# Patient Record
Sex: MALE | Race: WHITE | NOT HISPANIC OR LATINO | Employment: OTHER | URBAN - NONMETROPOLITAN AREA
[De-identification: names, ages, dates, MRNs, and addresses within clinical notes are randomized per-mention and may not be internally consistent; named-entity substitution may affect disease eponyms.]

---

## 2018-08-21 ENCOUNTER — OFFICE VISIT (OUTPATIENT)
Dept: ORTHOPEDICS | Facility: OTHER | Age: 83
End: 2018-08-21
Attending: ORTHOPAEDIC SURGERY
Payer: MEDICARE

## 2018-08-21 DIAGNOSIS — G89.29 CHRONIC PAIN OF BOTH KNEES: Primary | ICD-10-CM

## 2018-08-21 DIAGNOSIS — M25.561 CHRONIC PAIN OF BOTH KNEES: Primary | ICD-10-CM

## 2018-08-21 DIAGNOSIS — M25.562 CHRONIC PAIN OF BOTH KNEES: Primary | ICD-10-CM

## 2018-08-21 PROCEDURE — G0463 HOSPITAL OUTPT CLINIC VISIT: HCPCS | Mod: 25

## 2018-08-21 PROCEDURE — 25000128 H RX IP 250 OP 636: Performed by: ORTHOPAEDIC SURGERY

## 2018-08-21 PROCEDURE — 20610 DRAIN/INJ JOINT/BURSA W/O US: CPT | Mod: 50

## 2018-08-21 RX ORDER — TRIAMCINOLONE ACETONIDE 40 MG/ML
40 INJECTION, SUSPENSION INTRA-ARTICULAR; INTRAMUSCULAR ONCE
Status: COMPLETED | OUTPATIENT
Start: 2018-08-21 | End: 2018-08-21

## 2018-08-21 RX ADMIN — TRIAMCINOLONE ACETONIDE 40 MG: 40 INJECTION, SUSPENSION INTRA-ARTICULAR; INTRAMUSCULAR at 17:12

## 2018-08-21 RX ADMIN — TRIAMCINOLONE ACETONIDE 40 MG: 40 INJECTION, SUSPENSION INTRA-ARTICULAR; INTRAMUSCULAR at 17:13

## 2018-08-21 NOTE — NURSING NOTE
Patient is here for a follow up on his bilateral knee pain.  Patient is seeing Dr. Castro from [facility] today, .    Dorothea Mendoza LPN .......8/21/2018 1:01 PM

## 2018-08-21 NOTE — MR AVS SNAPSHOT
"              After Visit Summary   2018    Elsa Horowitz    MRN: [id number]           Patient Information     Date Of Birth          1935        Visit Information        Provider Department      2018 12:30 PM Umer Castro MD [facility]        Today's Diagnoses     Chronic pain of both knees    -  1       Follow-ups after your visit        Who to contact     If you have questions or need follow up information about today's clinic visit or your schedule please contact [facility] directly at [phone].  Normal or non-critical lab and imaging results will be communicated to you by JolieBoxhart, letter or phone within 4 business days after the clinic has received the results. If you do not hear from us within 7 days, please contact the clinic through psicofxpt or phone. If you have a critical or abnormal lab result, we will notify you by phone as soon as possible.  Submit refill requests through [employer] or call your pharmacy and they will forward the refill request to us. Please allow 3 business days for your refill to be completed.          Additional Information About Your Visit        MyChart Information     [employer] lets you send messages to your doctor, view your test results, renew your prescriptions, schedule appointments and more. To sign up, go to [email or web address] . Click on \"Log in\" on the left side of the screen, which will take you to the Welcome page. Then click on \"Sign up Now\" on the right side of the page.     You will be asked to enter the access code listed below, as well as some personal information. Please follow the directions to create your username and password.     Your access code is: [email or web address]  Expires: 2018 11:02 AM     Your access code will  in 90 days. If you need help or a new code, please call your [place] clinic or [phone].        Care EveryWhere ID     This is your Care EveryWhere ID. This could be " used by other organizations to access your [place] medical records  [id number]         Blood Pressure from Last 3 Encounters:   04/14/13 126/57   04/13/13 135/65   04/07/13 124/62    Weight from Last 3 Encounters:   No data found for Wt              Today, you had the following     No orders found for display       Primary Care Provider Office Phone # Fax #    Lisa Genao [phone]       [facility] INTERNAL  [address] [address]        Equal Access to Services     HARPREET BRITO : Hadii aad ku hadasho Soomaali, waaxda luqadaha, qaybta kaalmada adeegyada, waxay idiin hayaan adeeg maryse rmairez . So [facility] [phone].    ATENCIÓN: Si habla español, tiene a albert disposición servicios gratuitos de asistencia lingüística. [facility] [phone].    We comply with applicable federal civil rights laws and [place] laws. We do not discriminate on the basis of race, color, national origin, age, disability, sex, sexual orientation, or gender identity.            Thank you!     Thank you for choosing [facility] AND [facility]  for your care. Our goal is always to provide you with excellent care. Hearing back from our patients is one way we can continue to improve our services. Please take a few minutes to complete the written survey that you may receive in the mail after your visit with us. Thank you!             Your Updated Medication List - Protect others around you: Learn how to safely use, store and throw away your medicines at www.disposemymeds.org.          This list is accurate as of 8/21/18 11:59 PM.  Always use your most recent med list.                   Brand Name Dispense Instructions for use Diagnosis    ACETAMINOPHEN      Take 650 mg by mouth every 6 hours as needed.        alum & mag hydroxide-simethicone 400-400-40 MG/5ML Susp suspension    MYLANTA ES/MAALOX  ES     Take 10 mLs by mouth. Between meals and at bedtime as needed        CARAFATE 1 GM tablet    Generic drug:  sucralfate      Take 1 tablet by mouth 4 times daily. On an empty stomach 1 hour before meals and at bedtime        DERMATOP 0.1 % Crea cream   Generic drug:  prednicarbate      Apply  topically 2 times daily. A thin layer to affected area(s)        ferrous sulfate 325 (65 Fe) MG tablet    IRON     Take 1 tablet by mouth 2 times daily (with meals).        fish oil-omega-3 fatty acids 1000 MG capsule      Take 3 capsules by mouth daily.        HumaLOG KWIKpen 100 UNIT/ML injection   Generic drug:  insulin lispro      Inject  Subcutaneous. As per insulin sliding scale protocol        * IMDUR 30 MG 24 hr tablet   Generic drug:  isosorbide mononitrate      Take 1 tablet by mouth every morning.        * IMDUR 60 MG 24 hr tablet   Generic drug:  isosorbide mononitrate      Take 1 tablet by mouth every morning.        LEVEMIR VIAL 100 UNIT/ML injection   Generic drug:  insulin detemir      Inject 45 Units Subcutaneous At Bedtime.        LISINOPRIL PO      Take 10 mg by mouth daily.        loperamide 2 MG tablet    IMODIUM A-D     Take 2 tablets by mouth. After 1st loose stool and 1 tablet (2mg) after each subsequent bowel movement; do not exceed 16mg in 24 hours        metFORMIN 1000 MG tablet    GLUCOPHAGE     Take 1 tablet by mouth 2 times daily (with meals).        metoprolol tartrate 50 MG tablet    LOPRESSOR     Take 2 tablets by mouth 2 times daily.        MULTIVITAMIN PO      Take 1 tablet by mouth daily. With food        * NEURONTIN 300 MG capsule   Generic drug:  gabapentin      Take 1 capsule by mouth. In the morning and at breakfast        * NEURONTIN 600 MG tablet   Generic drug:  gabapentin      Take 1 tablet by mouth At Bedtime.        PLAVIX 75 MG tablet   Generic drug:  clopidogrel      Take 1 tablet by mouth daily.        PROTONIX PO      Take 1 tablet by mouth 2 times daily.        simvastatin 40 MG tablet    ZOCOR     Take 1 tablet by mouth every evening.        Vitamin D3 2000 units  Tabs      Take 1 tablet by mouth daily.        * Notice:  This list has 4 medication(s) that are the same as other medications prescribed for you. Read the directions carefully, and ask your doctor or other care provider to review them with you.

## 2018-12-04 ENCOUNTER — OFFICE VISIT (OUTPATIENT)
Dept: ORTHOPEDICS | Facility: OTHER | Age: 83
End: 2018-12-04
Attending: ORTHOPAEDIC SURGERY
Payer: MEDICARE

## 2018-12-04 DIAGNOSIS — Z00.00 ROUTINE GENERAL MEDICAL EXAMINATION AT A HEALTH CARE FACILITY: Primary | ICD-10-CM

## 2018-12-04 PROCEDURE — G0463 HOSPITAL OUTPT CLINIC VISIT: HCPCS

## 2018-12-17 NOTE — PROGRESS NOTES
CHIEF COMPLAINT: Bilateral Knee Pain Recheck    PROBLEMS:  KNEE PAIN (ICD-719.46) (BQM11-C34.569)    PATIENT REPORTED MEDICATIONS:  GABAPENTIN 300 MG ORAL CAPSULE (GABAPENTIN)   LOPRESSOR 50 MG ORAL TABLET (METOPROLOL TARTRATE)   HUMALOG KWIKPEN 100 UNIT/ML SUBCUTANEOUS SOLUTION PEN-INJECTOR (INSULIN LISPRO (HUMAN))   CARAFATE 1 GM ORAL TABLET (SUCRALFATE)   PLAVIX 75 MG ORAL TABLET (CLOPIDOGREL BISULFATE)   FISH OIL 1200 MG ORAL CAPSULE (OMEGA-3 FATTY ACIDS) One a day  MULTIVITAMINS TABS (MULTIPLE VITAMIN)   VITAMIN D3 CAPSULE (CHOLECALCIFEROL CAPS)   IMDUR 60 MG ORAL TABLET EXTENDED RELEASE 24 HOUR (ISOSORBIDE MONONITRATE)   NITROSTAT 0.4 MG SUBLINGUAL TABLET SUBLINGUAL (NITROGLYCERIN)   SIMVASTATIN 40 MG ORAL TABLET (SIMVASTATIN)   LISINOPRIL 20 MG ORAL TABLET (LISINOPRIL)     PATIENT REPORTED ALLERGIES:  PENICILLIN (PENICILLIN V POTASSIUM) (CriticalReaction: No reaction details noted)  * TETANUS (SevereReaction: No reaction details noted)      HISTORY OF PRESENT ILLNESS:    REASON FOR EVALUATION:  Bilateral knee pain.    HISTORY OF PRESENT ILLNESS:  Elsa comes in bilateral knee pain.  She is here for an injection at this time.  Last injections were done in August.      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  Neurovascular examination is otherwise intact.  Mild swelling is seen there, as well.  Patellofemoral tracking is acceptable.      ASSESSMENT:    IMPRESSION:  Bilateral knee arthrosis.     PROCEDURES:   Risks and benefits of the procedure were reviewed with the patient. Informed consent was obtained. Blood sugar risks for our diabetic  patients were also discussed.    After achieving informed consent and sterile preparation, the patient's bilateral knees are injected with 2 cc 1% lidocaine and 40 mg Kenalog under sterile conditions.  The patient did tolerate the procedures well.     PLAN:   Bilateral injections as stated above.   Repeat in the future as appropriate and necessary.    Dictated by:  Umer Castro MD  Copy to:  Mc Ferro DO     D:  12/04/18  T:  12/10/18    Typed and/or reviewed and corrected by signing  below, and sent to the Physician for final review and signature.      This report was created using voice recording software and computer-generated templates. Although every effort has been made to review for and eliminate errors, some errors may still occur.         Electronically signed by Yolande Núñez on 12/10/2018 at 9:19 AM    Electronically signed by Umer Castro MD on 12/10/2018 at 9:29 AM

## 2019-03-05 ENCOUNTER — OFFICE VISIT (OUTPATIENT)
Dept: ORTHOPEDICS | Facility: OTHER | Age: 84
End: 2019-03-05
Attending: ORTHOPAEDIC SURGERY
Payer: MEDICARE

## 2019-03-05 DIAGNOSIS — Z00.00 ROUTINE GENERAL MEDICAL EXAMINATION AT A HEALTH CARE FACILITY: Primary | ICD-10-CM

## 2019-03-05 PROCEDURE — G0463 HOSPITAL OUTPT CLINIC VISIT: HCPCS

## 2019-03-11 NOTE — PROGRESS NOTES
CHIEF COMPLAINT: Bilateral Knee Pain Recheck    PROBLEMS:  KNEE PAIN (ICD-719.46) ([id number])    PATIENT REPORTED MEDICATIONS:  GABAPENTIN 300 MG ORAL CAPSULE (GABAPENTIN)   LOPRESSOR 50 MG ORAL TABLET (METOPROLOL TARTRATE)   HUMALOG KWIKPEN 100 UNIT/ML SUBCUTANEOUS SOLUTION PEN-INJECTOR (INSULIN LISPRO (HUMAN))   CARAFATE 1 GM ORAL TABLET (SUCRALFATE)   PLAVIX 75 MG ORAL TABLET (CLOPIDOGREL BISULFATE)   FISH OIL 1200 MG ORAL CAPSULE (OMEGA-3 FATTY ACIDS) One a day  MULTIVITAMINS TABS (MULTIPLE VITAMIN)   VITAMIN D3 CAPSULE (CHOLECALCIFEROL CAPS)   IMDUR 60 MG ORAL TABLET EXTENDED RELEASE 24 HOUR (ISOSORBIDE MONONITRATE)   NITROSTAT 0.4 MG SUBLINGUAL TABLET SUBLINGUAL (NITROGLYCERIN)   SIMVASTATIN 40 MG ORAL TABLET (SIMVASTATIN)   LISINOPRIL 20 MG ORAL TABLET (LISINOPRIL)     PATIENT REPORTED ALLERGIES:  PENICILLIN (PENICILLIN V POTASSIUM) (CriticalReaction: No reaction details noted)  * TETANUS (SevereReaction: No reaction details noted)      HISTORY OF PRESENT ILLNESS:    REASON FOR EVALUATION:  Bilateral knee pain.    HISTORY OF PRESENT ILLNESS:  Elsa comes in for bilateral knee injections.  Last injection was done in December.      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  Pain with deep flexion.  Neurovascular exam is intact.  Varus deformity otherwise seen.     ASSESSMENT:    IMPRESSION:  Bilateral knee arthrosis.     PROCEDURES:   Risks and benefits of the procedure were reviewed with the patient. Informed consent was obtained. Blood sugar risks for our diabetic patients were also discussed.    After achieving informed consent  and sterile preparation, the patient's bilateral knees are injected with 2 cc 1% prilocaine and 40 mg Kenalog under sterile conditions.  The patient did tolerate the procedures well.      PLAN:   Injections as stated above, repeat in the future as appropriate and necessary.      Dictated by:  Umer Castro MD  Copy to:  Mc Ferro DO  Copy to: Dr. Castro @ [facility]     D:  03/05/19  T:  03/11/19    Typed and/or reviewed and corrected by signing  below, and sent to the Physician for final review and signature.      This report was created using voice recording software and computer-generated templates. Although every effort has been made to review for and eliminate errors, some errors may still occur.         Electronically signed by Yolande Núñez on 03/11/2019 at 12:09 PM    Electronically signed by Umer Castro MD on 03/11/2019 at 12:10 PM  ________________________________________________________________________

## 2021-01-26 ENCOUNTER — MEDICAL CORRESPONDENCE (OUTPATIENT)
Dept: HEALTH INFORMATION MANAGEMENT | Facility: OTHER | Age: 86
End: 2021-01-26

## 2022-09-29 ENCOUNTER — MEDICAL CORRESPONDENCE (OUTPATIENT)
Dept: HEALTH INFORMATION MANAGEMENT | Facility: OTHER | Age: 87
End: 2022-09-29

## 2022-11-26 ENCOUNTER — APPOINTMENT (OUTPATIENT)
Dept: GENERAL RADIOLOGY | Facility: OTHER | Age: 87
End: 2022-11-26
Attending: EMERGENCY MEDICINE
Payer: COMMERCIAL

## 2022-11-26 ENCOUNTER — HOSPITAL ENCOUNTER (EMERGENCY)
Facility: OTHER | Age: 87
Discharge: OTHER INSTITUTION | End: 2022-11-27
Attending: EMERGENCY MEDICINE | Admitting: EMERGENCY MEDICINE
Payer: COMMERCIAL

## 2022-11-26 DIAGNOSIS — L03.115 CELLULITIS OF RIGHT LOWER EXTREMITY: ICD-10-CM

## 2022-11-26 DIAGNOSIS — R50.9 FEVER, UNSPECIFIED FEVER CAUSE: ICD-10-CM

## 2022-11-26 DIAGNOSIS — T81.40XA POSTOPERATIVE INFECTION, UNSPECIFIED TYPE, INITIAL ENCOUNTER: ICD-10-CM

## 2022-11-26 LAB
ALBUMIN SERPL BCG-MCNC: 3.7 G/DL (ref 3.5–5.2)
ALP SERPL-CCNC: 72 U/L (ref 40–129)
ALT SERPL W P-5'-P-CCNC: 11 U/L (ref 10–50)
ANION GAP SERPL CALCULATED.3IONS-SCNC: 11 MMOL/L (ref 7–15)
AST SERPL W P-5'-P-CCNC: 15 U/L (ref 10–50)
BASOPHILS # BLD AUTO: 0.1 10E3/UL (ref 0–0.2)
BASOPHILS NFR BLD AUTO: 1 %
BILIRUB SERPL-MCNC: 0.5 MG/DL
BUN SERPL-MCNC: 21.3 MG/DL (ref 8–23)
CALCIUM SERPL-MCNC: 8.4 MG/DL (ref 8.8–10.2)
CHLORIDE SERPL-SCNC: 96 MMOL/L (ref 98–107)
CREAT SERPL-MCNC: 1.37 MG/DL (ref 0.67–1.17)
DEPRECATED HCO3 PLAS-SCNC: 24 MMOL/L (ref 22–29)
EOSINOPHIL # BLD AUTO: 0.1 10E3/UL (ref 0–0.7)
EOSINOPHIL NFR BLD AUTO: 1 %
ERYTHROCYTE [DISTWIDTH] IN BLOOD BY AUTOMATED COUNT: 13.5 % (ref 10–15)
FLUAV RNA SPEC QL NAA+PROBE: NEGATIVE
FLUBV RNA RESP QL NAA+PROBE: NEGATIVE
GFR SERPL CREATININE-BSD FRML MDRD: 50 ML/MIN/1.73M2
GLUCOSE SERPL-MCNC: 182 MG/DL (ref 70–99)
HCT VFR BLD AUTO: 29.7 % (ref 40–53)
HGB BLD-MCNC: 10.1 G/DL (ref 13.3–17.7)
HOLD SPECIMEN: NORMAL
HOLD SPECIMEN: NORMAL
IMM GRANULOCYTES # BLD: 0.1 10E3/UL
IMM GRANULOCYTES NFR BLD: 1 %
LACTATE SERPL-SCNC: 1.1 MMOL/L (ref 0.7–2)
LYMPHOCYTES # BLD AUTO: 0.9 10E3/UL (ref 0.8–5.3)
LYMPHOCYTES NFR BLD AUTO: 8 %
MCH RBC QN AUTO: 29.7 PG (ref 26.5–33)
MCHC RBC AUTO-ENTMCNC: 34 G/DL (ref 31.5–36.5)
MCV RBC AUTO: 87 FL (ref 78–100)
MONOCYTES # BLD AUTO: 0.9 10E3/UL (ref 0–1.3)
MONOCYTES NFR BLD AUTO: 8 %
NEUTROPHILS # BLD AUTO: 8.7 10E3/UL (ref 1.6–8.3)
NEUTROPHILS NFR BLD AUTO: 81 %
NRBC # BLD AUTO: 0 10E3/UL
NRBC BLD AUTO-RTO: 0 /100
PLATELET # BLD AUTO: 154 10E3/UL (ref 150–450)
POTASSIUM SERPL-SCNC: 4.7 MMOL/L (ref 3.4–5.3)
PROT SERPL-MCNC: 6.5 G/DL (ref 6.4–8.3)
RBC # BLD AUTO: 3.4 10E6/UL (ref 4.4–5.9)
RSV RNA SPEC NAA+PROBE: NEGATIVE
SARS-COV-2 RNA RESP QL NAA+PROBE: NEGATIVE
SODIUM SERPL-SCNC: 131 MMOL/L (ref 136–145)
TROPONIN T SERPL HS-MCNC: 36 NG/L
TROPONIN T SERPL HS-MCNC: 38 NG/L
WBC # BLD AUTO: 10.7 10E3/UL (ref 4–11)

## 2022-11-26 PROCEDURE — 80053 COMPREHEN METABOLIC PANEL: CPT | Performed by: EMERGENCY MEDICINE

## 2022-11-26 PROCEDURE — 93005 ELECTROCARDIOGRAM TRACING: CPT | Performed by: EMERGENCY MEDICINE

## 2022-11-26 PROCEDURE — 250N000011 HC RX IP 250 OP 636: Performed by: EMERGENCY MEDICINE

## 2022-11-26 PROCEDURE — 73630 X-RAY EXAM OF FOOT: CPT | Mod: RT

## 2022-11-26 PROCEDURE — 96365 THER/PROPH/DIAG IV INF INIT: CPT | Performed by: EMERGENCY MEDICINE

## 2022-11-26 PROCEDURE — 87637 SARSCOV2&INF A&B&RSV AMP PRB: CPT | Performed by: EMERGENCY MEDICINE

## 2022-11-26 PROCEDURE — 93010 ELECTROCARDIOGRAM REPORT: CPT | Performed by: INTERNAL MEDICINE

## 2022-11-26 PROCEDURE — 250N000013 HC RX MED GY IP 250 OP 250 PS 637: Performed by: EMERGENCY MEDICINE

## 2022-11-26 PROCEDURE — 96367 TX/PROPH/DG ADDL SEQ IV INF: CPT | Performed by: EMERGENCY MEDICINE

## 2022-11-26 PROCEDURE — 36415 COLL VENOUS BLD VENIPUNCTURE: CPT | Performed by: EMERGENCY MEDICINE

## 2022-11-26 PROCEDURE — 85014 HEMATOCRIT: CPT | Performed by: EMERGENCY MEDICINE

## 2022-11-26 PROCEDURE — 258N000003 HC RX IP 258 OP 636: Performed by: EMERGENCY MEDICINE

## 2022-11-26 PROCEDURE — 96375 TX/PRO/DX INJ NEW DRUG ADDON: CPT | Performed by: EMERGENCY MEDICINE

## 2022-11-26 PROCEDURE — 99285 EMERGENCY DEPT VISIT HI MDM: CPT | Performed by: EMERGENCY MEDICINE

## 2022-11-26 PROCEDURE — 99285 EMERGENCY DEPT VISIT HI MDM: CPT | Mod: 25 | Performed by: EMERGENCY MEDICINE

## 2022-11-26 PROCEDURE — 83605 ASSAY OF LACTIC ACID: CPT | Performed by: EMERGENCY MEDICINE

## 2022-11-26 PROCEDURE — 87077 CULTURE AEROBIC IDENTIFY: CPT | Performed by: EMERGENCY MEDICINE

## 2022-11-26 PROCEDURE — 84484 ASSAY OF TROPONIN QUANT: CPT | Mod: 91 | Performed by: EMERGENCY MEDICINE

## 2022-11-26 RX ORDER — MORPHINE SULFATE 2 MG/ML
2 INJECTION, SOLUTION INTRAMUSCULAR; INTRAVENOUS ONCE
Status: COMPLETED | OUTPATIENT
Start: 2022-11-26 | End: 2022-11-26

## 2022-11-26 RX ORDER — ACETAMINOPHEN 500 MG
1000 TABLET ORAL ONCE
Status: COMPLETED | OUTPATIENT
Start: 2022-11-26 | End: 2022-11-26

## 2022-11-26 RX ORDER — LINEZOLID 2 MG/ML
600 INJECTION, SOLUTION INTRAVENOUS EVERY 12 HOURS
Status: DISCONTINUED | OUTPATIENT
Start: 2022-11-26 | End: 2022-11-26

## 2022-11-26 RX ORDER — CEFEPIME HYDROCHLORIDE 1 G/1
1 INJECTION, POWDER, FOR SOLUTION INTRAMUSCULAR; INTRAVENOUS ONCE
Status: COMPLETED | OUTPATIENT
Start: 2022-11-26 | End: 2022-11-26

## 2022-11-26 RX ORDER — SODIUM CHLORIDE 9 MG/ML
INJECTION, SOLUTION INTRAVENOUS CONTINUOUS
Status: DISCONTINUED | OUTPATIENT
Start: 2022-11-26 | End: 2022-11-27 | Stop reason: HOSPADM

## 2022-11-26 RX ORDER — CEFAZOLIN SODIUM 1 G/50ML
2000 SOLUTION INTRAVENOUS ONCE
Status: COMPLETED | OUTPATIENT
Start: 2022-11-26 | End: 2022-11-27

## 2022-11-26 RX ADMIN — CEFEPIME HYDROCHLORIDE 1 G: 1 INJECTION, POWDER, FOR SOLUTION INTRAMUSCULAR; INTRAVENOUS at 22:17

## 2022-11-26 RX ADMIN — ACETAMINOPHEN 1000 MG: 500 TABLET ORAL at 19:52

## 2022-11-26 RX ADMIN — SODIUM CHLORIDE: 9 INJECTION, SOLUTION INTRAVENOUS at 20:18

## 2022-11-26 RX ADMIN — MORPHINE SULFATE 2 MG: 2 INJECTION, SOLUTION INTRAMUSCULAR; INTRAVENOUS at 20:11

## 2022-11-26 RX ADMIN — VANCOMYCIN HYDROCHLORIDE 2000 MG: 10 INJECTION, POWDER, LYOPHILIZED, FOR SOLUTION INTRAVENOUS at 22:52

## 2022-11-26 ASSESSMENT — ACTIVITIES OF DAILY LIVING (ADL)
ADLS_ACUITY_SCORE: 35
ADLS_ACUITY_SCORE: 35

## 2022-11-27 ENCOUNTER — APPOINTMENT (OUTPATIENT)
Dept: GENERAL RADIOLOGY | Facility: OTHER | Age: 87
End: 2022-11-27
Attending: EMERGENCY MEDICINE
Payer: COMMERCIAL

## 2022-11-27 VITALS
WEIGHT: 230 LBS | RESPIRATION RATE: 18 BRPM | BODY MASS INDEX: 30.48 KG/M2 | TEMPERATURE: 101.2 F | OXYGEN SATURATION: 95 % | DIASTOLIC BLOOD PRESSURE: 71 MMHG | HEIGHT: 73 IN | HEART RATE: 86 BPM | SYSTOLIC BLOOD PRESSURE: 133 MMHG

## 2022-11-27 PROCEDURE — 250N000011 HC RX IP 250 OP 636: Performed by: EMERGENCY MEDICINE

## 2022-11-27 PROCEDURE — 71045 X-RAY EXAM CHEST 1 VIEW: CPT | Mod: TC

## 2022-11-27 PROCEDURE — 73030 X-RAY EXAM OF SHOULDER: CPT | Mod: TC,RT

## 2022-11-27 PROCEDURE — 96376 TX/PRO/DX INJ SAME DRUG ADON: CPT | Performed by: EMERGENCY MEDICINE

## 2022-11-27 PROCEDURE — 96366 THER/PROPH/DIAG IV INF ADDON: CPT | Performed by: EMERGENCY MEDICINE

## 2022-11-27 RX ORDER — MORPHINE SULFATE 2 MG/ML
2 INJECTION, SOLUTION INTRAMUSCULAR; INTRAVENOUS ONCE
Status: COMPLETED | OUTPATIENT
Start: 2022-11-27 | End: 2022-11-27

## 2022-11-27 RX ADMIN — MORPHINE SULFATE 2 MG: 2 INJECTION, SOLUTION INTRAMUSCULAR; INTRAVENOUS at 02:55

## 2022-11-27 ASSESSMENT — ACTIVITIES OF DAILY LIVING (ADL)
ADLS_ACUITY_SCORE: 35
ADLS_ACUITY_SCORE: 35

## 2022-11-27 NOTE — ED NOTES
Writer attempted to call care facility to update on patient's transfer [facility] in [place]; message left with Becki (director) at [phone].

## 2022-11-27 NOTE — ED TRIAGE NOTES
Patient comes in from [facility].  He had surgery a couple of weeks ago on his right foot.  It has since become painful, swollen, and red.  Redness extends now to just below the knee.  Fever is 102.2 at facility.

## 2022-11-27 NOTE — ED PROVIDER NOTES
History     Chief Complaint   Patient presents with     Fever     Wound Infection     HPI  Elsa Horowitz is a 87 year old male who presents with RLE cellulitis. Had surgery on R foot (partial amputation R small toe @[place]) 3 weeks ago. Last follow up unknown. Redness developed a few days ago, also with shooting pain. There is an aid who comes to visit and change dressing twice daily but does not recall if they addressed it. He is in assisted living due to memory problems so his history is limited. He is febrile. Also has shoulder pain from falling today. Reports he lost consciousness today.  Reports he felt cold beforehand, unsure how long event lasted. Mild cough.     Allergies:  Allergies   Allergen Reactions     Penicillins      Pts. Arm swelled, but has tolerated rocephin     Tetanus Antitoxin      Tetanus Toxoid        Problem List:    Patient Active Problem List    Diagnosis Date Noted     ACP (advance care planning) 2015     Priority: Medium     Advance Care Plannin2015. ACP Review Reviewed chart for advance care plan.  Elsa Horowitz has no plan or code status on file. [facility]. Please provide resources for ACP at next opportunity. Confirmed/documented designated decision maker(s) as next of kin in absence of legal document.  Added by Kasandra Louise RN, System ACP Coordinator [facility] Cho[employer]              Cellulitis 2013     Priority: Medium        Past Medical History:    History reviewed. No pertinent past medical history.    Past Surgical History:    History reviewed. No pertinent surgical history.    Family History:    History reviewed. No pertinent family history.    Social History:  Marital Status:   [2]  Social History     Tobacco Use     Smoking status: Former   Substance Use Topics     Alcohol use: Not Currently     Drug use: Not Currently     Comment: Drug use: Not Asked        Medications:   "  ACETAMINOPHEN  alum & mag hydroxide-simethicone (MYLANTA ES/MAALOX  ES) 400-400-40 MG/5ML SUSP  Cholecalciferol (VITAMIN D3) 2000 UNITS TABS  gabapentin (NEURONTIN) 300 MG capsule  isosorbide mononitrate (IMDUR) 30 MG 24 hr tablet  metoprolol (LOPRESSOR) 50 MG tablet  clopidogrel (PLAVIX) 75 MG tablet  ferrous sulfate 325 (65 FE) MG tablet  fish oil-omega-3 fatty acids (FISH OIL) 1000 MG capsule  gabapentin (NEURONTIN) 600 MG tablet  insulin detemir (LEVEMIR VIAL 100 UNITS/ML SC SOLN) 100 UNIT/ML injection  insulin lispro (HUMALOG KWIKPEN 100 UNIT/ML SC SOLN) 100 UNIT/ML injection  isosorbide mononitrate (IMDUR) 60 MG 24 hr tablet  LISINOPRIL PO  loperamide (IMODIUM A-D) 2 MG tablet  metFORMIN (GLUCOPHAGE) 1000 MG tablet  Multiple Vitamins-Minerals (MULTIVITAMIN OR)  Pantoprazole Sodium (PROTONIX PO)  prednicarbate (DERMATOP) 0.1 % CREA  simvastatin (ZOCOR) 40 MG tablet  sucralfate (CARAFATE) 1 GM tablet          Review of Systems  Please seen HPI for pertinent positives and negatives. All other systems reviewed and found to be negative.   Physical Exam   BP: (!) 159/69  Pulse: 98  Temp: (!) 101.4  F (38.6  C)  Resp: 18  Height: 185.4 cm (6' 1\")  Weight: 104.3 kg (230 lb)  SpO2: 92 %      Physical Exam  Constitutional:       General: He is not in acute distress.     Appearance: He is not ill-appearing.   HENT:      Head: Normocephalic and atraumatic.      Nose: Nose normal.      Mouth/Throat:      Mouth: Mucous membranes are moist.      Pharynx: Oropharynx is clear.   Eyes:      Conjunctiva/sclera: Conjunctivae normal.      Pupils: Pupils are equal, round, and reactive to light.   Cardiovascular:      Rate and Rhythm: Normal rate and regular rhythm.   Pulmonary:      Effort: Pulmonary effort is normal.      Breath sounds: Normal breath sounds.   Abdominal:      Palpations: Abdomen is soft.      Tenderness: There is no abdominal tenderness.   Musculoskeletal:      Cervical back: Normal range of motion.      " Comments: Tenderness R AC joint. ROM R shoulder limited to 90 degrees extension and abduction. No deformity  Multiple toe amputations   Skin:     General: Skin is warm and dry.      Comments: Wound on R small toe, no discharge. Redness from foot to upper calf, with tenderness   Neurological:      Mental Status: He is alert and oriented to person, place, and time.         ED Course              ED Course as of 11/28/22 0417   Sat Nov 26, 2022 2103 Creatinine(!): 1.37   2103 Troponin T, High Sensitivity(!): 38  Will trend   2129 On waiting list at [facility] [address] [place].  Recommended broad spectrum antibiotic coverage.  Cefepime added   2339 Troponin T, High Sensitivity(!): 36  Stable   Sun Nov 27, 2022 0202 Accepted [facility]     Procedures              EKG Interpretation:      Interpreted by Sofia Meraz MD  Time reviewed: 2028  Symptoms at time of EKG: leg pain   Rhythm: normal sinus   Rate: normal  Axis: normal  Ectopy: none  Conduction: normal  ST Segments/ T Waves: No ST-T wave changes  Q Waves: none  Comparison to prior: Unchanged    Clinical Impression: normal EKG          Critical Care time:  none               No results found for this or any previous visit (from the past 24 hour(s)).    Medications   acetaminophen (TYLENOL) tablet 1,000 mg (1,000 mg Oral Given 11/26/22 1952)   morphine (PF) injection 2 mg (2 mg Intravenous Given 11/26/22 2011)   ceFEPIme (MAXIPIME) 1g vial to attach to  ml bag for ADULTS or NS 50 ml bag for PEDS (0 g Intravenous Stopped 11/26/22 2249)   vancomycin (VANCOCIN) 2,000 mg in sodium chloride 0.9 % 500 mL intermittent infusion (0 mg Intravenous Stopped 11/27/22 0251)   morphine (PF) injection 2 mg (2 mg Intravenous Given 11/27/22 0255)       Assessments & Plan (with Medical Decision Making)     I have reviewed the nursing notes.    I have reviewed the findings, diagnosis, plan and need for follow up with the patient.    Mr Horowitz is an 86 yo man who presents  with R foot redness and pain in setting of recent debridement of diabetic foot ulcer. Febrile. Not tachycardic. Will obtain labs including cultures. Does not meet sepsis criteria so will not obtain lactate.  Will treat with vancomycin to cover MRSA.  Patient reports possible syncope. Is very limited historian so details are limited. Will obtain EKG and troponin, keep on monitor. Will obtain shoulder xray as he is reporting increased pain since his fall. Will require admission.    Discharge Medication List as of 11/27/2022  3:09 AM          Final diagnoses:   Cellulitis of right lower extremity   Postoperative infection, unspecified type, initial encounter   Fever, unspecified fever cause       11/26/2022   [facility] AND [facility] [place]     Sofia Meraz MD  11/28/22 0411

## 2022-11-28 LAB
ATRIAL RATE - MUSE: 91 BPM
DIASTOLIC BLOOD PRESSURE - MUSE: NORMAL MMHG
INTERPRETATION ECG - MUSE: NORMAL
P AXIS - MUSE: 53 DEGREES
PR INTERVAL - MUSE: 188 MS
QRS DURATION - MUSE: 74 MS
QT - MUSE: 336 MS
QTC - MUSE: 413 MS
R AXIS - MUSE: 25 DEGREES
SYSTOLIC BLOOD PRESSURE - MUSE: NORMAL MMHG
T AXIS - MUSE: 47 DEGREES
VENTRICULAR RATE- MUSE: 91 BPM

## 2022-12-02 LAB
BACTERIA BLD CULT: ABNORMAL

## 2022-12-09 DIAGNOSIS — I21.4 NSTEMI (NON-ST ELEVATED MYOCARDIAL INFARCTION) (H): Primary | ICD-10-CM

## 2023-01-13 ENCOUNTER — APPOINTMENT (OUTPATIENT)
Dept: GENERAL RADIOLOGY | Facility: OTHER | Age: 88
DRG: 907 | End: 2023-01-13
Attending: PHYSICIAN ASSISTANT
Payer: COMMERCIAL

## 2023-01-13 ENCOUNTER — HOSPITAL ENCOUNTER (INPATIENT)
Facility: OTHER | Age: 88
LOS: 5 days | Discharge: HOME HEALTH | DRG: 907 | End: 2023-01-19
Attending: PHYSICIAN ASSISTANT | Admitting: INTERNAL MEDICINE
Payer: COMMERCIAL

## 2023-01-13 ENCOUNTER — APPOINTMENT (OUTPATIENT)
Dept: ULTRASOUND IMAGING | Facility: OTHER | Age: 88
DRG: 907 | End: 2023-01-13
Attending: PHYSICIAN ASSISTANT
Payer: COMMERCIAL

## 2023-01-13 DIAGNOSIS — N17.9 ACUTE RENAL FAILURE, UNSPECIFIED ACUTE RENAL FAILURE TYPE (H): ICD-10-CM

## 2023-01-13 DIAGNOSIS — E87.1 HYPONATREMIA: ICD-10-CM

## 2023-01-13 DIAGNOSIS — L03.115 CELLULITIS OF RIGHT LOWER EXTREMITY: ICD-10-CM

## 2023-01-13 DIAGNOSIS — T87.81 DEHISCENCE OF AMPUTATION STUMP (H): ICD-10-CM

## 2023-01-13 DIAGNOSIS — E87.1 HYPOSMOLALITY SYNDROME: ICD-10-CM

## 2023-01-13 DIAGNOSIS — T14.8XXA LOCAL INFECTION OF WOUND: ICD-10-CM

## 2023-01-13 DIAGNOSIS — T81.40XA POSTOPERATIVE INFECTION, UNSPECIFIED TYPE, INITIAL ENCOUNTER: ICD-10-CM

## 2023-01-13 DIAGNOSIS — N17.9 ACUTE KIDNEY INJURY (H): ICD-10-CM

## 2023-01-13 DIAGNOSIS — L08.9 LOCAL INFECTION OF WOUND: ICD-10-CM

## 2023-01-13 DIAGNOSIS — Z89.421 STATUS POST AMPUTATION OF TOE OF RIGHT FOOT (H): ICD-10-CM

## 2023-01-13 LAB
ANION GAP SERPL CALCULATED.3IONS-SCNC: 11 MMOL/L (ref 7–15)
BASOPHILS # BLD AUTO: 0.1 10E3/UL (ref 0–0.2)
BASOPHILS NFR BLD AUTO: 1 %
BUN SERPL-MCNC: 48.3 MG/DL (ref 8–23)
CALCIUM SERPL-MCNC: 8.8 MG/DL (ref 8.8–10.2)
CHLORIDE SERPL-SCNC: 96 MMOL/L (ref 98–107)
CREAT SERPL-MCNC: 1.75 MG/DL (ref 0.67–1.17)
DEPRECATED HCO3 PLAS-SCNC: 22 MMOL/L (ref 22–29)
EOSINOPHIL # BLD AUTO: 0.5 10E3/UL (ref 0–0.7)
EOSINOPHIL NFR BLD AUTO: 7 %
ERYTHROCYTE [DISTWIDTH] IN BLOOD BY AUTOMATED COUNT: 13.4 % (ref 10–15)
FLUAV RNA SPEC QL NAA+PROBE: NEGATIVE
FLUBV RNA RESP QL NAA+PROBE: NEGATIVE
GFR SERPL CREATININE-BSD FRML MDRD: 37 ML/MIN/1.73M2
GLUCOSE SERPL-MCNC: 142 MG/DL (ref 70–99)
HCT VFR BLD AUTO: 26.9 % (ref 40–53)
HGB BLD-MCNC: 9.2 G/DL (ref 13.3–17.7)
IMM GRANULOCYTES # BLD: 0 10E3/UL
IMM GRANULOCYTES NFR BLD: 0 %
LACTATE SERPL-SCNC: 0.5 MMOL/L (ref 0.7–2)
LYMPHOCYTES # BLD AUTO: 1.2 10E3/UL (ref 0.8–5.3)
LYMPHOCYTES NFR BLD AUTO: 17 %
MCH RBC QN AUTO: 29.5 PG (ref 26.5–33)
MCHC RBC AUTO-ENTMCNC: 34.2 G/DL (ref 31.5–36.5)
MCV RBC AUTO: 86 FL (ref 78–100)
MONOCYTES # BLD AUTO: 0.7 10E3/UL (ref 0–1.3)
MONOCYTES NFR BLD AUTO: 10 %
NEUTROPHILS # BLD AUTO: 4.7 10E3/UL (ref 1.6–8.3)
NEUTROPHILS NFR BLD AUTO: 65 %
NRBC # BLD AUTO: 0 10E3/UL
NRBC BLD AUTO-RTO: 0 /100
PLATELET # BLD AUTO: 163 10E3/UL (ref 150–450)
POTASSIUM SERPL-SCNC: 5.1 MMOL/L (ref 3.4–5.3)
RBC # BLD AUTO: 3.12 10E6/UL (ref 4.4–5.9)
RSV RNA SPEC NAA+PROBE: NEGATIVE
SARS-COV-2 RNA RESP QL NAA+PROBE: NEGATIVE
SODIUM SERPL-SCNC: 129 MMOL/L (ref 136–145)
WBC # BLD AUTO: 7.2 10E3/UL (ref 4–11)

## 2023-01-13 PROCEDURE — 36415 COLL VENOUS BLD VENIPUNCTURE: CPT | Performed by: PHYSICIAN ASSISTANT

## 2023-01-13 PROCEDURE — 99284 EMERGENCY DEPT VISIT MOD MDM: CPT | Performed by: PHYSICIAN ASSISTANT

## 2023-01-13 PROCEDURE — C9803 HOPD COVID-19 SPEC COLLECT: HCPCS

## 2023-01-13 PROCEDURE — 85025 COMPLETE CBC W/AUTO DIFF WBC: CPT | Performed by: PHYSICIAN ASSISTANT

## 2023-01-13 PROCEDURE — 96375 TX/PRO/DX INJ NEW DRUG ADDON: CPT | Performed by: PHYSICIAN ASSISTANT

## 2023-01-13 PROCEDURE — 99285 EMERGENCY DEPT VISIT HI MDM: CPT | Mod: 25 | Performed by: PHYSICIAN ASSISTANT

## 2023-01-13 PROCEDURE — 96375 TX/PRO/DX INJ NEW DRUG ADDON: CPT

## 2023-01-13 PROCEDURE — 83605 ASSAY OF LACTIC ACID: CPT | Performed by: PHYSICIAN ASSISTANT

## 2023-01-13 PROCEDURE — C9803 HOPD COVID-19 SPEC COLLECT: HCPCS | Performed by: PHYSICIAN ASSISTANT

## 2023-01-13 PROCEDURE — 96374 THER/PROPH/DIAG INJ IV PUSH: CPT | Performed by: PHYSICIAN ASSISTANT

## 2023-01-13 PROCEDURE — 96374 THER/PROPH/DIAG INJ IV PUSH: CPT

## 2023-01-13 PROCEDURE — 80048 BASIC METABOLIC PNL TOTAL CA: CPT | Performed by: PHYSICIAN ASSISTANT

## 2023-01-13 PROCEDURE — 99285 EMERGENCY DEPT VISIT HI MDM: CPT | Mod: 25

## 2023-01-13 PROCEDURE — 93971 EXTREMITY STUDY: CPT | Mod: TC,RT

## 2023-01-13 PROCEDURE — 87637 SARSCOV2&INF A&B&RSV AMP PRB: CPT | Performed by: PHYSICIAN ASSISTANT

## 2023-01-13 PROCEDURE — 73630 X-RAY EXAM OF FOOT: CPT | Mod: RT

## 2023-01-13 RX ORDER — SODIUM CHLORIDE 9 MG/ML
1000 INJECTION, SOLUTION INTRAVENOUS CONTINUOUS
Status: DISCONTINUED | OUTPATIENT
Start: 2023-01-13 | End: 2023-01-14

## 2023-01-13 RX ORDER — GUAIFENESIN 200 MG/10ML
LIQUID ORAL
Status: ON HOLD | COMMUNITY
Start: 2022-05-11 | End: 2023-01-14

## 2023-01-13 RX ORDER — CEFAZOLIN SODIUM 1 G/50ML
2000 SOLUTION INTRAVENOUS ONCE
Status: COMPLETED | OUTPATIENT
Start: 2023-01-13 | End: 2023-01-14

## 2023-01-13 RX ORDER — CEFEPIME HYDROCHLORIDE 2 G/1
2 INJECTION, POWDER, FOR SOLUTION INTRAVENOUS ONCE
Status: COMPLETED | OUTPATIENT
Start: 2023-01-13 | End: 2023-01-14

## 2023-01-13 RX ORDER — CLOPIDOGREL BISULFATE 75 MG/1
75 TABLET ORAL
Status: ON HOLD | COMMUNITY
Start: 2022-11-30 | End: 2023-01-14

## 2023-01-13 RX ORDER — HYDROCORTISONE 25 MG/G
CREAM TOPICAL
Status: ON HOLD | COMMUNITY
Start: 2022-05-11 | End: 2023-01-14

## 2023-01-13 RX ORDER — INSULIN GLARGINE 100 [IU]/ML
INJECTION, SOLUTION SUBCUTANEOUS
Status: ON HOLD | COMMUNITY
Start: 2022-12-28 | End: 2023-01-14

## 2023-01-13 RX ORDER — LEVOTHYROXINE SODIUM 25 UG/1
TABLET ORAL
Status: ON HOLD | COMMUNITY
Start: 2022-05-11 | End: 2023-01-14

## 2023-01-13 ASSESSMENT — ENCOUNTER SYMPTOMS
FEVER: 0
CONFUSION: 0
WOUND: 1
SHORTNESS OF BREATH: 0
DYSURIA: 0
NAUSEA: 0
ABDOMINAL PAIN: 0
VOMITING: 0

## 2023-01-13 ASSESSMENT — ACTIVITIES OF DAILY LIVING (ADL)
ADLS_ACUITY_SCORE: 35
ADLS_ACUITY_SCORE: 35

## 2023-01-14 ENCOUNTER — APPOINTMENT (OUTPATIENT)
Dept: CT IMAGING | Facility: OTHER | Age: 88
DRG: 907 | End: 2023-01-14
Attending: PHYSICIAN ASSISTANT
Payer: COMMERCIAL

## 2023-01-14 PROBLEM — E11.43 TYPE II DIABETES MELLITUS WITH PERIPHERAL AUTONOMIC NEUROPATHY (H): Status: ACTIVE | Noted: 2019-01-08

## 2023-01-14 PROBLEM — I10 ESSENTIAL HYPERTENSION: Status: ACTIVE | Noted: 2023-01-14

## 2023-01-14 PROBLEM — E11.40 DIABETIC NEUROPATHY (H): Status: ACTIVE | Noted: 2023-01-14

## 2023-01-14 PROBLEM — I25.10 CORONARY ARTERY DISEASE: Status: ACTIVE | Noted: 2023-01-14

## 2023-01-14 PROBLEM — E87.1 HYPONATREMIA: Status: ACTIVE | Noted: 2023-01-14

## 2023-01-14 PROBLEM — I73.9 PAD (PERIPHERAL ARTERY DISEASE) (H): Status: ACTIVE | Noted: 2022-11-29

## 2023-01-14 PROBLEM — N18.4 CHRONIC KIDNEY DISEASE, STAGE 4 (SEVERE) (H): Status: ACTIVE | Noted: 2022-07-04

## 2023-01-14 PROBLEM — T14.8XXA LOCAL INFECTION OF WOUND: Status: ACTIVE | Noted: 2023-01-14

## 2023-01-14 PROBLEM — L08.9 LOCAL INFECTION OF WOUND: Status: ACTIVE | Noted: 2023-01-14

## 2023-01-14 PROBLEM — K21.9 GASTROESOPHAGEAL REFLUX DISEASE, UNSPECIFIED WHETHER ESOPHAGITIS PRESENT: Status: ACTIVE | Noted: 2018-04-17

## 2023-01-14 PROBLEM — L03.115 CELLULITIS OF RIGHT LOWER EXTREMITY: Status: ACTIVE | Noted: 2023-01-14

## 2023-01-14 LAB
GLUCOSE BLDC GLUCOMTR-MCNC: 151 MG/DL (ref 70–99)
GLUCOSE BLDC GLUCOMTR-MCNC: 187 MG/DL (ref 70–99)
GLUCOSE BLDC GLUCOMTR-MCNC: 236 MG/DL (ref 70–99)
GLUCOSE BLDC GLUCOMTR-MCNC: 89 MG/DL (ref 70–99)
HOLD SPECIMEN: NORMAL
MAGNESIUM SERPL-MCNC: 1.7 MG/DL (ref 1.7–2.3)
SODIUM SERPL-SCNC: 130 MMOL/L (ref 136–145)
TROPONIN T SERPL HS-MCNC: 45 NG/L
TROPONIN T SERPL HS-MCNC: 65 NG/L

## 2023-01-14 PROCEDURE — 84484 ASSAY OF TROPONIN QUANT: CPT | Performed by: INTERNAL MEDICINE

## 2023-01-14 PROCEDURE — 99222 1ST HOSP IP/OBS MODERATE 55: CPT | Mod: 25 | Performed by: SURGERY

## 2023-01-14 PROCEDURE — 87040 BLOOD CULTURE FOR BACTERIA: CPT | Performed by: PHYSICIAN ASSISTANT

## 2023-01-14 PROCEDURE — 120N000001 HC R&B MED SURG/OB

## 2023-01-14 PROCEDURE — 250N000011 HC RX IP 250 OP 636: Performed by: INTERNAL MEDICINE

## 2023-01-14 PROCEDURE — 36415 COLL VENOUS BLD VENIPUNCTURE: CPT | Performed by: PHYSICIAN ASSISTANT

## 2023-01-14 PROCEDURE — 258N000003 HC RX IP 258 OP 636: Performed by: EMERGENCY MEDICINE

## 2023-01-14 PROCEDURE — 250N000012 HC RX MED GY IP 250 OP 636 PS 637: Performed by: INTERNAL MEDICINE

## 2023-01-14 PROCEDURE — 258N000003 HC RX IP 258 OP 636: Performed by: PHYSICIAN ASSISTANT

## 2023-01-14 PROCEDURE — 73700 CT LOWER EXTREMITY W/O DYE: CPT | Mod: TC,RT,MG

## 2023-01-14 PROCEDURE — 258N000003 HC RX IP 258 OP 636: Performed by: INTERNAL MEDICINE

## 2023-01-14 PROCEDURE — 99223 1ST HOSP IP/OBS HIGH 75: CPT | Performed by: INTERNAL MEDICINE

## 2023-01-14 PROCEDURE — 84295 ASSAY OF SERUM SODIUM: CPT | Performed by: INTERNAL MEDICINE

## 2023-01-14 PROCEDURE — 36415 COLL VENOUS BLD VENIPUNCTURE: CPT | Performed by: INTERNAL MEDICINE

## 2023-01-14 PROCEDURE — 83735 ASSAY OF MAGNESIUM: CPT | Performed by: INTERNAL MEDICINE

## 2023-01-14 PROCEDURE — 250N000013 HC RX MED GY IP 250 OP 250 PS 637: Performed by: INTERNAL MEDICINE

## 2023-01-14 PROCEDURE — 250N000011 HC RX IP 250 OP 636: Performed by: PHYSICIAN ASSISTANT

## 2023-01-14 RX ORDER — ACETAMINOPHEN 325 MG/1
975 TABLET ORAL EVERY 6 HOURS PRN
Status: DISCONTINUED | OUTPATIENT
Start: 2023-01-14 | End: 2023-01-17

## 2023-01-14 RX ORDER — ACETAMINOPHEN 500 MG
1000 TABLET ORAL 3 TIMES DAILY
COMMUNITY

## 2023-01-14 RX ORDER — AMOXICILLIN 250 MG
1 CAPSULE ORAL 2 TIMES DAILY PRN
Status: DISCONTINUED | OUTPATIENT
Start: 2023-01-14 | End: 2023-01-19 | Stop reason: HOSPADM

## 2023-01-14 RX ORDER — METOPROLOL SUCCINATE 25 MG/1
25 TABLET, EXTENDED RELEASE ORAL DAILY
Status: DISCONTINUED | OUTPATIENT
Start: 2023-01-14 | End: 2023-01-15

## 2023-01-14 RX ORDER — ASPIRIN 81 MG/1
81 TABLET, CHEWABLE ORAL DAILY
Status: ON HOLD | COMMUNITY
End: 2023-03-21

## 2023-01-14 RX ORDER — NITROGLYCERIN 0.4 MG/1
0.4 TABLET SUBLINGUAL EVERY 5 MIN PRN
Status: DISCONTINUED | OUTPATIENT
Start: 2023-01-14 | End: 2023-01-19 | Stop reason: HOSPADM

## 2023-01-14 RX ORDER — NALOXONE HYDROCHLORIDE 0.4 MG/ML
0.2 INJECTION, SOLUTION INTRAMUSCULAR; INTRAVENOUS; SUBCUTANEOUS
Status: DISCONTINUED | OUTPATIENT
Start: 2023-01-14 | End: 2023-01-19 | Stop reason: HOSPADM

## 2023-01-14 RX ORDER — POLYETHYLENE GLYCOL 3350 17 G/17G
17 POWDER, FOR SOLUTION ORAL DAILY PRN
Status: DISCONTINUED | OUTPATIENT
Start: 2023-01-14 | End: 2023-01-19 | Stop reason: HOSPADM

## 2023-01-14 RX ORDER — GABAPENTIN 300 MG/1
600 CAPSULE ORAL AT BEDTIME
Status: ON HOLD | COMMUNITY
End: 2023-01-19

## 2023-01-14 RX ORDER — LISINOPRIL 10 MG/1
10 TABLET ORAL DAILY
Status: DISCONTINUED | OUTPATIENT
Start: 2023-01-14 | End: 2023-01-14

## 2023-01-14 RX ORDER — LIDOCAINE 40 MG/G
CREAM TOPICAL
Status: DISCONTINUED | OUTPATIENT
Start: 2023-01-14 | End: 2023-01-19 | Stop reason: HOSPADM

## 2023-01-14 RX ORDER — AMOXICILLIN 250 MG
2 CAPSULE ORAL 2 TIMES DAILY PRN
Status: DISCONTINUED | OUTPATIENT
Start: 2023-01-14 | End: 2023-01-19 | Stop reason: HOSPADM

## 2023-01-14 RX ORDER — CALCIUM CARBONATE/VITAMIN D3 500-10/5ML
400 LIQUID (ML) ORAL 2 TIMES DAILY
COMMUNITY

## 2023-01-14 RX ORDER — TRAMADOL HYDROCHLORIDE 50 MG/1
50 TABLET ORAL EVERY 8 HOURS PRN
Status: ON HOLD | COMMUNITY
End: 2023-03-21

## 2023-01-14 RX ORDER — GABAPENTIN 300 MG/1
600 CAPSULE ORAL AT BEDTIME
Status: DISCONTINUED | OUTPATIENT
Start: 2023-01-14 | End: 2023-01-17

## 2023-01-14 RX ORDER — INSULIN GLARGINE 100 [IU]/ML
17 INJECTION, SOLUTION SUBCUTANEOUS EVERY MORNING
COMMUNITY

## 2023-01-14 RX ORDER — ASPIRIN 81 MG/1
81 TABLET, CHEWABLE ORAL DAILY
Status: DISCONTINUED | OUTPATIENT
Start: 2023-01-14 | End: 2023-01-19 | Stop reason: HOSPADM

## 2023-01-14 RX ORDER — SUCRALFATE 1 G/1
1 TABLET ORAL 4 TIMES DAILY
Status: DISCONTINUED | OUTPATIENT
Start: 2023-01-14 | End: 2023-01-14

## 2023-01-14 RX ORDER — SODIUM CHLORIDE 9 MG/ML
INJECTION, SOLUTION INTRAVENOUS CONTINUOUS
Status: DISCONTINUED | OUTPATIENT
Start: 2023-01-14 | End: 2023-01-15

## 2023-01-14 RX ORDER — HYDROMORPHONE HYDROCHLORIDE 1 MG/ML
0.5 INJECTION, SOLUTION INTRAMUSCULAR; INTRAVENOUS; SUBCUTANEOUS
Status: DISCONTINUED | OUTPATIENT
Start: 2023-01-14 | End: 2023-01-18

## 2023-01-14 RX ORDER — DEXTROSE MONOHYDRATE 25 G/50ML
25-50 INJECTION, SOLUTION INTRAVENOUS
Status: DISCONTINUED | OUTPATIENT
Start: 2023-01-14 | End: 2023-01-19 | Stop reason: HOSPADM

## 2023-01-14 RX ORDER — LOSARTAN POTASSIUM 25 MG/1
25 TABLET ORAL DAILY
Status: ON HOLD | COMMUNITY
End: 2023-04-25

## 2023-01-14 RX ORDER — ISOSORBIDE MONONITRATE 30 MG/1
30 TABLET, EXTENDED RELEASE ORAL EVERY MORNING
Status: DISCONTINUED | OUTPATIENT
Start: 2023-01-14 | End: 2023-01-19 | Stop reason: HOSPADM

## 2023-01-14 RX ORDER — ACETAMINOPHEN 650 MG/1
650 SUPPOSITORY RECTAL EVERY 4 HOURS PRN
Status: DISCONTINUED | OUTPATIENT
Start: 2023-01-14 | End: 2023-01-17

## 2023-01-14 RX ORDER — PANTOPRAZOLE SODIUM 40 MG/1
40 TABLET, DELAYED RELEASE ORAL
Status: DISCONTINUED | OUTPATIENT
Start: 2023-01-14 | End: 2023-01-14

## 2023-01-14 RX ORDER — TAMSULOSIN HYDROCHLORIDE 0.4 MG/1
0.8 CAPSULE ORAL DAILY
Status: DISCONTINUED | OUTPATIENT
Start: 2023-01-14 | End: 2023-01-19 | Stop reason: HOSPADM

## 2023-01-14 RX ORDER — LEVOTHYROXINE SODIUM 25 UG/1
25 TABLET ORAL DAILY
Status: DISCONTINUED | OUTPATIENT
Start: 2023-01-14 | End: 2023-01-14

## 2023-01-14 RX ORDER — GABAPENTIN 600 MG/1
600 TABLET ORAL AT BEDTIME
Status: DISCONTINUED | OUTPATIENT
Start: 2023-01-14 | End: 2023-01-14

## 2023-01-14 RX ORDER — MORPHINE SULFATE 2 MG/ML
1-2 INJECTION, SOLUTION INTRAMUSCULAR; INTRAVENOUS
Status: DISCONTINUED | OUTPATIENT
Start: 2023-01-14 | End: 2023-01-14

## 2023-01-14 RX ORDER — METOPROLOL TARTRATE 25 MG/1
25 TABLET, FILM COATED ORAL 2 TIMES DAILY
Status: DISCONTINUED | OUTPATIENT
Start: 2023-01-14 | End: 2023-01-14

## 2023-01-14 RX ORDER — ACETAMINOPHEN 325 MG/1
975 TABLET ORAL EVERY 6 HOURS PRN
Status: DISCONTINUED | OUTPATIENT
Start: 2023-01-14 | End: 2023-01-14

## 2023-01-14 RX ORDER — CEFTRIAXONE SODIUM 2 G/50ML
2 INJECTION, SOLUTION INTRAVENOUS EVERY 24 HOURS
Status: DISCONTINUED | OUTPATIENT
Start: 2023-01-14 | End: 2023-01-17

## 2023-01-14 RX ORDER — NITROGLYCERIN 0.4 MG/1
0.4 TABLET SUBLINGUAL EVERY 5 MIN PRN
COMMUNITY

## 2023-01-14 RX ORDER — INSULIN ASPART 100 [IU]/ML
20 INJECTION, SOLUTION INTRAVENOUS; SUBCUTANEOUS
Status: ON HOLD | COMMUNITY
End: 2023-03-21

## 2023-01-14 RX ORDER — CLOPIDOGREL BISULFATE 75 MG/1
75 TABLET ORAL DAILY
Status: DISCONTINUED | OUTPATIENT
Start: 2023-01-14 | End: 2023-01-14

## 2023-01-14 RX ORDER — NALOXONE HYDROCHLORIDE 0.4 MG/ML
0.4 INJECTION, SOLUTION INTRAMUSCULAR; INTRAVENOUS; SUBCUTANEOUS
Status: DISCONTINUED | OUTPATIENT
Start: 2023-01-14 | End: 2023-01-19 | Stop reason: HOSPADM

## 2023-01-14 RX ORDER — INSULIN ASPART 100 [IU]/ML
INJECTION, SOLUTION INTRAVENOUS; SUBCUTANEOUS
COMMUNITY

## 2023-01-14 RX ORDER — LEVOTHYROXINE SODIUM 25 UG/1
25 TABLET ORAL EVERY EVENING
COMMUNITY

## 2023-01-14 RX ORDER — BISACODYL 10 MG
10 SUPPOSITORY, RECTAL RECTAL DAILY PRN
Status: DISCONTINUED | OUTPATIENT
Start: 2023-01-14 | End: 2023-01-19 | Stop reason: HOSPADM

## 2023-01-14 RX ORDER — FAMOTIDINE 20 MG/1
10 TABLET, FILM COATED ORAL 2 TIMES DAILY
COMMUNITY

## 2023-01-14 RX ORDER — LEVOTHYROXINE SODIUM 25 UG/1
25 TABLET ORAL EVERY EVENING
Status: DISCONTINUED | OUTPATIENT
Start: 2023-01-14 | End: 2023-01-19 | Stop reason: HOSPADM

## 2023-01-14 RX ORDER — NICOTINE POLACRILEX 4 MG
15-30 LOZENGE BUCCAL
Status: DISCONTINUED | OUTPATIENT
Start: 2023-01-14 | End: 2023-01-19 | Stop reason: HOSPADM

## 2023-01-14 RX ORDER — METOPROLOL SUCCINATE 25 MG/1
25 TABLET, EXTENDED RELEASE ORAL DAILY
Status: ON HOLD | COMMUNITY
End: 2023-01-19

## 2023-01-14 RX ORDER — INSULIN ASPART 100 [IU]/ML
15 INJECTION, SOLUTION INTRAVENOUS; SUBCUTANEOUS
Status: ON HOLD | COMMUNITY
End: 2023-03-21

## 2023-01-14 RX ORDER — TAMSULOSIN HYDROCHLORIDE 0.4 MG/1
0.8 CAPSULE ORAL EVERY EVENING
COMMUNITY

## 2023-01-14 RX ORDER — ONDANSETRON 4 MG/1
4 TABLET, ORALLY DISINTEGRATING ORAL EVERY 6 HOURS PRN
Status: DISCONTINUED | OUTPATIENT
Start: 2023-01-14 | End: 2023-01-19 | Stop reason: HOSPADM

## 2023-01-14 RX ORDER — ISOSORBIDE MONONITRATE 60 MG/1
60 TABLET, EXTENDED RELEASE ORAL DAILY
Status: DISCONTINUED | OUTPATIENT
Start: 2023-01-14 | End: 2023-01-14

## 2023-01-14 RX ORDER — FUROSEMIDE 40 MG
40 TABLET ORAL DAILY
Status: ON HOLD | COMMUNITY
End: 2023-01-19

## 2023-01-14 RX ORDER — POLYETHYLENE GLYCOL 3350 17 G/17G
1 POWDER, FOR SOLUTION ORAL DAILY
COMMUNITY

## 2023-01-14 RX ORDER — LOSARTAN POTASSIUM 25 MG/1
25 TABLET ORAL DAILY
Status: DISCONTINUED | OUTPATIENT
Start: 2023-01-14 | End: 2023-01-15

## 2023-01-14 RX ORDER — CLOPIDOGREL BISULFATE 75 MG/1
75 TABLET ORAL DAILY
Status: DISCONTINUED | OUTPATIENT
Start: 2023-01-14 | End: 2023-01-19 | Stop reason: HOSPADM

## 2023-01-14 RX ORDER — ONDANSETRON 2 MG/ML
4 INJECTION INTRAMUSCULAR; INTRAVENOUS EVERY 6 HOURS PRN
Status: DISCONTINUED | OUTPATIENT
Start: 2023-01-14 | End: 2023-01-19 | Stop reason: HOSPADM

## 2023-01-14 RX ORDER — CLOPIDOGREL BISULFATE 75 MG/1
75 TABLET ORAL DAILY
Status: ON HOLD | COMMUNITY
End: 2023-03-15

## 2023-01-14 RX ORDER — MIRTAZAPINE 15 MG/1
15 TABLET, FILM COATED ORAL AT BEDTIME
COMMUNITY

## 2023-01-14 RX ORDER — MIRTAZAPINE 15 MG/1
15 TABLET, FILM COATED ORAL AT BEDTIME
Status: DISCONTINUED | OUTPATIENT
Start: 2023-01-14 | End: 2023-01-19 | Stop reason: HOSPADM

## 2023-01-14 RX ORDER — FAMOTIDINE 20 MG/1
20 TABLET, FILM COATED ORAL DAILY
Status: DISCONTINUED | OUTPATIENT
Start: 2023-01-14 | End: 2023-01-19 | Stop reason: HOSPADM

## 2023-01-14 RX ORDER — GABAPENTIN 300 MG/1
300 CAPSULE ORAL
Status: DISCONTINUED | OUTPATIENT
Start: 2023-01-14 | End: 2023-01-14

## 2023-01-14 RX ORDER — TRAMADOL HYDROCHLORIDE 50 MG/1
50 TABLET ORAL EVERY 6 HOURS PRN
Status: DISCONTINUED | OUTPATIENT
Start: 2023-01-14 | End: 2023-01-18

## 2023-01-14 RX ADMIN — TAMSULOSIN HYDROCHLORIDE 0.8 MG: 0.4 CAPSULE ORAL at 09:22

## 2023-01-14 RX ADMIN — ASPIRIN 81 MG 81 MG: 81 TABLET ORAL at 09:22

## 2023-01-14 RX ADMIN — GABAPENTIN 600 MG: 300 CAPSULE ORAL at 21:48

## 2023-01-14 RX ADMIN — SODIUM CHLORIDE: 9 INJECTION, SOLUTION INTRAVENOUS at 15:01

## 2023-01-14 RX ADMIN — LOSARTAN POTASSIUM 25 MG: 25 TABLET, FILM COATED ORAL at 09:22

## 2023-01-14 RX ADMIN — FAMOTIDINE 20 MG: 20 TABLET, FILM COATED ORAL at 09:22

## 2023-01-14 RX ADMIN — ACETAMINOPHEN 975 MG: 325 TABLET ORAL at 15:01

## 2023-01-14 RX ADMIN — VANCOMYCIN HYDROCHLORIDE 1000 MG: 10 INJECTION, POWDER, LYOPHILIZED, FOR SOLUTION INTRAVENOUS at 21:48

## 2023-01-14 RX ADMIN — MIRTAZAPINE 15 MG: 15 TABLET, FILM COATED ORAL at 21:48

## 2023-01-14 RX ADMIN — HYDROMORPHONE HYDROCHLORIDE 0.5 MG: 1 INJECTION, SOLUTION INTRAMUSCULAR; INTRAVENOUS; SUBCUTANEOUS at 18:05

## 2023-01-14 RX ADMIN — INSULIN ASPART 2 UNITS: 100 INJECTION, SOLUTION INTRAVENOUS; SUBCUTANEOUS at 09:38

## 2023-01-14 RX ADMIN — HYDROMORPHONE HYDROCHLORIDE 0.5 MG: 1 INJECTION, SOLUTION INTRAMUSCULAR; INTRAVENOUS; SUBCUTANEOUS at 06:43

## 2023-01-14 RX ADMIN — INSULIN ASPART 4 UNITS: 100 INJECTION, SOLUTION INTRAVENOUS; SUBCUTANEOUS at 12:33

## 2023-01-14 RX ADMIN — TRAMADOL HYDROCHLORIDE 50 MG: 50 TABLET, COATED ORAL at 18:54

## 2023-01-14 RX ADMIN — CLOPIDOGREL BISULFATE 75 MG: 75 TABLET, FILM COATED ORAL at 09:23

## 2023-01-14 RX ADMIN — INSULIN GLARGINE 17 UNITS: 100 INJECTION, SOLUTION SUBCUTANEOUS at 09:38

## 2023-01-14 RX ADMIN — LEVOTHYROXINE SODIUM 25 MCG: 0.03 TABLET ORAL at 19:36

## 2023-01-14 RX ADMIN — HYDROMORPHONE HYDROCHLORIDE 0.5 MG: 1 INJECTION, SOLUTION INTRAMUSCULAR; INTRAVENOUS; SUBCUTANEOUS at 20:08

## 2023-01-14 RX ADMIN — ISOSORBIDE MONONITRATE 30 MG: 30 TABLET, EXTENDED RELEASE ORAL at 09:22

## 2023-01-14 RX ADMIN — SERTRALINE HYDROCHLORIDE 150 MG: 50 TABLET ORAL at 09:22

## 2023-01-14 RX ADMIN — CEFEPIME 2 G: 2 INJECTION, POWDER, FOR SOLUTION INTRAVENOUS at 00:05

## 2023-01-14 RX ADMIN — SODIUM CHLORIDE 1000 ML: 9 INJECTION, SOLUTION INTRAVENOUS at 00:05

## 2023-01-14 RX ADMIN — VANCOMYCIN HYDROCHLORIDE 2000 MG: 10 INJECTION, POWDER, LYOPHILIZED, FOR SOLUTION INTRAVENOUS at 02:07

## 2023-01-14 RX ADMIN — SODIUM CHLORIDE 1000 ML: 9 INJECTION, SOLUTION INTRAVENOUS at 06:10

## 2023-01-14 RX ADMIN — HYDROMORPHONE HYDROCHLORIDE 0.5 MG: 1 INJECTION, SOLUTION INTRAMUSCULAR; INTRAVENOUS; SUBCUTANEOUS at 23:00

## 2023-01-14 RX ADMIN — ACETAMINOPHEN 975 MG: 325 TABLET ORAL at 21:48

## 2023-01-14 RX ADMIN — ACETAMINOPHEN 975 MG: 325 TABLET ORAL at 06:44

## 2023-01-14 RX ADMIN — CEFTRIAXONE SODIUM 2 G: 2 INJECTION, SOLUTION INTRAVENOUS at 09:22

## 2023-01-14 RX ADMIN — METOPROLOL SUCCINATE 25 MG: 25 TABLET, EXTENDED RELEASE ORAL at 09:22

## 2023-01-14 RX ADMIN — SODIUM CHLORIDE 500 ML: 9 INJECTION, SOLUTION INTRAVENOUS at 02:06

## 2023-01-14 ASSESSMENT — ACTIVITIES OF DAILY LIVING (ADL)
ADLS_ACUITY_SCORE: 48
ADLS_ACUITY_SCORE: 35
ADLS_ACUITY_SCORE: 35
ADLS_ACUITY_SCORE: 46
ADLS_ACUITY_SCORE: 47
ADLS_ACUITY_SCORE: 47
ADLS_ACUITY_SCORE: 48
ADLS_ACUITY_SCORE: 48
ADLS_ACUITY_SCORE: 47
ADLS_ACUITY_SCORE: 48
ADLS_ACUITY_SCORE: 35
ADLS_ACUITY_SCORE: 48

## 2023-01-14 NOTE — PROGRESS NOTES
[facility] ADMISSION NOTE    Patient admitted to room 302 at approximately 0530 via cart from emergency room. Patient was accompanied by other: ER staff.     Verbal SBAR report received from 302 prior to patient arrival.     Patient trasferred to bed via self. Patient alert and oriented X 2. Pain is not well controlled.  Medication(s) being used: none.  . Admission vital signs:   01/14/23 0550   Vital Signs   Temp (!) 100.5  F (38.1  C)   Temp src Tympanic   Resp 22   Pulse 86   Pulse Rate Source Monitor   BP (!) 162/73   BP Location Right arm   Patient Position Supine   Oxygen Therapy   SpO2 98 %   O2 Device None (Room air)    Patient was oriented to plan of care, call light, bed controls, tv, telephone, bathroom and visiting hours.     Huber Chavarria RN

## 2023-01-14 NOTE — PHARMACY
Pharmacy- Renal Dose Adjustment    Patient Active Problem List   Diagnosis     Cellulitis     ACP (advance care planning)     Local infection of wound     Cellulitis of right lower extremity        Relevant Labs:  Recent Labs   Lab Test 01/13/23 2239 11/26/22 2024   WBC 7.2 10.7   HGB 9.2* 10.1*    154        CrCl: 38.4      Intake/Output Summary (Last 24 hours) at 1/14/2023 0922  Last data filed at 1/14/2023 0919  Gross per 24 hour   Intake 481 ml   Output 675 ml   Net -194 ml          Per Renal Dose Adjustment Protocol, will adjust:  Rocephin to 2 grams daily for weight greater than 90 kg.   Famotidine to 50 mg Daily for CrCrl less than 50 ml.min.       Will continue to follow and make adjustments accordingly. Thank You.    Jaswinder Rapp [facility] ....................  1/14/2023   9:22 AM

## 2023-01-14 NOTE — PHARMACY-VANCOMYCIN DOSING SERVICE
Pharmacy Vancomycin Initial Note  Date of Service 2023  Patient's  1935  87 year old, male    Indication: Skin and Soft Tissue Infection    Current estimated CrCl = Estimated Creatinine Clearance: 38.4 mL/min (A) (based on SCr of 1.75 mg/dL (H)).    Creatinine for last 3 days  2023: 10:39 PM Creatinine 1.75 mg/dL    Recent Vancomycin Level(s) for last 3 days  No results found for requested labs within last 72 hours.      Vancomycin IV Administrations (past 72 hours)                   vancomycin (VANCOCIN) 2,000 mg in sodium chloride 0.9 % 500 mL intermittent infusion (mg) 2,000 mg New Bag 23 0207                Nephrotoxins and other renal medications (From now, onward)    Start     Dose/Rate Route Frequency Ordered Stop    23 2200  vancomycin (VANCOCIN) 1,000 mg in 0.9% NaCl 250 mL intermittent infusion         1,000 mg  over 60 Minutes Intravenous EVERY 24 HOURS 23 0912            Contrast Orders - past 72 hours (72h ago, onward)    None          InsightRX Prediction of Planned Initial Vancomycin Regimen  Loading dose: N/A  Regimen: 1000 mg IV every 24 hours.  Start time: 10:11 on 2023  Exposure target: AUC24 (range)400-600 mg/L.hr   AUC24,ss: 490 mg/L.hr  Probability of AUC24 > 400: 72 %  Ctrough,ss: 16.4 mg/L  Probability of Ctrough,ss > 20: 32 %  Probability of nephrotoxicity (Lodise DEE ): 12 %          Plan:  1. Start vancomycin  1000 mg IV q24h.   2. Vancomycin monitoring method: AUC  3. Vancomycin therapeutic monitoring goal: 400-600 mg*h/L  4. Pharmacy will check vancomycin levels as appropriate in 1-3 Days.    5. Serum creatinine levels will be ordered daily for the first week of therapy and at least twice weekly for subsequent weeks.      Jaswinder Rapp [facility]

## 2023-01-14 NOTE — PLAN OF CARE
Goal Outcome Evaluation:  Pt is here for infection to wound on right foot. Slight redness to right foot. Wound with black eschar, serosanguinous/yellow drainage. Per MD Monzon, orders placed for wet to dry dressing BID. Dressing changed this afternoon. +2 edema to RLE, chronic numbness to BLE. Rt extremity elevated on pillow. Beginning of shift, pt had temp of 101, was tachycardic, hypertensive, and having left sided sharp chest pain (MD aware of chest pain). This has now resolved. Now having chronic right knee pain, PRN Tylenol given. Lungs are clear, slight dry cough present. O2 stable on room air. Voiding without difficulty. Assist of 1-2, pivot to commode. IV infusing fluids.  Shilpi Borges RN on 1/14/2023 at 5:09 PM        Plan of Care Reviewed With: patient    Overall Patient Progress: improving

## 2023-01-14 NOTE — H&P
[facility]    History and Physical - Hospitalist Service       Date of Admission:  1/13/2023    Assessment & Plan      Elsa Horowitz is a 87 year old male admitted on 1/13/2023. He presents with right foot pain and fever     Principal Problem:      Cellulitis of right lower extremity  Assessment: present on admission, foot ulcer with foot and ankle cellulitis. Imaging shows no abscess or obvious bony involvement. Finished a prolonged course of IV antibiotics on 12/21/22 and has not been on any since. 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   Plan: Admit  IV vancomycin and ceftriaxone   Consult general surgery  NS wet to dry dressing changes      Chronic kidney disease, stage 4 (severe) (H)  Assessment: present on admission, with acute kidney injury   Plan: intravenous fluids, monitor      Coronary artery disease  Assessment: recent non-ST elevation MI, had chest pain this AM but was reproducible with palpation of left chest wall. Recent ALEXIS placement, and needs 12 months of DAPT. I suspect the troponin elevation is not ACS but strain.   Plan: trend troponin, continue aspirin and clopidogrel.       Diabetic neuropathy (H)      Essential hypertension  Assessment: chronic  Plan: monitor      PAD (peripheral artery disease) (H)  Assessment: chronic      Gastroesophageal reflux disease, unspecified whether esophagitis present  Assessment: chronic  Plan: continue H2 blocker      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)  Assessment: chronic  Plan: continue insulin, monitor for tight control      Hyponatremia  Assessment: present on admission   Plan: normal saline intravenous fluids     Acute kidney injury   Assessment: present on admission   Plan: intravenous fluids      Diet: Combination Diet Regular Diet Adult    DVT Prophylaxis: Pneumatic Compression Devices  Adkins Catheter: Not present  Lines: None     Cardiac Monitoring: ACTIVE order. Indication: Sepsis  Code  "Status: Full Code        Disposition Plan      Expected Discharge Date: 01/16/2023                  Tavares Monzon MD  Hospitalist Service  [facility]  Securely message with [employer] (more info)  Text page via [facility] Paging/Directory     ______________________________________________________________________    Chief Complaint   Foot pain and fever.     History is obtained from the patient and electronic health record    History of Present Illness   Elsa Horowitz is a 87 year old male who has had a recent complex past medical history. Per [facility] notes:    \"Elsa is a 87 year old male with a past medical history of cognitive impairment, diabetes, depression, hypertension, CKD 4 admitted 11/27 for right diabetic foot infection. MSSA bacteremia secondary to osteomyelitis in setting of severe PAD (s/p revascularization 11/28).  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 However, had NSTEMI peak trop 2.9. TTE demonstrated normal EF without appreciable valvular disease (study technically difficult).  LV cath (12/5) showed two sequential thrombotic lesions in the proximal and mid RCA s/p PCI with 2 overlapping ALEXIS. The recommendation was made for DAPT, minimum 12 months, high intensity statin. Could consider ischemic eval if ongoing symptoms to evaluate LAD further. He was discharged to [place] to complete his antibiotics with a PICC line in place. Antibiotics are scheduled to be finished 12/29/2022.\"    He completed antibiotics on 12/21 and was seen by podiatry and ID on 1/5. No further antibiotics, and sutures removed from foot. At home he fell and has had more pain. He comes to the ED and has a temp of 101.3. R foot red and swollen consistent with cellulitis. The is a large eschar on the lateral foot from where surgery occurred. He was admitted for further treatment    Past Medical History    Past Medical History:   Diagnosis Date     Chronic kidney disease, stage 4 (severe) (H) " 7/4/2022     Coronary artery disease 1/14/2023     Diabetic neuropathy (H) 1/14/2023     Essential hypertension 1/14/2023     Gastroesophageal reflux disease, unspecified whether esophagitis present 4/17/2018     PAD (peripheral artery disease) (H) 11/29/2022     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) 1/8/2019       Past Surgical History   ALEXIS placement December 2022  5th toe amputation with washout and revision Nov and Dec 2022    Prior to Admission Medications   Prior to Admission Medications   Prescriptions Last Dose Informant Patient Reported? Taking?   Cholecalciferol (VITAMIN D3) 2000 UNITS TABS 1/13/2023 at 0714  Yes Yes   Sig: Take 1 tablet by mouth daily.   acetaminophen (TYLENOL) 500 MG tablet 1/13/2023 at 1257  Yes Yes   Sig: Take 1,000 mg by mouth 3 times daily   aspirin (ASA) 81 MG chewable tablet 1/13/2023 at 0714  Yes Yes   Sig: Take 81 mg by mouth daily   clopidogrel (PLAVIX) 75 MG tablet 1/13/2023 at 0714  Yes Yes   Sig: Take 1 tablet by mouth daily.   clopidogrel (PLAVIX) 75 MG tablet 1/13/2023 at 0800  Yes Yes   Sig: Take 75 mg by mouth daily   diclofenac (VOLTAREN) 1 % topical gel 1/13/2023 at 1240  Yes Yes   Sig: Apply topically 4 times daily   famotidine (PEPCID) 20 MG tablet 1/13/2023 at 1900  Yes Yes   Sig: Take 20 mg by mouth 2 times daily   furosemide (LASIX) 40 MG tablet 1/13/2023 at 0814  Yes Yes   Sig: Take 40 mg by mouth daily   gabapentin (NEURONTIN) 300 MG capsule 1/13/2023 at 1900  Yes Yes   Sig: Take 600 mg by mouth At Bedtime   insulin aspart (NOVOLOG FLEXPEN) 100 UNIT/ML pen 1/13/2023 at 0714  Yes Yes   Sig: Inject 15 Units Subcutaneous every morning (before breakfast) Plus sliding scale.   insulin aspart (NOVOLOG FLEXPEN) 100 UNIT/ML pen 1/13/2023 at 1725  Yes Yes   Sig: Inject 15 Units Subcutaneous daily (with dinner) Plus sliding scale   insulin aspart (NOVOLOG FLEXPEN) 100 UNIT/ML pen 1/13/2023 at 1725  Yes Yes   Sig: Inject Subcutaneous 3 times  daily (with meals) 125-149= 0 units, 150-199 = 2 units, 200-249= 4 units, 250-299= 6 units, 300-349= 8 units, 350-400 = 10 units, more than 400 call MD.   insulin aspart (NOVOLOG FLEXPEN) 100 UNIT/ML pen 1/13/2023 at 1240  Yes Yes   Sig: Inject 20 Units Subcutaneous daily (with lunch) Plus sliding scale   insulin glargine (LANTUS VIAL) 100 UNIT/ML vial 1/13/2023 at 0714  Yes Yes   Sig: Inject 17 Units Subcutaneous every morning   isosorbide mononitrate (IMDUR) 30 MG 24 hr tablet 1/13/2023 at 0714  Yes Yes   Sig: Take 1 tablet by mouth every morning.   levothyroxine (SYNTHROID/LEVOTHROID) 25 MCG tablet 1/13/2023 at 1800  Yes Yes   Sig: Take 25 mcg by mouth every evening   losartan (COZAAR) 25 MG tablet 1/13/2023 at 0714  Yes Yes   Sig: Take 25 mg by mouth daily   magnesium oxide 400 MG CAPS 1/13/2023 at 1900  Yes Yes   Sig: Take 400 mg by mouth 2 times daily   melatonin 3 MG CAPS 1/13/2023 at 1900  Yes Yes   Sig: Take 9 mg by mouth At Bedtime   metoprolol succinate ER (TOPROL XL) 25 MG 24 hr tablet 1/13/2023 at 0714  Yes Yes   Sig: Take 25 mg by mouth daily   mirtazapine (REMERON) 15 MG tablet 1/13/2023 at 1900  Yes Yes   Sig: Take 15 mg by mouth At Bedtime   nitroGLYcerin (NITROSTAT) 0.4 MG sublingual tablet 1/12/2023 at 2019  Yes Yes   Sig: Place 0.4 mg under the tongue every 5 minutes as needed for chest pain For chest pain place 1 tablet under the tongue every 5 minutes for 3 doses. If symptoms persist 5 minutes after 1st dose call 911.   polyethylene glycol (MIRALAX) 17 g packet 1/13/2023 at 0714  Yes Yes   Sig: Take 1 packet by mouth daily   psyllium (METAMUCIL/KONSYL) capsule 1/13/2023 at 1900  Yes Yes   Sig: Take 1 capsule by mouth daily   sertraline (ZOLOFT) 50 MG tablet 1/13/2023 at 0714  Yes Yes   Sig: Take 150 mg by mouth daily   tamsulosin (FLOMAX) 0.4 MG capsule 1/13/2023 at 1900  Yes Yes   Sig: Take 0.8 mg by mouth daily   traMADol (ULTRAM) 50 MG tablet 1/13/2023 at 1516  Yes Yes   Sig: Take 50 mg by  mouth every 6 hours as needed for severe pain (7-10)      Facility-Administered Medications: None        Review of Systems    CONSTITUTIONAL: NEGATIVE for fever, chills, change in weight  INTEGUMENTARY/SKIN: NEGATIVE for worrisome rashes, moles or lesions  EYES: NEGATIVE for vision changes or irritation  ENT/MOUTH: NEGATIVE for ear, mouth and throat problems  RESP: NEGATIVE for significant cough or SOB  CV: POSITIVE for chest pain/chest pressure and chest pain/pressure  GI: NEGATIVE for nausea, abdominal pain, heartburn, or change in bowel habits  : NEGATIVE for frequency, dysuria, or hematuria  MUSCULOSKELETAL: NEGATIVE for significant arthralgias or myalgia  NEURO: NEGATIVE for weakness, dizziness or paresthesias  ENDOCRINE: NEGATIVE for temperature intolerance, skin/hair changes  HEME: NEGATIVE for bleeding problems  PSYCHIATRIC: NEGATIVE for changes in mood or affect    Social History   I have reviewed this patient's social history and updated it with pertinent information if needed.  Social History     Tobacco Use     Smoking status: Former   Substance Use Topics     Alcohol use: Not Currently     Drug use: Not Currently     Comment: Drug use: Not Asked       Family History   No significant family history    Allergies   Allergies   Allergen Reactions     Penicillins      Pts. Arm swelled, but has tolerated rocephin     Tetanus Antitoxin      Tetanus Toxoid         Physical Exam   Vital Signs: Temp: 98.3  F (36.8  C) Temp src: Tympanic BP: 119/55 Pulse: 85   Resp: 16 SpO2: 92 % O2 Device: None (Room air)    Weight: 229 lbs 15.04 oz    Constitutional: In no apparent distress  Eyes: pupils reactive, extraocular movements intact. Anicteric sclera.   HEENT: Oropharynx nonerythematous. Neck supple, no JVD.  Respiratory: no crackles noted, no wheezes.  Cardiovascular: regular, no murmur. no lower extremity edema.  GI: soft, non-tender, bowel sounds present.  Lymph/Hematologic: no cervical or supraclavicular  LAD.  Genitourinary: deferred  Skin: no rashes, or sores  Musculoskeletal: no joint erythema or swelling  Neurologic: cranial nerves symmetric. Neuro exam nonfocal  Psychiatric: alert and oriented x3. Interactive.       Medical Decision Making   90 MINUTES SPENT BY ME on the date of service doing chart review, history, exam, documentation & further activities per the note.      Data     I have personally reviewed the following data over the past 24 hrs:    7.2  \   9.2 (L)   / 163     130 (L) 96 (L) 48.3 (H) /  187 (H)   5.1 22 1.75 (H) \       Trop: 65 (H) BNP: N/A       Procal: N/A CRP: N/A Lactic Acid: 0.5 (L)         Imaging results reviewed over the past 24 hrs:   Recent Results (from the past 24 hour(s))   XR Foot Port Right 3 Views    Narrative    PROCEDURE INFORMATION:   Exam: XR Right Foot   Exam date and time: 1/13/2023 10:04 PM   Age: 87 years old   Clinical indication: Other: Wound; Prior surgery; Additional info: 6 weeks S/P   right little toe amputation. Fever, worsening surgical wound     TECHNIQUE:   Imaging protocol: Radiologic exam of the Right foot.   Views: 3 or more views.     COMPARISON:   CR XR FOOT PORT RIGHT 3 VIEWS 11/26/2022 8:12 PM     FINDINGS:   Bones/joints: Moderate plantar calcaneal spur. Multiple amputations, across the   base of 2nd proximal phalanx, across the neck of the 3rd proximal phalanx, and   across the mid diaphysis of the 5th metatarsal. Diffuse osteopenia. No acute   fracture or dislocation. No osseous destruction to suggest osteomyelitis.   Soft tissues: Normal.   Vasculature: Peripheral arterial calcification, consistent with diabetes.       Impression    IMPRESSION:   1. No evidence of osteomyelitis.   2. Prior amputations across the 2nd and 3rd proximal phalanges and 5th   metatarsal.   3. Osteopenia.     THIS DOCUMENT HAS BEEN ELECTRONICALLY SIGNED BY RASHEEDA MISTRY MD   US Lower Extremity Venous Duplex Right    Narrative    PROCEDURE INFORMATION:   Exam: US Duplex  Right Lower Extremity Veins, Limited   Exam date and time: 1/13/2023 11:12 PM   Age: 87 years old   Clinical indication: Pain; Leg, lower; Right; Additional info: Pain and   swelling rle, dvt?     TECHNIQUE:   Imaging protocol: Real-time duplex ultrasound of the Right Lower Extremity with   2-D gray scale, color Doppler flow and spectral waveform analysis with image   documentation. Limited exam was focused on the right lower extremity veins.     COMPARISON:   CR XR FOOT PORT RIGHT 3 VIEWS 1/13/2023 10:04 PM     FINDINGS:   Right deep veins: Unremarkable. The common femoral, femoral, proximal profunda   femoral and popliteal veins are patent without thrombus. Normal Doppler   waveforms. Normal compressibility and/or augmentation response.    Right superficial veins: Unremarkable. Saphenofemoral junction is patent   without thrombus.     Soft tissues: Unremarkable.       Impression    IMPRESSION:   No evidence of deep vein thrombosis.     THIS DOCUMENT HAS BEEN ELECTRONICALLY SIGNED BY RASHEEDA MISTRY MD   CT Foot Right w/o Contrast    Narrative    PROCEDURE INFORMATION:   Exam: CT Right Lower Extremity Without Contrast, Foot   Exam date and time: 1/14/2023 1:16 AM   Age: 87 years old   Clinical indication: Weakness; Additional info: Recent right little toe   amputation, dehisced wound, swelling, erythema fever     TECHNIQUE:   Imaging protocol: CT of the Right lower extremity without contrast was   performed. Exam focused on the foot.   Radiation optimization: All CT scans at this facility use at least one of these   dose optimization techniques: automated exposure control; mA and/or kV   adjustment per patient size (includes targeted exams where dose is matched to   clinical indication); or iterative reconstruction.     COMPARISON:   US LOWER EXTREMITY VENOUS DUPLEX RIGHT 1/13/2023 11:12 PM     FINDINGS:   Limitations: Motion artifact degrades image quality.     Bones/joints: Moderate degenerative changes of the  midfoot. Amputation of the   proximal phalanx of the 2nd toe. Transmetatarsal amputation of the 5th digit.   The surgical margin is sharp without definite evidence of osteomyelitis. No   organized fluid collection. Disarticulation at the PIP joint of the 3rd toe. No   acute fractures.   Soft tissues: Diffuse swelling throughout the foot and ankle.   Vasculature: Arterial vascular calcifications.       Impression    IMPRESSION:   1. Transmetatarsal amputation of the 5th digit without definite evidence of   osteomyelitis. No organized fluid collection.   2. Diffuse swelling throughout the foot and ankle, nonspecific.     THIS DOCUMENT HAS BEEN ELECTRONICALLY SIGNED BY LARA JOHN MD

## 2023-01-14 NOTE — ED TRIAGE NOTES
[facility] calls to report pt c/o pain from shooting from right toe to right hip.  S/p toe amputation 6 weeks ago.  Had witnessed fall earlier today, however landed on left side at that time from seated position. Hx mild dementia, report no change from baseline.

## 2023-01-14 NOTE — PROVIDER NOTIFICATION
01/14/23 0735   Valuables   Patient Belongings Remaining with Patient clothing   Did you bring any home meds/supplements to the hospital?  No     [facility] [place] will make every effort per our policy to help keep your items safe while in the hospital.  If you choose to keep any items at the bedside, we cannot be held responsible for any items that are lost or broken.      List items sent to safe: remain with patient in room.    I have reviewed my belongings list on admission and verify that it is correct.     Patient signature_______________________________  Date/Time_____________________    2nd Staff person if patient unable to sign __________________________  Date/Time ______________________      I have received all my belongings noted above at discharge.    Patient signature________________________________  Date/Time  __________________________

## 2023-01-14 NOTE — ED TRIAGE NOTES
Patient presents to ER with S/p toe amputation 6 weeks ago.  Had witnessed fall earlier today. CO Spasms and pain in RLE that extend superiorly into hip. /61   Pulse 84   Temp (!) 101.3  F (38.5  C)   Resp 20        Triage Assessment     Row Name 01/13/23 2103       Triage Assessment (Adult)    Airway WDL WDL       Respiratory WDL    Respiratory WDL WDL       Skin Circulation/Temperature WDL    Skin Circulation/Temperature WDL WDL;X  RLE toe amputation wound.       Cardiac WDL    Cardiac WDL WDL       Peripheral/Neurovascular WDL    Peripheral Neurovascular WDL WDL       Cognitive/Neuro/Behavioral WDL    Cognitive/Neuro/Behavioral WDL WDL

## 2023-01-14 NOTE — PHARMACY-VANCOMYCIN DOSING SERVICE
Pharmacy Vancomycin Initial Note  Date of Service 2023  Patient's  1935  87 year old, male    Indication: Skin and Soft Tissue Infection    Current estimated CrCl = CrCl cannot be calculated (Patient's most recent lab result is older than the maximum 30 days allowed.).    Creatinine for last 3 days  No results found for requested labs within last 72 hours.    Recent Vancomycin Level(s) for last 3 days  No results found for requested labs within last 72 hours.      Vancomycin IV Administrations (past 72 hours)      No vancomycin orders with administrations in past 72 hours.                Nephrotoxins and other renal medications (From now, onward)    Start     Dose/Rate Route Frequency Ordered Stop    23 2315  vancomycin (VANCOCIN) 2,000 mg in sodium chloride 0.9 % 500 mL intermittent infusion         2,000 mg  over 2 Hours Intravenous ONCE 23 2314            Contrast Orders - past 72 hours (72h ago, onward)    None                  Plan:  1. Start vancomycin  2000 mg IV once. Put in one time loading dose of 2000mg, Scr not ordered at time of consult, put in order to get scr to properly dose subsequent doses if needed.  2. Vancomycin monitoring method: AUC  3. Vancomycin therapeutic monitoring goal: 400-600 mg*h/L  4. Pharmacy will check vancomycin levels as appropriate in 1-3 Days.    5. Serum creatinine levels will be ordered daily for the first week of therapy and at least twice weekly for subsequent weeks.      Geovanni Bell

## 2023-01-14 NOTE — PROGRESS NOTES
Patient complains of pain to right foot, throbbing/ radiating intermittently to bilateral chest and back. Blood pressures elevated, patient does have diagnosis of essential hypertension. Temp 100.5 on admission. Dr. Monzon updated of the above findings and Morphine initially ordered but pharmacy recommended change to Hydromorphone due to renal function. Hydromorphone 0.5 mg IV every 2 hours for severe pain and tylenol 975mg every 6 hours PRN.

## 2023-01-14 NOTE — CONSULTS
GENERAL SURGERY CONSULTATION NOTE    Elsa Horowitz   [address]  [facility] [id number]  87 year old  male  Admission Date/Time: 1/13/2023  9:01 PM  Primary Care Provider:  Lisa Genao was asked to see this patient by Dr Na flood for evaluation of Right toe amputation.     HPI: Elsa Horowitz is a 87 year old male who had a toe amputation in December. He had follow up with [facility] last week and sutures were removed. He had been doing betadine dressings.  He states they told him to stop doing any wound care.  He is really not had much pain into it until yesterday.  He presented to the ER and was found to have cellulitis of the foot.  He has not had any purulent drainage.    REVIEW OF SYSTEMS:    GENERAL: No fevers or chills. Denies fatigue, recent weight loss.  HEENT: No sinus drainage. No changes with vision or hearing. No difficulty swallowing.   LYMPHATICS:  Noswollen nodes in axilla, neck or groin.  CARDIOVASCULAR: Denies chest pain, palpitations and dyspnea on exertion.  PULMONARY: No shortness of breath or cough. No increase in sputum production.  GI: Denies melena,bright red blood in stools. No hematemesis. No constipation or diarrhea.  : No dysuria or hematuria.  SKIN: See HPI  HEMATOLOGY:  No history of easy bruising or bleeding.  ENDOCRINE: Diabetes  NEUROLOGY:  No history of seizures or headaches. No motor or sensory changes.    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    No past surgical history  on file.     No family history on file.     Social History     Socioeconomic History     Marital status:      Spouse name: Not on file     Number of children: Not on file     Years of education: Not on file     Highest education level: Not on file   Occupational History     Not on file   Tobacco Use     Smoking status: Former     Smokeless tobacco: Not on file   Substance and Sexual Activity     Alcohol use: Not Currently     Drug use: Not Currently     Comment: Drug use: Not Asked     Sexual activity: Not on file   Other Topics Concern     Not on file   Social History Narrative    p 8/29/2013.     Social Determinants of Health     Financial Resource Strain: Not on file   Food Insecurity: Not on file   Transportation Needs: Not on file   Physical Activity: Not on file   Stress: Not on file   Social Connections: Not on file   Intimate Partner Violence: Not on file   Housing Stability: Not on file         No current facility-administered medications on file prior to encounter.  acetaminophen (TYLENOL) 500 MG tablet, Take 1,000 mg by mouth 3 times daily  aspirin (ASA) 81 MG chewable tablet, Take 81 mg by mouth daily  Cholecalciferol (VITAMIN D3) 2000 UNITS TABS, Take 1 tablet by mouth daily.  clopidogrel (PLAVIX) 75 MG tablet, Take 75 mg by mouth daily  clopidogrel (PLAVIX) 75 MG tablet, Take 1 tablet by mouth daily.  diclofenac (VOLTAREN) 1 % topical gel, Apply topically 4 times daily  famotidine (PEPCID) 20 MG tablet, Take 20 mg by mouth 2 times daily  furosemide (LASIX) 40 MG tablet, Take 40 mg by mouth daily  gabapentin (NEURONTIN) 300 MG capsule, Take 600 mg by mouth At Bedtime  insulin aspart (NOVOLOG FLEXPEN) 100 UNIT/ML pen, Inject 15 Units Subcutaneous every morning (before breakfast) Plus sliding scale.  insulin aspart (NOVOLOG FLEXPEN) 100 UNIT/ML pen, Inject 15 Units Subcutaneous daily (with dinner) Plus sliding scale  insulin aspart (NOVOLOG FLEXPEN) 100 UNIT/ML pen, Inject Subcutaneous 3 times  "daily (with meals) 125-149= 0 units, 150-199 = 2 units, 200-249= 4 units, 250-299= 6 units, 300-349= 8 units, 350-400 = 10 units, more than 400 call MD.  insulin aspart (NOVOLOG FLEXPEN) 100 UNIT/ML pen, Inject 20 Units Subcutaneous daily (with lunch) Plus sliding scale  insulin glargine (LANTUS VIAL) 100 UNIT/ML vial, Inject 17 Units Subcutaneous every morning  isosorbide mononitrate (IMDUR) 30 MG 24 hr tablet, Take 1 tablet by mouth every morning.  levothyroxine (SYNTHROID/LEVOTHROID) 25 MCG tablet, Take 25 mcg by mouth every evening  losartan (COZAAR) 25 MG tablet, Take 25 mg by mouth daily  magnesium oxide 400 MG CAPS, Take 400 mg by mouth 2 times daily  melatonin 3 MG CAPS, Take 9 mg by mouth At Bedtime  metoprolol succinate ER (TOPROL XL) 25 MG 24 hr tablet, Take 25 mg by mouth daily  mirtazapine (REMERON) 15 MG tablet, Take 15 mg by mouth At Bedtime  nitroGLYcerin (NITROSTAT) 0.4 MG sublingual tablet, Place 0.4 mg under the tongue every 5 minutes as needed for chest pain For chest pain place 1 tablet under the tongue every 5 minutes for 3 doses. If symptoms persist 5 minutes after 1st dose call 911.  polyethylene glycol (MIRALAX) 17 g packet, Take 1 packet by mouth daily  psyllium (METAMUCIL/KONSYL) capsule, Take 1 capsule by mouth daily  sertraline (ZOLOFT) 50 MG tablet, Take 150 mg by mouth daily  tamsulosin (FLOMAX) 0.4 MG capsule, Take 0.8 mg by mouth daily  traMADol (ULTRAM) 50 MG tablet, Take 50 mg by mouth every 6 hours as needed for severe pain (7-10)          ALLERGIES/SENSITIVITIES:   Allergies   Allergen Reactions     Penicillins      Pts. Arm swelled, but has tolerated rocephin     Tetanus Antitoxin      Tetanus Toxoid        PHYSICAL EXAM:     /55 (BP Location: Right arm, Patient Position: Sitting)   Pulse 85   Temp 98.3  F (36.8  C) (Tympanic)   Resp 16   Ht 1.854 m (6' 1\")   Wt 104.3 kg (229 lb 15 oz)   SpO2 92%   BMI 30.34 kg/m      General Appearance: Appears well and " coherent  Heart & CV:  RRR no murmur.  Intact distal pulses, good cap refill.  LUNGS:  CTA B/L, no wheezing or crackles.  Abd: Nondistended  Ext: Right fifth toe amputation site has dehiscence and extensive desiccation.      ADDITIONAL COMMENTS:      CONSULTATION ASSESSMENT AND PLAN:    87 year old male with fifth toe amputation by podiatry.  Something has gone awry with his wound care and resulted in extensive desiccation around the dehiscence.  I am uncertain whether he was instructed to continue wound cares or not.  At this point there is no source of infection other than the open wound itself.  As he is well-established with podiatry they are the most appropriate service to facilitate further debridements.    I would start/continue at discharge wet-to-dry dressings with moistened saline gauze twice daily    Expect podiatry to debride.      Oswaldo Weiner MD on 1/14/2023 at 11:34 AM

## 2023-01-14 NOTE — PHARMACY-ADMISSION MEDICATION HISTORY
Pharmacy -- Admission Medication Reconciliation    Prior to admission (PTA) medications were reviewed and the patient's PTA medication list was updated.    Sources Consulted: [facility] [place] [facility]    The reliability of this Medication Reconciliation is: Reliability: Reliable    The following significant changes were made:  Removed:    PRN APAP    Mylanta PRN    Duplicate Plavix entry    Ferrous Sulfate    Fish Oil    Old Gabapentin entries    Robitussin    Anusol    Levemir    Baslagar    Humalog    Imdur 60 mg    Lisinopril    Loperamide    Metformin    Metoprolol tartrate    MVI    Protonix    Dermatop    Simvastatin     Sucralafate    Added:    Scheduled APAP    ASA 81 mg    Voltaren gel    Pepcid    Lasix    Gabapentin    Novolog scheduled and sliding scale    Lantus    Losartan    Mag Ox    Melatonin    Toprol XL    Mirtazapine    Miralax    Psyllium    Sertraline    Flomax    *Facility has 23 standing orders for PRN, add the two (nitroglcyerin and tramadol) with recent fills. Left rest off for med list quality.     *MAR states that Atorvastatin, Clotrimazole, and Florastor are on hold.     In addition, the patient's allergies were reviewed with the patient and updated as follows:   Allergies: Penicillins, Tetanus antitoxin, and Tetanus toxoid    The pharmacist has reviewed with the patient that all personal medications should be removed from the building or locked in the belongings safe.  Patient shall only take medications ordered by the physician and administered by the nursing staff.       Medication barriers identified: Lives at [facility]   Medication adherence concerns: None   Understanding of emergency medications: RIA Rapp RP, 1/14/2023,  8:46 AM

## 2023-01-14 NOTE — ED PROVIDER NOTES
History     Chief Complaint   Patient presents with     Leg Pain     Foot Pain     Fall     HPI  Elsa Horowitz is a 87 year old male who presents to the ED for evaluation of leg/foot pain. Patient presents to ER with S/p toe amputation 6 weeks ago. He reports increased pain traveling up his right leg. He is a poor historian however does not appear to be on antibiotics currently.     Allergies:  Allergies   Allergen Reactions     Penicillins      Pts. Arm swelled, but has tolerated rocephin     Tetanus Antitoxin      Tetanus Toxoid        Problem List:    Patient Active Problem List    Diagnosis Date Noted     Local infection of wound 2023     Priority: Medium     Cellulitis of right lower extremity 2023     Priority: Medium     ACP (advance care planning) 2015     Priority: Medium     Advance Care Plannin2015. ACP Review Reviewed chart for advance care plan.  Elsa Horowitz has no plan or code status on file. Received anatomical body bequest form for [facility]-scanned on 1-14-15. Please provide resources for ACP at next opportunity. Confirmed/documented designated decision maker(s) as next of kin in absence of legal document.  Added by Kasandra Louise RN, System ACP Coordinator [employer]              Cellulitis 2013     Priority: Medium        Past Medical History:    No past medical history on file.    Past Surgical History:    No past surgical history on file.    Family History:    No family history on file.    Social History:  Marital Status:   [2]  Social History     Tobacco Use     Smoking status: Former   Substance Use Topics     Alcohol use: Not Currently     Drug use: Not Currently     Comment: Drug use: Not Asked        Medications:    No current outpatient medications on file.        Review of Systems   Constitutional: Negative for fever.   HENT: Negative for congestion.    Eyes: Negative for visual disturbance.   Respiratory: Negative for shortness of breath.  "   Cardiovascular: Negative for chest pain.   Gastrointestinal: Negative for abdominal pain, nausea and vomiting.   Genitourinary: Negative for dysuria.   Skin: Positive for wound.   Psychiatric/Behavioral: Negative for confusion.       Physical Exam   BP: 122/61  Pulse: 84  Temp: (!) 101.3  F (38.5  C)  Resp: 20  Height: 185.4 cm (6' 1\")  Weight: 104.3 kg (229 lb 15 oz)  SpO2: 93 %      Physical Exam  Constitutional:       General: He is not in acute distress.     Appearance: He is well-developed. He is not diaphoretic.   HENT:      Head: Normocephalic and atraumatic.   Eyes:      General: No scleral icterus.     Conjunctiva/sclera: Conjunctivae normal.   Cardiovascular:      Rate and Rhythm: Normal rate and regular rhythm.   Pulmonary:      Effort: Pulmonary effort is normal.      Breath sounds: Normal breath sounds.   Abdominal:      Palpations: Abdomen is soft.      Tenderness: There is no abdominal tenderness.   Musculoskeletal:         General: No deformity.      Cervical back: Neck supple.      Comments: Approximate 5 cm dehisced postsurgical wound with some necrotic tissue on his right lateral foot.  There is some surrounding erythema and swelling.   Lymphadenopathy:      Cervical: No cervical adenopathy.   Skin:     General: Skin is warm and dry.      Coloration: Skin is not jaundiced.      Findings: No rash.   Neurological:      Mental Status: He is alert and oriented to person, place, and time. Mental status is at baseline.   Psychiatric:         Mood and Affect: Mood normal.         Behavior: Behavior normal.         ED Course        Media Information     Document Information    Other:  Photograph      01/13/2023 10:28 PM   Attached To:   Hospital Encounter on 1/13/23     Source Information    Estuardo Ray PA   Emergency Dept           ED Course as of 01/14/23 1105   Fri Jan 13, 2023   2300 Patient signed out to me at shift change.  He is an 87-year-old man with surgery 6 weeks ago, recent " fall, concern for new infection.  Patient is febrile.  Has some purulent drainage from his surgical wound.  Labs including cultures and lactate were sent.   2349 Sodium(!): 129   2349 Creatinine(!): 1.75  Mild hyponatremia and LUIS, bolus and maintenance fluids ordered   Sat Jan 14, 2023   0508 No osteomyelitis seen on CT.  Infection appears to be localized to wound and surrounding cellulitis.  Will admit to medicine for antibiotic treatment and wound care, monitor culture results.     Procedures              Critical Care time:  none               Results for orders placed or performed during the hospital encounter of 01/13/23 (from the past 24 hour(s))   XR Foot Port Right 3 Views    Narrative    PROCEDURE INFORMATION:   Exam: XR Right Foot   Exam date and time: 1/13/2023 10:04 PM   Age: 87 years old   Clinical indication: Other: Wound; Prior surgery; Additional info: 6 weeks S/P   right little toe amputation. Fever, worsening surgical wound     TECHNIQUE:   Imaging protocol: Radiologic exam of the Right foot.   Views: 3 or more views.     COMPARISON:   CR XR FOOT PORT RIGHT 3 VIEWS 11/26/2022 8:12 PM     FINDINGS:   Bones/joints: Moderate plantar calcaneal spur. Multiple amputations, across the   base of 2nd proximal phalanx, across the neck of the 3rd proximal phalanx, and   across the mid diaphysis of the 5th metatarsal. Diffuse osteopenia. No acute   fracture or dislocation. No osseous destruction to suggest osteomyelitis.   Soft tissues: Normal.   Vasculature: Peripheral arterial calcification, consistent with diabetes.       Impression    IMPRESSION:   1. No evidence of osteomyelitis.   2. Prior amputations across the 2nd and 3rd proximal phalanges and 5th   metatarsal.   3. Osteopenia.     THIS DOCUMENT HAS BEEN ELECTRONICALLY SIGNED BY RASHEEDA MISTRY MD   CBC with platelets differential    Narrative    The following orders were created for panel order CBC with platelets differential.  Procedure                                Abnormality         Status                     ---------                               -----------         ------                     CBC with platelets and d...[[id number]]  Abnormal            Final result                 Please view results for these tests on the individual orders.   Basic metabolic panel   Result Value Ref Range    Sodium 129 (L) 136 - 145 mmol/L    Potassium 5.1 3.4 - 5.3 mmol/L    Chloride 96 (L) 98 - 107 mmol/L    Carbon Dioxide (CO2) 22 22 - 29 mmol/L    Anion Gap 11 7 - 15 mmol/L    Urea Nitrogen 48.3 (H) 8.0 - 23.0 mg/dL    Creatinine 1.75 (H) 0.67 - 1.17 mg/dL    Calcium 8.8 8.8 - 10.2 mg/dL    Glucose 142 (H) 70 - 99 mg/dL    GFR Estimate 37 (L) >60 mL/min/1.73m2   Symptomatic Influenza A/B & SARS-CoV2 (COVID-19) Virus PCR Multiplex Nose    Specimen: Nose; Swab   Result Value Ref Range    Influenza A PCR Negative Negative    Influenza B PCR Negative Negative    RSV PCR Negative Negative    SARS CoV2 PCR Negative Negative    Narrative    Testing was performed using the Xpert Xpress CoV2/Flu/RSV Assay on the [employer] Gene[employer]pert Instrument. This test should be ordered for the detection of SARS-CoV-2 and influenza viruses in individuals who meet clinical and/or epidemiological criteria. Test performance is unknown in asymptomatic patients. This test is for in vitro diagnostic use under the FDA EUA for laboratories certified under CLIA to perform high or moderate complexity testing. This test has not been FDA cleared or approved. A negative result does not rule out the presence of PCR inhibitors in the specimen or target RNA in concentration below the limit of detection for the assay. If only one viral target is positive but coinfection with multiple targets is suspected, the sample should be re-tested with another FDA cleared, approved, or authorized test, if coinfection would change clinical management. This test was validated by the [facility] [employer]. These  laboratories are certified under the Clinical Laboratory Improvement Amendments of 1988 (CLIA-88) as qualified to perform high complexity laboratory testing.   CBC with platelets and differential   Result Value Ref Range    WBC Count 7.2 4.0 - 11.0 10e3/uL    RBC Count 3.12 (L) 4.40 - 5.90 10e6/uL    Hemoglobin 9.2 (L) 13.3 - 17.7 g/dL    Hematocrit 26.9 (L) 40.0 - 53.0 %    MCV 86 78 - 100 fL    MCH 29.5 26.5 - 33.0 pg    MCHC 34.2 31.5 - 36.5 g/dL    RDW 13.4 10.0 - 15.0 %    Platelet Count 163 150 - 450 10e3/uL    % Neutrophils 65 %    % Lymphocytes 17 %    % Monocytes 10 %    % Eosinophils 7 %    % Basophils 1 %    % Immature Granulocytes 0 %    NRBCs per 100 WBC 0 <1 /100    Absolute Neutrophils 4.7 1.6 - 8.3 10e3/uL    Absolute Lymphocytes 1.2 0.8 - 5.3 10e3/uL    Absolute Monocytes 0.7 0.0 - 1.3 10e3/uL    Absolute Eosinophils 0.5 0.0 - 0.7 10e3/uL    Absolute Basophils 0.1 0.0 - 0.2 10e3/uL    Absolute Immature Granulocytes 0.0 <=0.4 10e3/uL    Absolute NRBCs 0.0 10e3/uL   Extra Tube    Narrative    The following orders were created for panel order Extra Tube.  Procedure                               Abnormality         Status                     ---------                               -----------         ------                     Extra Blue Top Tube[[id number]]                                                         Extra Red Top Tube[[id number]]                               Final result               Extra Green Top (Lithium...[[id number]]                      Final result                 Please view results for these tests on the individual orders.   Extra Red Top Tube   Result Value Ref Range    Hold Specimen JIC    Extra Green Top (Lithium Heparin) Tube   Result Value Ref Range    Hold Specimen JIC    US Lower Extremity Venous Duplex Right    Narrative    PROCEDURE INFORMATION:   Exam: US Duplex Right Lower Extremity Veins, Limited   Exam date and time: 1/13/2023 11:12 PM   Age: 87 years old   Clinical  indication: Pain; Leg, lower; Right; Additional info: Pain and   swelling rle, dvt?     TECHNIQUE:   Imaging protocol: Real-time duplex ultrasound of the Right Lower Extremity with   2-D gray scale, color Doppler flow and spectral waveform analysis with image   documentation. Limited exam was focused on the right lower extremity veins.     COMPARISON:   CR XR FOOT PORT RIGHT 3 VIEWS 1/13/2023 10:04 PM     FINDINGS:   Right deep veins: Unremarkable. The common femoral, femoral, proximal profunda   femoral and popliteal veins are patent without thrombus. Normal Doppler   waveforms. Normal compressibility and/or augmentation response.    Right superficial veins: Unremarkable. Saphenofemoral junction is patent   without thrombus.     Soft tissues: Unremarkable.       Impression    IMPRESSION:   No evidence of deep vein thrombosis.     THIS DOCUMENT HAS BEEN ELECTRONICALLY SIGNED BY RASHEEDA MISTRY MD   Lactic acid whole blood   Result Value Ref Range    Lactic Acid 0.5 (L) 0.7 - 2.0 mmol/L   CT Foot Right w/o Contrast    Narrative    PROCEDURE INFORMATION:   Exam: CT Right Lower Extremity Without Contrast, Foot   Exam date and time: 1/14/2023 1:16 AM   Age: 87 years old   Clinical indication: Weakness; Additional info: Recent right little toe   amputation, dehisced wound, swelling, erythema fever     TECHNIQUE:   Imaging protocol: CT of the Right lower extremity without contrast was   performed. Exam focused on the foot.   Radiation optimization: All CT scans at this facility use at least one of these   dose optimization techniques: automated exposure control; mA and/or kV   adjustment per patient size (includes targeted exams where dose is matched to   clinical indication); or iterative reconstruction.     COMPARISON:   US LOWER EXTREMITY VENOUS DUPLEX RIGHT 1/13/2023 11:12 PM     FINDINGS:   Limitations: Motion artifact degrades image quality.     Bones/joints: Moderate degenerative changes of the midfoot. Amputation of  the   proximal phalanx of the 2nd toe. Transmetatarsal amputation of the 5th digit.   The surgical margin is sharp without definite evidence of osteomyelitis. No   organized fluid collection. Disarticulation at the PIP joint of the 3rd toe. No   acute fractures.   Soft tissues: Diffuse swelling throughout the foot and ankle.   Vasculature: Arterial vascular calcifications.       Impression    IMPRESSION:   1. Transmetatarsal amputation of the 5th digit without definite evidence of   osteomyelitis. No organized fluid collection.   2. Diffuse swelling throughout the foot and ankle, nonspecific.     THIS DOCUMENT HAS BEEN ELECTRONICALLY SIGNED BY LARA JOHN MD   Glucose by meter   Result Value Ref Range    GLUCOSE BY METER POCT 187 (H) 70 - 99 mg/dL   Troponin T, High Sensitivity   Result Value Ref Range    Troponin T, High Sensitivity 45 (H) <=22 ng/L   Extra Tube    Narrative    The following orders were created for panel order Extra Tube.  Procedure                               Abnormality         Status                     ---------                               -----------         ------                     Extra Blue Top Tube[[id number]]                              In process                 Extra Serum Separator Tu...[[id number]]                      In process                 Extra Purple Top Tube[[id number]]                            In process                   Please view results for these tests on the individual orders.       Medications   acetaminophen (TYLENOL) tablet 975 mg (975 mg Oral Given 1/14/23 0644)     Or   acetaminophen (TYLENOL) Suppository 650 mg ( Rectal See Alternative 1/14/23 0644)   HYDROmorphone (PF) (DILAUDID) injection 0.5 mg (0.5 mg Intravenous Given 1/14/23 0643)   lidocaine 1 % 0.1-1 mL (has no administration in time range)   lidocaine (LMX4) cream (has no administration in time range)   sodium chloride (PF) 0.9% PF flush 3 mL (3 mLs Intracatheter Not Given 1/14/23 0814)   sodium  chloride (PF) 0.9% PF flush 3 mL (has no administration in time range)   senna-docusate (SENOKOT-S/PERICOLACE) 8.6-50 MG per tablet 1 tablet (has no administration in time range)     Or   senna-docusate (SENOKOT-S/PERICOLACE) 8.6-50 MG per tablet 2 tablet (has no administration in time range)   polyethylene glycol (MIRALAX) Packet 17 g (has no administration in time range)   bisacodyl (DULCOLAX) suppository 10 mg (has no administration in time range)   ondansetron (ZOFRAN ODT) ODT tab 4 mg (has no administration in time range)     Or   ondansetron (ZOFRAN) injection 4 mg (has no administration in time range)   sodium chloride 0.9% infusion ( Intravenous Rate/Dose Change 1/14/23 0835)   glucose gel 15-30 g (has no administration in time range)     Or   dextrose 50 % injection 25-50 mL (has no administration in time range)     Or   glucagon injection 1 mg (has no administration in time range)   insulin aspart (NovoLOG) injection (RAPID ACTING) (2 Units Subcutaneous Given 1/14/23 0938)   insulin aspart (NovoLOG) injection (RAPID ACTING) (has no administration in time range)   aspirin (ASA) chewable tablet 81 mg (81 mg Oral Given 1/14/23 0922)   famotidine (PEPCID) tablet 20 mg (20 mg Oral Given 1/14/23 0922)   gabapentin (NEURONTIN) capsule 600 mg (has no administration in time range)   insulin glargine (LANTUS PEN) injection 17 Units (17 Units Subcutaneous Given 1/14/23 0938)   losartan (COZAAR) tablet 25 mg (25 mg Oral Given 1/14/23 0922)   metoprolol succinate ER (TOPROL XL) 24 hr tablet 25 mg (25 mg Oral Given 1/14/23 0922)   mirtazapine (REMERON) tablet 15 mg (has no administration in time range)   nitroGLYcerin (NITROSTAT) sublingual tablet 0.4 mg (has no administration in time range)   sertraline (ZOLOFT) tablet 150 mg (150 mg Oral Given 1/14/23 0922)   tamsulosin (FLOMAX) capsule 0.8 mg (0.8 mg Oral Given 1/14/23 0950)   traMADol (ULTRAM) tablet 50 mg (has no administration in time range)   isosorbide  mononitrate (IMDUR) 24 hr tablet 30 mg (30 mg Oral Given 1/14/23 0922)   insulin aspart (NovoLOG) injection (RAPID ACTING) (10 Units Subcutaneous Given 1/14/23 0938)   cefTRIAXone IN D5W (ROCEPHIN) intermittent infusion 2 g (2 g Intravenous New Bag 1/14/23 0922)   naloxone (NARCAN) injection 0.2 mg (has no administration in time range)     Or   naloxone (NARCAN) injection 0.4 mg (has no administration in time range)     Or   naloxone (NARCAN) injection 0.2 mg (has no administration in time range)     Or   naloxone (NARCAN) injection 0.4 mg (has no administration in time range)   vancomycin (VANCOCIN) 1,000 mg in 0.9% NaCl 250 mL intermittent infusion (has no administration in time range)   clopidogrel (PLAVIX) tablet 75 mg (75 mg Oral Given 1/14/23 0923)   levothyroxine (SYNTHROID/LEVOTHROID) tablet 25 mcg (has no administration in time range)   ceFEPIme (MAXIPIME) 2 g in NS (2 g Intravenous Given 1/14/23 0005)   vancomycin (VANCOCIN) 2,000 mg in sodium chloride 0.9 % 500 mL intermittent infusion (2,000 mg Intravenous New Bag 1/14/23 0207)   0.9% sodium chloride BOLUS (500 mLs Intravenous New Bag 1/14/23 0206)       Assessments & Plan (with Medical Decision Making)   Nontoxic in no acute distress.  He is febrile.    He is 6 weeks status post right little toe amputation following infection.    He does have Approximate 5 cm dehisced postsurgical wound with some necrotic tissue on his right lateral foot.  There is some surrounding erythema and swelling.  Pictures from approximately 1 week ago do not appear to show dehisced wound at that time.    Lab work is pending, he is started on cefepime and vancomycin.  Images are also pending.    His care did occur during shift change, report given and care transferred to Dr. Meraz.     Estuardo Ray PA-C        I have reviewed the nursing notes.    I have reviewed the findings, diagnosis, plan and need for follow up with the patient.             Current Discharge  Medication List          Final diagnoses:   Cellulitis of right lower extremity   Local infection of wound   Acute kidney injury (H)   Hyponatremia       1/13/2023   [facility] AND [facility]     Estuardo Ray PA  01/13/23 5172       Estuardo Ray PA  01/14/23 3918

## 2023-01-14 NOTE — ED PROVIDER NOTES
ED Course as of 01/14/23 0511   Fri Jan 13, 2023   2300 Patient signed out to me at shift change.  He is an 87-year-old man with surgery 6 weeks ago, recent fall, concern for new infection.  Patient is febrile.  Has some purulent drainage from his surgical wound.  Labs including cultures and lactate were sent.   2349 Sodium(!): 129   2349 Creatinine(!): 1.75  Mild hyponatremia and LUIS, bolus and maintenance fluids ordered   Sat Jan 14, 2023   0508 No osteomyelitis seen on CT.  Infection appears to be localized to wound and surrounding cellulitis.  Will admit to medicine for antibiotic treatment and wound care, monitor culture results.     Final diagnosis: Cellulitis, local wound infection, hyponatremia, acute kidney injury     Sofia Meraz MD  01/14/23 0565

## 2023-01-14 NOTE — PROGRESS NOTES
SAFETY CHECKLIST  ID Bands and Risk clasps correct and in place (DNR, Fall risk, Allergy, Latex, Limb):  Yes  All Lines Reconciled and labeled correctly: Yes  Whiteboard updated:Yes  Environmental interventions: Yes  Verify Tele #: [id number]

## 2023-01-15 LAB
ANION GAP SERPL CALCULATED.3IONS-SCNC: 10 MMOL/L (ref 7–15)
BUN SERPL-MCNC: 30.9 MG/DL (ref 8–23)
CALCIUM SERPL-MCNC: 8.2 MG/DL (ref 8.8–10.2)
CHLORIDE SERPL-SCNC: 100 MMOL/L (ref 98–107)
CREAT SERPL-MCNC: 1.18 MG/DL (ref 0.67–1.17)
DEPRECATED HCO3 PLAS-SCNC: 23 MMOL/L (ref 22–29)
ERYTHROCYTE [DISTWIDTH] IN BLOOD BY AUTOMATED COUNT: 13.7 % (ref 10–15)
GFR SERPL CREATININE-BSD FRML MDRD: 60 ML/MIN/1.73M2
GLUCOSE BLDC GLUCOMTR-MCNC: 119 MG/DL (ref 70–99)
GLUCOSE BLDC GLUCOMTR-MCNC: 190 MG/DL (ref 70–99)
GLUCOSE BLDC GLUCOMTR-MCNC: 263 MG/DL (ref 70–99)
GLUCOSE BLDC GLUCOMTR-MCNC: 75 MG/DL (ref 70–99)
GLUCOSE SERPL-MCNC: 202 MG/DL (ref 70–99)
HCT VFR BLD AUTO: 26.2 % (ref 40–53)
HGB BLD-MCNC: 8.8 G/DL (ref 13.3–17.7)
HOLD SPECIMEN: NORMAL
LACTATE SERPL-SCNC: 0.7 MMOL/L (ref 0.7–2)
LACTATE SERPL-SCNC: 1 MMOL/L (ref 0.7–2)
MAGNESIUM SERPL-MCNC: 1.7 MG/DL (ref 1.7–2.3)
MCH RBC QN AUTO: 29.1 PG (ref 26.5–33)
MCHC RBC AUTO-ENTMCNC: 33.6 G/DL (ref 31.5–36.5)
MCV RBC AUTO: 87 FL (ref 78–100)
PLATELET # BLD AUTO: 156 10E3/UL (ref 150–450)
POTASSIUM SERPL-SCNC: 4.9 MMOL/L (ref 3.4–5.3)
RBC # BLD AUTO: 3.02 10E6/UL (ref 4.4–5.9)
SODIUM SERPL-SCNC: 133 MMOL/L (ref 136–145)
TROPONIN T SERPL HS-MCNC: 139 NG/L
TROPONIN T SERPL HS-MCNC: 145 NG/L
TROPONIN T SERPL HS-MCNC: 175 NG/L
VANCOMYCIN SERPL-MCNC: 8.4 UG/ML
WBC # BLD AUTO: 6.6 10E3/UL (ref 4–11)

## 2023-01-15 PROCEDURE — 80202 ASSAY OF VANCOMYCIN: CPT | Performed by: INTERNAL MEDICINE

## 2023-01-15 PROCEDURE — 83735 ASSAY OF MAGNESIUM: CPT | Performed by: INTERNAL MEDICINE

## 2023-01-15 PROCEDURE — 36415 COLL VENOUS BLD VENIPUNCTURE: CPT | Performed by: INTERNAL MEDICINE

## 2023-01-15 PROCEDURE — 258N000003 HC RX IP 258 OP 636: Performed by: INTERNAL MEDICINE

## 2023-01-15 PROCEDURE — 250N000013 HC RX MED GY IP 250 OP 250 PS 637: Performed by: INTERNAL MEDICINE

## 2023-01-15 PROCEDURE — 120N000001 HC R&B MED SURG/OB

## 2023-01-15 PROCEDURE — 93005 ELECTROCARDIOGRAM TRACING: CPT

## 2023-01-15 PROCEDURE — 999N000157 HC STATISTIC RCP TIME EA 10 MIN

## 2023-01-15 PROCEDURE — 99233 SBSQ HOSP IP/OBS HIGH 50: CPT | Performed by: INTERNAL MEDICINE

## 2023-01-15 PROCEDURE — 83605 ASSAY OF LACTIC ACID: CPT | Performed by: INTERNAL MEDICINE

## 2023-01-15 PROCEDURE — 84484 ASSAY OF TROPONIN QUANT: CPT | Performed by: INTERNAL MEDICINE

## 2023-01-15 PROCEDURE — 250N000011 HC RX IP 250 OP 636: Performed by: INTERNAL MEDICINE

## 2023-01-15 PROCEDURE — 93010 ELECTROCARDIOGRAM REPORT: CPT | Performed by: STUDENT IN AN ORGANIZED HEALTH CARE EDUCATION/TRAINING PROGRAM

## 2023-01-15 PROCEDURE — 85027 COMPLETE CBC AUTOMATED: CPT | Performed by: INTERNAL MEDICINE

## 2023-01-15 PROCEDURE — 80048 BASIC METABOLIC PNL TOTAL CA: CPT | Performed by: INTERNAL MEDICINE

## 2023-01-15 RX ORDER — METOPROLOL SUCCINATE 50 MG/1
50 TABLET, EXTENDED RELEASE ORAL DAILY
Status: DISCONTINUED | OUTPATIENT
Start: 2023-01-15 | End: 2023-01-19 | Stop reason: HOSPADM

## 2023-01-15 RX ADMIN — SERTRALINE HYDROCHLORIDE 150 MG: 50 TABLET ORAL at 09:42

## 2023-01-15 RX ADMIN — INSULIN GLARGINE 17 UNITS: 100 INJECTION, SOLUTION SUBCUTANEOUS at 09:42

## 2023-01-15 RX ADMIN — SODIUM CHLORIDE: 9 INJECTION, SOLUTION INTRAVENOUS at 02:51

## 2023-01-15 RX ADMIN — TRAMADOL HYDROCHLORIDE 50 MG: 50 TABLET, COATED ORAL at 21:19

## 2023-01-15 RX ADMIN — CLOPIDOGREL BISULFATE 75 MG: 75 TABLET, FILM COATED ORAL at 09:42

## 2023-01-15 RX ADMIN — HYDROMORPHONE HYDROCHLORIDE 0.5 MG: 1 INJECTION, SOLUTION INTRAMUSCULAR; INTRAVENOUS; SUBCUTANEOUS at 09:37

## 2023-01-15 RX ADMIN — LEVOTHYROXINE SODIUM 25 MCG: 0.03 TABLET ORAL at 20:26

## 2023-01-15 RX ADMIN — VANCOMYCIN HYDROCHLORIDE 1500 MG: 10 INJECTION, POWDER, LYOPHILIZED, FOR SOLUTION INTRAVENOUS at 21:19

## 2023-01-15 RX ADMIN — ASPIRIN 81 MG 81 MG: 81 TABLET ORAL at 09:42

## 2023-01-15 RX ADMIN — TAMSULOSIN HYDROCHLORIDE 0.8 MG: 0.4 CAPSULE ORAL at 09:42

## 2023-01-15 RX ADMIN — FAMOTIDINE 20 MG: 20 TABLET, FILM COATED ORAL at 09:42

## 2023-01-15 RX ADMIN — METOPROLOL SUCCINATE 50 MG: 50 TABLET, EXTENDED RELEASE ORAL at 13:03

## 2023-01-15 RX ADMIN — INSULIN ASPART 5 UNITS: 100 INJECTION, SOLUTION INTRAVENOUS; SUBCUTANEOUS at 13:00

## 2023-01-15 RX ADMIN — TRAMADOL HYDROCHLORIDE 50 MG: 50 TABLET, COATED ORAL at 15:20

## 2023-01-15 RX ADMIN — ACETAMINOPHEN 975 MG: 325 TABLET ORAL at 15:19

## 2023-01-15 RX ADMIN — MIRTAZAPINE 15 MG: 15 TABLET, FILM COATED ORAL at 21:19

## 2023-01-15 RX ADMIN — CEFTRIAXONE SODIUM 2 G: 2 INJECTION, SOLUTION INTRAVENOUS at 09:43

## 2023-01-15 RX ADMIN — ISOSORBIDE MONONITRATE 30 MG: 30 TABLET, EXTENDED RELEASE ORAL at 09:42

## 2023-01-15 RX ADMIN — GABAPENTIN 600 MG: 300 CAPSULE ORAL at 21:19

## 2023-01-15 ASSESSMENT — ACTIVITIES OF DAILY LIVING (ADL)
ADLS_ACUITY_SCORE: 47
ADLS_ACUITY_SCORE: 43
ADLS_ACUITY_SCORE: 47
ADLS_ACUITY_SCORE: 47
ADLS_ACUITY_SCORE: 43
ADLS_ACUITY_SCORE: 44
ADLS_ACUITY_SCORE: 47
ADLS_ACUITY_SCORE: 43
ADLS_ACUITY_SCORE: 47
ADLS_ACUITY_SCORE: 43

## 2023-01-15 NOTE — PROVIDER NOTIFICATION
01/14/23 2258   Vital Signs   Temp (!) 102  F (38.9  C)   Temp src Tympanic   Resp 22   Pulse 98   Pulse Rate Source Monitor   BP (!) 160/72   BP Location Right arm   Patient Position Dangled   Oxygen Therapy   SpO2 95 %   O2 Device None (Room air)   Pain/Comfort   Patient Currently in Pain yes   Preferred Pain Scale (Adult) DVPRS (Group)   Patient had complaints of tightness in bilateral chest, back and shoulder region. Discomfort rated 9/10. SpO2 95% on room air, applied O2 to promote chest comfort, medicated with hydromorphone IV and tightness resolved within minutes. Patient had similar symptoms this am and was evaluated by MD, thought to be muscle strain discomfort related to two recent falls at home. Telemetry monitoring unremarkable.

## 2023-01-15 NOTE — PLAN OF CARE
VSS and recorded. Patient had significant pain to right foot over wound which reportedly radiated upward to knee. Was calling out and moaning around 1900. Patient was given ultram per day shift nurse, with little relief. Removed dressing and patient had a decrease in pain. Premedicated for dressing cleansing/application  with hydromorphone 0.5mg  around 2000. Patient was able to tolerate dressing change pain reduced significantly. Highest temp with shift 102.0. Tylenol given at the six hour albertina since previous dose.

## 2023-01-15 NOTE — PROGRESS NOTES
[facility]    Medicine Progress Note - Hospitalist Service    Date of Admission:  1/13/2023    Assessment & Plan      Elsa Horowitz is a 87 year old male admitted on 1/13/2023. He presents with right foot pain and fever     Principal Problem:      Cellulitis of right lower extremity  Assessment: present on admission, foot ulcer with foot and ankle cellulitis. Imaging shows no abscess or obvious bony involvement. Finished a prolonged course of IV antibiotics on 12/21/22 and has not been on any since. 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   Plan: Admit  IV vancomycin and ceftriaxone day 2  Thanks to general surgery for consult. Need to schedule follow up with [facility] podiatry for debridement.   NS wet to dry dressing changes      Chronic kidney disease, stage 4 (severe) (H)  Assessment: present on admission, with acute kidney injury   Plan: intravenous fluids, monitor      Coronary artery disease  Assessment: recent non-ST elevation MI, had chest pain this AM but was reproducible with palpation of left chest wall. Recent ALEXIS placement in RCA on 12/5, and needs 12 months of DAPT. There is known LAD disease as well.  I suspected the troponin elevation was not ACS but strain. He had recurrent chest pain overnight, and troponin this AM is 139 from 65. EKG shows no ST elevation. [facility] is on divert. I spoke with Dr. Ramos, [facility] Cardiology. He feels this is likely strain/sepsis related, but if ongoing chest pain and troponin climb, to get patient on waiting list.   Plan: trend troponin, continue aspirin and clopidogrel. Per Dr. Ramos, increase metoprolol to lower heart rate to 70's from 90s. Holding losartan in order to increase metoprolol.   Discussed code status today. 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   Plan: increase lantus to 20 units.      Hyponatremia  Assessment: present on admission, improved   Plan: normal saline intravenous fluids     Acute kidney injury   Assessment: present on admission, improved.  Plan: intravenous fluids     Anemia  Assessment: acute on chronic. Discussed with cardiology. Would not transfuse - focus on heart rate.  Plan: check in AM       Diet: Combination Diet Regular Diet Adult    DVT Prophylaxis: Pneumatic Compression Devices  Adkins Catheter: Not present  Lines: None     Cardiac Monitoring: ACTIVE order. Indication: Chest pain/ ACS rule out (24 hours)  Code Status: No CPR- Do NOT Intubate      Disposition Plan             Tavares Monzon MD  Hospitalist Service  [facility]  Securely message with [employer] (more info)  Text page via [facility] Paging/Directory   ______________________________________________________________________    Interval History   Intermittent chest pain. Mild dyspnea. Productive cough.     Physical Exam   Vital Signs: Temp: 98.3  F (36.8  C) Temp src: Oral BP: (!) 158/64 Pulse: 94   Resp: 20 SpO2: 94 % O2 Device: Nasal cannula Oxygen Delivery: 3 LPM  Weight: 219 lbs 11.2 oz    GENERAL: Comfortable, talkative, in no apparent distress.  HEENT: Anicteric, non-injected sclera, mouth moist.   NECK: No JVD.  CARDIOVASCULAR: regular rate and rhythm, no murmur. No lower extremity edema   RESPIRATORY: Clear to auscultation bilaterally, no wheezes, no crackles.  GI: Non-distended, normal bowel sounds, soft, non-tender.  SKIN: ulcer on right lateral foot. No drainage. Dry  NEUROLOGY: Alert and oriented x3, follows commands, speech and language normal.       Medical Decision Making     75 MINUTES SPENT BY ME on the date of service doing chart  review, history, exam, documentation & further activities per the note.      Data     I have personally reviewed the following data over the past 24 hrs:    6.6  \   8.8 (L)   / 156     133 (L) 100 30.9 (H) /  263 (H)   4.9 23 1.18 (H) \       Trop: 145 (HH) BNP: N/A       Procal: N/A CRP: N/A Lactic Acid: 0.7         EKG shows normal sinus rhythm, no obvious ST changes.

## 2023-01-15 NOTE — PROGRESS NOTES
O2 placed on pt @ 3L due to increased SOB/CP - Stat EKG ordered and obtained   No other interventions this shift    RT Kassie on 1/15/2023 at 4:15 PM

## 2023-01-15 NOTE — PLAN OF CARE
Vitals recorded. Febrile overnight, tylenol given every six hours per PRN dosing schedule. Mild edema to bilateral lower extremities. More prominent on the right side. Wound continues to have eschar along one edge of the wound with yellow slough to wound bed and erythema to periwound, wet-to-dry dressing completed, no noted change since admission. Patient has improved mobility since admission and is ambulating with assist of one and the walker. Restless overnight, reports that he is historically a poor sleeper due recurrent nightmares related to  his combat exposure in the .

## 2023-01-15 NOTE — PROVIDER NOTIFICATION
01/15/23 0040   Vital Signs   Temp 100.3  F (37.9  C)   Temp src Tympanic   Resp 20   Pulse 100   Pulse Rate Source Monitor   /59   BP Location Left arm   Patient Position Dangled   Oxygen Therapy   SpO2 97 %   O2 Device Nasal cannula   Oxygen Delivery 2 LPM     Patient denies chest tightness and requested snack at this time. Has slept poorly thus far tonight. Triggered for sepsis BPA due temp and heart rate. B/P noted to be trending downward with most recent check.

## 2023-01-15 NOTE — PROGRESS NOTES
Ultram and tylenol for feet pain of 8.  3 liters acute.  Update given to Odalis Colindres nurseNella.

## 2023-01-15 NOTE — PHARMACY-VANCOMYCIN DOSING SERVICE
Pharmacy Vancomycin Note  Date of Service January 15, 2023  Patient's  1935   87 year old, male    Indication: Skin and Soft Tissue Infection  Day of Therapy: 2  Current vancomycin regimen:  1000 mg IV q24h, following a 2000 mg load.   Current vancomycin monitoring method: AUC  Current vancomycin therapeutic monitoring goal: 400-600 mg*h/L    Current estimated CrCl = Estimated Creatinine Clearance: 55.8 mL/min (A) (based on SCr of 1.18 mg/dL (H)).    Creatinine for last 3 days  2023: 10:39 PM Creatinine 1.75 mg/dL  1/15/2023:  6:18 AM Creatinine 1.18 mg/dL    Recent Vancomycin Levels (past 3 days)  1/15/2023:  2:56 PM Vancomycin 8.4 ug/mL    Vancomycin IV Administrations (past 72 hours)                   vancomycin (VANCOCIN) 1,000 mg in 0.9% NaCl 250 mL intermittent infusion (mg) 1,000 mg New Bag 23 2148    vancomycin (VANCOCIN) 2,000 mg in sodium chloride 0.9 % 500 mL intermittent infusion (mg) 2,000 mg New Bag 23 0207                Nephrotoxins and other renal medications (From now, onward)    Start     Dose/Rate Route Frequency Ordered Stop    01/15/23 2200  vancomycin (VANCOCIN) 1,500 mg in sodium chloride 0.9 % 500 mL intermittent infusion         1,500 mg  over 90 Minutes Intravenous EVERY 24 HOURS 01/15/23 1647               Contrast Orders - past 72 hours (72h ago, onward)    None          Interpretation of levels and current regimen:  Vancomycin level is reflective of AUC less than 400    Has serum creatinine changed greater than 50% in last 72 hours: Yes    Renal Function: Improving    InsightRX Prediction of Planned New Vancomycin Regimen  Loading dose: N/A  Regimen: 1500 mg IV every 24 hours.  Start time: 17:45 on 01/15/2023  Exposure target: AUC24 (range)400-600 mg/L.hr   AUC24,ss: 487 mg/L.hr  Probability of AUC24 > 400: 79 %  Ctrough,ss: 16.3 mg/L  Probability of Ctrough,ss > 20: 28 %  Probability of nephrotoxicity (Lodise DEE ): 12 %      Plan:  1. Increase Dose to 1500  mg IV Q24H due to improving renal function.   2. Vancomycin monitoring method: AUC  3. Vancomycin therapeutic monitoring goal: 400-600 mg*h/L  4. Pharmacy will check vancomycin levels as appropriate in 1-3 Days.  5. Serum creatinine levels will be ordered daily for the first week of therapy and at least twice weekly for subsequent weeks.    Jaswinder Rapp RPH

## 2023-01-16 ENCOUNTER — APPOINTMENT (OUTPATIENT)
Dept: OCCUPATIONAL THERAPY | Facility: OTHER | Age: 88
DRG: 907 | End: 2023-01-16
Attending: INTERNAL MEDICINE
Payer: COMMERCIAL

## 2023-01-16 ENCOUNTER — APPOINTMENT (OUTPATIENT)
Dept: GENERAL RADIOLOGY | Facility: OTHER | Age: 88
DRG: 907 | End: 2023-01-16
Attending: INTERNAL MEDICINE
Payer: COMMERCIAL

## 2023-01-16 ENCOUNTER — APPOINTMENT (OUTPATIENT)
Dept: CARDIOLOGY | Facility: OTHER | Age: 88
DRG: 907 | End: 2023-01-16
Attending: INTERNAL MEDICINE
Payer: COMMERCIAL

## 2023-01-16 ENCOUNTER — APPOINTMENT (OUTPATIENT)
Dept: PHYSICAL THERAPY | Facility: OTHER | Age: 88
DRG: 907 | End: 2023-01-16
Attending: INTERNAL MEDICINE
Payer: COMMERCIAL

## 2023-01-16 LAB
ALBUMIN UR-MCNC: 20 MG/DL
ANION GAP SERPL CALCULATED.3IONS-SCNC: 8 MMOL/L (ref 7–15)
APPEARANCE UR: CLEAR
ATRIAL RATE - MUSE: 99 BPM
BACTERIA #/AREA URNS HPF: ABNORMAL /HPF
BILIRUB UR QL STRIP: NEGATIVE
BUN SERPL-MCNC: 23.6 MG/DL (ref 8–23)
CALCIUM SERPL-MCNC: 8.2 MG/DL (ref 8.8–10.2)
CHLORIDE SERPL-SCNC: 100 MMOL/L (ref 98–107)
COLOR UR AUTO: ABNORMAL
CREAT SERPL-MCNC: 1.06 MG/DL (ref 0.67–1.17)
DEPRECATED HCO3 PLAS-SCNC: 22 MMOL/L (ref 22–29)
DIASTOLIC BLOOD PRESSURE - MUSE: NORMAL MMHG
ERYTHROCYTE [DISTWIDTH] IN BLOOD BY AUTOMATED COUNT: 13.7 % (ref 10–15)
FLUAV RNA SPEC QL NAA+PROBE: NEGATIVE
FLUBV RNA RESP QL NAA+PROBE: NEGATIVE
GFR SERPL CREATININE-BSD FRML MDRD: 68 ML/MIN/1.73M2
GLUCOSE BLDC GLUCOMTR-MCNC: 137 MG/DL (ref 70–99)
GLUCOSE BLDC GLUCOMTR-MCNC: 146 MG/DL (ref 70–99)
GLUCOSE BLDC GLUCOMTR-MCNC: 221 MG/DL (ref 70–99)
GLUCOSE BLDC GLUCOMTR-MCNC: 263 MG/DL (ref 70–99)
GLUCOSE BLDC GLUCOMTR-MCNC: 88 MG/DL (ref 70–99)
GLUCOSE SERPL-MCNC: 147 MG/DL (ref 70–99)
GLUCOSE UR STRIP-MCNC: 70 MG/DL
HCT VFR BLD AUTO: 24.9 % (ref 40–53)
HGB BLD-MCNC: 8 G/DL (ref 13.3–17.7)
HGB UR QL STRIP: ABNORMAL
INTERPRETATION ECG - MUSE: NORMAL
KETONES UR STRIP-MCNC: NEGATIVE MG/DL
LACTATE SERPL-SCNC: 0.8 MMOL/L (ref 0.7–2)
LEUKOCYTE ESTERASE UR QL STRIP: NEGATIVE
LVEF ECHO: NORMAL
MAGNESIUM SERPL-MCNC: 1.5 MG/DL (ref 1.7–2.3)
MCH RBC QN AUTO: 28.3 PG (ref 26.5–33)
MCHC RBC AUTO-ENTMCNC: 32.1 G/DL (ref 31.5–36.5)
MCV RBC AUTO: 88 FL (ref 78–100)
MUCOUS THREADS #/AREA URNS LPF: PRESENT /LPF
NITRATE UR QL: NEGATIVE
P AXIS - MUSE: 141 DEGREES
PH UR STRIP: 5 [PH] (ref 5–9)
PLATELET # BLD AUTO: 168 10E3/UL (ref 150–450)
POTASSIUM SERPL-SCNC: 4.8 MMOL/L (ref 3.4–5.3)
PR INTERVAL - MUSE: 184 MS
QRS DURATION - MUSE: 76 MS
QT - MUSE: 316 MS
QTC - MUSE: 405 MS
R AXIS - MUSE: 94 DEGREES
RBC # BLD AUTO: 2.83 10E6/UL (ref 4.4–5.9)
RBC URINE: <1 /HPF
RSV RNA SPEC NAA+PROBE: NEGATIVE
SARS-COV-2 RNA RESP QL NAA+PROBE: NEGATIVE
SODIUM SERPL-SCNC: 130 MMOL/L (ref 136–145)
SP GR UR STRIP: 1.01 (ref 1–1.03)
SYSTOLIC BLOOD PRESSURE - MUSE: NORMAL MMHG
T AXIS - MUSE: -30 DEGREES
TROPONIN T SERPL HS-MCNC: 203 NG/L
TROPONIN T SERPL HS-MCNC: 209 NG/L
UROBILINOGEN UR STRIP-MCNC: NORMAL MG/DL
VENTRICULAR RATE- MUSE: 99 BPM
WBC # BLD AUTO: 6.7 10E3/UL (ref 4–11)
WBC URINE: <1 /HPF

## 2023-01-16 PROCEDURE — 94640 AIRWAY INHALATION TREATMENT: CPT | Mod: 76

## 2023-01-16 PROCEDURE — 999N000208 ECHOCARDIOGRAM COMPLETE

## 2023-01-16 PROCEDURE — 80048 BASIC METABOLIC PNL TOTAL CA: CPT | Performed by: INTERNAL MEDICINE

## 2023-01-16 PROCEDURE — 999N000157 HC STATISTIC RCP TIME EA 10 MIN

## 2023-01-16 PROCEDURE — 36415 COLL VENOUS BLD VENIPUNCTURE: CPT | Performed by: INTERNAL MEDICINE

## 2023-01-16 PROCEDURE — 250N000009 HC RX 250: Performed by: INTERNAL MEDICINE

## 2023-01-16 PROCEDURE — 97116 GAIT TRAINING THERAPY: CPT | Mod: GP

## 2023-01-16 PROCEDURE — 250N000011 HC RX IP 250 OP 636: Performed by: INTERNAL MEDICINE

## 2023-01-16 PROCEDURE — 97161 PT EVAL LOW COMPLEX 20 MIN: CPT | Mod: GP

## 2023-01-16 PROCEDURE — 93306 TTE W/DOPPLER COMPLETE: CPT | Mod: 26 | Performed by: STUDENT IN AN ORGANIZED HEALTH CARE EDUCATION/TRAINING PROGRAM

## 2023-01-16 PROCEDURE — 255N000002 HC RX 255 OP 636: Performed by: INTERNAL MEDICINE

## 2023-01-16 PROCEDURE — 81001 URINALYSIS AUTO W/SCOPE: CPT | Performed by: INTERNAL MEDICINE

## 2023-01-16 PROCEDURE — 97530 THERAPEUTIC ACTIVITIES: CPT | Mod: GO | Performed by: OCCUPATIONAL THERAPIST

## 2023-01-16 PROCEDURE — 84484 ASSAY OF TROPONIN QUANT: CPT | Performed by: INTERNAL MEDICINE

## 2023-01-16 PROCEDURE — 94640 AIRWAY INHALATION TREATMENT: CPT

## 2023-01-16 PROCEDURE — 250N000013 HC RX MED GY IP 250 OP 250 PS 637: Performed by: INTERNAL MEDICINE

## 2023-01-16 PROCEDURE — 97535 SELF CARE MNGMENT TRAINING: CPT | Mod: GO | Performed by: OCCUPATIONAL THERAPIST

## 2023-01-16 PROCEDURE — 99233 SBSQ HOSP IP/OBS HIGH 50: CPT | Performed by: INTERNAL MEDICINE

## 2023-01-16 PROCEDURE — 97530 THERAPEUTIC ACTIVITIES: CPT | Mod: GP

## 2023-01-16 PROCEDURE — 87899 AGENT NOS ASSAY W/OPTIC: CPT | Performed by: INTERNAL MEDICINE

## 2023-01-16 PROCEDURE — 97165 OT EVAL LOW COMPLEX 30 MIN: CPT | Mod: GO | Performed by: OCCUPATIONAL THERAPIST

## 2023-01-16 PROCEDURE — 71045 X-RAY EXAM CHEST 1 VIEW: CPT

## 2023-01-16 PROCEDURE — 87081 CULTURE SCREEN ONLY: CPT | Performed by: INTERNAL MEDICINE

## 2023-01-16 PROCEDURE — 83735 ASSAY OF MAGNESIUM: CPT | Performed by: INTERNAL MEDICINE

## 2023-01-16 PROCEDURE — 83605 ASSAY OF LACTIC ACID: CPT | Performed by: INTERNAL MEDICINE

## 2023-01-16 PROCEDURE — 120N000001 HC R&B MED SURG/OB

## 2023-01-16 PROCEDURE — 87637 SARSCOV2&INF A&B&RSV AMP PRB: CPT | Performed by: INTERNAL MEDICINE

## 2023-01-16 PROCEDURE — 85014 HEMATOCRIT: CPT | Performed by: INTERNAL MEDICINE

## 2023-01-16 RX ORDER — IPRATROPIUM BROMIDE AND ALBUTEROL SULFATE 2.5; .5 MG/3ML; MG/3ML
3 SOLUTION RESPIRATORY (INHALATION)
Status: DISCONTINUED | OUTPATIENT
Start: 2023-01-16 | End: 2023-01-17

## 2023-01-16 RX ADMIN — IPRATROPIUM BROMIDE AND ALBUTEROL SULFATE 3 ML: 2.5; .5 SOLUTION RESPIRATORY (INHALATION) at 19:24

## 2023-01-16 RX ADMIN — ISOSORBIDE MONONITRATE 30 MG: 30 TABLET, EXTENDED RELEASE ORAL at 10:37

## 2023-01-16 RX ADMIN — GABAPENTIN 600 MG: 300 CAPSULE ORAL at 21:03

## 2023-01-16 RX ADMIN — IPRATROPIUM BROMIDE AND ALBUTEROL SULFATE 3 ML: 2.5; .5 SOLUTION RESPIRATORY (INHALATION) at 11:25

## 2023-01-16 RX ADMIN — ASPIRIN 81 MG 81 MG: 81 TABLET ORAL at 10:28

## 2023-01-16 RX ADMIN — ACETAMINOPHEN 975 MG: 325 TABLET ORAL at 18:57

## 2023-01-16 RX ADMIN — ACETAMINOPHEN 975 MG: 325 TABLET ORAL at 02:02

## 2023-01-16 RX ADMIN — TRAMADOL HYDROCHLORIDE 50 MG: 50 TABLET, COATED ORAL at 16:21

## 2023-01-16 RX ADMIN — TAMSULOSIN HYDROCHLORIDE 0.8 MG: 0.4 CAPSULE ORAL at 10:28

## 2023-01-16 RX ADMIN — IPRATROPIUM BROMIDE AND ALBUTEROL SULFATE 3 ML: 2.5; .5 SOLUTION RESPIRATORY (INHALATION) at 15:21

## 2023-01-16 RX ADMIN — LEVOTHYROXINE SODIUM 25 MCG: 0.03 TABLET ORAL at 20:59

## 2023-01-16 RX ADMIN — INSULIN ASPART 4 UNITS: 100 INJECTION, SOLUTION INTRAVENOUS; SUBCUTANEOUS at 15:11

## 2023-01-16 RX ADMIN — INSULIN GLARGINE 17 UNITS: 100 INJECTION, SOLUTION SUBCUTANEOUS at 10:24

## 2023-01-16 RX ADMIN — FAMOTIDINE 20 MG: 20 TABLET, FILM COATED ORAL at 10:28

## 2023-01-16 RX ADMIN — TRAMADOL HYDROCHLORIDE 50 MG: 50 TABLET, COATED ORAL at 10:30

## 2023-01-16 RX ADMIN — SERTRALINE HYDROCHLORIDE 150 MG: 50 TABLET ORAL at 10:28

## 2023-01-16 RX ADMIN — MIRTAZAPINE 15 MG: 15 TABLET, FILM COATED ORAL at 21:03

## 2023-01-16 RX ADMIN — CLOPIDOGREL BISULFATE 75 MG: 75 TABLET, FILM COATED ORAL at 10:30

## 2023-01-16 RX ADMIN — ACETAMINOPHEN 975 MG: 325 TABLET ORAL at 10:29

## 2023-01-16 RX ADMIN — PERFLUTREN 2 ML: 6.52 INJECTION, SUSPENSION INTRAVENOUS at 08:21

## 2023-01-16 RX ADMIN — CEFTRIAXONE SODIUM 2 G: 2 INJECTION, SOLUTION INTRAVENOUS at 10:32

## 2023-01-16 RX ADMIN — METOPROLOL SUCCINATE 50 MG: 50 TABLET, EXTENDED RELEASE ORAL at 10:30

## 2023-01-16 ASSESSMENT — ACTIVITIES OF DAILY LIVING (ADL)
ADLS_ACUITY_SCORE: 44
ADLS_ACUITY_SCORE: 43
ADLS_ACUITY_SCORE: 43
ADLS_ACUITY_SCORE: 44
ADLS_ACUITY_SCORE: 43
ADLS_ACUITY_SCORE: 43
ADLS_ACUITY_SCORE: 44
ADLS_ACUITY_SCORE: 47
ADLS_ACUITY_SCORE: 43
ADLS_ACUITY_SCORE: 44
ADLS_ACUITY_SCORE: 43
ADLS_ACUITY_SCORE: 44

## 2023-01-16 NOTE — PROGRESS NOTES
Negative for covid, influenza. UA sent. Assist of 2 to chair.  PT supplied an ortho shoe for right foot.  Ultram and tylenol for right foot pain.

## 2023-01-16 NOTE — PROGRESS NOTES
01/16/23 1310   Appointment Info   Signing Clinician's Name / Credentials (OT) Reina De Leon, OTR/L   Living Environment   People in Home facility resident   Current Living Arrangements assisted living   Home Accessibility no concerns   Transportation Anticipated family or friend will provide   Self-Care   Usual Activity Tolerance fair   Current Activity Tolerance fair   Equipment Currently Used at Home walker, rolling;wheelchair, manual   Cognitive Status Examination   Orientation Status person;place   Affect/Mental Status (Cognitive) confused  (but very mild, pt very pleasant and mostly appropriate during session)   Follows Commands WFL   Memory Deficit minimal deficit   Pain Assessment   Patient Currently in Pain Yes, see Vital Sign flowsheet   Range of Motion Comprehensive   General Range of Motion bilateral upper extremity ROM WFL   Bed Mobility   Bed Mobility supine-sit   Supine-Sit [place] (Bed Mobility) minimum assist (75% patient effort)   Transfers   Transfers bed-chair transfer   Transfer Skill: Bed to Chair/Chair to Bed   Bed-Chair [place] (Transfers) minimum assist (75% patient effort);1 person to manage equipment   Assistive Device (Bed-Chair Transfers) rolling walker   Transfer Comments to pivot transfer from bed to recliner with left foot   Activities of Daily Living   BADL Assessment/Intervention grooming   Grooming Assessment/Training   [place] Level (Grooming) maximum assist (25% patient effort)   Comment, (Grooming) pt needed assist to comb hair after shampoo cap, able to shave with electric razor with set up only   Clinical Impression   Criteria for Skilled Therapeutic Interventions Met (OT) Yes, treatment indicated   OT Diagnosis S/P right toe amputation   Influenced by the following impairments pain, limited mobility   OT Problem List-Impairments impacting ADL activity tolerance impaired;pain;post-surgical precautions   Assessment of Occupational Performance 1-3 Performance  Deficits   Identified Performance Deficits mobility and self care   Planned Therapy Interventions (OT) ADL retraining;progressive activity/exercise   Clinical Decision Making Complexity (OT) low complexity   Anticipated Equipment Needs Upon Discharge (OT)   (has all)   Risk & Benefits of therapy have been explained risks/benefits reviewed   OT Total Evaluation Time   OT Eval, Low Complexity Minutes (48196) 15   OT Goals   Therapy Frequency (OT) Daily   OT Predicted Duration/Target Date for Goal Attainment 01/18/23   OT Goals Upper Body Dressing;Lower Body Dressing;Transfers;Toilet Transfer/Toileting   OT: Upper Body Dressing Supervision/stand-by assist   OT: Lower Body Dressing Moderate assist   OT: Transfer Supervision/stand-by assist   OT: Toilet Transfer/Toileting Moderate assist   Interventions   Interventions Quick Adds Self-Care/Home Management;Therapeutic Activity   Self-Care/Home Management   Self-Care/Home Mgmt/ADL, Compensatory, Meal Prep Minutes (36398) 30   Symptoms Noted During/After Treatment (Meal Preparation/Planning Training) fatigue;increased pain   [place] Level (Grooming Training) maximum assist (25% patient effort)   Assistance (Grooming Training) 1 person assist  (with hair)   Therapeutic Activities   Therapeutic Activity Minutes (38103) 15   Symptoms noted during/after treatment fatigue   Treatment Detail/Skilled Intervention Pt needed min assist with supine to sit and min assist of 1 with FWW to transfer from bed to recliner to left side   OT Discharge Planning   OT Plan cont OT   OT Discharge Recommendation (DC Rec) home with assist;home with home care occupational therapy   OT Rationale for DC Rec Pt will benefit with continued home care PT/OT   OT Brief overview of current status see above for details, pt progressing well, continues to have pain   Total Session Time   Timed Code Treatment Minutes 45   Total Session Time (sum of timed and untimed services) 60

## 2023-01-16 NOTE — PLAN OF CARE
Patient is alert and oriented but forgetful. He is pleasant and cooperative with staff. VSS except fever of 101.2. PRN APAP given and fever decreased to 99.3. PRN ultram used x1, which was effective for pain in R foot/leg. Patient was restless t/o the night and kept on trying to get up out of bed. Patient is using O2 at 3 LPM. Patient would occasionally take of NC but would allow staff to put back on. He had SCDS on occasionally t/o the night. Will continue to monitor and update MD as appropriate.      Goal Outcome Evaluation:      Plan of Care Reviewed With: patient    Overall Patient Progress: improvingOverall Patient Progress: improving

## 2023-01-16 NOTE — PHARMACY-VANCOMYCIN DOSING SERVICE
Pharmacy Vancomycin Note  Date of Service 2023  Patient's  1935   87 year old, male    Indication: Skin and Soft Tissue Infection  Day of Therapy: 3  Current vancomycin regimen:  1500 mg IV q24h  Current vancomycin monitoring method: AUC  Current vancomycin therapeutic monitoring goal: 400-600 mg*h/L      Current estimated CrCl = Estimated Creatinine Clearance: 62.1 mL/min (based on SCr of 1.06 mg/dL).    Creatinine for last 3 days  2023: 10:39 PM Creatinine 1.75 mg/dL  1/15/2023:  6:18 AM Creatinine 1.18 mg/dL  2023:  6:52 AM Creatinine 1.06 mg/dL    Recent Vancomycin Levels (past 3 days)  1/15/2023:  2:56 PM Vancomycin 8.4 ug/mL    Vancomycin IV Administrations (past 72 hours)                   vancomycin (VANCOCIN) 1,500 mg in sodium chloride 0.9 % 500 mL intermittent infusion (mg) 1,500 mg New Bag 01/15/23 2119    vancomycin (VANCOCIN) 1,000 mg in 0.9% NaCl 250 mL intermittent infusion (mg) 1,000 mg New Bag 23 2148    vancomycin (VANCOCIN) 2,000 mg in sodium chloride 0.9 % 500 mL intermittent infusion (mg) 2,000 mg New Bag 23 0207                Nephrotoxins and other renal medications (From now, onward)    Start     Dose/Rate Route Frequency Ordered Stop    01/15/23 2200  vancomycin (VANCOCIN) 1,500 mg in sodium chloride 0.9 % 500 mL intermittent infusion         1,500 mg  over 90 Minutes Intravenous EVERY 24 HOURS 01/15/23 1647               Contrast Orders - past 72 hours (72h ago, onward)    None          Interpretation of current regimen:    Has serum creatinine changed greater than 50% in last 72 hours: No    Urine output:  good urine output    Renal Function: Improving    InsightRX Prediction of Vancomycin Regimen    Regimen: 1500 mg IV every 24 hours.  Exposure target: AUC24 (range)400-600 mg/L.hr   AUC24,ss: 457 mg/L.hr  Probability of AUC24 > 400: 68 %  Ctrough,ss: 14.9 mg/L  Probability of Ctrough,ss > 20: 23 %  Probability of nephrotoxicity (Lodise DEE ):  10 %    Plan:  1. Continue Current Dose of 1500 mg IV Q24H. If to continue, consider another level tomorrow given changing renal function. Cultures with no growth to date noted; previous MSSA cultures from 11/2022 also noted.   2. Vancomycin monitoring method: AUC  3. Vancomycin therapeutic monitoring goal: 400-600 mg*h/L  4. Pharmacy will check vancomycin levels as appropriate in 1-3 Days.  5. Serum creatinine levels will be ordered daily for the first week of therapy and at least twice weekly for subsequent weeks.    Alessandra Burden RPH

## 2023-01-16 NOTE — PROGRESS NOTES
SAFETY CHECKLIST  ID Bands and Risk clasps correct and in place (DNR, Fall risk, Allergy, Latex, Limb):  Yes  All Lines Reconciled and labeled correctly: Yes  Whiteboard updated:Yes  Environmental interventions: Yes  Verify Tele #: [id number]

## 2023-01-16 NOTE — PROGRESS NOTES
"[facility]    Medicine Progress Note - Hospitalist Service    Date of Admission:  1/13/2023    Assessment & Plan      Elsa Horowitz is a 87 year old male admitted on 1/13/2023.       Cellulitis of right lower extremity  Assessment: present on admission, foot ulcer with foot and ankle cellulitis. Imaging shows no abscess or obvious bony involvement. Finished a prolonged course of IV antibiotics on 12/21/22 and has not been on any since. 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   Plan:  -IV vancomycin and ceftriaxone day 3  -Follow-up/podiatry for any debridement needs no pain   -NS wet to dry dressing changes twice daily  -Appreciate general surgery counts    Acute hypoxic respiratory failure  Assessment: Requiring up to 3 L this morning, typical viral URI related symptoms with a white productive sputum which is improved compared to yesterday.  Plan:  -Check sputum culture  -Urine strep  -Check COVID/flu/RSV status      Chronic kidney disease, stage 4 (severe) (H)  Assessment: present on admission, with acute kidney injury now resolved with IV fluids.  Plan:   -Discontinue IV fluids  -Monitor daily      Coronary artery disease  Assessment: recent non-ST elevation MI with ALEXIS placement in RCA on 12/5, and needs 12 months of DAPT. There is known LAD disease as well.  Mild troponin elevation with typical anginal equivalent but no obvious EKG changes and no new obvious wall motion abnormalities on echo.  More likely related to acute infection and strain.  Per Dr. Monzon's note \"I spoke with Dr. Ramos, [facility]. He feels this is likely strain/sepsis related, but if ongoing chest pain and troponin climb, to get patient on waiting list.\"  Plan:   -Continue to trend troponin  -Continue home aspirin and clopidogrel  - Per Dr. Ramos, increase metoprolol to lower heart rate to 70's from 90s  - Holding losartan in order to increase metoprolol.      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   Plan:   -increase lantus to 20 units.  -ISS AC at bedtime      Hyponatremia  Assessment: present on admission, stable   plan:   -Monitor daily    Anemia  Assessment: acute on chronic. Discussed with cardiology. Would not transfuse - focus on heart rate.  Plan:   -Monitor daily       Diet: Combination Diet Regular Diet Adult    DVT Prophylaxis: Pneumatic Compression Devices  Adkins Catheter: Not present  Lines: None     Cardiac Monitoring: ACTIVE order. Indication: Chest pain/ ACS rule out (24 hours)  Code Status: No CPR- Do NOT Intubate      Disposition Plan             Vinny Berg MD  Hospitalist Service  [facility]  Securely message with [employer] (more info)  Text page via [employer] Paging/Directory   ______________________________________________________________________    Interval History   Overnight no acute events, continues to feel like he had \"has a chest cold, he otherwise feels significantly improved today in comparison 2 days prior, sputum is productive with white mucus, no chest pressure since the day he got here, no escalating dyspnea nonspecific nausea or vomiting, no orthopnea increased lower extremity edema palpitations lightheadedness, his foot mostly has some intermittent neuropathic pain but otherwise no other new complaints.    Physical Exam   Vital Signs: Temp: 99.3  F (37.4  C) Temp src: Tympanic BP: (!) 164/77 Pulse: 90   Resp: 22 SpO2: 96 % O2 Device: Nasal cannula Oxygen Delivery: 3 LPM (decreased to 2L)  Weight: 219 lbs 11.2 oz    Exam:   GENERAL: Talkative, sitting up in bed, pleasant and appropriate, in no apparent distress.  CARDIOVASCULAR: regular rate and rhythm, no " murmurs, rubs, or gallops. Normal S1/S2. No lower extremity edema.   RESPIRATORY: clear to auscultation bilaterally, no wheezes, no crackles.   GI: soft, non-tender, non-distended, normoactive bowel sounds.  MUSCULOSKELETAL: warm and well perfused, right foot with surgical shoe in place, wound covered with clean dry and intact Curlex.  SKIN: no other pallor, jaundice, or rashes      Medical Decision Making   Data     I have personally reviewed the following data over the past 24 hrs:    6.7  \   8.0 (L)   / 168     130 (L) 100 23.6 (H) /  263 (H)   4.8 22 1.06 \       Trop: 203 (HH) BNP: N/A       Procal: N/A CRP: N/A Lactic Acid: 1.0

## 2023-01-16 NOTE — PROGRESS NOTES
:     Patient lives at [facility]. Spoke with ANETTE Medley ([phone]).  Patients baseline is stand by assist, but this depends on the level of pain in his foot.  Patient currently has home care through [facility] for PT/OT services. Jasmyne requested that patient receive home care orders for skilled nursing and wound care.      will follow for discharge planning needs.      NOEL Atwood on 1/16/2023 at 1:01 PM

## 2023-01-16 NOTE — PROGRESS NOTES
"Pt in bed this 4 hour shift. C/o shooting pain in right foot. Wound was open to air, covered by gripper sock. Wet to dry dressing applied per order, secured w kerlex. Pain managed with PRN Tylenol.     /54 (BP Location: Right arm, Patient Position: Semi-Handley's)   Pulse 78   Temp 99.1  F (37.3  C) (Tympanic)   Resp 20   Ht 1.854 m (6' 1\")   Wt 99.7 kg (219 lb 11.2 oz)   SpO2 94%   BMI 28.99 kg/m       Beatriz Pascual RN on 1/15/2023 at 6:38 PM   "

## 2023-01-16 NOTE — PLAN OF CARE
"Pt alert and oriented, forgetful. O2 trending in low 90's on RA. Denies SOB. Crackles noted in bases.  Pain controlled with PRN meds, see MAR. UTV wound to right foot s/t new dressings placed this afternoon. Dressing CDI. SCD's off per pt request.     /53 (BP Location: Right arm, Patient Position: Semi-Handley's, Cuff Size: Adult Regular)   Pulse 73   Temp 99  F (37.2  C) (Tympanic)   Resp 18   Ht 1.854 m (6' 1\")   Wt 99.7 kg (219 lb 11.2 oz)   SpO2 91%   BMI 28.99 kg/m       Beatriz Pascual RN on 1/16/2023 at 5:23 PM       Problem: Plan of Care - These are the overarching goals to be used throughout the patient stay.    Goal: Optimal Comfort and Wellbeing  Outcome: Progressing   Goal Outcome Evaluation:      Plan of Care Reviewed With: patient    Overall Patient Progress: no changeOverall Patient Progress: no change           "

## 2023-01-17 ENCOUNTER — APPOINTMENT (OUTPATIENT)
Dept: PHYSICAL THERAPY | Facility: OTHER | Age: 88
DRG: 907 | End: 2023-01-17
Payer: COMMERCIAL

## 2023-01-17 ENCOUNTER — APPOINTMENT (OUTPATIENT)
Dept: OCCUPATIONAL THERAPY | Facility: OTHER | Age: 88
DRG: 907 | End: 2023-01-17
Payer: COMMERCIAL

## 2023-01-17 LAB
ANION GAP SERPL CALCULATED.3IONS-SCNC: 10 MMOL/L (ref 7–15)
BUN SERPL-MCNC: 20.8 MG/DL (ref 8–23)
CALCIUM SERPL-MCNC: 8.5 MG/DL (ref 8.8–10.2)
CHLORIDE SERPL-SCNC: 96 MMOL/L (ref 98–107)
CREAT SERPL-MCNC: 1.04 MG/DL (ref 0.67–1.17)
DEPRECATED HCO3 PLAS-SCNC: 23 MMOL/L (ref 22–29)
ERYTHROCYTE [DISTWIDTH] IN BLOOD BY AUTOMATED COUNT: 13.8 % (ref 10–15)
GFR SERPL CREATININE-BSD FRML MDRD: 70 ML/MIN/1.73M2
GLUCOSE BLDC GLUCOMTR-MCNC: 168 MG/DL (ref 70–99)
GLUCOSE BLDC GLUCOMTR-MCNC: 179 MG/DL (ref 70–99)
GLUCOSE BLDC GLUCOMTR-MCNC: 183 MG/DL (ref 70–99)
GLUCOSE BLDC GLUCOMTR-MCNC: 195 MG/DL (ref 70–99)
GLUCOSE SERPL-MCNC: 164 MG/DL (ref 70–99)
HCT VFR BLD AUTO: 25.8 % (ref 40–53)
HGB BLD-MCNC: 8.6 G/DL (ref 13.3–17.7)
MAGNESIUM SERPL-MCNC: 1.6 MG/DL (ref 1.7–2.3)
MCH RBC QN AUTO: 28.9 PG (ref 26.5–33)
MCHC RBC AUTO-ENTMCNC: 33.3 G/DL (ref 31.5–36.5)
MCV RBC AUTO: 87 FL (ref 78–100)
PLATELET # BLD AUTO: 205 10E3/UL (ref 150–450)
POTASSIUM SERPL-SCNC: 4.6 MMOL/L (ref 3.4–5.3)
RBC # BLD AUTO: 2.98 10E6/UL (ref 4.4–5.9)
S PNEUM AG SPEC QL: NEGATIVE
SODIUM SERPL-SCNC: 129 MMOL/L (ref 136–145)
TROPONIN T SERPL HS-MCNC: 224 NG/L
WBC # BLD AUTO: 6.6 10E3/UL (ref 4–11)

## 2023-01-17 PROCEDURE — 83735 ASSAY OF MAGNESIUM: CPT | Performed by: INTERNAL MEDICINE

## 2023-01-17 PROCEDURE — 94640 AIRWAY INHALATION TREATMENT: CPT | Mod: 76

## 2023-01-17 PROCEDURE — 999N000157 HC STATISTIC RCP TIME EA 10 MIN

## 2023-01-17 PROCEDURE — 250N000013 HC RX MED GY IP 250 OP 250 PS 637: Performed by: INTERNAL MEDICINE

## 2023-01-17 PROCEDURE — 97530 THERAPEUTIC ACTIVITIES: CPT | Mod: GP

## 2023-01-17 PROCEDURE — 99233 SBSQ HOSP IP/OBS HIGH 50: CPT | Performed by: INTERNAL MEDICINE

## 2023-01-17 PROCEDURE — 84484 ASSAY OF TROPONIN QUANT: CPT | Performed by: INTERNAL MEDICINE

## 2023-01-17 PROCEDURE — 250N000009 HC RX 250: Performed by: INTERNAL MEDICINE

## 2023-01-17 PROCEDURE — 36415 COLL VENOUS BLD VENIPUNCTURE: CPT | Performed by: INTERNAL MEDICINE

## 2023-01-17 PROCEDURE — 250N000011 HC RX IP 250 OP 636: Performed by: INTERNAL MEDICINE

## 2023-01-17 PROCEDURE — 97530 THERAPEUTIC ACTIVITIES: CPT | Mod: GO | Performed by: OCCUPATIONAL THERAPIST

## 2023-01-17 PROCEDURE — 80048 BASIC METABOLIC PNL TOTAL CA: CPT | Performed by: INTERNAL MEDICINE

## 2023-01-17 PROCEDURE — 94640 AIRWAY INHALATION TREATMENT: CPT

## 2023-01-17 PROCEDURE — 97535 SELF CARE MNGMENT TRAINING: CPT | Mod: GO | Performed by: OCCUPATIONAL THERAPIST

## 2023-01-17 PROCEDURE — 120N000001 HC R&B MED SURG/OB

## 2023-01-17 PROCEDURE — 97116 GAIT TRAINING THERAPY: CPT | Mod: GP

## 2023-01-17 PROCEDURE — 85027 COMPLETE CBC AUTOMATED: CPT | Performed by: INTERNAL MEDICINE

## 2023-01-17 RX ORDER — GABAPENTIN 300 MG/1
600 CAPSULE ORAL 3 TIMES DAILY
Status: DISCONTINUED | OUTPATIENT
Start: 2023-01-17 | End: 2023-01-19 | Stop reason: HOSPADM

## 2023-01-17 RX ORDER — MAGNESIUM OXIDE 400 MG/1
400 TABLET ORAL 2 TIMES DAILY
Status: DISCONTINUED | OUTPATIENT
Start: 2023-01-17 | End: 2023-01-19 | Stop reason: HOSPADM

## 2023-01-17 RX ORDER — SULFAMETHOXAZOLE/TRIMETHOPRIM 800-160 MG
2 TABLET ORAL 2 TIMES DAILY
Status: DISCONTINUED | OUTPATIENT
Start: 2023-01-17 | End: 2023-01-18

## 2023-01-17 RX ORDER — ACETAMINOPHEN 500 MG
1000 TABLET ORAL 3 TIMES DAILY
Status: DISCONTINUED | OUTPATIENT
Start: 2023-01-17 | End: 2023-01-19 | Stop reason: HOSPADM

## 2023-01-17 RX ORDER — FUROSEMIDE 40 MG
40 TABLET ORAL DAILY
Status: DISCONTINUED | OUTPATIENT
Start: 2023-01-17 | End: 2023-01-18

## 2023-01-17 RX ORDER — LOSARTAN POTASSIUM 25 MG/1
25 TABLET ORAL DAILY
Status: DISCONTINUED | OUTPATIENT
Start: 2023-01-17 | End: 2023-01-18

## 2023-01-17 RX ADMIN — FAMOTIDINE 20 MG: 20 TABLET, FILM COATED ORAL at 09:15

## 2023-01-17 RX ADMIN — TRAMADOL HYDROCHLORIDE 50 MG: 50 TABLET, COATED ORAL at 09:14

## 2023-01-17 RX ADMIN — ACETAMINOPHEN 1000 MG: 500 TABLET ORAL at 21:46

## 2023-01-17 RX ADMIN — INSULIN GLARGINE 17 UNITS: 100 INJECTION, SOLUTION SUBCUTANEOUS at 08:21

## 2023-01-17 RX ADMIN — SULFAMETHOXAZOLE AND TRIMETHOPRIM 2 TABLET: 800; 160 TABLET ORAL at 21:46

## 2023-01-17 RX ADMIN — IPRATROPIUM BROMIDE AND ALBUTEROL SULFATE 3 ML: 2.5; .5 SOLUTION RESPIRATORY (INHALATION) at 11:10

## 2023-01-17 RX ADMIN — CEFTRIAXONE SODIUM 2 G: 2 INJECTION, SOLUTION INTRAVENOUS at 09:09

## 2023-01-17 RX ADMIN — INSULIN ASPART 2 UNITS: 100 INJECTION, SOLUTION INTRAVENOUS; SUBCUTANEOUS at 09:13

## 2023-01-17 RX ADMIN — IPRATROPIUM BROMIDE AND ALBUTEROL SULFATE 3 ML: 2.5; .5 SOLUTION RESPIRATORY (INHALATION) at 06:30

## 2023-01-17 RX ADMIN — LEVOTHYROXINE SODIUM 25 MCG: 0.03 TABLET ORAL at 21:46

## 2023-01-17 RX ADMIN — FUROSEMIDE 40 MG: 40 TABLET ORAL at 09:14

## 2023-01-17 RX ADMIN — ASPIRIN 81 MG 81 MG: 81 TABLET ORAL at 09:14

## 2023-01-17 RX ADMIN — GABAPENTIN 600 MG: 300 CAPSULE ORAL at 13:59

## 2023-01-17 RX ADMIN — ACETAMINOPHEN 1000 MG: 500 TABLET ORAL at 13:59

## 2023-01-17 RX ADMIN — ISOSORBIDE MONONITRATE 30 MG: 30 TABLET, EXTENDED RELEASE ORAL at 07:57

## 2023-01-17 RX ADMIN — SERTRALINE HYDROCHLORIDE 150 MG: 50 TABLET ORAL at 09:15

## 2023-01-17 RX ADMIN — MAGNESIUM OXIDE TAB 400 MG (241.3 MG ELEMENTAL MG) 400 MG: 400 (241.3 MG) TAB at 21:46

## 2023-01-17 RX ADMIN — METOPROLOL SUCCINATE 50 MG: 50 TABLET, EXTENDED RELEASE ORAL at 09:14

## 2023-01-17 RX ADMIN — LOSARTAN POTASSIUM 25 MG: 25 TABLET, FILM COATED ORAL at 09:14

## 2023-01-17 RX ADMIN — TRAMADOL HYDROCHLORIDE 50 MG: 50 TABLET, COATED ORAL at 15:47

## 2023-01-17 RX ADMIN — HYDROMORPHONE HYDROCHLORIDE 0.5 MG: 1 INJECTION, SOLUTION INTRAMUSCULAR; INTRAVENOUS; SUBCUTANEOUS at 17:09

## 2023-01-17 RX ADMIN — GABAPENTIN 600 MG: 300 CAPSULE ORAL at 21:46

## 2023-01-17 RX ADMIN — ONDANSETRON 4 MG: 4 TABLET, ORALLY DISINTEGRATING ORAL at 07:57

## 2023-01-17 RX ADMIN — CLOPIDOGREL BISULFATE 75 MG: 75 TABLET, FILM COATED ORAL at 09:15

## 2023-01-17 RX ADMIN — MIRTAZAPINE 15 MG: 15 TABLET, FILM COATED ORAL at 21:46

## 2023-01-17 RX ADMIN — TAMSULOSIN HYDROCHLORIDE 0.8 MG: 0.4 CAPSULE ORAL at 09:14

## 2023-01-17 RX ADMIN — MAGNESIUM OXIDE TAB 400 MG (241.3 MG ELEMENTAL MG) 400 MG: 400 (241.3 MG) TAB at 09:15

## 2023-01-17 RX ADMIN — ACETAMINOPHEN 1000 MG: 500 TABLET ORAL at 11:15

## 2023-01-17 ASSESSMENT — ACTIVITIES OF DAILY LIVING (ADL)
ADLS_ACUITY_SCORE: 43

## 2023-01-17 NOTE — PLAN OF CARE
"Patient admitted with infection to wound on right foot.  Patient cooperative with staff, forgetful at times.  Some random hollering out, denies pain.  Oxygen sats remain low 90's on room air.  Afebrile on last check.  Appears to breathe shallow at rest.  IV saline locked in left wrist.  Using urinal at bedside, has been continent of bowel and bladder this shift.    BP (!) 175/80 (BP Location: Right arm, Patient Position: Semi-Handley's, Cuff Size: Adult Regular)   Pulse 88   Temp 98.5  F (36.9  C) (Tympanic)   Resp 20   Ht 1.854 m (6' 1\")   Wt 100.2 kg (220 lb 12.8 oz)   SpO2 91%   BMI 29.13 kg/m        Goal Outcome Evaluation:      Plan of Care Reviewed With: patient    Overall Patient Progress: improvingOverall Patient Progress: improving       Problem: Plan of Care - These are the overarching goals to be used throughout the patient stay.    Goal: Plan of Care Review  Description: The Plan of Care Review/Shift note should be completed every shift.  The Outcome Evaluation is a brief statement about your assessment that the patient is improving, declining, or no change.  This information will be displayed automatically on your shift note.  Outcome: Progressing  Flowsheets (Taken 1/17/2023 0406)  Plan of Care Reviewed With: patient  Overall Patient Progress: improving  Goal: Patient-Specific Goal (Individualized)  Description: You can add care plan individualizations to a care plan. Examples of Individualization might be:  \"Parent requests to be called daily at 9am for status\", \"I have a hard time hearing out of my right ear\", or \"Do not touch me to wake me up as it startles me\".  Outcome: Progressing  Goal: Absence of Hospital-Acquired Illness or Injury  Outcome: Progressing  Intervention: Identify and Manage Fall Risk  Recent Flowsheet Documentation  Taken 1/17/2023 2167 by Sada Ruiz, RN  Safety Promotion/Fall Prevention:   activity supervised   bed alarm on   increase visualization of patient   safety " round/check completed  Taken 1/16/2023 2106 by Sada Ruiz, RN  Safety Promotion/Fall Prevention:   activity supervised   bed alarm on   increase visualization of patient   safety round/check completed  Intervention: Prevent and Manage VTE (Venous Thromboembolism) Risk  Recent Flowsheet Documentation  Taken 1/17/2023 0359 by Sada Ruiz, RN  VTE Prevention/Management: patient refused intervention  Taken 1/16/2023 2106 by Sada Ruiz, RN  VTE Prevention/Management: patient refused intervention  Goal: Optimal Comfort and Wellbeing  Outcome: Progressing  Goal: Readiness for Transition of Care  Outcome: Progressing     Problem: Skin or Soft Tissue Infection  Goal: Absence of Infection Signs and Symptoms  Outcome: Progressing

## 2023-01-17 NOTE — PROGRESS NOTES
Provider was updated on pt elevated trop. 223 this am.  Pt denies any new SOB, chest pain, states he does have some dizziness when he gets up.  No new orders at this time.

## 2023-01-17 NOTE — PROGRESS NOTES
01/16/23 1253   Appointment Info   Signing Clinician's Name / Credentials (PT) Wily Wilsonjuliarick MPT   Living Environment   People in Home facility resident   Current Living Arrangements assisted living   Home Accessibility no concerns;wheelchair accessible   Transportation Anticipated family or friend will provide   Self-Care   Usual Activity Tolerance moderate   Current Activity Tolerance fair   Equipment Currently Used at Home cane, straight;wheelchair, manual   Fall history within last six months yes   Number of times patient has fallen within last six months 2   General Information   Referring Physician Otis   Patient/Family Therapy Goals Statement (PT) return home   Existing Precautions/Restrictions fall;oxygen therapy device and L/min   Weight-Bearing Status - LLE full weight-bearing;weight-bearing as tolerated   Weight-Bearing Status - RLE weight-bearing as tolerated   Cognition   Affect/Mental Status (Cognition) WFL   Orientation Status (Cognition) person;place   Follows Commands (Cognition) WFL   Pain Assessment   Patient Currently in Pain Yes, see Vital Sign flowsheet   Integumentary/Edema   Integumentary/Edema Comments wound on dorsal/lateral aspect of right foot   Posture    Posture Forward head position   Range of Motion (ROM)   Range of Motion ROM is WFL   Strength (Manual Muscle Testing)   Strength (Manual Muscle Testing) strength is WFL   Strength Comments however, patient fatigues with activitiy   Bed Mobility   Impairments Contributing to Impaired Bed Mobility pain;decreased strength   Comment, (Bed Mobility) minimal assist   Gait/Stairs (Locomotion)   [place] Level (Gait) minimum assist (75% patient effort)   Assistive Device (Gait) walker, front-wheeled   Distance in Feet 5-6   Balance   Balance Comments fair with Fww   Sensory Examination   Sensory Perception WFL   Coordination   Coordination no deficits were identified   Muscle Tone   Muscle Tone no deficits were identified   Clinical  Impression   Criteria for Skilled Therapeutic Intervention Yes, treatment indicated   PT Diagnosis (PT) impaired mobility   Influenced by the following impairments fatigue, weakness   Functional limitations due to impairments activity/gait tolerance and stability   Clinical Presentation (PT Evaluation Complexity) Stable/Uncomplicated   Clinical Decision Making (Complexity) low complexity   Planned Therapy Interventions (PT) bed mobility training;gait training;transfer training   Anticipated Equipment Needs at Discharge (PT) walker, rolling;wheelchair   Risk & Benefits of therapy have been explained risks/benefits reviewed;patient   PT Total Evaluation Time   PT Eval, Low Complexity Minutes (73206) 15   Physical Therapy Goals   PT Frequency Daily   PT Predicted Duration/Target Date for Goal Attainment 01/20/23   PT Goals Bed Mobility;Transfers;Gait   PT: Bed Mobility Supervision/stand-by assist   PT: Transfers Supervision/stand-by assist;Assistive device   PT: Gait Supervision/stand-by assist;Rolling walker   Interventions   Interventions Quick Adds Gait Training;Therapeutic Activity   PT Discharge Planning   PT Plan continue PT

## 2023-01-17 NOTE — PROGRESS NOTES
He remains on room air SpO2 in the low 90s overnight.  Breath sounds clear with diminished bases.  Strong, loose, non-productive cough.  Scheduled nebs given as ordered.

## 2023-01-17 NOTE — PROGRESS NOTES
:     Patient lives at [facility]. Patient will need [facility] at the time of discharge.     Anticipated discharge is 1-2 days.      will continue to follow for discharge planning needs.     NOEL Atwood on 1/17/2023 at 12:55 PM

## 2023-01-17 NOTE — PLAN OF CARE
"Pt admitted with infection to wound on right foot. Pt had one dose of Zofran this am for some nausea which has resolved.  Pt a/o but can be forgetful at times, will holler out at times about his pain that comes and goes.  He was started on scheduled tylenol today.  Doesn't feel that the tramadol does a lot for his foot pain.  IV saline locked.  Using urinal at bed side. Pt started on gabapentin today.   Pt has had one dose of IV dilaudid for pain 10/10.  Which was effective with pain decreased down to 6/10.    Goal Outcome Evaluation:      Plan of Care Reviewed With: patient    Overall Patient Progress: improvingOverall Patient Progress: improving      Problem: Plan of Care - These are the overarching goals to be used throughout the patient stay.    Goal: Plan of Care Review  Description: The Plan of Care Review/Shift note should be completed every shift.  The Outcome Evaluation is a brief statement about your assessment that the patient is improving, declining, or no change.  This information will be displayed automatically on your shift note.  Outcome: Progressing  Flowsheets (Taken 1/17/2023 1120)  Plan of Care Reviewed With: patient  Overall Patient Progress: improving  Goal: Patient-Specific Goal (Individualized)  Description: You can add care plan individualizations to a care plan. Examples of Individualization might be:  \"Parent requests to be called daily at 9am for status\", \"I have a hard time hearing out of my right ear\", or \"Do not touch me to wake me up as it startles me\".  Outcome: Progressing  Flowsheets (Taken 1/17/2023 1120)  Anxieties, Fears or Concerns: right foot pain  Patient/Family-Specific Goals (Include Timeframe): dressing chagne to foot  Goal: Absence of Hospital-Acquired Illness or Injury  Outcome: Progressing  Intervention: Identify and Manage Fall Risk  Recent Flowsheet Documentation  Taken 1/17/2023 1119 by Leandra Dunaway RN  Safety Promotion/Fall Prevention:    safety round/check " completed    treat reversible contributory factors    treat underlying cause  Taken 1/17/2023 0759 by Leandra Dunaway, RN  Safety Promotion/Fall Prevention:    activity supervised    assistive device/personal items within reach    bed alarm on    chair alarm on    clutter free environment maintained    fall prevention program maintained    lighting adjusted    nonskid shoes/slippers when out of bed    patient and family education    room door open    room near nurse's station    room organization consistent    safety round/check completed    supervised activity    treat underlying cause    treat reversible contributory factors  Intervention: Prevent and Manage VTE (Venous Thromboembolism) Risk  Recent Flowsheet Documentation  Taken 1/17/2023 0759 by Leandra Dunaway, RN  VTE Prevention/Management:    SCDs (sequential compression devices) off    patient refused intervention  Goal: Optimal Comfort and Wellbeing  Outcome: Progressing  Intervention: Monitor Pain and Promote Comfort  Recent Flowsheet Documentation  Taken 1/17/2023 0959 by Leandra Dunaway RN  Pain Management Interventions: (provider started pt on scheduled tylenol) other (see comments)  Taken 1/17/2023 0914 by Leandra Dunaway RN  Pain Management Interventions: medication (see MAR)  Taken 1/17/2023 0755 by Leandra Dunaway, RN  Pain Management Interventions: medication offered but refused  Goal: Readiness for Transition of Care  Outcome: Progressing     Problem: Skin or Soft Tissue Infection  Goal: Absence of Infection Signs and Symptoms  Outcome: Progressing

## 2023-01-17 NOTE — PROGRESS NOTES
SAFETY CHECKLIST  ID Bands and Risk clasps correct and in place (DNR, Fall risk, Allergy, Latex, Limb):  Yes  All Lines Reconciled and labeled correctly: Yes  Whiteboard updated:Yes  Environmental interventions: Yes - call light within reach, bedrails x 2.  Verify Tele #: 3

## 2023-01-17 NOTE — PROGRESS NOTES
01/17/23 1100   Appointment Info   Signing Clinician's Name / Credentials (OT) Rene Carrillo, OTS; Reina De Leon, OTR/L   Interventions   Interventions Quick Adds Self-Care/Home Management   Self-Care/Home Management   Self-Care/Home Mgmt/ADL, Compensatory, Meal Prep Minutes (40836) 30   [place] Level (Grooming Training) maximum assist (25% patient effort)   Assistance (Grooming Training) 1 person assist;set-up required  (With shampoo cap to wash hair)   [place] Level (Bathing Training) maximum assist (25% patient effort)  (Pt required Max A to complete upper body sponge bath.)   Assistance (Bathing Training) set-up required;1 person assist   Therapeutic Activities   Therapeutic Activity Minutes (88442) 15   Symptoms noted during/after treatment fatigue   Treatment Detail/Skilled Intervention Pt needed min assist with supine to sit and min assist of 1 with FWW to transfer from bed to recliner to left side   OT Discharge Planning   OT Plan cont OT   OT Discharge Recommendation (DC Rec) home with assist;home with home care occupational therapy   OT Rationale for DC Rec Pt will benefit with continued home care PT/OT   OT Brief overview of current status See above for details. Pt continues to progress, but continues to have pain.   Total Session Time   Timed Code Treatment Minutes 45   Total Session Time (sum of timed and untimed services) 45

## 2023-01-17 NOTE — PROGRESS NOTES
[facility]    Medicine Progress Note - Hospitalist Service    Date of Admission:  1/13/2023    Assessment & Plan      Elsa Horowitz is a 87 year old male admitted on 1/13/2023.       Cellulitis of right lower extremity  Assessment: stable.  Present on admission, foot ulcer with surrounding cellulitis without bacteremia that is stable to improved. Imaging shows no abscess or obvious bony involvement. Finished a prolonged course of IV antibiotics on 12/21/22 and has not been on any since. 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  Consent eschar from the recent amputation site will benefit from repeat debridement.  General surgery consult here is deferred this to 1/25 follow-up appointment.  Plan:  -IV vancomycin and ceftriaxone day 3-> Bactrim day 4  -Follow-up/podiatry on 1/25  -NS wet to dry dressing changes twice daily  -Appreciate general surgery consult  -Start scheduled gabapentin 3 times daily  -Resume home Lasix      Coronary artery disease  Assessment: recent non-ST elevation MI with ALEXIS placement in RCA on 12/5, and needs 12 months of DAPT. There is known LAD disease as well.  Mild troponin elevation with typical anginal equivalent on day of admit but no obvious EKG changes and no new obvious wall motion abnormalities on echo.  No further chest pain, troponin has plateaued.  Discussed with cardiology who feels this more likely related to strain/sepsis.   Plan:   -Continue to trend troponin  -Continue home aspirin and clopidogrel  -Increase metoprolol from 25 to 50 mg daily   -Restart home ARB    Peripheral vascular disease  Assessment: Status post right lower extremity angiogram with drug-coated balloon angioplasty of SFA-popliteal artery and balloon angioplasty of popliteal artery in 11/20/2022.  Plan:  -Continue optimize medical management and reschedule outpatient follow-up    Acute hypoxic respiratory failure  Assessment: Resolved.  Requiring up to 3 L this morning,  typical viral URI related symptoms with a white productive sputum which is improved compared to yesterday.  No obvious evidence of bacterial pneumonia.  Plan:  -Follow-up urine strep  -COVID/flu/RSV negative      Chronic kidney disease, stage 4 (severe) (H)  Assessment: present on admission, with acute kidney injury now resolved with IV fluids.  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  Plan:   -increase lantus to 20 units.  -ISS AC at bedtime      Hyponatremia  Assessment: present on admission, stable   plan:   -Monitor daily    Anemia  Assessment: acute on chronic. Discussed with cardiology. Would not transfuse - focus on heart rate.  Plan:   -Monitor daily       Diet: Combination Diet Regular Diet Adult    DVT Prophylaxis: Pneumatic Compression Devices  Adkins Catheter: Not present  Lines: None     Cardiac Monitoring: ACTIVE order. Indication: Chest pain/ ACS rule out (24 hours)  Code Status: No CPR- Do NOT Intubate      Disposition Plan   Back to assisted living at discharge       Vinny Berg MD  Hospitalist Service  [facility]  Securely message with [employer] info)  Text page via [employer] Paging/Directory   ______________________________________________________________________    Interval History   Overnight ongoing low-grade temps but swelling in his foot feels better, he continues to have typical neuropathic pain symptoms and would like better control of this, no chest pain dyspnea orthopnea palpitations nausea vomiting diarrhea or constipation.    Physical Exam   Vital Signs: Temp: 99.8  F (37.7  C) Temp src: Tympanic BP: 133/48 Pulse: 89   Resp: 18 SpO2: 93 % O2 Device: None (Room air) Oxygen Delivery: 3 LPM (decreased to 2L)  Weight: 220 lbs 12.8 oz    Exam:   GENERAL: Talkative,  sitting up in bed, pleasant and appropriate, in no apparent distress.  CARDIOVASCULAR: regular rate and rhythm, no murmurs, rubs, or gallops. Normal S1/S2. No lower extremity edema.   RESPIRATORY: clear to auscultation bilaterally, no wheezes, no crackles.   GI: soft, non-tender, non-distended, normoactive bowel sounds.  MUSCULOSKELETAL: Right foot with amputation site open with packing removed.   medial lateral rim of eschar noted with clean base, no exposed bone, only minimal scant surrounding nonraised erythema.  Ongoing dependent nonraised erythema in the dependent arch of his foot.  SKIN: no other pallor, jaundice, or rashes      Medical Decision Making   Data     I have personally reviewed the following data over the past 24 hrs:    6.6  \   8.6 (L)   / 205     129 (L) 96 (L) 20.8 /  183 (H)   4.6 23 1.04 \       Trop: 224 (HH) BNP: N/A       Procal: N/A CRP: N/A Lactic Acid: 0.8

## 2023-01-17 NOTE — PROGRESS NOTES
01/17/23 1217   Appointment Info   Signing Clinician's Name / Credentials (PT) Wily Cheng MPT   Gait/Stairs (Locomotion)   Distance in Feet (Gait) 5-6   Therapeutic Activity   Treatment Detail/Skilled Intervention minimal assist for bed mobilities   Gait Training   Symptoms Noted During/After Treatment (Gait Training) fatigue;increased pain   Treatment Detail/Skilled Intervention a few steps with bed<>recliner transfer   [place] Level (Gait Training) contact guard   Physical Assistance Level (Gait Training) 1 person assist   Weight Bearing (Gait Training) weight-bearing as tolerated  (on right LE due to right foot ulcer)   Assistive Device (Gait Training) rolling walker   Pattern Analysis (Gait Training) 3-point gait   Gait Analysis Deviations decreased [place];increased time in double stance;decreased velocity of limb motion;decreased step length   Impairments (Gait Analysis/Training) balance impaired;pain;strength decreased   PT Discharge Planning   PT Plan continue PT   PT Discharge Recommendation (DC Rec) home with assist;home with home care physical therapy   PT Rationale for DC Rec to promote strength, stability and safe mobility   PT Brief overview of current status minimal assist for bed mobilities; minimal assist to CGA for transfers and short ambulation using a Fww

## 2023-01-18 ENCOUNTER — APPOINTMENT (OUTPATIENT)
Dept: PHYSICAL THERAPY | Facility: OTHER | Age: 88
DRG: 907 | End: 2023-01-18
Payer: COMMERCIAL

## 2023-01-18 ENCOUNTER — APPOINTMENT (OUTPATIENT)
Dept: OCCUPATIONAL THERAPY | Facility: OTHER | Age: 88
DRG: 907 | End: 2023-01-18
Payer: COMMERCIAL

## 2023-01-18 LAB
ANION GAP SERPL CALCULATED.3IONS-SCNC: 9 MMOL/L (ref 7–15)
BACTERIA SPEC CULT: NORMAL
BUN SERPL-MCNC: 27.6 MG/DL (ref 8–23)
CALCIUM SERPL-MCNC: 8.2 MG/DL (ref 8.8–10.2)
CHLORIDE SERPL-SCNC: 99 MMOL/L (ref 98–107)
CREAT SERPL-MCNC: 1.41 MG/DL (ref 0.67–1.17)
DEPRECATED HCO3 PLAS-SCNC: 24 MMOL/L (ref 22–29)
ERYTHROCYTE [DISTWIDTH] IN BLOOD BY AUTOMATED COUNT: 14.1 % (ref 10–15)
GFR SERPL CREATININE-BSD FRML MDRD: 49 ML/MIN/1.73M2
GLUCOSE BLDC GLUCOMTR-MCNC: 104 MG/DL (ref 70–99)
GLUCOSE BLDC GLUCOMTR-MCNC: 156 MG/DL (ref 70–99)
GLUCOSE BLDC GLUCOMTR-MCNC: 167 MG/DL (ref 70–99)
GLUCOSE BLDC GLUCOMTR-MCNC: 264 MG/DL (ref 70–99)
GLUCOSE SERPL-MCNC: 174 MG/DL (ref 70–99)
HBA1C MFR BLD: 7.5 % (ref 4–6.2)
HCT VFR BLD AUTO: 24 % (ref 40–53)
HGB BLD-MCNC: 8 G/DL (ref 13.3–17.7)
HOLD SPECIMEN: NORMAL
HOLD SPECIMEN: NORMAL
LACTATE SERPL-SCNC: 0.9 MMOL/L (ref 0.7–2)
MAGNESIUM SERPL-MCNC: 1.7 MG/DL (ref 1.7–2.3)
MCH RBC QN AUTO: 29.1 PG (ref 26.5–33)
MCHC RBC AUTO-ENTMCNC: 33.3 G/DL (ref 31.5–36.5)
MCV RBC AUTO: 87 FL (ref 78–100)
PLATELET # BLD AUTO: 216 10E3/UL (ref 150–450)
POTASSIUM SERPL-SCNC: 4.8 MMOL/L (ref 3.4–5.3)
RBC # BLD AUTO: 2.75 10E6/UL (ref 4.4–5.9)
SODIUM SERPL-SCNC: 132 MMOL/L (ref 136–145)
TROPONIN T SERPL HS-MCNC: 235 NG/L
TROPONIN T SERPL HS-MCNC: 242 NG/L
WBC # BLD AUTO: 7.2 10E3/UL (ref 4–11)

## 2023-01-18 PROCEDURE — 99233 SBSQ HOSP IP/OBS HIGH 50: CPT | Performed by: INTERNAL MEDICINE

## 2023-01-18 PROCEDURE — 84484 ASSAY OF TROPONIN QUANT: CPT | Performed by: INTERNAL MEDICINE

## 2023-01-18 PROCEDURE — 83605 ASSAY OF LACTIC ACID: CPT | Performed by: INTERNAL MEDICINE

## 2023-01-18 PROCEDURE — 36415 COLL VENOUS BLD VENIPUNCTURE: CPT | Performed by: INTERNAL MEDICINE

## 2023-01-18 PROCEDURE — 85027 COMPLETE CBC AUTOMATED: CPT | Performed by: INTERNAL MEDICINE

## 2023-01-18 PROCEDURE — 250N000013 HC RX MED GY IP 250 OP 250 PS 637: Performed by: INTERNAL MEDICINE

## 2023-01-18 PROCEDURE — 80048 BASIC METABOLIC PNL TOTAL CA: CPT | Performed by: INTERNAL MEDICINE

## 2023-01-18 PROCEDURE — 83735 ASSAY OF MAGNESIUM: CPT | Performed by: INTERNAL MEDICINE

## 2023-01-18 PROCEDURE — 11043 DBRDMT MUSC&/FSCA 1ST 20/<: CPT | Performed by: SURGERY

## 2023-01-18 PROCEDURE — 120N000001 HC R&B MED SURG/OB

## 2023-01-18 PROCEDURE — 97530 THERAPEUTIC ACTIVITIES: CPT | Mod: GP

## 2023-01-18 PROCEDURE — 83036 HEMOGLOBIN GLYCOSYLATED A1C: CPT | Performed by: INTERNAL MEDICINE

## 2023-01-18 PROCEDURE — 97116 GAIT TRAINING THERAPY: CPT | Mod: GP

## 2023-01-18 PROCEDURE — 97530 THERAPEUTIC ACTIVITIES: CPT | Mod: GO | Performed by: OCCUPATIONAL THERAPIST

## 2023-01-18 PROCEDURE — 97535 SELF CARE MNGMENT TRAINING: CPT | Mod: GO | Performed by: OCCUPATIONAL THERAPIST

## 2023-01-18 PROCEDURE — 0LBV0ZZ EXCISION OF RIGHT FOOT TENDON, OPEN APPROACH: ICD-10-PCS | Performed by: SURGERY

## 2023-01-18 RX ORDER — OXYCODONE HYDROCHLORIDE 5 MG/1
5 TABLET ORAL EVERY 4 HOURS PRN
Status: DISCONTINUED | OUTPATIENT
Start: 2023-01-18 | End: 2023-01-19 | Stop reason: HOSPADM

## 2023-01-18 RX ORDER — CEFUROXIME AXETIL 250 MG/1
500 TABLET ORAL EVERY 12 HOURS SCHEDULED
Status: DISCONTINUED | OUTPATIENT
Start: 2023-01-18 | End: 2023-01-19 | Stop reason: HOSPADM

## 2023-01-18 RX ADMIN — LEVOTHYROXINE SODIUM 25 MCG: 0.03 TABLET ORAL at 19:35

## 2023-01-18 RX ADMIN — TAMSULOSIN HYDROCHLORIDE 0.8 MG: 0.4 CAPSULE ORAL at 10:03

## 2023-01-18 RX ADMIN — ASPIRIN 81 MG 81 MG: 81 TABLET ORAL at 10:11

## 2023-01-18 RX ADMIN — CEFUROXIME AXETIL 500 MG: 250 TABLET, FILM COATED ORAL at 10:03

## 2023-01-18 RX ADMIN — MAGNESIUM OXIDE TAB 400 MG (241.3 MG ELEMENTAL MG) 400 MG: 400 (241.3 MG) TAB at 21:48

## 2023-01-18 RX ADMIN — MAGNESIUM OXIDE TAB 400 MG (241.3 MG ELEMENTAL MG) 400 MG: 400 (241.3 MG) TAB at 10:03

## 2023-01-18 RX ADMIN — ACETAMINOPHEN 1000 MG: 500 TABLET ORAL at 13:53

## 2023-01-18 RX ADMIN — ISOSORBIDE MONONITRATE 30 MG: 30 TABLET, EXTENDED RELEASE ORAL at 10:03

## 2023-01-18 RX ADMIN — ACETAMINOPHEN 1000 MG: 500 TABLET ORAL at 21:48

## 2023-01-18 RX ADMIN — CLOPIDOGREL BISULFATE 75 MG: 75 TABLET, FILM COATED ORAL at 10:03

## 2023-01-18 RX ADMIN — CEFUROXIME AXETIL 500 MG: 250 TABLET, FILM COATED ORAL at 21:47

## 2023-01-18 RX ADMIN — MIRTAZAPINE 15 MG: 15 TABLET, FILM COATED ORAL at 21:48

## 2023-01-18 RX ADMIN — GABAPENTIN 600 MG: 300 CAPSULE ORAL at 13:53

## 2023-01-18 RX ADMIN — GABAPENTIN 600 MG: 300 CAPSULE ORAL at 05:12

## 2023-01-18 RX ADMIN — METOPROLOL SUCCINATE 50 MG: 50 TABLET, EXTENDED RELEASE ORAL at 10:03

## 2023-01-18 RX ADMIN — SERTRALINE HYDROCHLORIDE 150 MG: 50 TABLET ORAL at 10:03

## 2023-01-18 RX ADMIN — GABAPENTIN 600 MG: 300 CAPSULE ORAL at 21:47

## 2023-01-18 RX ADMIN — ACETAMINOPHEN 1000 MG: 500 TABLET ORAL at 05:12

## 2023-01-18 RX ADMIN — FAMOTIDINE 20 MG: 20 TABLET, FILM COATED ORAL at 10:03

## 2023-01-18 ASSESSMENT — ACTIVITIES OF DAILY LIVING (ADL)
ADLS_ACUITY_SCORE: 43

## 2023-01-18 NOTE — PROGRESS NOTES
Patient remained on RA throughout the day patient received on Neb treatment patient asked why he needs to keep taking them. RT explained for help with breathing patient said I have no problem breathing.     Thomas Huertas, RT on 1/17/2023 at 6:47 PM

## 2023-01-18 NOTE — PLAN OF CARE
"Patient a/o, forgetful at times. Jumps upon awaking. O2 sats dropped below 90% on room air while sleeping. O2 was increased to 3LPM to increase sats above 90%. O2 sats have now stabilized above 90% on room air. Patient had temp of 100.5 at 2158, tylenol had already been given at 2146. No further temp. Patient denied pain. Will continue to monitor.     Goal Outcome Evaluation:      Plan of Care Reviewed With: patient      Problem: Plan of Care - These are the overarching goals to be used throughout the patient stay.    Goal: Plan of Care Review  Description: The Plan of Care Review/Shift note should be completed every shift.  The Outcome Evaluation is a brief statement about your assessment that the patient is improving, declining, or no change.  This information will be displayed automatically on your shift note.  Outcome: Progressing  Flowsheets (Taken 1/18/2023 0626)  Plan of Care Reviewed With: patient  Goal: Patient-Specific Goal (Individualized)  Description: You can add care plan individualizations to a care plan. Examples of Individualization might be:  \"Parent requests to be called daily at 9am for status\", \"I have a hard time hearing out of my right ear\", or \"Do not touch me to wake me up as it startles me\".  Outcome: Progressing  Goal: Absence of Hospital-Acquired Illness or Injury  Outcome: Progressing  Intervention: Identify and Manage Fall Risk  Recent Flowsheet Documentation  Taken 1/17/2023 2158 by Kelsie Cosme, RN  Safety Promotion/Fall Prevention:    assistive device/personal items within reach    bed alarm on    nonskid shoes/slippers when out of bed    chair alarm on    clutter free environment maintained    fall prevention program maintained    safety round/check completed  Intervention: Prevent Skin Injury  Recent Flowsheet Documentation  Taken 1/18/2023 0512 by Kelsie Cosme, RN  Body Position:    position changed independently    weight shifting    supine, head elevated  Taken " 1/17/2023 2158 by Kelsie Cosme, RN  Body Position:    position changed independently    weight shifting    supine, head elevated  Intervention: Prevent and Manage VTE (Venous Thromboembolism) Risk  Recent Flowsheet Documentation  Taken 1/17/2023 2158 by Kelsie Cosme, RN  VTE Prevention/Management:    SCDs (sequential compression devices) off    patient refused intervention  Goal: Optimal Comfort and Wellbeing  Outcome: Progressing  Goal: Readiness for Transition of Care  Outcome: Progressing     Problem: Skin or Soft Tissue Infection  Goal: Absence of Infection Signs and Symptoms  Outcome: Progressing

## 2023-01-18 NOTE — PROGRESS NOTES
01/18/23 1100   Appointment Info   Signing Clinician's Name / Credentials (OT) Rene Carrillo, OTS; Reina De Leon, OTR/L   Interventions   Interventions Quick Adds Self-Care/Home Management   Self-Care/Home Management   Self-Care/Home Mgmt/ADL, Compensatory, Meal Prep Minutes (96842) 30   [place] Level (Grooming Training) maximum assist (25% patient effort)   Assistance (Grooming Training) 1 person assist;set-up required  (With shampoo cap to wash hair and comb hair.)   [place] Level (Bathing Training) maximum assist (25% patient effort)  (Pt required Max A to complete upper body sponge bath.)   Assistance (Bathing Training) set-up required;1 person assist   Therapeutic Activities   Therapeutic Activity Minutes (81609) 15   Symptoms noted during/after treatment fatigue   Treatment Detail/Skilled Intervention Pt needed min assist with supine to sit and min assist of 1 with FWW to transfer from bed to recliner to left side   OT Discharge Planning   OT Plan cont OT   OT Discharge Recommendation (DC Rec) home with assist;home with home care occupational therapy   OT Rationale for DC Rec Pt will benefit with continued home care PT/OT   OT Brief overview of current status See above for details. Pt did not complain of pain today and was pleasant during the session.   Total Session Time   Timed Code Treatment Minutes 45   Total Session Time (sum of timed and untimed services) 45

## 2023-01-18 NOTE — PROCEDURES
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    Surgeon: Oswaldo Weiner MD    Local Anesthesia: Not required    Indication for the procedure:  This is a 87 year old male admitted to the inpatient hospitalist service.  He has had multiple podiatry procedures.  Recently had a right fifth toe amputation.  At some point to remove the sutures approximately last week.  Somehow this has become thoroughly desiccated.     After explaining the risks to include bleeding and needing for further debridement.  We proceeded.    Procedure:   The eschar was debrided with scalpel.  We debrided down to the level of the tendons at the toe amputation site.  There was both dermis and subcutaneous tissue which was desiccated and involved in the eschar.  The surrounding callus was also undermined and freed to allow for granulation.  Total area debrided was 5 cm x 3 cm.    Plan:  Will need further debridement with podiatry as previously planned.  No gross purulence or source of infection other than open wound.      Oswaldo Weiner MD....................  1/18/2023   10:17 AM

## 2023-01-18 NOTE — PROGRESS NOTES
[facility]    Medicine Progress Note - Hospitalist Service    Date of Admission:  1/13/2023    Assessment & Plan      Elsa Horowitz is a 87 year old male admitted on 1/13/2023.       Cellulitis of right lower extremity  Assessment: Stable. Present on admission, foot ulcer with resolving cellulitis and more of a eschar appearance.  CT shows no abscess or obvious bony involvement. Finished a prolonged course of IV antibiotics on 12/21/22 and has not been on any since. 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  Consent eschar from the recent amputation site will benefit from repeat debridement.  General surgery consult here deferred further debridement to podiatry follow-up on 1/25.  Plan:  -IV vancomycin and ceftriaxone day 3-> Bactrim day 4-> Ceftin day 5 given acute kidney injury and age  -Follow-up/podiatry on 1/25  -NS wet to dry dressing changes twice daily  -Appreciate general surgery consult  -Start scheduled gabapentin 3 times daily    Acute kidney injury  Assessment: Net positive for I's and O's yesterday but resumed on his Lasix ARB and started Bactrim and will need to monitor further to ensure stability  Plan:  -Hold Lasix, ARB and change antibiotics as above  -Monitor daily    Acute hypoxic respiratory failure  Assessment: Resolved.  Requiring up to 3 L this morning, typical viral URI related symptoms with a white productive sputum which is improved compared to yesterday.  No obvious evidence of bacterial pneumonia.  Continues to require oxygen only at night and then is weaned to room air while awake and likely has a component of RYLEE.  Plan:  -Follow-up urine strep  -COVID/flu/RSV negative  -Consider sleep study as an outpatient      Coronary artery disease  Assessment: Stable.  Recent non-ST elevation MI with ALEXIS placement in RCA on 12/5, and needs 12 months of DAPT. There is known LAD disease.  Mild troponin elevation with typical anginal equivalent on day of admit but no  obvious EKG changes and no new obvious wall motion abnormalities on echo and troponin has plateaued.  No further anginal equivalent symptoms.  Case initially assessed with cardiology on admit who felt trope elevation was likely related to strain/sepsis.  Plan:   -Follow-up with cardiology as an outpatient to consider repeat ischemia evaluation  -Continue to trend troponin  -Continue home aspirin and clopidogrel  -Increase metoprolol from 25 to 50 mg daily   -Continue statin  -Resume ARB pending clinical course    Peripheral vascular disease  Assessment: Status post right lower extremity angiogram with drug-coated balloon angioplasty of SFA-popliteal artery and balloon angioplasty of popliteal artery in 11/20/2022.  Plan:  -Continue optimize medical management and reschedule outpatient follow-up with vascular surgery     Chronic kidney disease, stage 4 (severe) (H)  Assessment: present on admission, with acute kidney injury now resolved with IV fluids.  Recurrent again and likely medication related.  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  Plan:   -increase lantus to 20 units.  -ISS AC at bedtime      Hyponatremia  Assessment: present on admission, stable   plan:   -Monitor daily    Anemia  Assessment: acute on chronic. Discussed with cardiology. Would not transfuse - focus on heart rate.  Plan:   -Monitor daily       Diet: Combination Diet Regular Diet Adult    DVT Prophylaxis: Pneumatic Compression Devices  Adkins Catheter: Not present  Lines: None     Cardiac Monitoring: None  Code Status: No CPR- Do NOT Intubate      Disposition Plan   Back to assisted living at discharge       Vinny Berg MD  Hospitalist Service  [facility]  Securely message with [employer])  Text page via  [facility] Paging/Directory   ______________________________________________________________________    Interval History   Overnight no acute events and afebrile, feeling back to his baseline, eager to discharge, his foot no longer has any significant pain, sleeping much better, appetite is normal, no increased swelling or redness of his foot, no other new complaints.    Physical Exam   Vital Signs: Temp: 97.4  F (36.3  C) Temp src: Tympanic BP: 126/63 Pulse: 60   Resp: 16 SpO2: 92 % O2 Device: None (Room air) Oxygen Delivery: 3 LPM (Decreased to 2LPM)  Weight: 224 lbs 3.2 oz    Exam:   GENERAL: Talkative, sitting up in bed, pleasant and appropriate, in no apparent distress.  CARDIOVASCULAR: regular rate and rhythm, no murmurs, rubs, or gallops. Normal S1/S2. No lower extremity edema.   RESPIRATORY: clear to auscultation bilaterally, no wheezes, no crackles.   GI: soft, non-tender, non-distended, normoactive bowel sounds.  MUSCULOSKELETAL: Right foot with amputation site open with packing removed.   medial lateral rim of eschar noted with clean base, no exposed bone, only minimal scant surrounding nonraised erythema which is unchanged today.  Ongoing dependent nonraised erythema in the dependent arch of his foot which is slowly improved.  SKIN: no other pallor, jaundice, or rashes  Neuro: Awake alert and oriented x3, moves all extremities symmetrically, grossly nonfocal.      Medical Decision Making   Data     I have personally reviewed the following data over the past 24 hrs:    7.2  \   8.0 (L)   / 216     132 (L) 99 27.6 (H) /  156 (H)   4.8 24 1.41 (H) \       Trop: 242 (HH) BNP: N/A       TSH: N/A T4: N/A A1C: 7.5 (H)

## 2023-01-18 NOTE — PROGRESS NOTES
:     Patient lives at [facility] Spoke with ANETTE Medley at [facility] to provide a discharge update.     Anticipated discharge is tomorrow.      will continue to follow for discharge planning needs.     NOEL Atwood on 1/18/2023 at 1:08 PM

## 2023-01-19 ENCOUNTER — APPOINTMENT (OUTPATIENT)
Dept: OCCUPATIONAL THERAPY | Facility: OTHER | Age: 88
DRG: 907 | End: 2023-01-19
Payer: COMMERCIAL

## 2023-01-19 VITALS
TEMPERATURE: 98.1 F | OXYGEN SATURATION: 91 % | BODY MASS INDEX: 29.25 KG/M2 | SYSTOLIC BLOOD PRESSURE: 144 MMHG | RESPIRATION RATE: 20 BRPM | HEART RATE: 73 BPM | HEIGHT: 73 IN | DIASTOLIC BLOOD PRESSURE: 64 MMHG | WEIGHT: 220.7 LBS

## 2023-01-19 LAB
ANION GAP SERPL CALCULATED.3IONS-SCNC: 12 MMOL/L (ref 7–15)
BACTERIA BLD CULT: NO GROWTH
BACTERIA BLD CULT: NO GROWTH
BUN SERPL-MCNC: 28.1 MG/DL (ref 8–23)
CALCIUM SERPL-MCNC: 8.5 MG/DL (ref 8.8–10.2)
CHLORIDE SERPL-SCNC: 98 MMOL/L (ref 98–107)
CREAT SERPL-MCNC: 1.38 MG/DL (ref 0.67–1.17)
DEPRECATED HCO3 PLAS-SCNC: 23 MMOL/L (ref 22–29)
ERYTHROCYTE [DISTWIDTH] IN BLOOD BY AUTOMATED COUNT: 14 % (ref 10–15)
GFR SERPL CREATININE-BSD FRML MDRD: 50 ML/MIN/1.73M2
GLUCOSE BLDC GLUCOMTR-MCNC: 170 MG/DL (ref 70–99)
GLUCOSE SERPL-MCNC: 157 MG/DL (ref 70–99)
HCT VFR BLD AUTO: 26.1 % (ref 40–53)
HGB BLD-MCNC: 8.5 G/DL (ref 13.3–17.7)
HOLD SPECIMEN: NORMAL
HOLD SPECIMEN: NORMAL
LACTATE SERPL-SCNC: 0.7 MMOL/L (ref 0.7–2)
MAGNESIUM SERPL-MCNC: 1.8 MG/DL (ref 1.7–2.3)
MCH RBC QN AUTO: 28.1 PG (ref 26.5–33)
MCHC RBC AUTO-ENTMCNC: 32.6 G/DL (ref 31.5–36.5)
MCV RBC AUTO: 86 FL (ref 78–100)
PLATELET # BLD AUTO: 278 10E3/UL (ref 150–450)
POTASSIUM SERPL-SCNC: 4.9 MMOL/L (ref 3.4–5.3)
RBC # BLD AUTO: 3.02 10E6/UL (ref 4.4–5.9)
SODIUM SERPL-SCNC: 133 MMOL/L (ref 136–145)
WBC # BLD AUTO: 7.9 10E3/UL (ref 4–11)

## 2023-01-19 PROCEDURE — 99239 HOSP IP/OBS DSCHRG MGMT >30: CPT | Performed by: INTERNAL MEDICINE

## 2023-01-19 PROCEDURE — 80048 BASIC METABOLIC PNL TOTAL CA: CPT | Performed by: INTERNAL MEDICINE

## 2023-01-19 PROCEDURE — 250N000013 HC RX MED GY IP 250 OP 250 PS 637: Performed by: INTERNAL MEDICINE

## 2023-01-19 PROCEDURE — 85027 COMPLETE CBC AUTOMATED: CPT | Performed by: INTERNAL MEDICINE

## 2023-01-19 PROCEDURE — 83605 ASSAY OF LACTIC ACID: CPT | Performed by: INTERNAL MEDICINE

## 2023-01-19 PROCEDURE — 97116 GAIT TRAINING THERAPY: CPT | Mod: GP

## 2023-01-19 PROCEDURE — 97530 THERAPEUTIC ACTIVITIES: CPT | Mod: GP

## 2023-01-19 PROCEDURE — 97535 SELF CARE MNGMENT TRAINING: CPT | Mod: GO | Performed by: OCCUPATIONAL THERAPIST

## 2023-01-19 PROCEDURE — 83735 ASSAY OF MAGNESIUM: CPT | Performed by: INTERNAL MEDICINE

## 2023-01-19 PROCEDURE — 36415 COLL VENOUS BLD VENIPUNCTURE: CPT | Performed by: INTERNAL MEDICINE

## 2023-01-19 RX ORDER — CEFUROXIME AXETIL 500 MG/1
500 TABLET ORAL EVERY 12 HOURS
Qty: 8 TABLET | Refills: 0 | Status: SHIPPED | OUTPATIENT
Start: 2023-01-19 | End: 2023-01-23

## 2023-01-19 RX ORDER — GABAPENTIN 300 MG/1
600 CAPSULE ORAL 3 TIMES DAILY
Qty: 90 CAPSULE | Refills: 0 | Status: ON HOLD | OUTPATIENT
Start: 2023-01-19 | End: 2023-03-15

## 2023-01-19 RX ORDER — METOPROLOL SUCCINATE 25 MG/1
25 TABLET, EXTENDED RELEASE ORAL DAILY
COMMUNITY
Start: 2023-01-19

## 2023-01-19 RX ORDER — FUROSEMIDE 40 MG
20 TABLET ORAL DAILY
COMMUNITY
Start: 2023-01-19 | End: 2023-04-05

## 2023-01-19 RX ADMIN — GABAPENTIN 600 MG: 300 CAPSULE ORAL at 05:42

## 2023-01-19 RX ADMIN — CLOPIDOGREL BISULFATE 75 MG: 75 TABLET, FILM COATED ORAL at 09:27

## 2023-01-19 RX ADMIN — SERTRALINE HYDROCHLORIDE 150 MG: 50 TABLET ORAL at 09:27

## 2023-01-19 RX ADMIN — FAMOTIDINE 20 MG: 20 TABLET, FILM COATED ORAL at 09:27

## 2023-01-19 RX ADMIN — ASPIRIN 81 MG 81 MG: 81 TABLET ORAL at 09:27

## 2023-01-19 RX ADMIN — MAGNESIUM OXIDE TAB 400 MG (241.3 MG ELEMENTAL MG) 400 MG: 400 (241.3 MG) TAB at 09:28

## 2023-01-19 RX ADMIN — ISOSORBIDE MONONITRATE 30 MG: 30 TABLET, EXTENDED RELEASE ORAL at 08:23

## 2023-01-19 RX ADMIN — METOPROLOL SUCCINATE 50 MG: 50 TABLET, EXTENDED RELEASE ORAL at 09:27

## 2023-01-19 RX ADMIN — TAMSULOSIN HYDROCHLORIDE 0.8 MG: 0.4 CAPSULE ORAL at 09:27

## 2023-01-19 RX ADMIN — CEFUROXIME AXETIL 500 MG: 250 TABLET, FILM COATED ORAL at 09:27

## 2023-01-19 RX ADMIN — ACETAMINOPHEN 1000 MG: 500 TABLET ORAL at 05:42

## 2023-01-19 ASSESSMENT — ACTIVITIES OF DAILY LIVING (ADL)
ADLS_ACUITY_SCORE: 43

## 2023-01-19 NOTE — PLAN OF CARE
"Patient a/o x4, pleasant. VSS. Had temp 100.5 at beginning of shift, but reduced to 97.5. Denies pain. Will continue to monitor.     Goal Outcome Evaluation:      Plan of Care Reviewed With: patient        Problem: Plan of Care - These are the overarching goals to be used throughout the patient stay.    Goal: Plan of Care Review  Description: The Plan of Care Review/Shift note should be completed every shift.  The Outcome Evaluation is a brief statement about your assessment that the patient is improving, declining, or no change.  This information will be displayed automatically on your shift note.  1/19/2023 0439 by Kelsie Cosme RN  Outcome: Progressing  1/19/2023 0439 by Kelsie Cosme RN  Outcome: Progressing  Flowsheets (Taken 1/19/2023 0439)  Plan of Care Reviewed With: patient  Goal: Patient-Specific Goal (Individualized)  Description: You can add care plan individualizations to a care plan. Examples of Individualization might be:  \"Parent requests to be called daily at 9am for status\", \"I have a hard time hearing out of my right ear\", or \"Do not touch me to wake me up as it startles me\".  1/19/2023 0439 by Kelsie Cosme RN  Outcome: Progressing  1/19/2023 0439 by Kelsie Cosme RN  Outcome: Progressing  Goal: Absence of Hospital-Acquired Illness or Injury  1/19/2023 0439 by Kelsie Cosme RN  Outcome: Progressing  1/19/2023 0439 by Kelsie Cosme RN  Outcome: Progressing  Intervention: Identify and Manage Fall Risk  Recent Flowsheet Documentation  Taken 1/19/2023 0220 by Kelsie Cosme RN  Safety Promotion/Fall Prevention:    activity supervised    assistive device/personal items within reach    bed alarm on    clutter free environment maintained    fall prevention program maintained    nonskid shoes/slippers when out of bed    safety round/check completed  Taken 1/18/2023 1935 by Kelsie Cosme RN  Safety Promotion/Fall Prevention:    activity supervised    assistive " device/personal items within reach    bed alarm on    clutter free environment maintained    fall prevention program maintained    nonskid shoes/slippers when out of bed    safety round/check completed  Intervention: Prevent Skin Injury  Recent Flowsheet Documentation  Taken 1/19/2023 0220 by Kelsie Cosme RN  Body Position:    position changed independently    weight shifting    supine, head elevated  Taken 1/18/2023 1935 by Kelsie Cosme RN  Body Position:    position changed independently    supine, head elevated  Intervention: Prevent and Manage VTE (Venous Thromboembolism) Risk  Recent Flowsheet Documentation  Taken 1/19/2023 0220 by Kelsie Cosme RN  VTE Prevention/Management:    SCDs (sequential compression devices) off    patient refused intervention  Taken 1/18/2023 1935 by Kelsie Cosme RN  VTE Prevention/Management:    SCDs (sequential compression devices) off    patient refused intervention  Goal: Optimal Comfort and Wellbeing  1/19/2023 0439 by Kelsie Cosme RN  Outcome: Progressing  1/19/2023 0439 by Kelsie Cosme RN  Outcome: Progressing  Goal: Readiness for Transition of Care  1/19/2023 0439 by Kelsie Cosme RN  Outcome: Progressing  1/19/2023 0439 by Kelsie Cosme RN  Outcome: Progressing     Problem: Skin or Soft Tissue Infection  Goal: Absence of Infection Signs and Symptoms  1/19/2023 0439 by Kelsie Cosme RN  Outcome: Progressing  1/19/2023 0439 by Kelsie Cosme RN  Outcome: Progressing

## 2023-01-19 NOTE — DISCHARGE SUMMARY
"[facility]    Discharge Summary  Hospitalist    Date of Admission:  1/13/2023  Date of Discharge:  1/19/2023 10:54 AM  Discharging Provider: Vinny Berg MD  Date of Service (when I saw the patient): 01/19/23    Discharge Diagnoses   Principal Problem:    Local infection of wound (1/14/2023)  Active Problems:    Cellulitis of right lower extremity (1/14/2023)    Chronic kidney disease, stage 4 (severe) (H) (7/4/2022)    Coronary artery disease (1/14/2023)    Diabetic neuropathy (H) (1/14/2023)    Essential hypertension (1/14/2023)    PAD (peripheral artery disease) (H) (11/29/2022)    Gastroesophageal reflux disease, unspecified whether esophagitis present (4/17/2018)    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  Patient was admitted by Dr. Monzon and per H&P,   \"Elsa Horowitz is a 87 year old male who has had a recent complex past medical history. [facility]:     \"Elsa is a 87 year old male with a past medical history of cognitive impairment, diabetes, depression, hypertension, CKD 4 admitted 11/27 for right diabetic foot infection. MSSA bacteremia secondary to osteomyelitis in setting of severe PAD (s/p revascularization 11/28).  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 However, had NSTEMI peak trop 2.9. TTE demonstrated normal EF without appreciable valvular disease (study technically difficult).  LV cath (12/5) showed two sequential thrombotic lesions in the proximal and mid RCA s/p PCI with 2 overlapping ALEXIS. The recommendation was made for DAPT, minimum 12 months, high intensity statin. Could consider ischemic eval if ongoing symptoms to evaluate LAD further. He was discharged to [place] to complete his " "antibiotics with a PICC line in place. Antibiotics are scheduled to be finished 12/29/2022.\"     He completed antibiotics on 12/21 and was seen by podiatry and ID on 1/5. No further antibiotics, and sutures removed from foot. At home he fell and has had more pain. He comes to the ED and has a temp of 101.3. R foot red and swollen consistent with cellulitis. The is a large eschar on the lateral foot from where surgery occurred. He was admitted for further treatment.\"    Hospital Course   Elsa Horowitz was admitted on 1/13/2023.  The following problems were addressed during his hospitalization:     Cellulitis of right lower extremity: Present on admission, foot ulcer with resolving cellulitis and more of a eschar appearance.  CT shows no abscess or obvious bony involvement. Finished a prolonged course of IV antibiotics on 12/21/22 and has not been on any since. 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  Consent eschar from the recent amputation site will benefit from repeat debridement.    Initially started on vancomycin and ceftriaxone but narrowed to Ceftin with ongoing clinical improvement.  General surgery consult here and underwent debridement of right fifth metatarsal amputation site measuring 5 x 3 cm given some eschar and callus that had developed around it.  He had no other clinical evidence of deeper infection.  He will complete a full course of Ceftin, continue with surgical shoe with ambulation and will follow-up with podiatry in 1/25.  He will also continue with wet-to-dry dressings twice daily and due to his neuropathic pain component we will increase his gabapentin from at bedtime to 3 times daily.      Acute kidney injury: Present on admission and resolved with IV fluids.  He was resumed on his ARB and Lasix has slight worsening of his creatinine back to 1.4.  Given his oral intake and no evidence of hypervolemia we will decrease his Lasix from 40 to 20 mg daily and will need close monitoring " of his weights and fluid status with consideration of repeat BMP at time of PCP follow-up.     Acute hypoxic respiratory failure: Resolved.  Requiring up to 3 L this morning, typical viral URI related symptoms with a white productive sputum which is improved compared to yesterday.  No obvious evidence of bacterial pneumonia.    No further supplemental oxygen requirement 21st prior to discharge and possible component of RYLEE and could consider sleep study as an outpatient.         Coronary artery disease: Recent non-ST elevation MI with ALEXIS placement in RCA on 12/5, and needs 12 months of DAPT. There is known LAD disease.  Mild troponin elevation with typical anginal equivalent on day of admit but no obvious EKG changes and no new obvious wall motion abnormalities on echo and troponin has plateaued.  No further anginal equivalent symptoms.  Case initially assessed with cardiology on admit who felt trope elevation was likely related to strain/sepsis and it did decrease with no further anginal equivalent.  We will increase his metoprolol from 20 to 50 mg daily, continue on dual antiplatelet therapy and statin and ARB.     Peripheral vascular disease: Status post right lower extremity angiogram with drug-coated balloon angioplasty of SFA-popliteal artery and balloon angioplasty of popliteal artery in 11/20/2022.  Continue on optimize medical management and need rescheduling of his outpatient follow-up with vascular surgery.       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    Vinny Berg MD    Significant Results and Procedures   Debridement with general surgery    Pending Results   These results will be followed up by NA  Unresulted Labs Ordered in the Past 30 Days of this Admission     No orders found from 12/14/2022 to 1/14/2023.          Code Status   DNR / DNI       Primary Care Physician    Lisa Genao    Physical Exam   Temp: 98.1  F (36.7  C) Temp src: Tympanic BP: (!) 144/64 Pulse: 73   Resp: 20 SpO2: 91 % O2 Device: None (Room air)    Vitals:    01/17/23 0358 01/18/23 0700 01/19/23 0223   Weight: 100.2 kg (220 lb 12.8 oz) 101.7 kg (224 lb 3.2 oz) 100.1 kg (220 lb 11.2 oz)     Vital Signs with Ranges  Temp:  [97.5  F (36.4  C)-100.5  F (38.1  C)] 98.1  F (36.7  C)  Pulse:  [73-83] 73  Resp:  [16-20] 20  BP: (124-144)/(50-64) 144/64  SpO2:  [91 %-93 %] 91 %  I/O last 3 completed shifts:  In: 1287 [P.O.:1287]  Out: 950 [Urine:950]    Exam on day of discharge:   GENERAL: Talkative, sitting up on side of bed getting dressed, pleasant and appropriate, in no apparent distress.  CARDIOVASCULAR: regular rate and rhythm, no murmurs, rubs, or gallops. Normal S1/S2. No lower extremity edema.   RESPIRATORY: clear to auscultation bilaterally, no wheezes, no crackles.   GI: soft, non-tender, non-distended, normoactive bowel sounds.  MUSCULOSKELETAL: Right foot with amputation site open with packing removed.     Eschar from your lateral room has now been dissected with clean base, no significant granulation but no raised erythema exudate or drainage.  SKIN: no other pallor, jaundice, or rashes  Neuro: Awake alert and oriented x3, moves all extremities symmetrically, grossly nonfocal.    Discharge Disposition   Discharged to assisted living with additional home care  Condition at discharge: Stable    Consultations This Hospital Stay   PHARMACY TO DOSE VANCO  CARE MANAGEMENT / SOCIAL WORK IP CONSULT  PHARMACY TO DOSE VANCO  SOCIAL WORK IP CONSULT  PHYSICAL THERAPY ADULT IP CONSULT  OCCUPATIONAL THERAPY ADULT IP CONSULT    Time Spent on this Encounter   I, Vinny Berg MD, personally saw the patient today and spent greater than 30 minutes discharging this patient.    Discharge Orders      Home Care Referral      Reason for your hospital stay    Foot infection     Follow-up and recommended labs and tests      1/25/2023 12:20 PM 1/25/2023 12:40 PM  Travis Matos DPM  Podiatry  NPI: [id number]  [address] [place] [id number]    PCP at the facility and cardiology at next available.     Monitor and record    blood glucose 4 times a day, before meals and at bedtime  weight every day and call weight to PCP if gaining more than 3 pounds in 1 day     When to contact your care team    Call your primary doctor if you have any of the following: temperature greater than 101,  increased shortness of breath, increased drainage, increased swelling, or increased pain.     Wound care and dressings    Instructions to care for your wound at home: Wet-to-dry twice daily.  Cover with dry clean dressing     Discharge Instructions    - Decrease her Lasix from 40 to 20 mg daily, consider repeat BMP at time of PCP follow-up  -Increase your gabapentin for your foot pain to 3 times daily  -Take the antibiotic until it is gone to clear up the skin infection in your foot     Activity    Your activity upon discharge: activity as tolerated.  Wear your surgical shoe anytime you are ambulating on your right foot     Diet    Follow this diet upon discharge: Orders Placed This Encounter      Combination Diet Regular Diet Adult     Discharge Medications   Discharge Medication List as of 1/19/2023 10:45 AM      START taking these medications    Details   cefuroxime (CEFTIN) 500 MG tablet Take 1 tablet (500 mg) by mouth every 12 hours for 4 days, Disp-8 tablet, R-0, E-Prescribe         CONTINUE these medications which have CHANGED    Details   furosemide (LASIX) 40 MG tablet Take 0.5 tablets (20 mg) by mouth daily, Historical      gabapentin (NEURONTIN) 300 MG capsule Take 2 capsules (600 mg) by mouth 3 times daily, Disp-90 capsule, R-0, E-Prescribe      metoprolol succinate ER (TOPROL XL) 25 MG 24 hr tablet Take 2 tablets (50 mg) by mouth daily, Historical         CONTINUE these medications which have NOT CHANGED    Details   acetaminophen  (TYLENOL) 500 MG tablet Take 1,000 mg by mouth 3 times daily, Historical      aspirin (ASA) 81 MG chewable tablet Take 81 mg by mouth daily, Historical      Cholecalciferol (VITAMIN D3) 2000 UNITS TABS Take 1 tablet by mouth daily., Historical      !! clopidogrel (PLAVIX) 75 MG tablet Take 75 mg by mouth daily, Historical      !! clopidogrel (PLAVIX) 75 MG tablet Take 1 tablet by mouth daily., Historical      diclofenac (VOLTAREN) 1 % topical gel Apply topically 4 times daily, Historical      famotidine (PEPCID) 20 MG tablet Take 20 mg by mouth 2 times daily, Historical      !! insulin aspart (NOVOLOG FLEXPEN) 100 UNIT/ML pen Inject 15 Units Subcutaneous every morning (before breakfast) Plus sliding scale., Historical      !! insulin aspart (NOVOLOG FLEXPEN) 100 UNIT/ML pen Inject 15 Units Subcutaneous daily (with dinner) Plus sliding scale, Historical      !! insulin aspart (NOVOLOG FLEXPEN) 100 UNIT/ML pen Inject Subcutaneous 3 times daily (with meals) 125-149= 0 units, 150-199 = 2 units, 200-249= 4 units, 250-299= 6 units, 300-349= 8 units, 350-400 = 10 units, more than 400 call MD., Historical      !! insulin aspart (NOVOLOG FLEXPEN) 100 UNIT/ML pen Inject 20 Units Subcutaneous daily (with lunch) Plus sliding scale, Historical      insulin glargine (LANTUS VIAL) 100 UNIT/ML vial Inject 17 Units Subcutaneous every morning, Historical      isosorbide mononitrate (IMDUR) 30 MG 24 hr tablet Take 1 tablet by mouth every morning., Historical      levothyroxine (SYNTHROID/LEVOTHROID) 25 MCG tablet Take 25 mcg by mouth every evening, Historical      losartan (COZAAR) 25 MG tablet Take 25 mg by mouth daily, Historical      magnesium oxide 400 MG CAPS Take 400 mg by mouth 2 times daily, Historical      melatonin 3 MG CAPS Take 9 mg by mouth At Bedtime, Historical      mirtazapine (REMERON) 15 MG tablet Take 15 mg by mouth At Bedtime, Historical      nitroGLYcerin (NITROSTAT) 0.4 MG sublingual tablet Place 0.4 mg under the  tongue every 5 minutes as needed for chest pain For chest pain place 1 tablet under the tongue every 5 minutes for 3 doses. If symptoms persist 5 minutes after 1st dose call 911., Historical      polyethylene glycol (MIRALAX) 17 g packet Take 1 packet by mouth daily, Historical      psyllium (METAMUCIL/KONSYL) capsule Take 1 capsule by mouth daily, Historical      sertraline (ZOLOFT) 50 MG tablet Take 150 mg by mouth daily, Historical      tamsulosin (FLOMAX) 0.4 MG capsule Take 0.8 mg by mouth daily, Historical      traMADol (ULTRAM) 50 MG tablet Take 50 mg by mouth every 6 hours as needed for severe pain (7-10), Historical       !! - Potential duplicate medications found. Please discuss with provider.        Allergies   Allergies   Allergen Reactions     Tetanus Antitoxin      Tetanus Toxoid      Data   Most Recent 3 CBC's:  Recent Labs   Lab Test 01/19/23  0416 01/18/23  0612 01/17/23  0628   WBC 7.9 7.2 6.6   HGB 8.5* 8.0* 8.6*   MCV 86 87 87    216 205      Most Recent 3 BMP's:  Recent Labs   Lab Test 01/19/23  0744 01/19/23  0416 01/18/23  2106 01/18/23  0742 01/18/23  0612 01/17/23  0819 01/17/23  0628   NA  --  133*  --   --  132*  --  129*   POTASSIUM  --  4.9  --   --  4.8  --  4.6   CHLORIDE  --  98  --   --  99  --  96*   CO2  --  23  --   --  24  --  23   BUN  --  28.1*  --   --  27.6*  --  20.8   CR  --  1.38*  --   --  1.41*  --  1.04   ANIONGAP  --  12  --   --  9  --  10   JOSEE  --  8.5*  --   --  8.2*  --  8.5*   * 157* 167*   < > 174*   < > 164*    < > = values in this interval not displayed.     Most Recent 2 LFT's:  Recent Labs   Lab Test 11/26/22  2024 02/05/15  1111   AST 15  --    ALT 11  --    ALKPHOS 72 53   BILITOTAL 0.5 0.4     Most Recent INR's and Anticoagulation Dosing History:  Anticoagulation Dose History     Recent Dosing and Labs Latest Ref Rng & Units 2/5/2015    INR <1.3 1.0        Most Recent 3 Troponin's:No lab results found.  Most Recent Cholesterol Panel:No lab  results found.  Most Recent 6 Bacteria Isolates From Any Culture (See EPIC Reports for Culture Details):No lab results found.  Most Recent TSH, T4 and A1c Labs:  Recent Labs   Lab Test 01/18/23  0612   A1C 7.5*     Results for orders placed or performed during the hospital encounter of 01/13/23   US Lower Extremity Venous Duplex Right    Narrative    PROCEDURE INFORMATION:   Exam: US Duplex Right Lower Extremity Veins, Limited   Exam date and time: 1/13/2023 11:12 PM   Age: 87 years old   Clinical indication: Pain; Leg, lower; Right; Additional info: Pain and   swelling rle, dvt?     TECHNIQUE:   Imaging protocol: Real-time duplex ultrasound of the Right Lower Extremity with   2-D gray scale, color Doppler flow and spectral waveform analysis with image   documentation. Limited exam was focused on the right lower extremity veins.     COMPARISON:   CR XR FOOT PORT RIGHT 3 VIEWS 1/13/2023 10:04 PM     FINDINGS:   Right deep veins: Unremarkable. The common femoral, femoral, proximal profunda   femoral and popliteal veins are patent without thrombus. Normal Doppler   waveforms. Normal compressibility and/or augmentation response.    Right superficial veins: Unremarkable. Saphenofemoral junction is patent   without thrombus.     Soft tissues: Unremarkable.       Impression    IMPRESSION:   No evidence of deep vein thrombosis.     THIS DOCUMENT HAS BEEN ELECTRONICALLY SIGNED BY RASHEEDA MISTRY MD   XR Foot Port Right 3 Views    Narrative    PROCEDURE INFORMATION:   Exam: XR Right Foot   Exam date and time: 1/13/2023 10:04 PM   Age: 87 years old   Clinical indication: Other: Wound; Prior surgery; Additional info: 6 weeks S/P   right little toe amputation. Fever, worsening surgical wound     TECHNIQUE:   Imaging protocol: Radiologic exam of the Right foot.   Views: 3 or more views.     COMPARISON:   CR XR FOOT PORT RIGHT 3 VIEWS 11/26/2022 8:12 PM     FINDINGS:   Bones/joints: Moderate plantar calcaneal spur. Multiple  amputations, across the   base of 2nd proximal phalanx, across the neck of the 3rd proximal phalanx, and   across the mid diaphysis of the 5th metatarsal. Diffuse osteopenia. No acute   fracture or dislocation. No osseous destruction to suggest osteomyelitis.   Soft tissues: Normal.   Vasculature: Peripheral arterial calcification, consistent with diabetes.       Impression    IMPRESSION:   1. No evidence of osteomyelitis.   2. Prior amputations across the 2nd and 3rd proximal phalanges and 5th   metatarsal.   3. Osteopenia.     THIS DOCUMENT HAS BEEN ELECTRONICALLY SIGNED BY RASHEEDA MISTRY MD   CT Foot Right w/o Contrast    Narrative    PROCEDURE INFORMATION:   Exam: CT Right Lower Extremity Without Contrast, Foot   Exam date and time: 1/14/2023 1:16 AM   Age: 87 years old   Clinical indication: Weakness; Additional info: Recent right little toe   amputation, dehisced wound, swelling, erythema fever     TECHNIQUE:   Imaging protocol: CT of the Right lower extremity without contrast was   performed. Exam focused on the foot.   Radiation optimization: All CT scans at this facility use at least one of these   dose optimization techniques: automated exposure control; mA and/or kV   adjustment per patient size (includes targeted exams where dose is matched to   clinical indication); or iterative reconstruction.     COMPARISON:   US LOWER EXTREMITY VENOUS DUPLEX RIGHT 1/13/2023 11:12 PM     FINDINGS:   Limitations: Motion artifact degrades image quality.     Bones/joints: Moderate degenerative changes of the midfoot. Amputation of the   proximal phalanx of the 2nd toe. Transmetatarsal amputation of the 5th digit.   The surgical margin is sharp without definite evidence of osteomyelitis. No   organized fluid collection. Disarticulation at the PIP joint of the 3rd toe. No   acute fractures.   Soft tissues: Diffuse swelling throughout the foot and ankle.   Vasculature: Arterial vascular calcifications.       Impression     IMPRESSION:   1. Transmetatarsal amputation of the 5th digit without definite evidence of   osteomyelitis. No organized fluid collection.   2. Diffuse swelling throughout the foot and ankle, nonspecific.     THIS DOCUMENT HAS BEEN ELECTRONICALLY SIGNED BY LARA JOHN MD   XR Chest Port 1 View    Narrative    Exam:  XR CHEST PORT 1 VIEW    HISTORY: hypoxia and sepsis. concern for pneumonia.    COMPARISON:  2022    FINDINGS:     The cardiomediastinal contours are stable.      There is mild streaky left basilar opacity. No pleural effusion or  pneumothorax.      No acute osseous abnormality.       Impression    IMPRESSION:      Mild streaky left basilar opacity, likely atelectasis or scarring,  however early/mild pneumonia is possible in the appropriate clinical  setting.      SOHAIL BARNHART MD         SYSTEM ID:  [id number]   Echocardiogram Complete     Value    LVEF  55-60%    Narrative    [id number]  [id number]  [id number]  [id number]^KRISTEN^RADHA^L     [facility]  [address] [place] [address]     Name: OSCAR WILSON  MRN: [id number]  : 1935  Study Date: 2023 07:16 AM  Age: 87 yrs  Gender: Male  Patient Location: [facility]  Reason For Study: MI  Ordering Physician: RADHA PETERSON  Performed By: Abby Holly RDCS, RVT     BSA: 2.2 m2  Height: 73 in  Weight: 219 lb  HR: 76  BP: 164/77 mmHg  ______________________________________________________________________________  Procedure  Complete Portable Echo Adult. Contrast Definity. Definity (NDC #92638-230-76)  given intravenously. Patient was given 2ml mixture of 1.5ml Definity and 8.5ml  saline. 8 ml wasted. Definity Lot # 1325 .  ______________________________________________________________________________  Interpretation Summary  Left ventricular function is normal.The ejection fraction is 55-60%. There is  mild hypokinesis of the mid inferior segment.  Global right ventricular function is normal. The right  ventricle is normal  size.  No significant valvular abnormalities.  The estimated PA systolic pressure is 48 mmHg.  IVC diameter <2.1 cm collapsing >50% with sniff suggests a normal RA pressure  of 3 mmHg.  There is no prior study for direct comparison.  ______________________________________________________________________________  Left Ventricle  Left ventricular function is normal.The ejection fraction is 55-60%. Left  ventricular size is normal. Mild concentric wall thickening consistent with  left ventricular hypertrophy is present. Grade III or advanced diastolic  dysfunction. Diastolic Doppler findings (E/E' ratio and/or other parameters)  suggest left ventricular filling pressures are increased. There is mild  hypokinesis of the mid inferior segment.     Right Ventricle  Global right ventricular function is normal. The right ventricle is normal  size.     Atria  The right atria appears normal. Mild left atrial enlargement is present.     Mitral Valve  Mild mitral annular calcification is present. Trace mitral insufficiency is  present.     Aortic Valve  Mild aortic valve calcification is present. The aortic valve is tricuspid.     Tricuspid Valve  Mild tricuspid insufficiency is present. The right ventricular systolic  pressure is approximated at 44.7 mmHg plus the right atrial pressure.     Pulmonic Valve  The valve leaflets are not well visualized. Trace pulmonic insufficiency is  present.     Vessels  Sinuses of Valsalva 3.7 cm. Ascending aorta 3.5 cm. IVC diameter <2.1 cm  collapsing >50% with sniff suggests a normal RA pressure of 3 mmHg.     Pericardium  No pericardial effusion is present.     Compared to Previous Study  There is no prior study for direct comparison.     ______________________________________________________________________________  MMode/2D Measurements & Calculations  IVSd: 1.2 cm  LVIDd: 4.7 cm  LVIDs: 3.3 cm  LVPWd: 1.3 cm  FS: 28.6 %     LV mass(C)d: 220.0 grams  LV mass(C)dI:  98.4 grams/m2  Ao root diam: 3.7 cm  asc Aorta Diam: 3.5 cm  LVOT diam: 2.2 cm  LVOT area: 3.9 cm2  LA Volume (BP): 87.0 ml  LA Volume Index (BP): 38.8 ml/m2  RWT: 0.55     Doppler Measurements & Calculations  MV E max jesus: 117.5 cm/sec  MV A max jesus: 73.6 cm/sec  MV E/A: 1.6     MV dec time: 0.16 sec  Ao V2 max: 129.0 cm/sec  Ao max P.0 mmHg  Ao V2 mean: 89.0 cm/sec  Ao mean PG: 3.5 mmHg  Ao V2 VTI: 26.1 cm  KAY(I,D): 2.7 cm2  KAY(V,D): 2.6 cm2  LV V1 max P.9 mmHg  LV V1 max: 85.6 cm/sec  LV V1 VTI: 18.6 cm  SV(LVOT): 71.7 ml  SI(LVOT): 32.1 ml/m2  PA acc time: 0.13 sec  TR max jesus: 333.0 cm/sec  TR max P.7 mmHg  AV Jesus Ratio (DI): 0.66  KAY Index (cm2/m2): 1.2  E/E' av.9  Lateral E/e': 10.9  Medial E/e': 16.9     ______________________________________________________________________________  Report approved by: Hermelindo Yates 2023 09:52 AM

## 2023-01-19 NOTE — PROGRESS NOTES
SAFETY CHECKLIST  ID Bands and Risk clasps correct and in place (DNR, Fall risk, Allergy, Latex, Limb):  Yes  All Lines Reconciled and labeled correctly: Yes  Whiteboard updated:Yes  Environmental interventions: Yes  Verify Tele #: not on tele

## 2023-01-19 NOTE — PROGRESS NOTES
01/19/23 1500   Appointment Info   Signing Clinician's Name / Credentials (PT) Wily Cheng MPT   Gait/Stairs (Locomotion)   Distance in Feet (Gait) 15   Therapeutic Activity   Symptoms Noted During/After Treatment Fatigue   Treatment Detail/Skilled Intervention minimal assist for bed mobilities   Gait Training   Symptoms Noted During/After Treatment (Gait Training) fatigue   Treatment Detail/Skilled Intervention ambulation within patient's room   [place] Level (Gait Training) contact guard   Physical Assistance Level (Gait Training) 1 person assist   Weight Bearing (Gait Training) weight-bearing as tolerated   Assistive Device (Gait Training) rolling walker   Pattern Analysis (Gait Training) 3-point gait   Gait Analysis Deviations decreased [place];increased time in double stance;decreased velocity of limb motion;decreased step length   Impairments (Gait Analysis/Training) balance impaired;strength decreased;pain   PT Discharge Planning   PT Plan continue PT   PT Discharge Recommendation (DC Rec) home with assist;home with home care physical therapy   PT Rationale for DC Rec to promote strength, stability and safe mobility   PT Brief overview of current status minimal assist for bed mobilities; minimal assist to CGA for transfers and short ambulation using a Fww

## 2023-01-19 NOTE — PLAN OF CARE
Physical Therapy Discharge Summary    Reason for therapy discharge:    Discharged to home with home therapy.    Progress towards therapy goal(s). See goals on Care Plan in [facility] electronic health record for goal details.  Goals partially met.  Barriers to achieving goals:   discharge from facility.    Therapy recommendation(s):    Continued therapy is recommended.  Rationale/Recommendations:  to promote strength, stability and safe mobility.    Goal Outcome Evaluation:

## 2023-01-19 NOTE — PLAN OF CARE
Goal Outcome Evaluation:  Pt is here for infection to right foot. New dressing wet to dry applied with ABD pad and kerlix this morning. Slight redness to right foot. Trace edema. Brace to foot in place. Lungs with expiratory wheezes, O2 stable on room air. Dry cough present. Up assist of 1 for mobility. Bruising to left hip/back. Voiding without difficulty, BM this morning. Forgetful. Discharging back to [facility] today. Shilpi Borges RN on 1/19/2023 at 10:45 AM      Plan of Care Reviewed With: patient    Overall Patient Progress: improving

## 2023-01-19 NOTE — PHARMACY - DISCHARGE MEDICATION RECONCILIATION AND EDUCATION
Pharmacy:  Discharge Counseling and Medication Reconciliation    Elsa Horowitz  [address]  [facility] [id number] [phone] (home)   87 year old male  PCP: Lisa Genao    Allergies: Tetanus antitoxin and Tetanus toxoid    Discharge Counseling:    Pharmacist did not meet with patient (and/or family) today to review the medication portion of the After Visit Summary (with an emphasis on NEW medications) and to address patient's questions/concerns.    Summary of Education: none     Materials Provided:  MedCounselor sheets printed from Clinical Pharmacology on: none    Discharge Medication Reconciliation:    It has been determined that the patient has an adequate supply of medications available or which can be obtained from the patient's preferred pharmacy, which HE/SHE has confirmed as: [facility].    Thank you for the consult.    Alessandra Burden RPH........January 19, 2023 10:34 AM

## 2023-01-19 NOTE — PROGRESS NOTES
:     Patient lives at [facility]. Plan for patient to discharge today. [facility]     Call placed to ANETTE Medley at [facility] to update on patients discharge plan.     [facility] will pick patient up at 1100. They will need to borrow a wheelchair.     Discharge summary will be faxed to [facility].     No further needs from  at this time.     NOEL Atwood on 1/19/2023 at 9:55 AM

## 2023-01-19 NOTE — PROGRESS NOTES
01/19/23 3793   Appointment Info   Signing Clinician's Name / Credentials (OT) Reina De Leon, OTR/L   Self-Care/Home Management   Self-Care/Home Mgmt/ADL, Compensatory, Meal Prep Minutes (72166) 45   Worth Level (Grooming Training) stand-by assist   Assistance (Grooming Training) supervision;verbal cues   Worth Level (Bathing Training) maximum assist (25% patient effort)   Assistance (Bathing Training) 1 person assist   Worth Level (Upper Body Dressing Training) moderate assist (50% patient effort)   Assistance (Upper Body Dressing Training) 1 person assist   Lower Body Dressing Training Assistance maximum assist (25% patient effort)   Lower Body Dressing Training Assistance 1 person assist   Worth Level (Toilet Training) maximum assist (25% patient effort)   Assistance (Toilet Training) 1 person assist   OT Discharge Planning   OT Plan d/c to [facility] today   OT Discharge Recommendation (DC Rec) home with home care occupational therapy   OT Rationale for DC Rec Pt will benefit from continued home OT to assess safety with funcitonal mobility and ADL's   OT Brief overview of current status Pt has progressed well over course of stay, less complaints of pain today and able to ambulate from bathroom to recliner using FWW and post op shoe on right foot for protection   Total Session Time   Timed Code Treatment Minutes 45   Total Session Time (sum of timed and untimed services) 45

## 2023-01-19 NOTE — PROGRESS NOTES
01/18/23 1422   Appointment Info   Signing Clinician's Name / Credentials (PT) Wily Cheng MPT   Gait/Stairs (Locomotion)   Distance in Feet (Gait) 15   Therapeutic Activity   Symptoms Noted During/After Treatment Fatigue   Treatment Detail/Skilled Intervention minimal assist for bed mobilities   Gait Training   Symptoms Noted During/After Treatment (Gait Training) fatigue;increased pain   Treatment Detail/Skilled Intervention ambulation within patient's room   [place] Level (Gait Training) contact guard   Physical Assistance Level (Gait Training) 1 person assist   Weight Bearing (Gait Training) weight-bearing as tolerated   Assistive Device (Gait Training) rolling walker   Pattern Analysis (Gait Training) 3-point gait   Gait Analysis Deviations decreased london;increased time in double stance;decreased velocity of limb motion;decreased step length   Impairments (Gait Analysis/Training) balance impaired   PT Discharge Planning   PT Plan continue PT   PT Discharge Recommendation (DC Rec) home with assist;home with home care physical therapy   PT Rationale for DC Rec to promote strength, stability and safe mobility   PT Brief overview of current status minimal assist for bed mobilities; minimal assist to CGA for transfers and short ambulation using a Fww

## 2023-01-19 NOTE — DISCHARGE INSTRUCTIONS
Cardiology apt. With  on 1/7 at 930    [facility] has Dr. Genao that will do hospital follow up

## 2023-01-19 NOTE — PLAN OF CARE
Occupational Therapy Discharge Summary    Reason for therapy discharge:    Discharged to home with home therapy.    Progress towards therapy goal(s). See goals on Care Plan in [facility] electronic health record for goal details.  Goals partially met.  Barriers to achieving goals:   discharge from facility.    Therapy recommendation(s):    Continued therapy is recommended.  Rationale/Recommendations:  on-going home care therapies to maximize level of independence needed to return to baseline function .

## 2023-01-20 ENCOUNTER — PATIENT OUTREACH (OUTPATIENT)
Dept: FAMILY MEDICINE | Facility: OTHER | Age: 88
End: 2023-01-20
Payer: COMMERCIAL

## 2023-01-20 NOTE — TELEPHONE ENCOUNTER
Patient has PCP elsewhere, no follow-up here. No TCM call required per policy.      Corinne R Thayer, RN on 1/20/2023 at 8:16 AM

## 2023-01-26 ENCOUNTER — HOSPITAL ENCOUNTER (EMERGENCY)
Facility: OTHER | Age: 88
Discharge: HOME | End: 2023-01-26
Attending: FAMILY MEDICINE | Admitting: FAMILY MEDICINE
Payer: COMMERCIAL

## 2023-01-26 VITALS
WEIGHT: 208 LBS | OXYGEN SATURATION: 94 % | SYSTOLIC BLOOD PRESSURE: 143 MMHG | TEMPERATURE: 98.6 F | BODY MASS INDEX: 27.57 KG/M2 | HEART RATE: 70 BPM | RESPIRATION RATE: 16 BRPM | DIASTOLIC BLOOD PRESSURE: 74 MMHG | HEIGHT: 73 IN

## 2023-01-26 DIAGNOSIS — I96 GANGRENE OF RIGHT FOOT (H): ICD-10-CM

## 2023-01-26 DIAGNOSIS — T87.9 COMPLICATION OF TOE AMPUTATION STUMP (H): ICD-10-CM

## 2023-01-26 LAB
HOLD SPECIMEN: NORMAL

## 2023-01-26 PROCEDURE — 97597 DBRDMT OPN WND 1ST 20 CM/<: CPT | Performed by: FAMILY MEDICINE

## 2023-01-26 PROCEDURE — 99283 EMERGENCY DEPT VISIT LOW MDM: CPT | Mod: 25 | Performed by: FAMILY MEDICINE

## 2023-01-26 PROCEDURE — 250N000009 HC RX 250: Performed by: FAMILY MEDICINE

## 2023-01-26 PROCEDURE — 99282 EMERGENCY DEPT VISIT SF MDM: CPT | Mod: 25 | Performed by: FAMILY MEDICINE

## 2023-01-26 RX ORDER — GINSENG 100 MG
500 CAPSULE ORAL ONCE
Status: COMPLETED | OUTPATIENT
Start: 2023-01-26 | End: 2023-01-26

## 2023-01-26 RX ADMIN — BACITRACIN 1 G: 500 OINTMENT TOPICAL at 04:04

## 2023-01-26 ASSESSMENT — ENCOUNTER SYMPTOMS: FEVER: 1

## 2023-01-26 NOTE — ED PROVIDER NOTES
History     Chief Complaint   Patient presents with     Fever     The history is provided by the patient and the EMS personnel.     Elsa Horowitz is a 87 year old male here from [facility].  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 They told him to be seen right away if he had fever. Staff at [facility] reported a temperature of 100.0  F and sent him here to the ED.  The patient feels well.     Allergies:  Allergies   Allergen Reactions     Tetanus Antitoxin      Tetanus Toxoid        Problem List:    Patient Active Problem List    Diagnosis Date Noted     Local infection of wound 2023     Priority: Medium     Cellulitis of right lower extremity 2023     Priority: Medium     Coronary artery disease 2023     Priority: Medium     Diabetic neuropathy (H) 2023     Priority: Medium     Essential hypertension 2023     Priority: Medium     Hyponatremia 2023     Priority: Medium     PAD (peripheral artery disease) (H) 2022     Priority: Medium     Chronic kidney disease, stage 4 (severe) (H) 2022     Priority: Medium     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 ACP Review Reviewed chart for advance care plan.  Elsa Horowitz has no plan or code status on file. Received anatomical body bequest form for [facility]-scanned on 1-14-15. Please provide resources for ACP at next opportunity. Confirmed/documented designated decision maker(s) as next of kin in absence of legal document.  Added by Kasandra Louise RN, System ACP Coordinator [employer]              Cellulitis 2013     Priority: Medium        Past Medical History:    Past Medical  History:   Diagnosis Date     Chronic kidney disease, stage 4 (severe) (H) 7/4/2022     Coronary artery disease 1/14/2023     Diabetic neuropathy (H) 1/14/2023     Essential hypertension 1/14/2023     Gastroesophageal reflux disease, unspecified whether esophagitis present 4/17/2018     PAD (peripheral artery disease) (H) 11/29/2022     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) 1/8/2019       Past Surgical History:    No past surgical history on file.    Family History:    No family history on file.    Social History:  Marital Status:   [2]  Social History     Tobacco Use     Smoking status: Former   Substance Use Topics     Alcohol use: Not Currently     Drug use: Not Currently     Comment: Drug use: Not Asked        Medications:    acetaminophen (TYLENOL) 500 MG tablet  aspirin (ASA) 81 MG chewable tablet  Cholecalciferol (VITAMIN D3) 2000 UNITS TABS  clopidogrel (PLAVIX) 75 MG tablet  clopidogrel (PLAVIX) 75 MG tablet  diclofenac (VOLTAREN) 1 % topical gel  famotidine (PEPCID) 20 MG tablet  furosemide (LASIX) 40 MG tablet  gabapentin (NEURONTIN) 300 MG capsule  insulin aspart (NOVOLOG FLEXPEN) 100 UNIT/ML pen  insulin aspart (NOVOLOG FLEXPEN) 100 UNIT/ML pen  insulin aspart (NOVOLOG FLEXPEN) 100 UNIT/ML pen  insulin aspart (NOVOLOG FLEXPEN) 100 UNIT/ML pen  insulin glargine (LANTUS VIAL) 100 UNIT/ML vial  isosorbide mononitrate (IMDUR) 30 MG 24 hr tablet  levothyroxine (SYNTHROID/LEVOTHROID) 25 MCG tablet  losartan (COZAAR) 25 MG tablet  magnesium oxide 400 MG CAPS  melatonin 3 MG CAPS  metoprolol succinate ER (TOPROL XL) 25 MG 24 hr tablet  mirtazapine (REMERON) 15 MG tablet  nitroGLYcerin (NITROSTAT) 0.4 MG sublingual tablet  polyethylene glycol (MIRALAX) 17 g packet  psyllium (METAMUCIL/KONSYL) capsule  sertraline (ZOLOFT) 50 MG tablet  tamsulosin (FLOMAX) 0.4 MG capsule  traMADol (ULTRAM) 50 MG tablet          Review of Systems   Constitutional: Positive for fever.   All other  "systems reviewed and are negative.      Physical Exam   BP: (!) 143/74  Pulse: 70  Temp: 98.6  F (37  C)  Resp: 16  Height: 185.4 cm (6' 1\")  Weight: 94.3 kg (208 lb)  SpO2: 94 %      Physical Exam  Vitals and nursing note reviewed.   Constitutional:       General: He is not in acute distress.     Appearance: Normal appearance. He is not ill-appearing, toxic-appearing or diaphoretic.   Cardiovascular:      Rate and Rhythm: Normal rate.      Pulses: Normal pulses.   Musculoskeletal:      Comments: Exam of the right foot shows pus on the tissue and quite a bit of hard, dry skin around the wound edges. He has no sensation in the bed of the wound on exam today.   Skin:     General: Skin is warm and dry.   Neurological:      Mental Status: He is alert.                   Results for orders placed or performed during the hospital encounter of 01/26/23 (from the past 24 hour(s))   Extra Tube ([place] Draw)    Narrative    The following orders were created for panel order Extra Tube ([place] Draw).  Procedure                               Abnormality         Status                     ---------                               -----------         ------                     Extra Blue Top Tube[[id number]]                              In process                 Extra Red Top Tube[[id number]]                               In process                 Extra Green Top (Lithium...[[id number]]                      In process                 Extra Purple Top Tube[[id number]]                            In process                 Extra Green Top (Lithium...[[id number]]                      In process                   Please view results for these tests on the individual orders.       Medications   bacitracin ointment 1 g (has no administration in time range)       Assessments & Plan (with Medical Decision Making)  Elsa Horowitz is a 87 year old male here from [facility].  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 They told him to be seen right away if he had fever. Staff at [facility] reported a temperature of 100.0  F and sent him here to the ED.  The patient feels well.  VS in the ED shows no fever BP (!) 143/74   Pulse 70   Temp 98.6  F (37  C) (Oral)   Resp 16   Ht 1.854 m (6' 1\")   Wt 94.3 kg (208 lb)   SpO2 94%   BMI 27.44 kg/m    Exam shows a wound with pus and drainage. I did trim some of the thick, hard skin from around the wound edges. We irrigated the wound with a liter of sterile saline and dressed it with Bacitracin and an ABD. We were able to get him back home.      I have reviewed the nursing notes.    I have reviewed the findings, diagnosis, plan and need for follow up with the patient.       Medical Decision Making  The patient's presentation is strongly suggestive of a chronic illness mild to moderate exacerbation, progression, or side effect of treatment.    The patient's evaluation involved:  an assessment requiring an independent historian (see separate area of note for details)  review of external note(s) from 1 sources ([facility])    The patient's management involved only low risk treatment.      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

## 2023-01-26 NOTE — ED TRIAGE NOTES
Patient to ER via EMS from [facility] with c/o fever of 100.0. Was in clinic yesterday for right foot debridement. Had surgery on right foot about 1 month ago per EMS. Current temp 98.6. No tylenol/iburpfoen taken PTA for fever.

## 2023-01-26 NOTE — DISCHARGE INSTRUCTIONS
Elsa did not have fever this evening (Fever is temperature of 100.4 or higher). His foot wound did have a bit of drainage so we cleaned it up a bit, irrigated it with saline and put a dressing on it.    No change in his meds.     Thank you for choosing our Emergency Department for your care.     You may receive a phone call or letter for a survey about your care in our ED.  Please complete this as this is how we improve care for our patients.     If you have any questions after leaving the ED you can call or text me on my cell phone at [phone] and I will get back to you at some point. This does not mean that I am on call and if you are not doing well please return to the ED.     Sincerely,    Dr Juancarlos Cosme M.D.

## 2023-01-30 ENCOUNTER — CARE COORDINATION (OUTPATIENT)
Dept: CARDIOLOGY | Facility: OTHER | Age: 88
End: 2023-01-30
Payer: COMMERCIAL

## 2023-01-30 DIAGNOSIS — Z89.421 STATUS POST AMPUTATION OF TOE OF RIGHT FOOT (H): ICD-10-CM

## 2023-01-30 RX ORDER — GABAPENTIN 300 MG/1
600 CAPSULE ORAL 3 TIMES DAILY
Qty: 90 CAPSULE | Refills: 0 | OUTPATIENT
Start: 2023-01-30

## 2023-01-30 NOTE — PROGRESS NOTES
Chart reviewed for cardiac rehab referral that was received on 12/9/22.  Due to chronic continued foot infections not appropriate for cardiac rehab at this time.  No call made to patient at this time.

## 2023-01-30 NOTE — TELEPHONE ENCOUNTER
This was ordered by Dr. Vinny Berg,  Medical Surgical. Refused with note to pharmacy. Marcella Chawla RN on 1/30/2023 at 2:11 PM       Requested Prescriptions   Pending Prescriptions Disp Refills     gabapentin (NEURONTIN) 300 MG capsule 90 capsule 0     Sig: Take 2 capsules (600 mg) by mouth 3 times daily       There is no refill protocol information for this order

## 2023-01-31 ENCOUNTER — APPOINTMENT (OUTPATIENT)
Dept: CT IMAGING | Facility: OTHER | Age: 88
End: 2023-01-31
Attending: PHYSICIAN ASSISTANT
Payer: COMMERCIAL

## 2023-01-31 ENCOUNTER — HOSPITAL ENCOUNTER (EMERGENCY)
Facility: OTHER | Age: 88
Discharge: NURSING, REHAB OR LONG-TERM CARE | End: 2023-01-31
Admitting: PHYSICIAN ASSISTANT
Payer: COMMERCIAL

## 2023-01-31 VITALS
HEART RATE: 70 BPM | DIASTOLIC BLOOD PRESSURE: 85 MMHG | BODY MASS INDEX: 30.07 KG/M2 | WEIGHT: 222 LBS | SYSTOLIC BLOOD PRESSURE: 157 MMHG | TEMPERATURE: 98.5 F | RESPIRATION RATE: 11 BRPM | OXYGEN SATURATION: 98 % | HEIGHT: 72 IN

## 2023-01-31 DIAGNOSIS — M54.9 BACK PAIN: ICD-10-CM

## 2023-01-31 DIAGNOSIS — R07.9 CHEST PAIN: Primary | ICD-10-CM

## 2023-01-31 DIAGNOSIS — R10.9 RIGHT FLANK PAIN: ICD-10-CM

## 2023-01-31 LAB
ALBUMIN SERPL BCG-MCNC: 3.4 G/DL (ref 3.5–5.2)
ALBUMIN UR-MCNC: NEGATIVE MG/DL
ALP SERPL-CCNC: 97 U/L (ref 40–129)
ALT SERPL W P-5'-P-CCNC: 27 U/L (ref 10–50)
ANION GAP SERPL CALCULATED.3IONS-SCNC: 15 MMOL/L (ref 7–15)
APPEARANCE UR: CLEAR
APTT PPP: 28 SECONDS (ref 22–38)
AST SERPL W P-5'-P-CCNC: 16 U/L (ref 10–50)
ATRIAL RATE - MUSE: 73 BPM
BASOPHILS # BLD AUTO: 0 10E3/UL (ref 0–0.2)
BASOPHILS NFR BLD AUTO: 0 %
BILIRUB SERPL-MCNC: 0.2 MG/DL
BILIRUB UR QL STRIP: NEGATIVE
BUN SERPL-MCNC: 41.9 MG/DL (ref 8–23)
CALCIUM SERPL-MCNC: 8.7 MG/DL (ref 8.8–10.2)
CHLORIDE SERPL-SCNC: 94 MMOL/L (ref 98–107)
COLOR UR AUTO: YELLOW
CREAT SERPL-MCNC: 1.33 MG/DL (ref 0.67–1.17)
DEPRECATED HCO3 PLAS-SCNC: 20 MMOL/L (ref 22–29)
DIASTOLIC BLOOD PRESSURE - MUSE: NORMAL MMHG
EOSINOPHIL # BLD AUTO: 0 10E3/UL (ref 0–0.7)
EOSINOPHIL NFR BLD AUTO: 0 %
ERYTHROCYTE [DISTWIDTH] IN BLOOD BY AUTOMATED COUNT: 14 % (ref 10–15)
GFR SERPL CREATININE-BSD FRML MDRD: 52 ML/MIN/1.73M2
GLUCOSE SERPL-MCNC: 256 MG/DL (ref 70–99)
GLUCOSE UR STRIP-MCNC: 70 MG/DL
HCT VFR BLD AUTO: 24.9 % (ref 40–53)
HGB BLD-MCNC: 8.5 G/DL (ref 13.3–17.7)
HGB UR QL STRIP: NEGATIVE
HOLD SPECIMEN: NORMAL
IMM GRANULOCYTES # BLD: 0.1 10E3/UL
IMM GRANULOCYTES NFR BLD: 1 %
INR PPP: 1.16 (ref 0.85–1.15)
INTERPRETATION ECG - MUSE: NORMAL
KETONES UR STRIP-MCNC: NEGATIVE MG/DL
LEUKOCYTE ESTERASE UR QL STRIP: NEGATIVE
LYMPHOCYTES # BLD AUTO: 0.8 10E3/UL (ref 0.8–5.3)
LYMPHOCYTES NFR BLD AUTO: 5 %
MCH RBC QN AUTO: 28.1 PG (ref 26.5–33)
MCHC RBC AUTO-ENTMCNC: 34.1 G/DL (ref 31.5–36.5)
MCV RBC AUTO: 82 FL (ref 78–100)
MONOCYTES # BLD AUTO: 0.5 10E3/UL (ref 0–1.3)
MONOCYTES NFR BLD AUTO: 4 %
NEUTROPHILS # BLD AUTO: 12.9 10E3/UL (ref 1.6–8.3)
NEUTROPHILS NFR BLD AUTO: 90 %
NITRATE UR QL: NEGATIVE
NRBC # BLD AUTO: 0 10E3/UL
NRBC BLD AUTO-RTO: 0 /100
NT-PROBNP SERPL-MCNC: 5312 PG/ML (ref 0–1800)
P AXIS - MUSE: 65 DEGREES
PH UR STRIP: 5.5 [PH] (ref 5–9)
PLATELET # BLD AUTO: 299 10E3/UL (ref 150–450)
POTASSIUM SERPL-SCNC: 4.8 MMOL/L (ref 3.4–5.3)
PR INTERVAL - MUSE: 194 MS
PROT SERPL-MCNC: 7.1 G/DL (ref 6.4–8.3)
QRS DURATION - MUSE: 76 MS
QT - MUSE: 382 MS
QTC - MUSE: 420 MS
R AXIS - MUSE: 30 DEGREES
RBC # BLD AUTO: 3.03 10E6/UL (ref 4.4–5.9)
SODIUM SERPL-SCNC: 129 MMOL/L (ref 136–145)
SP GR UR STRIP: 1.03 (ref 1–1.03)
SYSTOLIC BLOOD PRESSURE - MUSE: NORMAL MMHG
T AXIS - MUSE: 41 DEGREES
TROPONIN T SERPL HS-MCNC: 28 NG/L
UROBILINOGEN UR STRIP-MCNC: NORMAL MG/DL
VENTRICULAR RATE- MUSE: 73 BPM
WBC # BLD AUTO: 14.3 10E3/UL (ref 4–11)

## 2023-01-31 PROCEDURE — 85025 COMPLETE CBC W/AUTO DIFF WBC: CPT | Performed by: PHYSICIAN ASSISTANT

## 2023-01-31 PROCEDURE — 36415 COLL VENOUS BLD VENIPUNCTURE: CPT | Performed by: PHYSICIAN ASSISTANT

## 2023-01-31 PROCEDURE — 80053 COMPREHEN METABOLIC PANEL: CPT | Performed by: PHYSICIAN ASSISTANT

## 2023-01-31 PROCEDURE — 93005 ELECTROCARDIOGRAM TRACING: CPT | Performed by: PHYSICIAN ASSISTANT

## 2023-01-31 PROCEDURE — 85730 THROMBOPLASTIN TIME PARTIAL: CPT | Performed by: PHYSICIAN ASSISTANT

## 2023-01-31 PROCEDURE — 99284 EMERGENCY DEPT VISIT MOD MDM: CPT | Performed by: PHYSICIAN ASSISTANT

## 2023-01-31 PROCEDURE — G1010 CDSM STANSON: HCPCS

## 2023-01-31 PROCEDURE — 81003 URINALYSIS AUTO W/O SCOPE: CPT | Performed by: PHYSICIAN ASSISTANT

## 2023-01-31 PROCEDURE — 93010 ELECTROCARDIOGRAM REPORT: CPT | Performed by: STUDENT IN AN ORGANIZED HEALTH CARE EDUCATION/TRAINING PROGRAM

## 2023-01-31 PROCEDURE — 85610 PROTHROMBIN TIME: CPT | Performed by: PHYSICIAN ASSISTANT

## 2023-01-31 PROCEDURE — 250N000011 HC RX IP 250 OP 636: Performed by: PHYSICIAN ASSISTANT

## 2023-01-31 PROCEDURE — 99285 EMERGENCY DEPT VISIT HI MDM: CPT | Mod: 25 | Performed by: PHYSICIAN ASSISTANT

## 2023-01-31 PROCEDURE — 84484 ASSAY OF TROPONIN QUANT: CPT | Performed by: PHYSICIAN ASSISTANT

## 2023-01-31 PROCEDURE — 83880 ASSAY OF NATRIURETIC PEPTIDE: CPT | Performed by: PHYSICIAN ASSISTANT

## 2023-01-31 RX ORDER — IOPAMIDOL 755 MG/ML
100 INJECTION, SOLUTION INTRAVASCULAR ONCE
Status: COMPLETED | OUTPATIENT
Start: 2023-01-31 | End: 2023-01-31

## 2023-01-31 RX ADMIN — IOPAMIDOL 100 ML: 755 INJECTION, SOLUTION INTRAVENOUS at 16:27

## 2023-01-31 ASSESSMENT — ACTIVITIES OF DAILY LIVING (ADL)
ADLS_ACUITY_SCORE: 35
ADLS_ACUITY_SCORE: 35

## 2023-01-31 NOTE — PROGRESS NOTES
IV Contrast- Discharge Instructions After Your CT Scan      The IV contrast you received today will be filtered from your bloodstream by your kidneys during the next 24 hours and pass from the body in urine.  You will not be aware of this process and your urine will not change in color.  To help this process you should drink at least 4 additional glasses of water or juice today.  This reduces stress on your kidneys.    Most contrast reactions are immediate.  Should you develop symptoms of concern after discharge, contact the department at the number below.  After hours you should contact your personal physician.  If you develop breathing distress or wheezing, call 911.      1.  Has the patient had a previous reaction to IV contrast? no    2.  Does the patient have kidney disease? yes    3.  Is the patient on dialysis?  no    If YES to any of these questions, exam will be reviewed with a Radiologist before administering contrast.

## 2023-01-31 NOTE — ED PROVIDER NOTES
History     Chief Complaint   Patient presents with     Back Pain     Chest Pain     HPI  Elsa Horowitz is a 87 year old male who arrives emergency department with back pain chest pain.  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  Started last night patient started having chest pain again the nitro did feel better but he started having chest pain and back pain earlier today.  He also has some epigastric discomfort he points to the center of his chest it is nonradicular in nature.  He describes this as a pressure and a pain.  He has had no chills or sweats no body aches he has had a headache no changes in vision no ringing in ears he is got no weakness in any of extremities no difficulty with movement.  Chest pain as listed above no palpitations no shortness of breath he does have a chronic dry cough.  Abdominal discomfort in his epigastric area no nausea no changes in bowel or bladder habits.    Allergies:  Allergies   Allergen Reactions     Tetanus Antitoxin      Tetanus Toxoid        Problem List:    Patient Active Problem List    Diagnosis Date Noted     Local infection of wound 01/14/2023     Priority: Medium     Cellulitis of right lower extremity 01/14/2023     Priority: Medium     Coronary artery disease 01/14/2023     Priority: Medium     Diabetic neuropathy (H) 01/14/2023     Priority: Medium     Essential hypertension 01/14/2023     Priority: Medium     Hyponatremia 01/14/2023     Priority: Medium     PAD (peripheral artery disease) (H) 11/29/2022     Priority: Medium     Chronic kidney disease, stage 4 (severe) (H) 07/04/2022     Priority: Medium     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) 01/08/2019     Priority: Medium     Gastroesophageal reflux disease, unspecified whether  esophagitis present 2018     Priority: Medium     ACP (advance care planning) 2015     Priority: Medium     Advance Care Plannin2015. ACP Review Reviewed chart for advance care plan.  Elsa Horowitz has no plan or code status on file. Received anatomical body bequest form for [facility]-scanned on 1-14-15. Please provide resources for ACP at next opportunity. Confirmed/documented designated decision maker(s) as next of kin in absence of legal document.  Added by Kasandra Louise RN, System ACP Coordinator [facility]              Cellulitis 2013     Priority: Medium        Past Medical History:    Past Medical History:   Diagnosis Date     Chronic kidney disease, stage 4 (severe) (H) 2022     Coronary artery disease 2023     Diabetic neuropathy (H) 2023     Essential hypertension 2023     Gastroesophageal reflux disease, unspecified whether esophagitis present 2018     PAD (peripheral artery disease) (H) 2022     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) 2019       Past Surgical History:    History reviewed. No pertinent surgical history.    Family History:    History reviewed. No pertinent family history.    Social History:  Marital Status:   [2]  Social History     Tobacco Use     Smoking status: Former   Substance Use Topics     Alcohol use: Not Currently     Drug use: Not Currently     Comment: Drug use: Not Asked        Medications:    acetaminophen (TYLENOL) 500 MG tablet  aspirin (ASA) 81 MG chewable tablet  Cholecalciferol (VITAMIN D3) 2000 UNITS TABS  clopidogrel (PLAVIX) 75 MG tablet  clopidogrel (PLAVIX) 75 MG tablet  diclofenac (VOLTAREN) 1 % topical gel  famotidine (PEPCID) 20 MG tablet  furosemide (LASIX) 40 MG tablet  gabapentin (NEURONTIN) 300 MG capsule  insulin aspart (NOVOLOG FLEXPEN) 100 UNIT/ML pen  insulin aspart (NOVOLOG FLEXPEN) 100 UNIT/ML pen  insulin aspart (NOVOLOG FLEXPEN) 100 UNIT/ML  pen  insulin aspart (NOVOLOG FLEXPEN) 100 UNIT/ML pen  insulin glargine (LANTUS VIAL) 100 UNIT/ML vial  isosorbide mononitrate (IMDUR) 30 MG 24 hr tablet  levothyroxine (SYNTHROID/LEVOTHROID) 25 MCG tablet  losartan (COZAAR) 25 MG tablet  magnesium oxide 400 MG CAPS  melatonin 3 MG CAPS  metoprolol succinate ER (TOPROL XL) 25 MG 24 hr tablet  mirtazapine (REMERON) 15 MG tablet  nitroGLYcerin (NITROSTAT) 0.4 MG sublingual tablet  polyethylene glycol (MIRALAX) 17 g packet  psyllium (METAMUCIL/KONSYL) capsule  sertraline (ZOLOFT) 50 MG tablet  tamsulosin (FLOMAX) 0.4 MG capsule  traMADol (ULTRAM) 50 MG tablet          Review of Systems ROS is listed in HPI    Physical Exam   BP: 130/57  Pulse: 77  Temp: 98.5  F (36.9  C)  Resp: 14  Height: 182.9 cm (6')  Weight: 100.7 kg (222 lb)  SpO2: 98 %      Physical Exam  Vitals: Afebrile, pulse 77, blood pressure 130/57 is 98% on room air  General: alert, oriented to person, place, time  Head: atraumatic  Eyes: PERRL, corneas clear and conjunctivae clear  Chest/Pulmonary: chest clear with equal lung sounds bilaterally, no chest wall tenderness or deformities, no tachypnea and no cyanosis  Cardiovascular: S1, S2 normal, regular rate and rhythm, no murmur and 1+ pedal edema equal upper extremity pulses  Abdomen: Soft no palpable mass appreciated epigastric tenderness no guarding  Back/Spine: Lower thoracic tenderness, no step-offs, no obvious deformities.  Musculoskeletal/Extremities: Patient does have a recent wound on his right toe this is a recent change this morning staff stated that the wound looked good.  Skin: no diaphoresis and skin color normal  Neuro: speech clear and gait stable  Psychiatric: affect/mood normal, cooperative, normal judgement/insight and memory intact  ED Course              ED Course as of 01/31/23 1804 Tue Jan 31, 2023   1536 EKG 12-lead, tracing only  EKG shows a normal sinus rhythm rate of 73 MA interval 194  no no ectopy no ST elevation or  ST depression there is no axis deviation.   1604 Troponin T, High Sensitivity(!)  Troponin is 28 8 days ago was 235 this does appear to be coming down.   1604 CBC with platelets differential(!)  No leukocytosis chronic stable anemia   1604 Partial thromboplastin time  PTT is reassuring   1659 Comprehensive metabolic panel(!)   1659 Comprehensive metabolic panel(!)  Slight hyponatremia he has been trying to be 2-1 29 and 132 his creatinine does appear to be around baseline slight elevated and BUN glucose is elevated patient is nonfasting he is a type II diabetic.   1708 CTA Chest Abdomen Pelvis w Contrast  IMPRESSION:     No acute aortic injury.      No pulmonary embolism.   1719 There is no sign of an intra-abdominal infection or pneumonia.  He does have some right-sided CVA tenderness his back pain is more associated with right-sided flank pain.  He does have a leukocytosis will obtain a urine.   1747 UA reflex to Microscopic(!)  No signs of infection we will look at his wound.   1801 His recent toe surgery wound shows no signs of infection at this time.  He states that 3 weeks ago he fell over and landed on a TV nightstand on his right lower back as well as his right side and this is where his tenderness is he also has some tenderness when I push on his chest wall.  His CT of his chest abdomen pelvis showed no signs of aortic injury, no signs of pneumonia, no signs of CHF no signs of intra-abdominal infection or disease processes his urine analysis was negative he has no new skin wounds and he has no issues moving his neck and no signs of altered mental status at this time his elevation his white blood cell count of 14 may be demargination.  I will discharge home recommendations return to clinic if he continues to have right lower side pain and return to the ER if at any point he starts having severe chest pain shortness of breath or diuresis patient is in agreement.     Procedures                  Results for  orders placed or performed during the hospital encounter of 01/31/23 (from the past 24 hour(s))   EKG 12 lead   Result Value Ref Range    Systolic Blood Pressure  mmHg    Diastolic Blood Pressure  mmHg    Ventricular Rate 73 BPM    Atrial Rate 73 BPM    AR Interval 194 ms    QRS Duration 76 ms     ms    QTc 420 ms    P Axis 65 degrees    R AXIS 30 degrees    T Axis 41 degrees    Interpretation ECG       Sinus rhythm  Normal ECG    Confirmed by Jovanni Khoury ([id number]) on 1/31/2023 5:24:48 PM     CBC with platelets differential    Narrative    The following orders were created for panel order CBC with platelets differential.  Procedure                               Abnormality         Status                     ---------                               -----------         ------                     CBC with platelets and d...[[id number]]  Abnormal            Final result                 Please view results for these tests on the individual orders.   INR   Result Value Ref Range    INR 1.16 (H) 0.85 - 1.15   Partial thromboplastin time   Result Value Ref Range    aPTT 28 22 - 38 Seconds   Comprehensive metabolic panel   Result Value Ref Range    Sodium 129 (L) 136 - 145 mmol/L    Potassium 4.8 3.4 - 5.3 mmol/L    Chloride 94 (L) 98 - 107 mmol/L    Carbon Dioxide (CO2) 20 (L) 22 - 29 mmol/L    Anion Gap 15 7 - 15 mmol/L    Urea Nitrogen 41.9 (H) 8.0 - 23.0 mg/dL    Creatinine 1.33 (H) 0.67 - 1.17 mg/dL    Calcium 8.7 (L) 8.8 - 10.2 mg/dL    Glucose 256 (H) 70 - 99 mg/dL    Alkaline Phosphatase 97 40 - 129 U/L    AST 16 10 - 50 U/L    ALT 27 10 - 50 U/L    Protein Total 7.1 6.4 - 8.3 g/dL    Albumin 3.4 (L) 3.5 - 5.2 g/dL    Bilirubin Total 0.2 <=1.2 mg/dL    GFR Estimate 52 (L) >60 mL/min/1.73m2   Troponin T, High Sensitivity   Result Value Ref Range    Troponin T, High Sensitivity 28 (H) <=22 ng/L   Nt probnp inpatient (BNP)   Result Value Ref Range    N terminal Pro BNP Inpatient 5,312 (H) 0 - 1,800 pg/mL   CBC with  platelets and differential   Result Value Ref Range    WBC Count 14.3 (H) 4.0 - 11.0 10e3/uL    RBC Count 3.03 (L) 4.40 - 5.90 10e6/uL    Hemoglobin 8.5 (L) 13.3 - 17.7 g/dL    Hematocrit 24.9 (L) 40.0 - 53.0 %    MCV 82 78 - 100 fL    MCH 28.1 26.5 - 33.0 pg    MCHC 34.1 31.5 - 36.5 g/dL    RDW 14.0 10.0 - 15.0 %    Platelet Count 299 150 - 450 10e3/uL    % Neutrophils 90 %    % Lymphocytes 5 %    % Monocytes 4 %    % Eosinophils 0 %    % Basophils 0 %    % Immature Granulocytes 1 %    NRBCs per 100 WBC 0 <1 /100    Absolute Neutrophils 12.9 (H) 1.6 - 8.3 10e3/uL    Absolute Lymphocytes 0.8 0.8 - 5.3 10e3/uL    Absolute Monocytes 0.5 0.0 - 1.3 10e3/uL    Absolute Eosinophils 0.0 0.0 - 0.7 10e3/uL    Absolute Basophils 0.0 0.0 - 0.2 10e3/uL    Absolute Immature Granulocytes 0.1 <=0.4 10e3/uL    Absolute NRBCs 0.0 10e3/uL   Extra Tube    Narrative    The following orders were created for panel order Extra Tube.  Procedure                               Abnormality         Status                     ---------                               -----------         ------                     Extra Red Top Tube[[id number]]                               Final result                 Please view results for these tests on the individual orders.   Extra Red Top Tube   Result Value Ref Range    Hold Specimen x    CTA Chest Abdomen Pelvis w Contrast    Narrative    PROCEDURE:  CTA CHEST ABDOMEN PELVIS W CONTRAST.    HISTORY:  Evaluate for pulmonary embolism.  Back pain that radiates  into his chest as well as abdominal pain concern for aortic  involvement    TECHNIQUE:  Initial pre-contrast  and localizer images were  obtained.  Contrast enhanced helical CT angiography of the chest  abdomen and pelvis was then performed.  Routine transaxial and  post-processed (multiplanar and/or MIP) reformations were obtained.  This CT exam was performed using one or more the following dose  reduction techniques: automated exposure control,  adjustment of the mA  and/or kV according to patient size, and/or iterative reconstruction  technique.    COMPARISON:  2/5/15    MEDS/CONTRAST: ISOVUE 370 100ml    CTA FINDINGS:  No acute aortic injury. No aortic aneurysm. No acute  pulmonary embolism.    OTHER FINDINGS:      The neck base is grossly symmetric. Cardiac size is normal. There are  extensive atherosclerotic calcifications of the coronary arteries.   There is no pericardial or pleural effusion. The thoracic aorta and  main pulmonary artery are within normal limits in size. No abnormal  thoracic adenopathy is identified.    The pleura is without thickening or effusions. The central airways are  unremarkable. No focal consolidation or intrapulmonary mass is  identified. Trace dependent atelectasis is seen.    No hepatic mass. Contracted gallbladder. Mild pancreatic atrophy.  Normal volume spleen. No adrenal mass. Symmetric nephrograms without  hydronephrosis. Normal caliber bowel. Prostatomegaly. Aortic  calcifications.    No suspicious osseous lesion is identified.      Impression    IMPRESSION:    No acute aortic injury.     No pulmonary embolism.      JIAN ANDRADE MD         SYSTEM ID:  [id number]   UA reflex to Microscopic   Result Value Ref Range    Color Urine Yellow Colorless, Straw, Light Yellow, Yellow    Appearance Urine Clear Clear    Glucose Urine 70 (A) Negative mg/dL    Bilirubin Urine Negative Negative    Ketones Urine Negative Negative mg/dL    Specific Gravity Urine 1.027 1.000 - 1.030    Blood Urine Negative Negative    pH Urine 5.5 5.0 - 9.0    Protein Albumin Urine Negative Negative mg/dL    Urobilinogen Urine Normal Normal, 2.0 mg/dL    Nitrite Urine Negative Negative    Leukocyte Esterase Urine Negative Negative    Narrative    Microscopic not indicated       Medications   iopamidol (ISOVUE-370) solution 100 mL (100 mLs Intravenous Given 1/31/23 7929)       Assessments & Plan (with Medical Decision Making)     I have reviewed  the nursing notes.    I have reviewed the findings, diagnosis, plan and need for follow up with the patient.    Medical Decision Making    Differential diagnosis includes but is not limited to ACS, viral illness, pneumonia, AAA, thoracic dissection, PE, as well as arrhythmia electrolyte abnormalities and gastritis.    Patient's not hypoxic nor is he tachycardic he has no unilateral calf swelling or tenderness he has not coughed up any blood this makes me less concerned of PE.  However with a headache chest pain back pain and some epigastric pain I do have a concern for AAA as well as thoracic aortic involvement.  We will obtain a CBC a BMP, BNP as he does have a history of heart failure troponin twelve-lead EKG we will perform a CTA chest abdomen pelvis.        New Prescriptions    No medications on file       Final diagnoses:   Chest pain   Back pain   Right flank pain       1/31/2023   [facility]  01/31/23 0557

## 2023-01-31 NOTE — ED TRIAGE NOTES
Patient arrived via ems from [facility] with c/o of chest pain and back pain. Pt also states is very dizzy. On arrival pts chest pain cleared but states has slight headache and back ache. Per staff he was given a trazodone earlier when chest pain first started and that stooped chest pain but back pain continued. Pt alert orientated. Vitals stable EKG and labs obtained. Pt has amputation of right little toe and dressing was changed today per staff and staff stated no issues with would care. Pt BS high today around 400 but did receive cortisone shot yesterday      Triage Assessment     Row Name 01/31/23 1529       Triage Assessment (Adult)    Airway WDL WDL       Respiratory WDL    Respiratory WDL WDL       Skin Circulation/Temperature WDL    Skin Circulation/Temperature WDL WDL       Cardiac WDL    Cardiac WDL X;chest pain       Chest Pain Assessment    Chest Pain Location midsternal  none now had some at home    Chest Pain Radiation back    Precipitating Factors at rest    Associated Signs/Symptoms dizziness    Chest Pain Intervention cardiac biomarkers drawn;cardiac monitoring continued;cardiac monitor placed;12-lead ECG obtained       Peripheral/Neurovascular WDL    Peripheral Neurovascular WDL WDL       Cognitive/Neuro/Behavioral WDL    Cognitive/Neuro/Behavioral WDL WDL

## 2023-02-01 NOTE — ED NOTES
Dressing removed from right foot. Area just had debridement yesterday. No drainage noted. Area looks intact and healing. Wet to dry dressing completed per PA orders. Spoke with zena Sagastume from [facility] to discuss patient status and return to [facility]. No questions from nurse. [facility] called for transport back and will be 2 hour wait [facility] aware

## 2023-02-01 NOTE — DISCHARGE INSTRUCTIONS
You are seen in the emergency department today for chest pain back pain and right flank pain.  Your blood work showed no signs of systemic infection no signs of anemia your cardiac enzyme was improving from where previously was significantly decreased.  Your kidney function as well as electrolytes were stable from previous.  CT of your chest abdomen and pelvis showed no signs of any injuries to any arteries in your chest, showed no signs of pneumonia no signs of fluid in your lungs and no signs of abdominal infection.  Your urinalysis also showed no signs of infection and your wound on your right toe appears to be healing well.  If at any point you start having severe pain shortness of breath please return to an ER.  If your condition does not improve please follow-up with your clinic provider.

## 2023-02-06 ENCOUNTER — HOSPITAL ENCOUNTER (EMERGENCY)
Facility: OTHER | Age: 88
Discharge: HOME | End: 2023-02-06
Attending: PHYSICIAN ASSISTANT | Admitting: PHYSICIAN ASSISTANT
Payer: COMMERCIAL

## 2023-02-06 VITALS
RESPIRATION RATE: 16 BRPM | BODY MASS INDEX: 30.07 KG/M2 | OXYGEN SATURATION: 95 % | SYSTOLIC BLOOD PRESSURE: 140 MMHG | TEMPERATURE: 97.8 F | DIASTOLIC BLOOD PRESSURE: 55 MMHG | HEART RATE: 71 BPM | WEIGHT: 222 LBS | HEIGHT: 72 IN

## 2023-02-06 DIAGNOSIS — L08.9 RIGHT FOOT INFECTION: ICD-10-CM

## 2023-02-06 LAB
ANION GAP SERPL CALCULATED.3IONS-SCNC: 7 MMOL/L (ref 7–15)
BASOPHILS # BLD AUTO: 0.1 10E3/UL (ref 0–0.2)
BASOPHILS NFR BLD AUTO: 1 %
BUN SERPL-MCNC: 37.6 MG/DL (ref 8–23)
CALCIUM SERPL-MCNC: 8.5 MG/DL (ref 8.8–10.2)
CHLORIDE SERPL-SCNC: 95 MMOL/L (ref 98–107)
CREAT SERPL-MCNC: 1.38 MG/DL (ref 0.67–1.17)
DEPRECATED HCO3 PLAS-SCNC: 27 MMOL/L (ref 22–29)
EOSINOPHIL # BLD AUTO: 0.6 10E3/UL (ref 0–0.7)
EOSINOPHIL NFR BLD AUTO: 5 %
ERYTHROCYTE [DISTWIDTH] IN BLOOD BY AUTOMATED COUNT: 14.2 % (ref 10–15)
GFR SERPL CREATININE-BSD FRML MDRD: 50 ML/MIN/1.73M2
GLUCOSE SERPL-MCNC: 59 MG/DL (ref 70–99)
HCT VFR BLD AUTO: 24.8 % (ref 40–53)
HGB BLD-MCNC: 8.2 G/DL (ref 13.3–17.7)
HOLD SPECIMEN: NORMAL
IMM GRANULOCYTES # BLD: 0.2 10E3/UL
IMM GRANULOCYTES NFR BLD: 1 %
LYMPHOCYTES # BLD AUTO: 2.7 10E3/UL (ref 0.8–5.3)
LYMPHOCYTES NFR BLD AUTO: 22 %
MCH RBC QN AUTO: 27.6 PG (ref 26.5–33)
MCHC RBC AUTO-ENTMCNC: 33.1 G/DL (ref 31.5–36.5)
MCV RBC AUTO: 84 FL (ref 78–100)
MONOCYTES # BLD AUTO: 1 10E3/UL (ref 0–1.3)
MONOCYTES NFR BLD AUTO: 8 %
NEUTROPHILS # BLD AUTO: 7.8 10E3/UL (ref 1.6–8.3)
NEUTROPHILS NFR BLD AUTO: 63 %
NRBC # BLD AUTO: 0 10E3/UL
NRBC BLD AUTO-RTO: 0 /100
PLATELET # BLD AUTO: 269 10E3/UL (ref 150–450)
POTASSIUM SERPL-SCNC: 4.6 MMOL/L (ref 3.4–5.3)
RBC # BLD AUTO: 2.97 10E6/UL (ref 4.4–5.9)
SODIUM SERPL-SCNC: 129 MMOL/L (ref 136–145)
WBC # BLD AUTO: 12.3 10E3/UL (ref 4–11)

## 2023-02-06 PROCEDURE — 99284 EMERGENCY DEPT VISIT MOD MDM: CPT | Mod: 25 | Performed by: SURGERY

## 2023-02-06 PROCEDURE — 36415 COLL VENOUS BLD VENIPUNCTURE: CPT | Performed by: PHYSICIAN ASSISTANT

## 2023-02-06 PROCEDURE — 85025 COMPLETE CBC W/AUTO DIFF WBC: CPT | Performed by: PHYSICIAN ASSISTANT

## 2023-02-06 PROCEDURE — 99284 EMERGENCY DEPT VISIT MOD MDM: CPT | Performed by: PHYSICIAN ASSISTANT

## 2023-02-06 PROCEDURE — 82947 ASSAY GLUCOSE BLOOD QUANT: CPT | Performed by: PHYSICIAN ASSISTANT

## 2023-02-06 PROCEDURE — 82310 ASSAY OF CALCIUM: CPT | Performed by: PHYSICIAN ASSISTANT

## 2023-02-06 RX ORDER — CLINDAMYCIN HCL 300 MG
300 CAPSULE ORAL 4 TIMES DAILY
Qty: 28 CAPSULE | Refills: 0 | Status: SHIPPED | OUTPATIENT
Start: 2023-02-06 | End: 2023-02-06

## 2023-02-06 RX ORDER — CIPROFLOXACIN 500 MG/1
500 TABLET, FILM COATED ORAL 2 TIMES DAILY
Qty: 14 TABLET | Refills: 0 | Status: SHIPPED | OUTPATIENT
Start: 2023-02-06 | End: 2023-02-06

## 2023-02-06 RX ORDER — CIPROFLOXACIN 500 MG/1
500 TABLET, FILM COATED ORAL 2 TIMES DAILY
Qty: 14 TABLET | Refills: 0 | Status: SHIPPED | OUTPATIENT
Start: 2023-02-06 | End: 2023-02-13

## 2023-02-06 RX ORDER — CLINDAMYCIN HCL 300 MG
300 CAPSULE ORAL 4 TIMES DAILY
Qty: 28 CAPSULE | Refills: 0 | Status: SHIPPED | OUTPATIENT
Start: 2023-02-06 | End: 2023-02-13

## 2023-02-06 ASSESSMENT — ACTIVITIES OF DAILY LIVING (ADL)
ADLS_ACUITY_SCORE: 35
ADLS_ACUITY_SCORE: 35

## 2023-02-06 NOTE — ED TRIAGE NOTES
Pt here via [facility]s 1 into hallway bed, pt has been having ongoing issues with a foot infection, nurse at [facility] marked his right foot and noted that the redness is going out of the lines that were marked, VSS, no pain or distress noted, VSS     Triage Assessment     Row Name 02/06/23 0564       Triage Assessment (Adult)    Airway WDL WDL       Respiratory WDL    Respiratory WDL WDL       Skin Circulation/Temperature WDL    Skin Circulation/Temperature WDL WDL       Cardiac WDL    Cardiac WDL WDL       Peripheral/Neurovascular WDL    Peripheral Neurovascular WDL WDL       Cognitive/Neuro/Behavioral WDL    Cognitive/Neuro/Behavioral WDL WDL

## 2023-02-06 NOTE — DISCHARGE INSTRUCTIONS
Get plenty of fluids and rest.  Take antibiotics as directed.  Keep wound clean continue with wet-to-dry bandages and wound care.  Referral placed for you to follow-up with podiatry for further assessment.  Return if worsening symptoms including rapidly spreading redness up your leg, increased fevers intractable pain etc.

## 2023-02-06 NOTE — CONSULTS
GENERAL SURGERY CONSULTATION NOTE    [facility]lsa Horowitz   [address] [facility]  [place] [address]  87 year old  male    Primary Care Provider:  No Ref-Primary, Physician      HPI: Elsa Horowitz presents to emergency department with right foot wound.  Patient has a history of fifth toe amputation.  Patient also underwent a recent debridement of this area for infection.  The wound is open.  Patient denies fevers chills.  Patient is not taking any antibiotics.  The assisted living facility is doing wet-to-dry dressings daily with Dakin solution.  He recently followed up with his podiatrist on 1/26/2023 and plan was to continue wet-to-dry dressing changes and follow-up in clinic.     REVIEW OF SYSTEMS:    GENERAL: No fevers or chills. Denies fatigue, recent weight loss.  HEENT: No sinus drainage. No changes with vision or hearing. No difficulty swallowing.   LYMPHATICS:  Noswollen nodes in axilla, neck or groin.  CARDIOVASCULAR: Denies chest pain, palpitations and dyspnea on exertion.  PULMONARY: No shortness of breath or cough. No increase in sputum production.  GI: Denies melena,bright red blood in stools. No hematemesis. No constipation or diarrhea.  : No dysuria or hematuria.  SKIN: No recent rashes or ulcers.   HEMATOLOGY:  No history of easy bruising or bleeding.  ENDOCRINE:  No history of diabetes or thyroid problems.  NEUROLOGY:  No history of seizures or headaches. No motor or sensory changes.        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    No family history on file.    Social History     Social History Narrative    p 8/29/2013.       Social History     Socioeconomic History     Marital status:      Spouse name: Not on file     Number of children: Not on file     Years of education: Not on file     Highest education level: Not on file   Occupational History     Not on file   Tobacco Use     Smoking status: Former     Smokeless tobacco: Not on file   Substance and Sexual Activity     Alcohol use: Not Currently     Drug use: Not Currently     Comment: Drug use: Not Asked     Sexual activity: Not on file   Other Topics Concern     Not on file   Social History Narrative    p 8/29/2013.     Social Determinants of Health     Financial Resource Strain: Not on file   Food Insecurity: Not on file   Transportation Needs: Not on file   Physical Activity: Not on file   Stress: Not on file   Social Connections: Not on file   Intimate Partner Violence: Not on file   Housing Stability: Not on file       No current facility-administered medications on file prior to encounter.  acetaminophen (TYLENOL) 500 MG tablet, Take 1,000 mg by mouth 3 times daily  aspirin (ASA) 81 MG chewable tablet, Take 81 mg by mouth daily  Cholecalciferol (VITAMIN D3) 2000 UNITS TABS, Take 1 tablet by mouth daily.  clopidogrel (PLAVIX) 75 MG tablet, Take 75 mg by mouth daily  clopidogrel (PLAVIX) 75 MG tablet, Take 1 tablet by mouth daily.  diclofenac (VOLTAREN) 1 % topical gel, Apply topically 4 times daily  famotidine (PEPCID) 20 MG  tablet, Take 20 mg by mouth 2 times daily  furosemide (LASIX) 40 MG tablet, Take 0.5 tablets (20 mg) by mouth daily  gabapentin (NEURONTIN) 300 MG capsule, Take 2 capsules (600 mg) by mouth 3 times daily  insulin aspart (NOVOLOG FLEXPEN) 100 UNIT/ML pen, Inject 15 Units Subcutaneous every morning (before breakfast) Plus sliding scale.  insulin aspart (NOVOLOG FLEXPEN) 100 UNIT/ML pen, Inject 15 Units Subcutaneous daily (with dinner) Plus sliding scale  insulin aspart (NOVOLOG FLEXPEN) 100 UNIT/ML pen, Inject Subcutaneous 3 times daily (with meals) 125-149= 0 units, 150-199 = 2 units, 200-249= 4 units, 250-299= 6 units, 300-349= 8 units, 350-400 = 10 units, more than 400 call MD.  insulin aspart (NOVOLOG FLEXPEN) 100 UNIT/ML pen, Inject 20 Units Subcutaneous daily (with lunch) Plus sliding scale  insulin glargine (LANTUS VIAL) 100 UNIT/ML vial, Inject 17 Units Subcutaneous every morning  isosorbide mononitrate (IMDUR) 30 MG 24 hr tablet, Take 1 tablet by mouth every morning.  levothyroxine (SYNTHROID/LEVOTHROID) 25 MCG tablet, Take 25 mcg by mouth every evening  losartan (COZAAR) 25 MG tablet, Take 25 mg by mouth daily  magnesium oxide 400 MG CAPS, Take 400 mg by mouth 2 times daily  melatonin 3 MG CAPS, Take 9 mg by mouth At Bedtime  metoprolol succinate ER (TOPROL XL) 25 MG 24 hr tablet, Take 2 tablets (50 mg) by mouth daily  mirtazapine (REMERON) 15 MG tablet, Take 15 mg by mouth At Bedtime  nitroGLYcerin (NITROSTAT) 0.4 MG sublingual tablet, Place 0.4 mg under the tongue every 5 minutes as needed for chest pain For chest pain place 1 tablet under the tongue every 5 minutes for 3 doses. If symptoms persist 5 minutes after 1st dose call 911.  polyethylene glycol (MIRALAX) 17 g packet, Take 1 packet by mouth daily  psyllium (METAMUCIL/KONSYL) capsule, Take 1 capsule by mouth daily  sertraline (ZOLOFT) 50 MG tablet, Take 150 mg by mouth daily  tamsulosin (FLOMAX) 0.4 MG capsule, Take 0.8 mg by mouth daily  traMADol  (ULTRAM) 50 MG tablet, Take 50 mg by mouth every 6 hours as needed for severe pain (7-10)          ALLERGIES/SENSITIVITIES:   Allergies   Allergen Reactions     Tetanus Antitoxin      Tetanus Toxoid        PHYSICAL EXAM:     BP (!) 140/55   Pulse 71   Temp 97.8  F (36.6  C) (Tympanic)   Resp 16   Ht 1.829 m (6')   Wt 100.7 kg (222 lb)   SpO2 95%   BMI 30.11 kg/m      General Appearance:   Sitting up in the chair, no apparent distress  HEENT: Pupils are equal and reactive, no scleral icterus,   Heart & CV:  RRR no murmur.  Intact distal pulses, good cap refill.  LUNGS: No increased work of breathing.Lugns are CTA B/L, no wheezing or crackles.  Abd: Soft, nontender, nondistended  Ext: Right lateral foot shows approximately 4 cm x 3 cm open wound, no exposed bone.  However, there is thick eschar at the base of the wound.  No evidence of undrained fluid collection.  No foul odor.  No purulent drainage.  Minimal surrounding erythema which is dark and more suggestive of hyperemia than infection.  Neuro: Alert, not oriented to time    Labs are reviewed and are significant for sodium 129, potassium 4.6, creatinine 1.38, WBC 12.3, hemoglobin 8.2, platelet 269        CONSULTATION ASSESSMENT AND PLAN:    87 year old male with open right foot wound.  The dimensions of the wound are smaller than previously recorded at his follow-up visit with podiatry.  This suggest the wound is healing.  I do not suspect any undrained fluid collections today.  Thus I would not recommend any further debridement.  Recommended continuing Dakin's dressing changes and follow-up with podiatry.  Would empirically treat for infection with broad-spectrum regimen, for example ciprofloxacin and clindamycin.    Neftali Cheng MD on 2/6/2023 at 3:37 PM

## 2023-02-06 NOTE — CONSULTS
GENERAL SURGERY CONSULTATION NOTE    Elsa Horowitz   [address] [facility] [address]  87 year old  male    Primary Care Provider:  No Ref-Primary, Physician      HPI: Elsa Horowitz presents to clinic with increased redness in his right foot.  Patient is status post 2 amputation with complication of wound breakdown and further debridement.  Patient states he feels well today.  Denies fevers chills, denies significant pain in the foot.  Patient seems to be alert but not completely oriented, unsure if his answers are reliable.  Patient is being seen by podiatry at [facility] in [place], Dr. Matos.  Was last seen in January with plans to continue wet-to-dry dressing and regular follow-up.    REVIEW OF SYSTEMS:    GENERAL: No fevers or chills. Denies fatigue, recent weight loss.  HEENT: No sinus drainage. No changes with vision or hearing. No difficulty swallowing.   LYMPHATICS:  Noswollen nodes in axilla, neck or groin.  CARDIOVASCULAR: Denies chest pain, palpitations and dyspnea on exertion.  PULMONARY: No shortness of breath or cough. No increase in sputum production.  GI: Denies melena,bright red blood in stools. No hematemesis. No constipation or diarrhea.  : No dysuria or hematuria.  SKIN: Chronic wound to the right lateral foot  HEMATOLOGY:  No history of easy bruising or bleeding.  ENDOCRINE:  No history of diabetes or thyroid problems.  NEUROLOGY:  No history of seizures or headaches. No motor or sensory changes.        Patient Active Problem List   Diagnosis     Cellulitis     ACP (advance care planning)     Local infection of wound     Cellulitis of right lower extremity     Chronic kidney disease, stage 4 (severe) (H)     Coronary artery disease     Diabetic neuropathy (H)     Essential hypertension     PAD (peripheral artery disease) (H)     Gastroesophageal reflux disease, unspecified whether esophagitis present     Type II diabetes mellitus with peripheral autonomic  neuropathy (H)     Hyponatremia       Past Medical History:   Diagnosis Date     Chronic kidney disease, stage 4 (severe) (H) 7/4/2022     Coronary artery disease 1/14/2023     Diabetic neuropathy (H) 1/14/2023     Essential hypertension 1/14/2023     Gastroesophageal reflux disease, unspecified whether esophagitis present 4/17/2018     PAD (peripheral artery disease) (H) 11/29/2022     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    No family history on file.    Social History     Social History Narrative    p 8/29/2013.       Social History     Socioeconomic History     Marital status:      Spouse name: Not on file     Number of children: Not on file     Years of education: Not on file     Highest education level: Not on file   Occupational History     Not on file   Tobacco Use     Smoking status: Former     Smokeless tobacco: Not on file   Substance and Sexual Activity     Alcohol use: Not Currently     Drug use: Not Currently     Comment: Drug use: Not Asked     Sexual activity: Not on file   Other Topics Concern     Not on file   Social History Narrative    p 8/29/2013.     Social Determinants of Health     Financial Resource Strain: Not on file   Food Insecurity: Not on file   Transportation Needs: Not on file   Physical Activity: Not on file   Stress: Not on file   Social Connections: Not on file   Intimate Partner Violence: Not on file   Housing Stability: Not on file       No current facility-administered medications on file prior to encounter.  acetaminophen (TYLENOL) 500 MG tablet, Take 1,000 mg by mouth 3 times daily  aspirin (ASA) 81 MG chewable tablet, Take 81 mg by mouth daily  Cholecalciferol (VITAMIN D3) 2000 UNITS TABS, Take 1 tablet by mouth daily.  clopidogrel (PLAVIX) 75 MG tablet, Take 75 mg by mouth daily  clopidogrel (PLAVIX) 75 MG tablet, Take 1 tablet by mouth daily.  diclofenac (VOLTAREN) 1 % topical gel, Apply  topically 4 times daily  famotidine (PEPCID) 20 MG tablet, Take 20 mg by mouth 2 times daily  furosemide (LASIX) 40 MG tablet, Take 0.5 tablets (20 mg) by mouth daily  gabapentin (NEURONTIN) 300 MG capsule, Take 2 capsules (600 mg) by mouth 3 times daily  insulin aspart (NOVOLOG FLEXPEN) 100 UNIT/ML pen, Inject 15 Units Subcutaneous every morning (before breakfast) Plus sliding scale.  insulin aspart (NOVOLOG FLEXPEN) 100 UNIT/ML pen, Inject 15 Units Subcutaneous daily (with dinner) Plus sliding scale  insulin aspart (NOVOLOG FLEXPEN) 100 UNIT/ML pen, Inject Subcutaneous 3 times daily (with meals) 125-149= 0 units, 150-199 = 2 units, 200-249= 4 units, 250-299= 6 units, 300-349= 8 units, 350-400 = 10 units, more than 400 call MD.  insulin aspart (NOVOLOG FLEXPEN) 100 UNIT/ML pen, Inject 20 Units Subcutaneous daily (with lunch) Plus sliding scale  insulin glargine (LANTUS VIAL) 100 UNIT/ML vial, Inject 17 Units Subcutaneous every morning  isosorbide mononitrate (IMDUR) 30 MG 24 hr tablet, Take 1 tablet by mouth every morning.  levothyroxine (SYNTHROID/LEVOTHROID) 25 MCG tablet, Take 25 mcg by mouth every evening  losartan (COZAAR) 25 MG tablet, Take 25 mg by mouth daily  magnesium oxide 400 MG CAPS, Take 400 mg by mouth 2 times daily  melatonin 3 MG CAPS, Take 9 mg by mouth At Bedtime  metoprolol succinate ER (TOPROL XL) 25 MG 24 hr tablet, Take 2 tablets (50 mg) by mouth daily  mirtazapine (REMERON) 15 MG tablet, Take 15 mg by mouth At Bedtime  nitroGLYcerin (NITROSTAT) 0.4 MG sublingual tablet, Place 0.4 mg under the tongue every 5 minutes as needed for chest pain For chest pain place 1 tablet under the tongue every 5 minutes for 3 doses. If symptoms persist 5 minutes after 1st dose call 911.  polyethylene glycol (MIRALAX) 17 g packet, Take 1 packet by mouth daily  psyllium (METAMUCIL/KONSYL) capsule, Take 1 capsule by mouth daily  sertraline (ZOLOFT) 50 MG tablet, Take 150 mg by mouth daily  tamsulosin (FLOMAX) 0.4  MG capsule, Take 0.8 mg by mouth daily  traMADol (ULTRAM) 50 MG tablet, Take 50 mg by mouth every 6 hours as needed for severe pain (7-10)          ALLERGIES/SENSITIVITIES:   Allergies   Allergen Reactions     Tetanus Antitoxin      Tetanus Toxoid        PHYSICAL EXAM:     BP (!) 140/55   Pulse 71   Temp 97.8  F (36.6  C) (Tympanic)   Resp 16   Ht 1.829 m (6')   Wt 100.7 kg (222 lb)   SpO2 95%   BMI 30.11 kg/m      General Appearance:   Sitting up in the chair, no apparent distress  HEENT: Pupils are equal and reactive, no scleral icterus,   Heart & CV:  RRR no murmur.  Intact distal pulses, good cap refill.  LUNGS: No increased work of breathing.Lugns are CTA B/L, no wheezing or crackles.  Abd: Soft, nontender, nondistended  Ext: 4 cm x 3 cm open wound on the right lateral foot, there is some thick exudate in 1 portion and some dark appearing eschar towards the distal end.  Minimal extending erythema, the foot is not warm, no malodor or purulent drainage.  No tracking.  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  The wound itself does not appear to harbor a large burden of infection, if present.  Would recommend starting oral antibiotics and following up with podiatry.  On that note, the wound measures smaller than it did at the last follow-up visit, so there does appear to be some healing.  I do not believe there is any role for further debridement today.    Neftail Cheng MD on 2/6/2023 at 3:12 PM

## 2023-02-07 DIAGNOSIS — Z89.421 STATUS POST AMPUTATION OF TOE OF RIGHT FOOT (H): ICD-10-CM

## 2023-02-07 DIAGNOSIS — E11.43 TYPE II DIABETES MELLITUS WITH PERIPHERAL AUTONOMIC NEUROPATHY (H): Primary | ICD-10-CM

## 2023-02-07 ASSESSMENT — ENCOUNTER SYMPTOMS
COLOR CHANGE: 1
CONFUSION: 0
ABDOMINAL PAIN: 0
VOMITING: 0
FEVER: 0
SHORTNESS OF BREATH: 0
DYSURIA: 0
NAUSEA: 0

## 2023-02-07 NOTE — ED PROVIDER NOTES
History     Chief Complaint   Patient presents with     Foot Problems     HPI  Elsa Horowitz is a 87 year old male who presents to the ED for evaluation of foot problems. Pt here via meds 1 into hallway bed, pt has been having ongoing issues with a foot infection, nurse at [facility] marked his right foot and noted that the redness is going out of the lines that were marked.    Allergies:  Allergies   Allergen Reactions     Tetanus Antitoxin      Tetanus Toxoid        Problem List:    Patient Active Problem List    Diagnosis Date Noted     Local infection of wound 2023     Priority: Medium     Cellulitis of right lower extremity 2023     Priority: Medium     Coronary artery disease 2023     Priority: Medium     Diabetic neuropathy (H) 2023     Priority: Medium     Essential hypertension 2023     Priority: Medium     Hyponatremia 2023     Priority: Medium     PAD (peripheral artery disease) (H) 2022     Priority: Medium     Chronic kidney disease, stage 4 (severe) (H) 2022     Priority: Medium     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 ACP Review Reviewed chart for advance care plan.  Elsa Horowitz has no plan or code status on file. Received anatomical body bequest form for [facility]-scanned on 1-14-15. Please provide resources for ACP at next opportunity. Confirmed/documented designated decision maker(s) as next of kin in absence of legal document.  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    Family History:    No family history on file.    Social History:  Marital Status:   [2]  Social History     Tobacco Use     Smoking status: Former   Substance Use Topics     Alcohol use: Not Currently     Drug use: Not Currently     Comment: Drug use: Not Asked        Medications:    ciprofloxacin (CIPRO) 500 MG tablet  clindamycin (CLEOCIN) 300 MG capsule  acetaminophen (TYLENOL) 500 MG tablet  aspirin (ASA) 81 MG chewable tablet  Cholecalciferol (VITAMIN D3) 2000 UNITS TABS  clopidogrel (PLAVIX) 75 MG tablet  clopidogrel (PLAVIX) 75 MG tablet  diclofenac (VOLTAREN) 1 % topical gel  famotidine (PEPCID) 20 MG tablet  furosemide (LASIX) 40 MG tablet  gabapentin (NEURONTIN) 300 MG capsule  insulin aspart (NOVOLOG FLEXPEN) 100 UNIT/ML pen  insulin aspart (NOVOLOG FLEXPEN) 100 UNIT/ML pen  insulin aspart (NOVOLOG FLEXPEN) 100 UNIT/ML pen  insulin aspart (NOVOLOG FLEXPEN) 100 UNIT/ML pen  insulin glargine (LANTUS VIAL) 100 UNIT/ML vial  isosorbide mononitrate (IMDUR) 30 MG 24 hr tablet  levothyroxine (SYNTHROID/LEVOTHROID) 25 MCG tablet  losartan (COZAAR) 25 MG tablet  magnesium oxide 400 MG CAPS  melatonin 3 MG CAPS  metoprolol succinate ER (TOPROL XL) 25 MG 24 hr tablet  mirtazapine (REMERON) 15 MG tablet  nitroGLYcerin (NITROSTAT) 0.4 MG sublingual tablet  polyethylene glycol (MIRALAX) 17 g packet  psyllium (METAMUCIL/KONSYL) capsule  sertraline (ZOLOFT) 50 MG tablet  tamsulosin (FLOMAX) 0.4 MG capsule  traMADol (ULTRAM) 50 MG tablet          Review of Systems   Constitutional: Negative for fever.   HENT: Negative for  congestion.    Eyes: Negative for visual disturbance.   Respiratory: Negative for shortness of breath.    Cardiovascular: Negative for chest pain.   Gastrointestinal: Negative for abdominal pain, nausea and vomiting.   Genitourinary: Negative for dysuria.   Skin: Positive for color change.   Psychiatric/Behavioral: Negative for confusion.       Physical Exam   BP: 119/45  Pulse: 65  Temp: 97.8  F (36.6  C)  Resp: 16  Height: 182.9 cm (6')  Weight: 100.7 kg (222 lb)  SpO2: 96 %      Physical Exam  Constitutional:       General: He is not in acute distress.     Appearance: He is well-developed. He is not ill-appearing or diaphoretic.   HENT:      Head: Normocephalic and atraumatic.   Eyes:      General: No scleral icterus.     Conjunctiva/sclera: Conjunctivae normal.   Cardiovascular:      Rate and Rhythm: Normal rate and regular rhythm.   Pulmonary:      Effort: Pulmonary effort is normal.      Breath sounds: Normal breath sounds.   Abdominal:      Palpations: Abdomen is soft.      Tenderness: There is no abdominal tenderness.   Musculoskeletal:         General: No deformity.      Cervical back: Neck supple.      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   Lymphadenopathy:      Cervical: No cervical adenopathy.   Skin:     General: Skin is warm and dry.      Coloration: Skin is not jaundiced.      Findings: No rash.   Neurological:      Mental Status: He is alert and oriented to person, place, and time. Mental status is at baseline.   Psychiatric:         Mood and Affect: Mood normal.         Behavior: Behavior normal.         ED Course                 Procedures              Critical Care time:  none               Results for orders placed or performed during the hospital encounter of 02/06/23 (from the past 24 hour(s))   CBC with platelets differential    Narrative    The following orders were created for panel order CBC with platelets differential.  Procedure                                Abnormality         Status                     ---------                               -----------         ------                     CBC with platelets and d...[[id number]]  Abnormal            Final result                 Please view results for these tests on the individual orders.   Basic metabolic panel   Result Value Ref Range    Sodium 129 (L) 136 - 145 mmol/L    Potassium 4.6 3.4 - 5.3 mmol/L    Chloride 95 (L) 98 - 107 mmol/L    Carbon Dioxide (CO2) 27 22 - 29 mmol/L    Anion Gap 7 7 - 15 mmol/L    Urea Nitrogen 37.6 (H) 8.0 - 23.0 mg/dL    Creatinine 1.38 (H) 0.67 - 1.17 mg/dL    Calcium 8.5 (L) 8.8 - 10.2 mg/dL    Glucose 59 (L) 70 - 99 mg/dL    GFR Estimate 50 (L) >60 mL/min/1.73m2   CBC with platelets and differential   Result Value Ref Range    WBC Count 12.3 (H) 4.0 - 11.0 10e3/uL    RBC Count 2.97 (L) 4.40 - 5.90 10e6/uL    Hemoglobin 8.2 (L) 13.3 - 17.7 g/dL    Hematocrit 24.8 (L) 40.0 - 53.0 %    MCV 84 78 - 100 fL    MCH 27.6 26.5 - 33.0 pg    MCHC 33.1 31.5 - 36.5 g/dL    RDW 14.2 10.0 - 15.0 %    Platelet Count 269 150 - 450 10e3/uL    % Neutrophils 63 %    % Lymphocytes 22 %    % Monocytes 8 %    % Eosinophils 5 %    % Basophils 1 %    % Immature Granulocytes 1 %    NRBCs per 100 WBC 0 <1 /100    Absolute Neutrophils 7.8 1.6 - 8.3 10e3/uL    Absolute Lymphocytes 2.7 0.8 - 5.3 10e3/uL    Absolute Monocytes 1.0 0.0 - 1.3 10e3/uL    Absolute Eosinophils 0.6 0.0 - 0.7 10e3/uL    Absolute Basophils 0.1 0.0 - 0.2 10e3/uL    Absolute Immature Granulocytes 0.2 <=0.4 10e3/uL    Absolute NRBCs 0.0 10e3/uL   Extra Tube ([place] Draw)    Narrative    The following orders were created for panel order Extra Tube ([place] Draw).  Procedure                               Abnormality         Status                     ---------                               -----------         ------                     Extra Blue Top Tube[[id number]]                              Final result                Extra Red Top Tube[[id number]]                               Final result               Extra Green Top (Lithium...[[id number]]                      Final result                 Please view results for these tests on the individual orders.   Extra Blue Top Tube   Result Value Ref Range    Hold Specimen x    Extra Red Top Tube   Result Value Ref Range    Hold Specimen x    Extra Green Top (Lithium Heparin) Tube   Result Value Ref Range    Hold Specimen x        Medications - No data to display    Assessments & Plan (with Medical Decision Making)   Pt nontoxic in NAD. Heart, lung, bowel sounds normal, abd soft, nontender to palpation, nondistended. VSS, afebrile.     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    Dr. Cheng, surgeon did come in and evaluate the patient in the ED.  Does not feel any emergent treatment is required today.  Does recommend further antibiotic treatment with Cipro and clindamycin as well as close follow-up with podiatry.    Strict return precautions are given to the pt, they will return if symptoms are worsening or concerning. The pt understands and agrees with the plan and they are discharged.     Estuardo Ray PA-C    I have reviewed the nursing notes.    I have reviewed the findings, diagnosis, plan and need for follow up with the patient.             Discharge Medication List as of 2/6/2023  3:19 PM          Final diagnoses:   Right foot infection       2/6/2023   [facility]     Estuardo Ray PA  02/07/23 0044

## 2023-02-08 RX ORDER — FUROSEMIDE 20 MG
20 TABLET ORAL DAILY
OUTPATIENT
Start: 2023-02-08

## 2023-02-08 RX ORDER — GABAPENTIN 300 MG/1
600 CAPSULE ORAL 3 TIMES DAILY
Qty: 90 CAPSULE | Refills: 0 | OUTPATIENT
Start: 2023-02-08

## 2023-02-08 NOTE — TELEPHONE ENCOUNTER
"Medications were prescribed during hospitalization and patient has primary elsewhere. Call placed to pharmacy to have patient followup with primary physician for refills    Corinne R Thayer, RN on 2/8/2023 at 10:20 AM       Requested Prescriptions   Pending Prescriptions Disp Refills     gabapentin (NEURONTIN) 300 MG capsule 90 capsule 0     Sig: Take 2 capsules (600 mg) by mouth 3 times daily       There is no refill protocol information for this order        furosemide (LASIX) 20 MG tablet       Sig: Take 1 tablet (20 mg) by mouth daily       Diuretics (Including Combos) Protocol Failed - 2/7/2023  6:39 PM        Failed - Blood pressure under 140/90 in past 12 months     BP Readings from Last 3 Encounters:   02/06/23 (!) 140/55   01/31/23 (!) 157/85   01/26/23 (!) 143/74                 Failed - Recent (12 mo) or future (30 days) visit within the authorizing provider's specialty     Patient has had an office visit with the authorizing provider or a provider within the authorizing providers department within the previous 12 mos or has a future within next 30 days. See \"Patient Info\" tab in inbasket, or \"Choose Columns\" in Meds & Orders section of the refill encounter.              Failed - Normal serum creatinine on file in past 12 months     Recent Labs   Lab Test 02/06/23  1339   CR 1.38*              Failed - Normal serum sodium on file in past 12 months     Recent Labs   Lab Test 02/06/23  1339   *           Recent Labs   Lab Test 02/06/23  1339   POTASSIUM 4.6                 "

## 2023-02-13 ENCOUNTER — HOSPITAL ENCOUNTER (EMERGENCY)
Facility: OTHER | Age: 88
Discharge: HOME | End: 2023-02-13
Attending: INTERNAL MEDICINE | Admitting: INTERNAL MEDICINE
Payer: COMMERCIAL

## 2023-02-13 VITALS
WEIGHT: 215 LBS | BODY MASS INDEX: 28.49 KG/M2 | SYSTOLIC BLOOD PRESSURE: 144 MMHG | DIASTOLIC BLOOD PRESSURE: 76 MMHG | TEMPERATURE: 97.1 F | HEART RATE: 68 BPM | RESPIRATION RATE: 18 BRPM | HEIGHT: 73 IN | OXYGEN SATURATION: 98 %

## 2023-02-13 DIAGNOSIS — I73.9 PAD (PERIPHERAL ARTERY DISEASE) (H): ICD-10-CM

## 2023-02-13 DIAGNOSIS — I96 GANGRENE OF TOE OF RIGHT FOOT (H): ICD-10-CM

## 2023-02-13 DIAGNOSIS — L03.115 CELLULITIS OF RIGHT FOOT: Primary | ICD-10-CM

## 2023-02-13 PROCEDURE — 99284 EMERGENCY DEPT VISIT MOD MDM: CPT | Performed by: INTERNAL MEDICINE

## 2023-02-13 RX ORDER — CIPROFLOXACIN 500 MG/1
500 TABLET, FILM COATED ORAL 2 TIMES DAILY
Qty: 14 TABLET | Refills: 0 | Status: ON HOLD | OUTPATIENT
Start: 2023-02-13 | End: 2023-03-15

## 2023-02-13 RX ORDER — CLINDAMYCIN HCL 300 MG
300 CAPSULE ORAL 4 TIMES DAILY
Qty: 28 CAPSULE | Refills: 0 | Status: ON HOLD | OUTPATIENT
Start: 2023-02-13 | End: 2023-03-15

## 2023-02-13 ASSESSMENT — ACTIVITIES OF DAILY LIVING (ADL)
ADLS_ACUITY_SCORE: 35
ADLS_ACUITY_SCORE: 35

## 2023-02-13 NOTE — DISCHARGE INSTRUCTIONS
Plan to extend the current antibiotic regimen given the ongoing but improving cellulitis of the right foot.    Vascular surgery and podiatry at [facility] will contact you to schedule follow-up.  May need additional foot surgery/toe amputation.    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

## 2023-02-13 NOTE — ED PROVIDER NOTES
Emergency Department Provider Note  : 1935 Age: 87 year old Sex: male MRN: [id number]    Chief Complaint   Patient presents with     Wound Infection     Toe Injury       Medical Decision Making / Assessment / Plan   87 year old male presenting with right foot cellulitis, right fourth toe gangrene    ED Course as of 23 1418   Mon 2023   1300 Patient evaluated.  Has gangrenous right fourth toe.  Had right fifth toe ray amputation 2022   Non-ST elevation MI with angiogram on 2022 with 2 drug-eluting stents into the right coronary artery.    Has been in the emergency room a few different times recently with cellulitis, now with gangrenous right fourth toe.   1312 Most recent ER visit on  due to foot infection/cellulitis and was started on antibiotics.  Initially had significant shooting pain in the right fourth toe, now the toe has no pain and is cold and non-painful.   1356 Spoke with podiatry at [facility].  They will help coordinate with vascular surgery for clinic follow-up appointment.   1405 Patient discharged in stable condition.  Continue additional 7 days of oral antibiotics.  Ciprofloxacin and clindamycin.  Outpatient follow-up with vascular surgery and podiatry to be arranged by [facility].        The patient was informed of the plan and verbalized understanding and agreed with the plan. The patient was given strict return to Emergency Department precautions as well as appropriate follow up instructions. The patient was discharged in stable condition.    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  Report from his assisted living, [facility], indicates that possibly on T it was noted that  "estrada his right fourth toe had turned purple/black.  Had right fifth toe ray amputation early December.  Non-ST elevation MI couple days later and ended up getting 2 drug-eluting stents into his right coronary artery.    Seen in the emergency room on 2/6/2023 with right foot cellulitis and started on ciprofloxacin and clindamycin.  Continues on these antibiotics.    Right fourth toe was dark and cold and painless.  See photographs.    Patient is otherwise without concerns.  Appears that the cellulitis of his right foot is improving with current oral antibiotics.  See photographs.    I have reviewed the Medications, Allergies, Past Medical and Surgical History, and Social History in the [facility] and with family.    Review of Systems:  Please see Subjective / HPI for pertinent positives and negatives. All other systems reviewed and found to be negative.      Objective     Patient Vitals for the past 24 hrs:   BP Temp Temp src Pulse Resp SpO2 Height Weight   02/13/23 1222 115/57 96.9  F (36.1  C) Tympanic 70 18 100 % 1.854 m (6' 1\") 97.5 kg (215 lb)       Physical Exam:     General: Awake, alert, in no acute respiratory distress.  Head: Normocephalic, atraumatic.  Eyes: Conjugate gaze.  ENT: Moist membranes, external ear appears normal.   Chest/Respiratory: Equal chest rise.  Cardiovascular: regular rate and rhythm .  Abdominal: Soft, non-distended, non-tender.  Extremities: Multiple right toe amputations, open wound involving loss of right 5th ray amputation.   Neurological: GCS 15  Skin: right 4th toe is cold and violaceous.   Psychiatric: Appropriate affect.                         Procedures / Critical Care   Procedures    Aggregate Critical Care Time: None.     No orders of the defined types were placed in this encounter.      RESULTS: As noted above.          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    Medications:  No current facility-administered medications for this encounter.     Current Outpatient Medications   Medication     ciprofloxacin (CIPRO) 500 MG tablet     clindamycin (CLEOCIN) 300 MG capsule     acetaminophen (TYLENOL) 500 MG tablet     aspirin (ASA) 81 MG chewable tablet     Cholecalciferol (VITAMIN D3) 2000 UNITS TABS     clopidogrel (PLAVIX) 75 MG tablet     clopidogrel (PLAVIX) 75 MG tablet     diclofenac (VOLTAREN) 1 % topical gel     famotidine (PEPCID) 20 MG tablet     furosemide (LASIX) 40 MG tablet     gabapentin (NEURONTIN) 300 MG capsule     insulin aspart (NOVOLOG FLEXPEN) 100 UNIT/ML pen     insulin aspart (NOVOLOG FLEXPEN) 100 UNIT/ML pen     insulin aspart (NOVOLOG FLEXPEN) 100 UNIT/ML pen     insulin aspart (NOVOLOG FLEXPEN) 100 UNIT/ML pen     insulin glargine (LANTUS VIAL) 100 UNIT/ML vial     isosorbide mononitrate (IMDUR) 30 MG 24 hr tablet     levothyroxine (SYNTHROID/LEVOTHROID) 25 MCG tablet     losartan (COZAAR) 25 MG tablet     magnesium oxide 400 MG CAPS     melatonin 3 MG CAPS     metoprolol succinate ER (TOPROL XL) 25 MG 24 hr tablet     mirtazapine (REMERON) 15 MG tablet     nitroGLYcerin (NITROSTAT) 0.4 MG sublingual tablet     polyethylene glycol (MIRALAX) 17 g packet     psyllium (METAMUCIL/KONSYL) capsule     sertraline (ZOLOFT) 50 MG tablet     tamsulosin (FLOMAX) 0.4 MG capsule     traMADol (ULTRAM) 50 MG tablet       Allergies:  Penicillins, Tetanus antitoxin, and Tetanus toxoid    Relevant labs, images, EKGs, Epic and outside hospital (if applicable) charts were reviewed. The findings, diagnosis, plan, and need for follow up  were discussed with the patient/family. Nursing notes were reviewed.      Rigo Maxwell MD  02/13/23 3610

## 2023-02-13 NOTE — PROGRESS NOTES
:    Patient is from [facility].    Patient has been medically cleared and can discharge back to her assisted living.    MEDS 1 to transport back.    TONE Schwartz on 2/13/2023 at 2:48 PM

## 2023-02-13 NOTE — PROGRESS NOTES
Nurse to nurse called to ANETTE Medley on call at [facility]. Meds 1 called to transport Pt back

## 2023-02-13 NOTE — ED TRIAGE NOTES
"Pt brought in by EMS from [facility]. Report received from Jasmyne- starting yesterday afternoon Pt's Rt 4th digit started turning purple/ black. Pt had Rt 5th toe removed. Pt denies pain. /57   Pulse 70   Temp 96.9  F (36.1  C) (Tympanic)   Resp 18   Ht 1.854 m (6' 1\")   Wt 97.5 kg (215 lb)   SpO2 100%   BMI 28.37 kg/m         Triage Assessment     Row Name 02/13/23 1223       Triage Assessment (Adult)    Airway WDL WDL       Respiratory WDL    Respiratory WDL WDL       Skin Circulation/Temperature WDL    Skin Circulation/Temperature WDL X  necrotic Rt 4th toe       Cardiac WDL    Cardiac WDL WDL       Peripheral/Neurovascular WDL    Peripheral Neurovascular WDL X;neurovascular assessment lower  Necrotic Rt 4th toe. 5th toe amputated       [place] Coma Scale    Best Eye Response 4-->(E4) spontaneous    Best Motor Response 6-->(M6) obeys commands    Best Verbal Response 5-->(V5) oriented    [place] Coma Scale Score 15              "

## 2023-02-17 ENCOUNTER — HOSPITAL ENCOUNTER (EMERGENCY)
Facility: OTHER | Age: 88
Discharge: ANOTHER HOSPITAL | End: 2023-02-17
Attending: STUDENT IN AN ORGANIZED HEALTH CARE EDUCATION/TRAINING PROGRAM | Admitting: STUDENT IN AN ORGANIZED HEALTH CARE EDUCATION/TRAINING PROGRAM
Payer: COMMERCIAL

## 2023-02-17 ENCOUNTER — APPOINTMENT (OUTPATIENT)
Dept: CT IMAGING | Facility: OTHER | Age: 88
End: 2023-02-17
Attending: STUDENT IN AN ORGANIZED HEALTH CARE EDUCATION/TRAINING PROGRAM
Payer: COMMERCIAL

## 2023-02-17 VITALS
TEMPERATURE: 98.4 F | BODY MASS INDEX: 31.54 KG/M2 | DIASTOLIC BLOOD PRESSURE: 55 MMHG | HEIGHT: 73 IN | SYSTOLIC BLOOD PRESSURE: 111 MMHG | RESPIRATION RATE: 13 BRPM | WEIGHT: 238 LBS | OXYGEN SATURATION: 95 % | HEART RATE: 82 BPM

## 2023-02-17 DIAGNOSIS — I96 GANGRENE OF RIGHT FOOT (H): Primary | ICD-10-CM

## 2023-02-17 PROBLEM — J96.01 ACUTE RESPIRATORY FAILURE WITH HYPOXIA (H): Status: ACTIVE | Noted: 2022-11-28

## 2023-02-17 PROBLEM — Z87.898 HISTORY OF INTRAVENOUS DRUG USE IN REMISSION: Status: ACTIVE | Noted: 2018-04-17

## 2023-02-17 PROBLEM — Z91.51 HISTORY OF SUICIDE ATTEMPT: Status: ACTIVE | Noted: 2023-02-17

## 2023-02-17 PROBLEM — R41.89 COGNITIVE IMPAIRMENT: Status: ACTIVE | Noted: 2023-02-17

## 2023-02-17 PROBLEM — S20.95XA: Status: ACTIVE | Noted: 2023-02-17

## 2023-02-17 LAB
ABO/RH(D): NORMAL
ANION GAP SERPL CALCULATED.3IONS-SCNC: 6 MMOL/L (ref 7–15)
ANTIBODY SCREEN: NEGATIVE
BASOPHILS # BLD AUTO: 0.1 10E3/UL (ref 0–0.2)
BASOPHILS NFR BLD AUTO: 1 %
BUN SERPL-MCNC: 28.2 MG/DL (ref 8–23)
CALCIUM SERPL-MCNC: 8.4 MG/DL (ref 8.8–10.2)
CHLORIDE SERPL-SCNC: 97 MMOL/L (ref 98–107)
CREAT SERPL-MCNC: 1.15 MG/DL (ref 0.67–1.17)
CRP SERPL-MCNC: 53.97 MG/L
DEPRECATED HCO3 PLAS-SCNC: 25 MMOL/L (ref 22–29)
EOSINOPHIL # BLD AUTO: 0.6 10E3/UL (ref 0–0.7)
EOSINOPHIL NFR BLD AUTO: 8 %
ERYTHROCYTE [DISTWIDTH] IN BLOOD BY AUTOMATED COUNT: 13.9 % (ref 10–15)
ERYTHROCYTE [SEDIMENTATION RATE] IN BLOOD BY WESTERGREN METHOD: 114 MM/HR (ref 0–20)
GFR SERPL CREATININE-BSD FRML MDRD: 62 ML/MIN/1.73M2
GLUCOSE BLDC GLUCOMTR-MCNC: 113 MG/DL (ref 70–99)
GLUCOSE BLDC GLUCOMTR-MCNC: 203 MG/DL (ref 70–99)
GLUCOSE BLDC GLUCOMTR-MCNC: 207 MG/DL (ref 70–99)
GLUCOSE BLDC GLUCOMTR-MCNC: 221 MG/DL (ref 70–99)
GLUCOSE SERPL-MCNC: 193 MG/DL (ref 70–99)
HCT VFR BLD AUTO: 26.1 % (ref 40–53)
HGB BLD-MCNC: 8.6 G/DL (ref 13.3–17.7)
IMM GRANULOCYTES # BLD: 0.1 10E3/UL
IMM GRANULOCYTES NFR BLD: 1 %
LACTATE SERPL-SCNC: 1 MMOL/L (ref 0.7–2)
LYMPHOCYTES # BLD AUTO: 1.5 10E3/UL (ref 0.8–5.3)
LYMPHOCYTES NFR BLD AUTO: 19 %
MCH RBC QN AUTO: 27.1 PG (ref 26.5–33)
MCHC RBC AUTO-ENTMCNC: 33 G/DL (ref 31.5–36.5)
MCV RBC AUTO: 82 FL (ref 78–100)
MONOCYTES # BLD AUTO: 0.5 10E3/UL (ref 0–1.3)
MONOCYTES NFR BLD AUTO: 6 %
NEUTROPHILS # BLD AUTO: 5.2 10E3/UL (ref 1.6–8.3)
NEUTROPHILS NFR BLD AUTO: 65 %
NRBC # BLD AUTO: 0 10E3/UL
NRBC BLD AUTO-RTO: 0 /100
PLATELET # BLD AUTO: 331 10E3/UL (ref 150–450)
POTASSIUM SERPL-SCNC: 5 MMOL/L (ref 3.4–5.3)
RBC # BLD AUTO: 3.17 10E6/UL (ref 4.4–5.9)
SARS-COV-2 RNA RESP QL NAA+PROBE: NEGATIVE
SODIUM SERPL-SCNC: 128 MMOL/L (ref 136–145)
SPECIMEN EXPIRATION DATE: NORMAL
WBC # BLD AUTO: 8 10E3/UL (ref 4–11)

## 2023-02-17 PROCEDURE — 99285 EMERGENCY DEPT VISIT HI MDM: CPT | Performed by: STUDENT IN AN ORGANIZED HEALTH CARE EDUCATION/TRAINING PROGRAM

## 2023-02-17 PROCEDURE — C9803 HOPD COVID-19 SPEC COLLECT: HCPCS | Performed by: STUDENT IN AN ORGANIZED HEALTH CARE EDUCATION/TRAINING PROGRAM

## 2023-02-17 PROCEDURE — 85652 RBC SED RATE AUTOMATED: CPT | Performed by: STUDENT IN AN ORGANIZED HEALTH CARE EDUCATION/TRAINING PROGRAM

## 2023-02-17 PROCEDURE — 86850 RBC ANTIBODY SCREEN: CPT | Performed by: STUDENT IN AN ORGANIZED HEALTH CARE EDUCATION/TRAINING PROGRAM

## 2023-02-17 PROCEDURE — 87040 BLOOD CULTURE FOR BACTERIA: CPT | Performed by: STUDENT IN AN ORGANIZED HEALTH CARE EDUCATION/TRAINING PROGRAM

## 2023-02-17 PROCEDURE — 85025 COMPLETE CBC W/AUTO DIFF WBC: CPT | Performed by: STUDENT IN AN ORGANIZED HEALTH CARE EDUCATION/TRAINING PROGRAM

## 2023-02-17 PROCEDURE — 96372 THER/PROPH/DIAG INJ SC/IM: CPT | Performed by: FAMILY MEDICINE

## 2023-02-17 PROCEDURE — 99285 EMERGENCY DEPT VISIT HI MDM: CPT | Mod: 25 | Performed by: STUDENT IN AN ORGANIZED HEALTH CARE EDUCATION/TRAINING PROGRAM

## 2023-02-17 PROCEDURE — U0003 INFECTIOUS AGENT DETECTION BY NUCLEIC ACID (DNA OR RNA); SEVERE ACUTE RESPIRATORY SYNDROME CORONAVIRUS 2 (SARS-COV-2) (CORONAVIRUS DISEASE [COVID-19]), AMPLIFIED PROBE TECHNIQUE, MAKING USE OF HIGH THROUGHPUT TECHNOLOGIES AS DESCRIBED BY CMS-2020-01-R: HCPCS | Performed by: FAMILY MEDICINE

## 2023-02-17 PROCEDURE — 73700 CT LOWER EXTREMITY W/O DYE: CPT | Mod: RT

## 2023-02-17 PROCEDURE — 86901 BLOOD TYPING SEROLOGIC RH(D): CPT | Performed by: STUDENT IN AN ORGANIZED HEALTH CARE EDUCATION/TRAINING PROGRAM

## 2023-02-17 PROCEDURE — 250N000013 HC RX MED GY IP 250 OP 250 PS 637: Performed by: FAMILY MEDICINE

## 2023-02-17 PROCEDURE — 82962 GLUCOSE BLOOD TEST: CPT | Mod: 91

## 2023-02-17 PROCEDURE — 250N000011 HC RX IP 250 OP 636: Performed by: FAMILY MEDICINE

## 2023-02-17 PROCEDURE — 250N000012 HC RX MED GY IP 250 OP 636 PS 637: Performed by: FAMILY MEDICINE

## 2023-02-17 PROCEDURE — 86140 C-REACTIVE PROTEIN: CPT | Performed by: STUDENT IN AN ORGANIZED HEALTH CARE EDUCATION/TRAINING PROGRAM

## 2023-02-17 PROCEDURE — 96365 THER/PROPH/DIAG IV INF INIT: CPT | Performed by: STUDENT IN AN ORGANIZED HEALTH CARE EDUCATION/TRAINING PROGRAM

## 2023-02-17 PROCEDURE — 36415 COLL VENOUS BLD VENIPUNCTURE: CPT | Performed by: STUDENT IN AN ORGANIZED HEALTH CARE EDUCATION/TRAINING PROGRAM

## 2023-02-17 PROCEDURE — 83605 ASSAY OF LACTIC ACID: CPT | Performed by: STUDENT IN AN ORGANIZED HEALTH CARE EDUCATION/TRAINING PROGRAM

## 2023-02-17 PROCEDURE — 80048 BASIC METABOLIC PNL TOTAL CA: CPT | Performed by: STUDENT IN AN ORGANIZED HEALTH CARE EDUCATION/TRAINING PROGRAM

## 2023-02-17 RX ORDER — GABAPENTIN 300 MG/1
600 CAPSULE ORAL 3 TIMES DAILY
Status: DISCONTINUED | OUTPATIENT
Start: 2023-02-17 | End: 2023-02-17 | Stop reason: HOSPADM

## 2023-02-17 RX ORDER — LEVOTHYROXINE SODIUM 25 UG/1
25 TABLET ORAL EVERY EVENING
Status: DISCONTINUED | OUTPATIENT
Start: 2023-02-17 | End: 2023-02-17 | Stop reason: HOSPADM

## 2023-02-17 RX ORDER — ISOSORBIDE MONONITRATE 30 MG/1
30 TABLET, EXTENDED RELEASE ORAL EVERY MORNING
Status: DISCONTINUED | OUTPATIENT
Start: 2023-02-17 | End: 2023-02-17 | Stop reason: HOSPADM

## 2023-02-17 RX ORDER — ASPIRIN 81 MG/1
81 TABLET, CHEWABLE ORAL DAILY
Status: DISCONTINUED | OUTPATIENT
Start: 2023-02-17 | End: 2023-02-17 | Stop reason: HOSPADM

## 2023-02-17 RX ORDER — NICOTINE POLACRILEX 4 MG
15-30 LOZENGE BUCCAL
Status: DISCONTINUED | OUTPATIENT
Start: 2023-02-17 | End: 2023-02-17 | Stop reason: HOSPADM

## 2023-02-17 RX ORDER — MIRTAZAPINE 15 MG/1
15 TABLET, FILM COATED ORAL AT BEDTIME
Status: DISCONTINUED | OUTPATIENT
Start: 2023-02-17 | End: 2023-02-17 | Stop reason: HOSPADM

## 2023-02-17 RX ORDER — FAMOTIDINE 20 MG/1
20 TABLET, FILM COATED ORAL 2 TIMES DAILY
Status: DISCONTINUED | OUTPATIENT
Start: 2023-02-17 | End: 2023-02-17 | Stop reason: HOSPADM

## 2023-02-17 RX ORDER — POLYETHYLENE GLYCOL 3350 17 G/17G
17 POWDER, FOR SOLUTION ORAL DAILY
Status: DISCONTINUED | OUTPATIENT
Start: 2023-02-17 | End: 2023-02-17 | Stop reason: HOSPADM

## 2023-02-17 RX ORDER — LOSARTAN POTASSIUM 25 MG/1
25 TABLET ORAL DAILY
Status: DISCONTINUED | OUTPATIENT
Start: 2023-02-17 | End: 2023-02-17 | Stop reason: HOSPADM

## 2023-02-17 RX ORDER — CLINDAMYCIN HCL 150 MG
450 CAPSULE ORAL ONCE
Status: DISCONTINUED | OUTPATIENT
Start: 2023-02-17 | End: 2023-02-17

## 2023-02-17 RX ORDER — DEXTROSE MONOHYDRATE 25 G/50ML
25-50 INJECTION, SOLUTION INTRAVENOUS
Status: DISCONTINUED | OUTPATIENT
Start: 2023-02-17 | End: 2023-02-17 | Stop reason: HOSPADM

## 2023-02-17 RX ORDER — CIPROFLOXACIN 2 MG/ML
400 INJECTION, SOLUTION INTRAVENOUS ONCE
Status: COMPLETED | OUTPATIENT
Start: 2023-02-17 | End: 2023-02-17

## 2023-02-17 RX ORDER — CIPROFLOXACIN 500 MG/1
500 TABLET, FILM COATED ORAL EVERY 12 HOURS SCHEDULED
Status: DISCONTINUED | OUTPATIENT
Start: 2023-02-17 | End: 2023-02-17

## 2023-02-17 RX ORDER — SODIUM HYPOCHLORITE 2.5 MG/ML
SOLUTION TOPICAL ONCE
Status: COMPLETED | OUTPATIENT
Start: 2023-02-17 | End: 2023-02-17

## 2023-02-17 RX ORDER — CLOPIDOGREL BISULFATE 75 MG/1
75 TABLET ORAL DAILY
Status: DISCONTINUED | OUTPATIENT
Start: 2023-02-17 | End: 2023-02-17 | Stop reason: HOSPADM

## 2023-02-17 RX ORDER — METOPROLOL SUCCINATE 50 MG/1
50 TABLET, EXTENDED RELEASE ORAL DAILY
Status: DISCONTINUED | OUTPATIENT
Start: 2023-02-17 | End: 2023-02-17 | Stop reason: HOSPADM

## 2023-02-17 RX ADMIN — PSYLLIUM HUSK 1 PACKET: 3.4 POWDER ORAL at 12:19

## 2023-02-17 RX ADMIN — ISOSORBIDE MONONITRATE 30 MG: 30 TABLET, EXTENDED RELEASE ORAL at 12:20

## 2023-02-17 RX ADMIN — INSULIN ASPART 2 UNITS: 100 INJECTION, SOLUTION INTRAVENOUS; SUBCUTANEOUS at 13:10

## 2023-02-17 RX ADMIN — METOPROLOL SUCCINATE 50 MG: 50 TABLET, EXTENDED RELEASE ORAL at 12:20

## 2023-02-17 RX ADMIN — LOSARTAN POTASSIUM 25 MG: 25 TABLET, FILM COATED ORAL at 12:20

## 2023-02-17 RX ADMIN — CIPROFLOXACIN 400 MG: 2 INJECTION, SOLUTION INTRAVENOUS at 08:34

## 2023-02-17 RX ADMIN — POLYETHYLENE GLYCOL 3350 17 G: 17 POWDER, FOR SOLUTION ORAL at 12:19

## 2023-02-17 RX ADMIN — GABAPENTIN 600 MG: 300 CAPSULE ORAL at 10:07

## 2023-02-17 RX ADMIN — SODIUM HYPOCHLORITE: 2.5 SOLUTION TOPICAL at 10:36

## 2023-02-17 RX ADMIN — SERTRALINE HYDROCHLORIDE 150 MG: 50 TABLET ORAL at 12:20

## 2023-02-17 RX ADMIN — ASPIRIN 81 MG 81 MG: 81 TABLET ORAL at 10:07

## 2023-02-17 RX ADMIN — CLOPIDOGREL BISULFATE 75 MG: 75 TABLET, FILM COATED ORAL at 10:07

## 2023-02-17 RX ADMIN — GABAPENTIN 600 MG: 300 CAPSULE ORAL at 17:12

## 2023-02-17 RX ADMIN — INSULIN GLARGINE 17 UNITS: 100 INJECTION, SOLUTION SUBCUTANEOUS at 12:24

## 2023-02-17 RX ADMIN — FAMOTIDINE 20 MG: 20 TABLET, FILM COATED ORAL at 10:07

## 2023-02-17 ASSESSMENT — ACTIVITIES OF DAILY LIVING (ADL)
ADLS_ACUITY_SCORE: 35

## 2023-02-17 NOTE — ED PROVIDER NOTES
Emergency Department Provider Note  : 1935 Age: 87 year old Sex: male MRN: [id number]    Chief Complaint   Patient presents with     Wound Infection       Medical Decision Making / Assessment / Plan   87 year old male presenting with right foot pain and gangrene.    On presentation, patient is drowsy, but is HDS, afebrile rectally and in no acute distress. He has warmth and erythema of the right foot and gangrenous findings. Broad differential considered and a broad workup was initiated. No leukocytosis or lactate elevation however, significant elevation in inflammatory markers.    ED Course as of 23 0719      0655 CBC with platelets differential(!)  WBC downtrending from several days ago. Anemia at baseline   0656 Basic metabolic panel(!)  Hyponatremia near patient baseline   0656 Lactic acid whole blood  Lactate normal   0711 Cellulitis and gangrene R foot. Transfer of care. Need to call surgery. EMK     Pending CT of the RLE. Plan for surgical consult. Patient signed out to Dr. Bill. Please see her note for further detail regarding final ED course and disposition.      New Prescriptions    No medications on file       Final diagnoses:   None       Mookie Logan, DO  2023   Emergency Department    Ramon Hunter is a 87 year old male who presents at  6:06 AM with right foot pain and decreased mental status.  Patient has a history of cellulitis and gangrene to the right foot and has been seen multiple times in the emergency department for similar symptoms.  On  he was seen in the ED and ultimately discharged on cipro and clindamycin, which he has been taking. He is supposed to have outpatient follow up with vascular surgery and podiatry. His facility called today due to decreasing mental status and concern for sepsis.  When medics arrived their initial blood pressure was hypotensive in the 80s, but subsequent blood pressures increased into the 130s systolic. The patient is  "endorsing right foot pain and states he feels slightly more tired, but denies any further symptoms. He is without chest pain, shortness of breath, abdominal pain, nausea or vomiting.     I have reviewed the Medications, Allergies, Past Medical and Surgical History, and Social History in the Epic System and with family.    Review of Systems:  Please see HPI for pertinent positives and negatives. All other systems reviewed and found to be negative.      Objective   BP: (!) 150/64  Pulse: 66  Temp: 97.8  F (36.6  C)  Resp: 16  Height: 185.4 cm (6' 1\")  Weight: 108 kg (238 lb)  SpO2: 98 %    Physical Exam:   General: slow to respond to questions, tired.  Head: Normocephalic, atraumatic.  Eyes: Conjugate gaze.  ENT: Moist membranes, external ear appears normal.   Chest/Respiratory: Equal chest rise.  Cardiovascular: Peripheral pulses present.  Abdominal: Soft, non-distended, non-tender.  Extremities: gangrene of the right foot with multiple   Neurological: GCS 15, moving all extremities without gross deficit.  Skin: Warm, no rashes, lesions, or bruising.   Psychiatric: Appropriate affect.     Procedures / Critical Care   Procedures    Critical Care Time: None.          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    Medications:  Current Facility-Administered Medications   Medication     0.9% sodium chloride BOLUS     Current Outpatient Medications   Medication     acetaminophen (TYLENOL) 500 MG tablet     aspirin (ASA) 81 MG chewable tablet     Cholecalciferol (VITAMIN D3) 2000 " UNITS TABS     ciprofloxacin (CIPRO) 500 MG tablet     clindamycin (CLEOCIN) 300 MG capsule     clopidogrel (PLAVIX) 75 MG tablet     clopidogrel (PLAVIX) 75 MG tablet     diclofenac (VOLTAREN) 1 % topical gel     famotidine (PEPCID) 20 MG tablet     furosemide (LASIX) 40 MG tablet     gabapentin (NEURONTIN) 300 MG capsule     insulin aspart (NOVOLOG FLEXPEN) 100 UNIT/ML pen     insulin aspart (NOVOLOG FLEXPEN) 100 UNIT/ML pen     insulin aspart (NOVOLOG FLEXPEN) 100 UNIT/ML pen     insulin aspart (NOVOLOG FLEXPEN) 100 UNIT/ML pen     insulin glargine (LANTUS VIAL) 100 UNIT/ML vial     isosorbide mononitrate (IMDUR) 30 MG 24 hr tablet     levothyroxine (SYNTHROID/LEVOTHROID) 25 MCG tablet     losartan (COZAAR) 25 MG tablet     magnesium oxide 400 MG CAPS     melatonin 3 MG CAPS     metoprolol succinate ER (TOPROL XL) 25 MG 24 hr tablet     mirtazapine (REMERON) 15 MG tablet     nitroGLYcerin (NITROSTAT) 0.4 MG sublingual tablet     polyethylene glycol (MIRALAX) 17 g packet     psyllium (METAMUCIL/KONSYL) capsule     sertraline (ZOLOFT) 50 MG tablet     tamsulosin (FLOMAX) 0.4 MG capsule     traMADol (ULTRAM) 50 MG tablet       Allergies:  Penicillins, Tetanus antitoxin, and Tetanus toxoid    Relevant labs, images, EKGs, Epic and outside hospital (if applicable) charts were reviewed. The findings, diagnosis, plan, and need for follow up were discussed with the patient/family. Nursing notes were reviewed.      Mookie Logan DO  02/17/23 0708
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
## 2023-02-17 NOTE — ED TRIAGE NOTES
Patient to ER via EMS from [facility] with c/o increasing lethargy and a right foot infection that is worsening. Patient also hypotensive on scene 89/62. Last /67. This is patient's 4th visit to the ER this week. Temp 97.8 tympanic. Patient very lethargic upon arrival but does respond to voice. MD at bedside.  via EMS

## 2023-02-22 LAB
BACTERIA BLD CULT: NO GROWTH
BACTERIA BLD CULT: NO GROWTH

## 2023-03-06 ENCOUNTER — LAB REQUISITION (OUTPATIENT)
Dept: LAB | Facility: OTHER | Age: 88
End: 2023-03-06
Payer: COMMERCIAL

## 2023-03-06 DIAGNOSIS — M86.271 SUBACUTE OSTEOMYELITIS, RIGHT ANKLE AND FOOT (H): ICD-10-CM

## 2023-03-06 DIAGNOSIS — Z22.39 CARRIER OF OTHER SPECIFIED BACTERIAL DISEASES: ICD-10-CM

## 2023-03-06 LAB
ALBUMIN SERPL BCG-MCNC: 3.5 G/DL (ref 3.5–5.2)
ALP SERPL-CCNC: 74 U/L (ref 40–129)
ALT SERPL W P-5'-P-CCNC: 9 U/L (ref 10–50)
ANION GAP SERPL CALCULATED.3IONS-SCNC: 8 MMOL/L (ref 7–15)
AST SERPL W P-5'-P-CCNC: 20 U/L (ref 10–50)
BASOPHILS # BLD AUTO: 0.1 10E3/UL (ref 0–0.2)
BASOPHILS NFR BLD AUTO: 2 %
BILIRUB SERPL-MCNC: 0.2 MG/DL
BUN SERPL-MCNC: 21.3 MG/DL (ref 8–23)
CALCIUM SERPL-MCNC: 9.1 MG/DL (ref 8.8–10.2)
CHLORIDE SERPL-SCNC: 104 MMOL/L (ref 98–107)
CK SERPL-CCNC: 109 U/L (ref 39–308)
CREAT SERPL-MCNC: 1.32 MG/DL (ref 0.67–1.17)
DEPRECATED HCO3 PLAS-SCNC: 23 MMOL/L (ref 22–29)
EOSINOPHIL # BLD AUTO: 0.6 10E3/UL (ref 0–0.7)
EOSINOPHIL NFR BLD AUTO: 10 %
ERYTHROCYTE [DISTWIDTH] IN BLOOD BY AUTOMATED COUNT: 15.9 % (ref 10–15)
GFR SERPL CREATININE-BSD FRML MDRD: 52 ML/MIN/1.73M2
GLUCOSE SERPL-MCNC: 106 MG/DL (ref 70–99)
HCT VFR BLD AUTO: 22.8 % (ref 40–53)
HGB BLD-MCNC: 7.5 G/DL (ref 13.3–17.7)
HOLD SPECIMEN: NORMAL
IMM GRANULOCYTES # BLD: 0 10E3/UL
IMM GRANULOCYTES NFR BLD: 0 %
LYMPHOCYTES # BLD AUTO: 1.1 10E3/UL (ref 0.8–5.3)
LYMPHOCYTES NFR BLD AUTO: 19 %
MCH RBC QN AUTO: 27.7 PG (ref 26.5–33)
MCHC RBC AUTO-ENTMCNC: 32.9 G/DL (ref 31.5–36.5)
MCV RBC AUTO: 84 FL (ref 78–100)
MONOCYTES # BLD AUTO: 0.4 10E3/UL (ref 0–1.3)
MONOCYTES NFR BLD AUTO: 7 %
NEUTROPHILS # BLD AUTO: 3.4 10E3/UL (ref 1.6–8.3)
NEUTROPHILS NFR BLD AUTO: 62 %
NRBC # BLD AUTO: 0 10E3/UL
NRBC BLD AUTO-RTO: 0 /100
PLATELET # BLD AUTO: 190 10E3/UL (ref 150–450)
POTASSIUM SERPL-SCNC: 4.4 MMOL/L (ref 3.4–5.3)
PROT SERPL-MCNC: 7 G/DL (ref 6.4–8.3)
RBC # BLD AUTO: 2.71 10E6/UL (ref 4.4–5.9)
SODIUM SERPL-SCNC: 135 MMOL/L (ref 136–145)
WBC # BLD AUTO: 5.5 10E3/UL (ref 4–11)

## 2023-03-06 PROCEDURE — 82550 ASSAY OF CK (CPK): CPT | Performed by: INTERNAL MEDICINE

## 2023-03-06 PROCEDURE — 85025 COMPLETE CBC W/AUTO DIFF WBC: CPT | Performed by: INTERNAL MEDICINE

## 2023-03-06 PROCEDURE — 80053 COMPREHEN METABOLIC PANEL: CPT | Performed by: INTERNAL MEDICINE

## 2023-03-13 ENCOUNTER — LAB REQUISITION (OUTPATIENT)
Dept: LAB | Facility: OTHER | Age: 88
DRG: 315 | End: 2023-03-13
Payer: COMMERCIAL

## 2023-03-13 DIAGNOSIS — Z22.39 CARRIER OF OTHER SPECIFIED BACTERIAL DISEASES: ICD-10-CM

## 2023-03-13 LAB
ALBUMIN SERPL BCG-MCNC: 3.4 G/DL (ref 3.5–5.2)
ALP SERPL-CCNC: 64 U/L (ref 40–129)
ALT SERPL W P-5'-P-CCNC: 11 U/L (ref 10–50)
ANION GAP SERPL CALCULATED.3IONS-SCNC: 7 MMOL/L (ref 7–15)
AST SERPL W P-5'-P-CCNC: 21 U/L (ref 10–50)
BASOPHILS # BLD AUTO: 0.1 10E3/UL (ref 0–0.2)
BASOPHILS NFR BLD AUTO: 2 %
BILIRUB SERPL-MCNC: 0.2 MG/DL
BUN SERPL-MCNC: 35.2 MG/DL (ref 8–23)
CALCIUM SERPL-MCNC: 8.7 MG/DL (ref 8.8–10.2)
CHLORIDE SERPL-SCNC: 109 MMOL/L (ref 98–107)
CK SERPL-CCNC: 64 U/L (ref 39–308)
CREAT SERPL-MCNC: 2.31 MG/DL (ref 0.67–1.17)
DEPRECATED HCO3 PLAS-SCNC: 23 MMOL/L (ref 22–29)
EOSINOPHIL # BLD AUTO: 0.4 10E3/UL (ref 0–0.7)
EOSINOPHIL NFR BLD AUTO: 9 %
ERYTHROCYTE [DISTWIDTH] IN BLOOD BY AUTOMATED COUNT: 16.8 % (ref 10–15)
GFR SERPL CREATININE-BSD FRML MDRD: 27 ML/MIN/1.73M2
GLUCOSE SERPL-MCNC: 143 MG/DL (ref 70–99)
HCT VFR BLD AUTO: 23.7 % (ref 40–53)
HGB BLD-MCNC: 7.5 G/DL (ref 13.3–17.7)
HOLD SPECIMEN: NORMAL
IMM GRANULOCYTES # BLD: 0 10E3/UL
IMM GRANULOCYTES NFR BLD: 0 %
LYMPHOCYTES # BLD AUTO: 1.1 10E3/UL (ref 0.8–5.3)
LYMPHOCYTES NFR BLD AUTO: 22 %
MCH RBC QN AUTO: 27.7 PG (ref 26.5–33)
MCHC RBC AUTO-ENTMCNC: 31.6 G/DL (ref 31.5–36.5)
MCV RBC AUTO: 88 FL (ref 78–100)
MONOCYTES # BLD AUTO: 0.3 10E3/UL (ref 0–1.3)
MONOCYTES NFR BLD AUTO: 7 %
NEUTROPHILS # BLD AUTO: 3 10E3/UL (ref 1.6–8.3)
NEUTROPHILS NFR BLD AUTO: 60 %
NRBC # BLD AUTO: 0 10E3/UL
NRBC BLD AUTO-RTO: 0 /100
PLATELET # BLD AUTO: 120 10E3/UL (ref 150–450)
POTASSIUM SERPL-SCNC: 4.8 MMOL/L (ref 3.4–5.3)
PROT SERPL-MCNC: 6.8 G/DL (ref 6.4–8.3)
RBC # BLD AUTO: 2.71 10E6/UL (ref 4.4–5.9)
SODIUM SERPL-SCNC: 139 MMOL/L (ref 136–145)
WBC # BLD AUTO: 5 10E3/UL (ref 4–11)

## 2023-03-13 PROCEDURE — 80053 COMPREHEN METABOLIC PANEL: CPT | Performed by: INTERNAL MEDICINE

## 2023-03-13 PROCEDURE — 85025 COMPLETE CBC W/AUTO DIFF WBC: CPT | Performed by: INTERNAL MEDICINE

## 2023-03-13 PROCEDURE — 82550 ASSAY OF CK (CPK): CPT | Performed by: INTERNAL MEDICINE

## 2023-03-15 ENCOUNTER — APPOINTMENT (OUTPATIENT)
Dept: ULTRASOUND IMAGING | Facility: OTHER | Age: 88
DRG: 315 | End: 2023-03-15
Attending: NURSE PRACTITIONER
Payer: COMMERCIAL

## 2023-03-15 ENCOUNTER — HOSPITAL ENCOUNTER (INPATIENT)
Facility: OTHER | Age: 88
LOS: 6 days | Discharge: NURSING, REHAB OR LONG-TERM CARE | DRG: 315 | End: 2023-03-21
Attending: NURSE PRACTITIONER | Admitting: FAMILY MEDICINE
Payer: COMMERCIAL

## 2023-03-15 DIAGNOSIS — I82.A11 ACUTE DEEP VEIN THROMBOSIS (DVT) OF AXILLARY VEIN OF RIGHT UPPER EXTREMITY (H): ICD-10-CM

## 2023-03-15 DIAGNOSIS — I82.621 DEEP VEIN THROMBOSIS (DVT) OF OTHER VEIN OF RIGHT UPPER EXTREMITY, UNSPECIFIED CHRONICITY (H): ICD-10-CM

## 2023-03-15 DIAGNOSIS — N18.9 CHRONIC KIDNEY DISEASE, UNSPECIFIED CKD STAGE: ICD-10-CM

## 2023-03-15 DIAGNOSIS — E11.49 TYPE II OR UNSPECIFIED TYPE DIABETES MELLITUS WITH NEUROLOGICAL MANIFESTATIONS, NOT STATED AS UNCONTROLLED(250.60) (H): ICD-10-CM

## 2023-03-15 DIAGNOSIS — T80.1XXA: ICD-10-CM

## 2023-03-15 DIAGNOSIS — E11.22 TYPE 2 DIABETES MELLITUS WITH DIABETIC CHRONIC KIDNEY DISEASE, UNSPECIFIED CKD STAGE, UNSPECIFIED WHETHER LONG TERM INSULIN USE (H): ICD-10-CM

## 2023-03-15 DIAGNOSIS — O22.30 DVT (DEEP VEIN THROMBOSIS) IN PREGNANCY: Primary | ICD-10-CM

## 2023-03-15 DIAGNOSIS — I82.621 ACUTE DEEP VEIN THROMBOSIS (DVT) OF RIGHT UPPER EXTREMITY, UNSPECIFIED VEIN (H): ICD-10-CM

## 2023-03-15 DIAGNOSIS — N17.9 ACUTE RENAL FAILURE, UNSPECIFIED ACUTE RENAL FAILURE TYPE (H): ICD-10-CM

## 2023-03-15 DIAGNOSIS — M86.60 CHRONIC OSTEOMYELITIS (H): ICD-10-CM

## 2023-03-15 DIAGNOSIS — E11.49 OTHER DIABETIC NEUROLOGICAL COMPLICATION ASSOCIATED WITH TYPE 2 DIABETES MELLITUS (H): ICD-10-CM

## 2023-03-15 PROBLEM — K59.09 CHRONIC CONSTIPATION: Status: ACTIVE | Noted: 2019-09-12

## 2023-03-15 PROBLEM — F33.42 MDD (MAJOR DEPRESSIVE DISORDER), RECURRENT, IN FULL REMISSION (H): Status: ACTIVE | Noted: 2023-03-15

## 2023-03-15 PROBLEM — M25.642 STIFFNESS OF LEFT HAND JOINT: Status: ACTIVE | Noted: 2020-11-25

## 2023-03-15 PROBLEM — I73.9 INTERMITTENT CLAUDICATION (H): Status: ACTIVE | Noted: 2023-03-15

## 2023-03-15 PROBLEM — M86.271 SUBACUTE OSTEOMYELITIS OF RIGHT FOOT (H): Status: ACTIVE | Noted: 2023-02-17

## 2023-03-15 PROBLEM — I82.629 DEEP VEIN THROMBOSIS (DVT) OF UPPER EXTREMITY (H): Status: ACTIVE | Noted: 2023-03-15

## 2023-03-15 PROBLEM — M47.814 SPONDYLOSIS OF THORACIC REGION WITHOUT MYELOPATHY OR RADICULOPATHY: Status: ACTIVE | Noted: 2019-01-01

## 2023-03-15 PROBLEM — M54.50 LOW BACK PAIN: Status: ACTIVE | Noted: 2023-03-15

## 2023-03-15 PROBLEM — Z79.891 LONG TERM (CURRENT) USE OF OPIATE ANALGESIC: Status: ACTIVE | Noted: 2022-09-08

## 2023-03-15 PROBLEM — L97.509 FOOT ULCER (H): Status: ACTIVE | Noted: 2023-03-15

## 2023-03-15 PROBLEM — Z22.39 VRE (VANCOMYCIN RESISTANT ENTEROCOCCUS) CULTURE POSITIVE: Status: ACTIVE | Noted: 2023-02-27

## 2023-03-15 PROBLEM — M72.0 DUPUYTREN'S DISEASE OF PALM OF BOTH HANDS: Status: ACTIVE | Noted: 2023-03-15

## 2023-03-15 PROBLEM — Z89.9 HISTORY OF AMPUTATION: Status: ACTIVE | Noted: 2019-01-08

## 2023-03-15 PROBLEM — Z78.9 LIVING IN ASSISTED LIVING: Status: ACTIVE | Noted: 2019-01-26

## 2023-03-15 PROBLEM — K76.0 FATTY LIVER: Status: ACTIVE | Noted: 2019-01-01

## 2023-03-15 PROBLEM — E03.9 HYPOTHYROIDISM, UNSPECIFIED TYPE: Status: ACTIVE | Noted: 2019-09-12

## 2023-03-15 PROBLEM — M51.9 LUMBAR DISC DISEASE: Status: ACTIVE | Noted: 2019-01-01

## 2023-03-15 PROBLEM — N18.32 STAGE 3B CHRONIC KIDNEY DISEASE (H): Status: ACTIVE | Noted: 2021-04-21

## 2023-03-15 PROBLEM — H31.013 MACULAR SCAR OF BOTH EYES: Status: ACTIVE | Noted: 2023-03-15

## 2023-03-15 PROBLEM — R41.82 ALTERED MENTAL STATUS: Status: ACTIVE | Noted: 2023-03-15

## 2023-03-15 PROBLEM — F32.A DEPRESSIVE DISORDER: Status: ACTIVE | Noted: 2023-03-15

## 2023-03-15 LAB
ANION GAP SERPL CALCULATED.3IONS-SCNC: 9 MMOL/L (ref 7–15)
BASOPHILS # BLD AUTO: 0.1 10E3/UL (ref 0–0.2)
BASOPHILS NFR BLD AUTO: 2 %
BUN SERPL-MCNC: 42.7 MG/DL (ref 8–23)
CALCIUM SERPL-MCNC: 8.2 MG/DL (ref 8.8–10.2)
CHLORIDE SERPL-SCNC: 108 MMOL/L (ref 98–107)
CREAT SERPL-MCNC: 3.3 MG/DL (ref 0.67–1.17)
CRP SERPL-MCNC: 3.17 MG/L
DEPRECATED HCO3 PLAS-SCNC: 19 MMOL/L (ref 22–29)
EOSINOPHIL # BLD AUTO: 0.5 10E3/UL (ref 0–0.7)
EOSINOPHIL NFR BLD AUTO: 7 %
ERYTHROCYTE [DISTWIDTH] IN BLOOD BY AUTOMATED COUNT: 16.2 % (ref 10–15)
ERYTHROCYTE [SEDIMENTATION RATE] IN BLOOD BY WESTERGREN METHOD: 34 MM/HR (ref 0–20)
GFR SERPL CREATININE-BSD FRML MDRD: 17 ML/MIN/1.73M2
GLUCOSE BLDC GLUCOMTR-MCNC: 168 MG/DL (ref 70–99)
GLUCOSE BLDC GLUCOMTR-MCNC: 61 MG/DL (ref 70–99)
GLUCOSE BLDC GLUCOMTR-MCNC: 65 MG/DL (ref 70–99)
GLUCOSE BLDC GLUCOMTR-MCNC: 93 MG/DL (ref 70–99)
GLUCOSE SERPL-MCNC: 78 MG/DL (ref 70–99)
HCT VFR BLD AUTO: 24.8 % (ref 40–53)
HGB BLD-MCNC: 8 G/DL (ref 13.3–17.7)
HOLD SPECIMEN: NORMAL
IMM GRANULOCYTES # BLD: 0 10E3/UL
IMM GRANULOCYTES NFR BLD: 0 %
LACTATE SERPL-SCNC: 0.9 MMOL/L (ref 0.7–2)
LYMPHOCYTES # BLD AUTO: 1.3 10E3/UL (ref 0.8–5.3)
LYMPHOCYTES NFR BLD AUTO: 18 %
MAGNESIUM SERPL-MCNC: 1.9 MG/DL (ref 1.7–2.3)
MCH RBC QN AUTO: 27.9 PG (ref 26.5–33)
MCHC RBC AUTO-ENTMCNC: 32.3 G/DL (ref 31.5–36.5)
MCV RBC AUTO: 86 FL (ref 78–100)
MONOCYTES # BLD AUTO: 0.4 10E3/UL (ref 0–1.3)
MONOCYTES NFR BLD AUTO: 6 %
NEUTROPHILS # BLD AUTO: 4.5 10E3/UL (ref 1.6–8.3)
NEUTROPHILS NFR BLD AUTO: 67 %
NRBC # BLD AUTO: 0 10E3/UL
NRBC BLD AUTO-RTO: 0 /100
PLATELET # BLD AUTO: 98 10E3/UL (ref 150–450)
POTASSIUM SERPL-SCNC: 4.9 MMOL/L (ref 3.4–5.3)
RBC # BLD AUTO: 2.87 10E6/UL (ref 4.4–5.9)
SODIUM SERPL-SCNC: 136 MMOL/L (ref 136–145)
UFH PPP CHRO-ACNC: 0.88 IU/ML
WBC # BLD AUTO: 6.9 10E3/UL (ref 4–11)

## 2023-03-15 PROCEDURE — 250N000011 HC RX IP 250 OP 636: Performed by: NURSE PRACTITIONER

## 2023-03-15 PROCEDURE — 85652 RBC SED RATE AUTOMATED: CPT | Performed by: NURSE PRACTITIONER

## 2023-03-15 PROCEDURE — 96374 THER/PROPH/DIAG INJ IV PUSH: CPT | Performed by: NURSE PRACTITIONER

## 2023-03-15 PROCEDURE — 250N000013 HC RX MED GY IP 250 OP 250 PS 637: Performed by: NURSE PRACTITIONER

## 2023-03-15 PROCEDURE — 200N000001 HC R&B ICU

## 2023-03-15 PROCEDURE — 36415 COLL VENOUS BLD VENIPUNCTURE: CPT | Performed by: NURSE PRACTITIONER

## 2023-03-15 PROCEDURE — 85025 COMPLETE CBC W/AUTO DIFF WBC: CPT | Performed by: NURSE PRACTITIONER

## 2023-03-15 PROCEDURE — 96375 TX/PRO/DX INJ NEW DRUG ADDON: CPT | Performed by: NURSE PRACTITIONER

## 2023-03-15 PROCEDURE — 80048 BASIC METABOLIC PNL TOTAL CA: CPT | Performed by: NURSE PRACTITIONER

## 2023-03-15 PROCEDURE — 93971 EXTREMITY STUDY: CPT | Mod: RT

## 2023-03-15 PROCEDURE — 36415 COLL VENOUS BLD VENIPUNCTURE: CPT | Performed by: FAMILY MEDICINE

## 2023-03-15 PROCEDURE — 85520 HEPARIN ASSAY: CPT | Performed by: FAMILY MEDICINE

## 2023-03-15 PROCEDURE — 250N000011 HC RX IP 250 OP 636: Performed by: FAMILY MEDICINE

## 2023-03-15 PROCEDURE — 83605 ASSAY OF LACTIC ACID: CPT | Performed by: NURSE PRACTITIONER

## 2023-03-15 PROCEDURE — 86140 C-REACTIVE PROTEIN: CPT | Performed by: NURSE PRACTITIONER

## 2023-03-15 PROCEDURE — 258N000003 HC RX IP 258 OP 636: Performed by: FAMILY MEDICINE

## 2023-03-15 PROCEDURE — 99223 1ST HOSP IP/OBS HIGH 75: CPT | Mod: AI | Performed by: FAMILY MEDICINE

## 2023-03-15 PROCEDURE — 258N000003 HC RX IP 258 OP 636: Performed by: NURSE PRACTITIONER

## 2023-03-15 PROCEDURE — 250N000013 HC RX MED GY IP 250 OP 250 PS 637: Performed by: FAMILY MEDICINE

## 2023-03-15 PROCEDURE — 99285 EMERGENCY DEPT VISIT HI MDM: CPT | Mod: 25 | Performed by: NURSE PRACTITIONER

## 2023-03-15 PROCEDURE — 99285 EMERGENCY DEPT VISIT HI MDM: CPT | Performed by: NURSE PRACTITIONER

## 2023-03-15 PROCEDURE — 83735 ASSAY OF MAGNESIUM: CPT | Performed by: FAMILY MEDICINE

## 2023-03-15 RX ORDER — GABAPENTIN 300 MG/1
600 CAPSULE ORAL AT BEDTIME
COMMUNITY

## 2023-03-15 RX ORDER — SODIUM CHLORIDE 9 MG/ML
INJECTION, SOLUTION INTRAVENOUS CONTINUOUS
Status: DISCONTINUED | OUTPATIENT
Start: 2023-03-15 | End: 2023-03-16

## 2023-03-15 RX ORDER — METRONIDAZOLE 500 MG/1
500 TABLET ORAL 2 TIMES DAILY
Status: ON HOLD | COMMUNITY
End: 2023-04-25

## 2023-03-15 RX ORDER — AMOXICILLIN 250 MG
1 CAPSULE ORAL 2 TIMES DAILY PRN
Status: DISCONTINUED | OUTPATIENT
Start: 2023-03-15 | End: 2023-03-21 | Stop reason: HOSPADM

## 2023-03-15 RX ORDER — METOPROLOL SUCCINATE 50 MG/1
50 TABLET, EXTENDED RELEASE ORAL DAILY
Status: DISCONTINUED | OUTPATIENT
Start: 2023-03-16 | End: 2023-03-15

## 2023-03-15 RX ORDER — CEFEPIME HYDROCHLORIDE 1 G/1
1 INJECTION, POWDER, FOR SOLUTION INTRAMUSCULAR; INTRAVENOUS EVERY 12 HOURS
Status: DISCONTINUED | OUTPATIENT
Start: 2023-03-15 | End: 2023-03-15 | Stop reason: DRUGHIGH

## 2023-03-15 RX ORDER — ACETAMINOPHEN 500 MG
1000 TABLET ORAL 3 TIMES DAILY
Status: DISCONTINUED | OUTPATIENT
Start: 2023-03-15 | End: 2023-03-21 | Stop reason: HOSPADM

## 2023-03-15 RX ORDER — ONDANSETRON 2 MG/ML
4 INJECTION INTRAMUSCULAR; INTRAVENOUS EVERY 6 HOURS PRN
Status: DISCONTINUED | OUTPATIENT
Start: 2023-03-15 | End: 2023-03-21 | Stop reason: HOSPADM

## 2023-03-15 RX ORDER — HYDROCODONE BITARTRATE AND ACETAMINOPHEN 5; 325 MG/1; MG/1
1 TABLET ORAL EVERY 4 HOURS PRN
Status: ON HOLD | COMMUNITY
End: 2023-03-21

## 2023-03-15 RX ORDER — LOPERAMIDE HYDROCHLORIDE 2 MG/1
TABLET ORAL
COMMUNITY

## 2023-03-15 RX ORDER — NICOTINE POLACRILEX 4 MG
15-30 LOZENGE BUCCAL
Status: DISCONTINUED | OUTPATIENT
Start: 2023-03-15 | End: 2023-03-21 | Stop reason: HOSPADM

## 2023-03-15 RX ORDER — LIDOCAINE 40 MG/G
CREAM TOPICAL
Status: DISCONTINUED | OUTPATIENT
Start: 2023-03-15 | End: 2023-03-21 | Stop reason: HOSPADM

## 2023-03-15 RX ORDER — SACCHAROMYCES BOULARDII 250 MG
250 CAPSULE ORAL 2 TIMES DAILY
COMMUNITY

## 2023-03-15 RX ORDER — METOPROLOL SUCCINATE 25 MG/1
25 TABLET, EXTENDED RELEASE ORAL DAILY
Status: DISCONTINUED | OUTPATIENT
Start: 2023-03-16 | End: 2023-03-21 | Stop reason: HOSPADM

## 2023-03-15 RX ORDER — CEFEPIME HYDROCHLORIDE 2 G/1
2 INJECTION, POWDER, FOR SOLUTION INTRAVENOUS EVERY 24 HOURS
Status: DISCONTINUED | OUTPATIENT
Start: 2023-03-15 | End: 2023-03-21 | Stop reason: HOSPADM

## 2023-03-15 RX ORDER — ISOSORBIDE MONONITRATE 30 MG/1
30 TABLET, EXTENDED RELEASE ORAL EVERY MORNING
Status: DISCONTINUED | OUTPATIENT
Start: 2023-03-16 | End: 2023-03-21 | Stop reason: HOSPADM

## 2023-03-15 RX ORDER — CLOPIDOGREL BISULFATE 75 MG/1
75 TABLET ORAL DAILY
Status: DISCONTINUED | OUTPATIENT
Start: 2023-03-16 | End: 2023-03-21 | Stop reason: HOSPADM

## 2023-03-15 RX ORDER — CLOTRIMAZOLE 1 %
CREAM (GRAM) TOPICAL 2 TIMES DAILY
COMMUNITY

## 2023-03-15 RX ORDER — TROLAMINE SALICYLATE 10 G/100G
CREAM TOPICAL 2 TIMES DAILY PRN
COMMUNITY

## 2023-03-15 RX ORDER — HYDROCORTISONE 2.5 %
CREAM (GRAM) TOPICAL DAILY PRN
COMMUNITY

## 2023-03-15 RX ORDER — HEPARIN SODIUM 10000 [USP'U]/100ML
0-5000 INJECTION, SOLUTION INTRAVENOUS CONTINUOUS
Status: DISCONTINUED | OUTPATIENT
Start: 2023-03-15 | End: 2023-03-17

## 2023-03-15 RX ORDER — SODIUM CHLORIDE 9 MG/ML
INJECTION, SOLUTION INTRAVENOUS CONTINUOUS
Status: DISCONTINUED | OUTPATIENT
Start: 2023-03-15 | End: 2023-03-15

## 2023-03-15 RX ORDER — METRONIDAZOLE 500 MG/1
500 TABLET ORAL 2 TIMES DAILY
Status: DISCONTINUED | OUTPATIENT
Start: 2023-03-15 | End: 2023-03-21 | Stop reason: HOSPADM

## 2023-03-15 RX ORDER — ASPIRIN 81 MG/1
81 TABLET, CHEWABLE ORAL DAILY
Status: DISCONTINUED | OUTPATIENT
Start: 2023-03-16 | End: 2023-03-17

## 2023-03-15 RX ORDER — ACETAMINOPHEN 650 MG/1
650 SUPPOSITORY RECTAL EVERY 6 HOURS PRN
Status: DISCONTINUED | OUTPATIENT
Start: 2023-03-15 | End: 2023-03-21 | Stop reason: HOSPADM

## 2023-03-15 RX ORDER — ATORVASTATIN CALCIUM 40 MG/1
40 TABLET, FILM COATED ORAL EVERY EVENING
COMMUNITY

## 2023-03-15 RX ORDER — NYSTATIN 100000 [USP'U]/G
POWDER TOPICAL 2 TIMES DAILY
COMMUNITY

## 2023-03-15 RX ORDER — DEXTROSE MONOHYDRATE 25 G/50ML
25-50 INJECTION, SOLUTION INTRAVENOUS
Status: DISCONTINUED | OUTPATIENT
Start: 2023-03-15 | End: 2023-03-21 | Stop reason: HOSPADM

## 2023-03-15 RX ORDER — ACETAMINOPHEN 325 MG/1
650 TABLET ORAL EVERY 6 HOURS PRN
Status: DISCONTINUED | OUTPATIENT
Start: 2023-03-15 | End: 2023-03-21 | Stop reason: HOSPADM

## 2023-03-15 RX ORDER — ONDANSETRON 4 MG/1
4 TABLET, ORALLY DISINTEGRATING ORAL EVERY 6 HOURS PRN
Status: DISCONTINUED | OUTPATIENT
Start: 2023-03-15 | End: 2023-03-21 | Stop reason: HOSPADM

## 2023-03-15 RX ORDER — AMOXICILLIN 250 MG
2 CAPSULE ORAL 2 TIMES DAILY PRN
Status: DISCONTINUED | OUTPATIENT
Start: 2023-03-15 | End: 2023-03-21 | Stop reason: HOSPADM

## 2023-03-15 RX ORDER — CEFEPIME HYDROCHLORIDE 2 G/1
2 INJECTION, POWDER, FOR SOLUTION INTRAVENOUS EVERY 24 HOURS
Status: DISCONTINUED | OUTPATIENT
Start: 2023-03-15 | End: 2023-03-15

## 2023-03-15 RX ADMIN — SODIUM CHLORIDE: 9 INJECTION, SOLUTION INTRAVENOUS at 17:53

## 2023-03-15 RX ADMIN — METRONIDAZOLE 500 MG: 500 TABLET ORAL at 17:52

## 2023-03-15 RX ADMIN — ACETAMINOPHEN 1000 MG: 500 TABLET, FILM COATED ORAL at 20:49

## 2023-03-15 RX ADMIN — CEFEPIME 2 G: 2 INJECTION, POWDER, FOR SOLUTION INTRAVENOUS at 14:28

## 2023-03-15 RX ADMIN — HEPARIN SODIUM 1700 UNITS/HR: 10000 INJECTION, SOLUTION INTRAVENOUS at 14:33

## 2023-03-15 RX ADMIN — METRONIDAZOLE 500 MG: 500 TABLET ORAL at 20:49

## 2023-03-15 RX ADMIN — SODIUM CHLORIDE: 9 INJECTION, SOLUTION INTRAVENOUS at 11:19

## 2023-03-15 RX ADMIN — DAPTOMYCIN 500 MG: 500 INJECTION, POWDER, LYOPHILIZED, FOR SOLUTION INTRAVENOUS at 16:20

## 2023-03-15 ASSESSMENT — ENCOUNTER SYMPTOMS
JOINT SWELLING: 1
DIAPHORESIS: 0
WHEEZING: 0
DIZZINESS: 1
PSYCHIATRIC NEGATIVE: 1
FEVER: 0
CHEST TIGHTNESS: 0
CHILLS: 0
SHORTNESS OF BREATH: 0
PALPITATIONS: 0

## 2023-03-15 ASSESSMENT — ACTIVITIES OF DAILY LIVING (ADL)
ADLS_ACUITY_SCORE: 45
DRESSING/BATHING: BATHING DIFFICULTY, ASSISTANCE 1 PERSON
DRESSING/BATHING_DIFFICULTY: YES
SWALLOWING: 0-->SWALLOWS FOODS/LIQUIDS WITHOUT DIFFICULTY
DRESS: 1-->ASSISTANCE (EQUIPMENT/PERSON) NEEDED (NOT DEVELOPMENTALLY APPROPRIATE)
DRESS: 1-->ASSISTANCE (EQUIPMENT/PERSON) NEEDED
FALL_HISTORY_WITHIN_LAST_SIX_MONTHS: YES
DIFFICULTY_EATING/SWALLOWING: YES
TRANSFERRING: 1-->ASSISTANCE (EQUIPMENT/PERSON) NEEDED (NOT DEVELOPMENTALLY APPROPRIATE)
EATING: 0-->INDEPENDENT
ADLS_ACUITY_SCORE: 35
SWALLOWING: 0-->SWALLOWS FOODS/LIQUIDS WITHOUT DIFFICULTY (DEVELOPMENTALLY APPROPRIATE)
EATING/SWALLOWING: EATING
NUMBER_OF_TIMES_PATIENT_HAS_FALLEN_WITHIN_LAST_SIX_MONTHS: 2
TOILETING: 1-->ASSISTANCE (EQUIPMENT/PERSON) NEEDED (NOT DEVELOPMENTALLY APPROPRIATE)
TOILETING_ISSUES: YES
WEAR_GLASSES_OR_BLIND: YES
DOING_ERRANDS_INDEPENDENTLY_DIFFICULTY: YES
CONCENTRATING,_REMEMBERING_OR_MAKING_DECISIONS_DIFFICULTY: NO
ADLS_ACUITY_SCORE: 35
WALKING_OR_CLIMBING_STAIRS: AMBULATION DIFFICULTY, REQUIRES EQUIPMENT
ADLS_ACUITY_SCORE: 42
ADLS_ACUITY_SCORE: 42
TRANSFERRING: 1-->ASSISTANCE (EQUIPMENT/PERSON) NEEDED
ADLS_ACUITY_SCORE: 35
CHANGE_IN_FUNCTIONAL_STATUS_SINCE_ONSET_OF_CURRENT_ILLNESS/INJURY: NO
EATING: 0-->INDEPENDENT
TOILETING_ASSISTANCE: TOILETING DIFFICULTY, REQUIRES EQUIPMENT
ADLS_ACUITY_SCORE: 45
BATHING: 1-->ASSISTANCE NEEDED
TOILETING: 1-->ASSISTANCE (EQUIPMENT/PERSON) NEEDED
WALKING_OR_CLIMBING_STAIRS_DIFFICULTY: YES
EQUIPMENT_CURRENTLY_USED_AT_HOME: WALKER, ROLLING;WHEELCHAIR, MANUAL

## 2023-03-15 NOTE — H&P
[facility]    History and Physical - Hospitalist Service       Date of Admission:  3/15/2023    Assessment & Plan      Elsa Horowitz is a 88 year old male admitted on 3/15/2023. He presents for evaluation of phlebitis at the right arm where PICC line is in place.    UE DVT.   PICC line. Still working. Per discussion with radiology, ok to continue to use if it is flushing okay  -admit ICU  -heparin drip for DVT given renal function  -Dose adjust aspirin as needed.  Will need outpatient anticoagulation therapy.  -Regular diet as tolerated    LUIS on CKD,   -renally adjusting antibiotics as noted  -Pharmacy consult for ongoing management  -Gentle IV hydration overnight  -Holding home diuretic and angiotensin receptor blocker. 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  Status post revascularization of the right lower extremity.  He has had ongoing osteomyelitis treated with PICC line and IV antibiotic therapy.  Unfortunately, he is not able to give us a good history due to his mental status.  Spoke with infectious diseases and vascular surgery at [facility] where patient has had his care previously.  He has a complex medical history with several comorbidities including a recent non-STEMI.  Not a great surgical candidate at this time and does not appear to need source control for infection as foot has been stable and he recently had revascularization.  He had a non-STEMI about 2 months after his revascularization and remains on aspirin and Plavix.  Per that discussion would admit him here and transfer as needed.  Vascular team is available as needed for transfer if there does become an acute problem.  Infectious diseases is available for consultation and by phone as well for any further questions that should read.  Jey Breen [phone]  -Resume antibiotics Flagyl 500 mg twice daily, cefepime 2 g every 24 and daptomycin 500 mg  "which is 6 megs per kilogram every other day.  Can increase dosing or change dosing as needed for renal function.  -Dressing changes and wound care    Unfortunately he has many comorbidities and generally his prognosis is poor.  I do not think he would be a good candidate for any surgery at this time given his recent non-STEMI.  Okay for ongoing medical management.  Only family he has is a cousin.  We will work to be in touch with him about neck steps going forward as patient clearly cannot make his own decision about medical issues going forward based on his mental status.         Diet: Consistent Carbohydrate Diet High Consistent Carb (75 g CHO per Meal) Diet consistent carb diet  DVT Prophylaxis: Heparin drip now for renal function.  Will need anticoagulation at discharge.  May need to go on Coumadin depending on renal function.  Adkins Catheter: Not present  Lines: None     Cardiac Monitoring: Yes, ICU  Code Status:   DNR/DNI confirmed by directives/POLST on file at care facility.     Clinically Significant Risk Factors Present on Admission                # Drug Induced Platelet Defect: home medication list includes an antiplatelet medication  # Acute Kidney Injury, unspecified: based on a >150% or 0.3 mg/dL increase in last creatinine compared to past 90 day average, will monitor renal function  # Hypertension: home medication list includes antihypertensive(s)   # Acute Respiratory Failure: Documented O2 saturation < 91%.  Continue supplemental oxygen as needed    # DMII: A1C = 7.5 % (Ref range: 4.0 - 6.2 %) within past 6 months   # Overweight: Estimated body mass index is 27.28 kg/m  as calculated from the following:    Height as of 2/17/23: 1.854 m (6' 1\").    Weight as of this encounter: 93.8 kg (206 lb 12.8 oz).           Disposition Plan      Expected Discharge Date: 03/16/2023                  Kristin Bill DO  Hospitalist Service  [facility]  Securely message with Karen (more " info)  Text page via [facility] Paging/Directory     _____________________________________________________________________    Chief Complaint   PICC line problem    Unable to obtain a history from the patient due to confusion  History is obtained from chart review, conversation with care facility and conversation with his physicians at [facility].    History of Present Illness   Elsa Horowitz is a 88 year old male who comes from a care facility for concern related to his PICC line.  PICC line has been in place for IV antibiotic therapy.  He has a very complicated and complex medical history including cognitive impairment, diabetes depression and prior lower extremity infections and amputations.  He was admitted to [facility] November 27 through December 8 with right foot infection and osteomyelitis.  He had revascularization on November 28 and 1/5 ray amputation on November 30, closed on December 2.  Had a postop non-STEMI and had 2 stents placed to the RCA after that.  He is now been on Plavix and aspirin since that time.  He remained in a swing bed from December 8 to December 28.  He then represented to [facility] in hospital here from January 13 and was admitted to January 19 for a fall and increased pain to the foot.  He had a fever and infection.  Debridement was done.  At follow-up visit he had bilateral knee injections for pain.  He then had elevated blood sugar and chest pain.  On February 6 he was seen again for drainage of the foot wound.  He was prescribed Cipro and clindamycin.  He returned February 13 and was given another course of Cipro and clindamycin.  He presented again on February 17 for lethargy and worsening signs of foot infection.  He was hypotensive with a blood pressure of 89/62 however, he does run low.  He had a gangrenous appearing fourth toe and chronically infected looking wound at that time.  CT of the foot suggested osteomyelitis of the distal fourth metatarsal and probable  osteomyelitis of the fourth proximal phalanx and antibiotics were changed to cefepime and Flagyl.  Transferred to [place] where she underwent right leg arterial study.  Angiogram on February 22, SFA and pop by VL and balloon angioplasty.  Peroneal and PT balloon angioplasty.  Had a calcified popliteal lesion.  Short segmental.  T. Junior occlusion was treated.  Proximal PT was occluded and recannulated with improving blood flow to the foot.  Status post right TMA on 224 by Dr. David Arteaga of podiatry and tolerated procedure well.  Went back to the OR on 227 for wound washout and closure of the right foot.  Vancomycin mycin was discontinued at that time.  He was then placed on daptomycin 500 mg every 24 hours which has been decreased to 500 mg every other day based on worsening renal function.  Is also been on cefepime 2 g initially every 8 hours then every 12 hours and however 24 hours due to worsening renal function.  He had been started on Flagyl 500 mg 3 times daily which has now been decreased to 2 times daily.  PICC line was placed.  He was discharged to the [facility] where he lives for further follow-up.  However, his PICC line became swollen he was sent in today.  Upper extremity ultrasound showed DVT of this arm.  The PICC line appears to have good blood flow.  Due to patient's dementia he is not a good historian.      Past Medical History    Past Medical History:   Diagnosis Date     Chronic kidney disease, stage 4 (severe) (H) 7/4/2022     Coronary artery disease 1/14/2023     Diabetic neuropathy (H) 1/14/2023     Essential hypertension 1/14/2023     Gastroesophageal reflux disease, unspecified whether esophagitis present 4/17/2018     PAD (peripheral artery disease) (H) 11/29/2022     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) 1/8/2019       Past Surgical History   No past surgical history on file.    Prior to Admission Medications   Prior to Admission Medications   Prescriptions  Last Dose Informant Patient Reported? Taking?   Cholecalciferol (VITAMIN D3) 2000 UNITS TABS   Yes No   Sig: Take 1 tablet by mouth daily.   acetaminophen (TYLENOL) 500 MG tablet   Yes No   Sig: Take 1,000 mg by mouth 3 times daily   aspirin (ASA) 81 MG chewable tablet   Yes No   Sig: Take 81 mg by mouth daily   ciprofloxacin (CIPRO) 500 MG tablet   No No   Sig: Take 1 tablet (500 mg) by mouth 2 times daily   clindamycin (CLEOCIN) 300 MG capsule   No No   Sig: Take 1 capsule (300 mg) by mouth 4 times daily   clopidogrel (PLAVIX) 75 MG tablet   Yes No   Sig: Take 1 tablet by mouth daily.   clopidogrel (PLAVIX) 75 MG tablet   Yes No   Sig: Take 75 mg by mouth daily   diclofenac (VOLTAREN) 1 % topical gel   Yes No   Sig: Apply topically 4 times daily   famotidine (PEPCID) 20 MG tablet   Yes No   Sig: Take 20 mg by mouth 2 times daily   furosemide (LASIX) 40 MG tablet   Yes No   Sig: Take 0.5 tablets (20 mg) by mouth daily   gabapentin (NEURONTIN) 300 MG capsule   No No   Sig: Take 2 capsules (600 mg) by mouth 3 times daily   insulin aspart (NOVOLOG FLEXPEN) 100 UNIT/ML pen   Yes No   Sig: Inject 15 Units Subcutaneous every morning (before breakfast) Plus sliding scale.   insulin aspart (NOVOLOG FLEXPEN) 100 UNIT/ML pen   Yes No   Sig: Inject 15 Units Subcutaneous daily (with dinner) Plus sliding scale   insulin aspart (NOVOLOG FLEXPEN) 100 UNIT/ML pen   Yes No   Sig: Inject Subcutaneous 3 times daily (with meals) 125-149= 0 units, 150-199 = 2 units, 200-249= 4 units, 250-299= 6 units, 300-349= 8 units, 350-400 = 10 units, more than 400 call MD.   insulin aspart (NOVOLOG FLEXPEN) 100 UNIT/ML pen   Yes No   Sig: Inject 20 Units Subcutaneous daily (with lunch) Plus sliding scale   insulin glargine (LANTUS VIAL) 100 UNIT/ML vial   Yes No   Sig: Inject 17 Units Subcutaneous every morning   isosorbide mononitrate (IMDUR) 30 MG 24 hr tablet   Yes No   Sig: Take 1 tablet by mouth every morning.   levothyroxine  (SYNTHROID/LEVOTHROID) 25 MCG tablet   Yes No   Sig: Take 25 mcg by mouth every evening   losartan (COZAAR) 25 MG tablet   Yes No   Sig: Take 25 mg by mouth daily   magnesium oxide 400 MG CAPS   Yes No   Sig: Take 400 mg by mouth 2 times daily   melatonin 3 MG CAPS   Yes No   Sig: Take 9 mg by mouth At Bedtime   metoprolol succinate ER (TOPROL XL) 25 MG 24 hr tablet   Yes No   Sig: Take 2 tablets (50 mg) by mouth daily   mirtazapine (REMERON) 15 MG tablet   Yes No   Sig: Take 15 mg by mouth At Bedtime   nitroGLYcerin (NITROSTAT) 0.4 MG sublingual tablet   Yes No   Sig: Place 0.4 mg under the tongue every 5 minutes as needed for chest pain For chest pain place 1 tablet under the tongue every 5 minutes for 3 doses. If symptoms persist 5 minutes after 1st dose call 911.   polyethylene glycol (MIRALAX) 17 g packet   Yes No   Sig: Take 1 packet by mouth daily   psyllium (METAMUCIL/KONSYL) capsule   Yes No   Sig: Take 1 capsule by mouth daily   sertraline (ZOLOFT) 50 MG tablet   Yes No   Sig: Take 150 mg by mouth daily   tamsulosin (FLOMAX) 0.4 MG capsule   Yes No   Sig: Take 0.8 mg by mouth daily   traMADol (ULTRAM) 50 MG tablet   Yes No   Sig: Take 50 mg by mouth every 6 hours as needed for severe pain (7-10)      Facility-Administered Medications: None        Review of Systems    Review of systems not obtained due to patient factors - confusion     Physical Exam   Vital Signs: Temp: 97.4  F (36.3  C) Temp src: Tympanic BP: (!) 125/94 Pulse: 65   Resp: 18 SpO2: 97 % O2 Device: None (Room air) Oxygen Delivery: 2 LPM  Weight: 206 lbs 12.8 oz    General Appearance: Confused.  Obese.  In no acute distress.  Unable to answer any questions appropriately but can follow commands  Respiratory: Clear to auscultation bilaterally for no wheezing rhonchi or rails  Cardiovascular: Regular rate.  GI: Abdomen soft, nontender, nondistended  Skin: Is status post amputation of the fifth metatarsal on the right foot.  Palpable distal  pulses.  No gangrene or obvious poor perfusion to the right lower extremity.  Other: Confused and demented    Medical Decision Making       120 MINUTES SPENT BY ME on the date of service doing chart review, history, exam, documentation & further activities per the note.      Data   ------------------------- PAST 24 HR DATA REVIEWED -----------------------------------------------    I have personally reviewed the following data over the past 24 hrs:    6.9  \   8.0 (L)   / 98 (L)     136 108 (H) 42.7 (H) /  78   4.9 19 (L) 3.30 (H) \       Procal: N/A CRP: 3.17 Lactic Acid: 0.9         Imaging results reviewed over the past 24 hrs:   Recent Results (from the past 24 hour(s))   US Upper Extremity Venous Duplex Right    Narrative    US UPPER EXTREMITY VENOUS DUPLEX RIGHT  3/15/2023 11:20 AM    History:Male, age 88 years; ; phlebitis noted at 8:30am. He was  running with Cefepime. Evaluate for possible blood clot.    Comparison: No relevant prior imaging.    Technique: Grayscale and color Doppler ultrasound of the right  upper  extremity venous structures.    Findings:   A PICC line is present within the basilic vein. Small volume of  nonocclusive thrombus is seen within the axillary and basilic vein.     The subclavian vein is difficult to assess.      Impression    Impression:  Positive for DVT.     Nonocclusive thrombus seen within the basilic and axillary vein.    UMBERTO BARNETT MD         SYSTEM ID:  [id number]     Recent Labs   Lab 03/15/23  1110 03/13/23  1030   WBC 6.9 5.0   HGB 8.0* 7.5*   MCV 86 88   PLT 98* 120*    139   POTASSIUM 4.9 4.8   CHLORIDE 108* 109*   CO2 19* 23   BUN 42.7* 35.2*   CR 3.30* 2.31*   ANIONGAP 9 7   JOSEE 8.2* 8.7*   GLC 78 143*   ALBUMIN  --  3.4*   PROTTOTAL  --  6.8   BILITOTAL  --  0.2   ALKPHOS  --  64   ALT  --  11   AST  --  21

## 2023-03-15 NOTE — PHARMACY-CONSULT NOTE
Initiation of Heparin Drip- Pharmacy Consult Note      DC all PRE-existing heparin and LMWH orders prior to start of heparin infusion therapy. See comments section if transferred with a running infusion. No other active orders for heparin or LMWH other than currently ordered bolus and drip. No action needed at this time.    Pharmacist to confirm PTT monitored heparin drip is ordered if the patient has received a Factor Xa inhibitor (e.g. apixaban or rivaroxaban) in the last 72 hours. Med list and recent fill history reviewed- No active orders for anti-Xa inhibitors- anti Xa lab monitoring ok for heparin drip.    Thank you for the consult.    Curtis Ratliff RPH on 3/15/2023 at 1:30 PM

## 2023-03-15 NOTE — ED TRIAGE NOTES
Pt arrived to ER via [facility] for infiltrated PICC line.  Pt has had line in place for IV abx, placed by staff in [place]. Right arm is swollen, only painful when pressed on at site.      Triage Assessment     Row Name 03/15/23 1002       Triage Assessment (Adult)    Airway WDL WDL       Respiratory WDL    Respiratory WDL WDL       Skin Circulation/Temperature WDL    Skin Circulation/Temperature WDL WDL       Cardiac WDL    Cardiac WDL WDL       Peripheral/Neurovascular WDL    Peripheral Neurovascular WDL WDL       Cognitive/Neuro/Behavioral WDL    Cognitive/Neuro/Behavioral WDL WDL

## 2023-03-15 NOTE — ED PROVIDER NOTES
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 3+ sources (see separate area of note for details)  ordering and/or review of 3+ test(s) in this encounter (see separate area of note for details)  discussion of management or test interpretation with another health professional (see separate area of note for details)    The patient's management necessitated high risk (a decision regarding hospitalization).        Current Discharge Medication List          Final diagnoses:   Acute deep vein thrombosis (DVT) of axillary vein of right upper extremity (H)   Chronic kidney disease, unspecified CKD stage       3/15/2023   [facility]      Luiza Woodward APRN CNP  03/15/23 1213       Luiza Woodward APRN CNP  03/15/23 1337       Luiza Woodward APRN CNP  03/15/23 1450       Luiza Woodward APRN CNP  03/16/23 1108       Luiza Woodward NP  04/04/23 2010

## 2023-03-15 NOTE — PHARMACY-CONSULT NOTE
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)     Hyponatremia     Gangrene of right foot (H)     Acute respiratory failure with hypoxia (H)     Cognitive impairment     Gangrene (H)     History of intravenous drug use in remission     History of suicide attempt     Metal foreign body in chest     Altered mental status     Macular scar of both eyes     Chronic constipation     Dupuytren's disease of palm of both hands     Depressive disorder     Fatty liver     Foot ulcer (H)     History of amputation     Hypothyroidism, unspecified type     Intermittent claudication (H)     Living in assisted living     Long term (current) use of opiate analgesic     Low back pain     Lower limb amputation, great toe (H)     Lumbar disc disease     MDD (major depressive disorder), recurrent, in full remission (H)     Subacute osteomyelitis of right foot (H)     Stiffness of left hand joint     Stage 3b chronic kidney disease (H)     Spondylosis of thoracic region without myelopathy or radiculopathy     VRE (vancomycin resistant enterococcus) culture positive     Acute kidney injury (H)     Deep vein thrombosis (DVT) of upper extremity (H)     DVT (deep vein thrombosis) in pregnancy     Acute deep vein thrombosis (DVT) of axillary vein of right upper extremity (H)        Relevant Labs:  Recent Labs   Lab Test 03/15/23  1110 03/13/23  1030   WBC 6.9 5.0   HGB 8.0* 7.5*   PLT 98* 120*        CrCl: 20.9 mL/min    No intake or output data in the 24 hours ending 03/15/23 1331       Per Renal  Dose Adjustment Protocol, will adjust:  -Cefepime to 2 g Q24H (from 1 g Q12H) for indication of osteomyelitis and CrCl 10-29 mL/min.      Will continue to follow and make adjustments accordingly. Thank You.    Curtis Ratliff [facility] ....................  3/15/2023   1:31 PM

## 2023-03-16 ENCOUNTER — APPOINTMENT (OUTPATIENT)
Dept: GENERAL RADIOLOGY | Facility: OTHER | Age: 88
DRG: 315 | End: 2023-03-16
Attending: INTERNAL MEDICINE
Payer: COMMERCIAL

## 2023-03-16 LAB
ANION GAP SERPL CALCULATED.3IONS-SCNC: 13 MMOL/L (ref 7–15)
BUN SERPL-MCNC: 44.6 MG/DL (ref 8–23)
CALCIUM SERPL-MCNC: 8.2 MG/DL (ref 8.8–10.2)
CHLORIDE SERPL-SCNC: 107 MMOL/L (ref 98–107)
CREAT SERPL-MCNC: 3.11 MG/DL (ref 0.67–1.17)
DEPRECATED HCO3 PLAS-SCNC: 14 MMOL/L (ref 22–29)
ERYTHROCYTE [DISTWIDTH] IN BLOOD BY AUTOMATED COUNT: 16.6 % (ref 10–15)
GFR SERPL CREATININE-BSD FRML MDRD: 19 ML/MIN/1.73M2
GLUCOSE BLDC GLUCOMTR-MCNC: 125 MG/DL (ref 70–99)
GLUCOSE BLDC GLUCOMTR-MCNC: 127 MG/DL (ref 70–99)
GLUCOSE BLDC GLUCOMTR-MCNC: 149 MG/DL (ref 70–99)
GLUCOSE BLDC GLUCOMTR-MCNC: 72 MG/DL (ref 70–99)
GLUCOSE SERPL-MCNC: 90 MG/DL (ref 70–99)
HCT VFR BLD AUTO: 29.2 % (ref 40–53)
HGB BLD-MCNC: 9.3 G/DL (ref 13.3–17.7)
HOLD SPECIMEN: NORMAL
MAGNESIUM SERPL-MCNC: 1.9 MG/DL (ref 1.7–2.3)
MCH RBC QN AUTO: 27.9 PG (ref 26.5–33)
MCHC RBC AUTO-ENTMCNC: 31.8 G/DL (ref 31.5–36.5)
MCV RBC AUTO: 88 FL (ref 78–100)
PLATELET # BLD AUTO: 128 10E3/UL (ref 150–450)
POTASSIUM SERPL-SCNC: 5.2 MMOL/L (ref 3.4–5.3)
RBC # BLD AUTO: 3.33 10E6/UL (ref 4.4–5.9)
SODIUM SERPL-SCNC: 134 MMOL/L (ref 136–145)
UFH PPP CHRO-ACNC: 0.67 IU/ML
UFH PPP CHRO-ACNC: 0.67 IU/ML
UFH PPP CHRO-ACNC: 0.76 IU/ML
WBC # BLD AUTO: 6.5 10E3/UL (ref 4–11)

## 2023-03-16 PROCEDURE — 250N000011 HC RX IP 250 OP 636: Performed by: FAMILY MEDICINE

## 2023-03-16 PROCEDURE — 85027 COMPLETE CBC AUTOMATED: CPT | Performed by: FAMILY MEDICINE

## 2023-03-16 PROCEDURE — 99233 SBSQ HOSP IP/OBS HIGH 50: CPT | Performed by: FAMILY MEDICINE

## 2023-03-16 PROCEDURE — 85520 HEPARIN ASSAY: CPT | Performed by: FAMILY MEDICINE

## 2023-03-16 PROCEDURE — 36415 COLL VENOUS BLD VENIPUNCTURE: CPT | Performed by: FAMILY MEDICINE

## 2023-03-16 PROCEDURE — 80048 BASIC METABOLIC PNL TOTAL CA: CPT | Performed by: FAMILY MEDICINE

## 2023-03-16 PROCEDURE — 71045 X-RAY EXAM CHEST 1 VIEW: CPT | Mod: TC

## 2023-03-16 PROCEDURE — 200N000001 HC R&B ICU

## 2023-03-16 PROCEDURE — 250N000013 HC RX MED GY IP 250 OP 250 PS 637: Performed by: FAMILY MEDICINE

## 2023-03-16 PROCEDURE — 250N000013 HC RX MED GY IP 250 OP 250 PS 637: Performed by: NURSE PRACTITIONER

## 2023-03-16 PROCEDURE — 83735 ASSAY OF MAGNESIUM: CPT | Performed by: FAMILY MEDICINE

## 2023-03-16 RX ORDER — TRAMADOL HYDROCHLORIDE 50 MG/1
50 TABLET ORAL EVERY 8 HOURS PRN
Status: DISCONTINUED | OUTPATIENT
Start: 2023-03-16 | End: 2023-03-16 | Stop reason: ALTCHOICE

## 2023-03-16 RX ORDER — FAMOTIDINE 20 MG/1
20 TABLET, FILM COATED ORAL EVERY OTHER DAY
Status: DISCONTINUED | OUTPATIENT
Start: 2023-03-16 | End: 2023-03-21 | Stop reason: HOSPADM

## 2023-03-16 RX ORDER — LEVOTHYROXINE SODIUM 25 UG/1
25 TABLET ORAL EVERY EVENING
Status: DISCONTINUED | OUTPATIENT
Start: 2023-03-16 | End: 2023-03-21 | Stop reason: HOSPADM

## 2023-03-16 RX ORDER — HYDROCODONE BITARTRATE AND ACETAMINOPHEN 5; 325 MG/1; MG/1
1 TABLET ORAL EVERY 4 HOURS PRN
Status: DISCONTINUED | OUTPATIENT
Start: 2023-03-16 | End: 2023-03-21 | Stop reason: HOSPADM

## 2023-03-16 RX ORDER — NALOXONE HYDROCHLORIDE 0.4 MG/ML
0.2 INJECTION, SOLUTION INTRAMUSCULAR; INTRAVENOUS; SUBCUTANEOUS
Status: DISCONTINUED | OUTPATIENT
Start: 2023-03-16 | End: 2023-03-21 | Stop reason: HOSPADM

## 2023-03-16 RX ORDER — SACCHAROMYCES BOULARDII 250 MG
250 CAPSULE ORAL 2 TIMES DAILY
Status: DISCONTINUED | OUTPATIENT
Start: 2023-03-16 | End: 2023-03-21 | Stop reason: HOSPADM

## 2023-03-16 RX ORDER — NYSTATIN 100000 [USP'U]/G
POWDER TOPICAL 2 TIMES DAILY
Status: DISCONTINUED | OUTPATIENT
Start: 2023-03-16 | End: 2023-03-21 | Stop reason: HOSPADM

## 2023-03-16 RX ORDER — GABAPENTIN 300 MG/1
600 CAPSULE ORAL AT BEDTIME
Status: DISCONTINUED | OUTPATIENT
Start: 2023-03-16 | End: 2023-03-21 | Stop reason: HOSPADM

## 2023-03-16 RX ORDER — NALOXONE HYDROCHLORIDE 0.4 MG/ML
0.4 INJECTION, SOLUTION INTRAMUSCULAR; INTRAVENOUS; SUBCUTANEOUS
Status: DISCONTINUED | OUTPATIENT
Start: 2023-03-16 | End: 2023-03-21 | Stop reason: HOSPADM

## 2023-03-16 RX ORDER — MIRTAZAPINE 15 MG/1
15 TABLET, FILM COATED ORAL AT BEDTIME
Status: DISCONTINUED | OUTPATIENT
Start: 2023-03-16 | End: 2023-03-21 | Stop reason: HOSPADM

## 2023-03-16 RX ORDER — CLOTRIMAZOLE 1 %
CREAM (GRAM) TOPICAL 2 TIMES DAILY
Status: DISCONTINUED | OUTPATIENT
Start: 2023-03-16 | End: 2023-03-21 | Stop reason: HOSPADM

## 2023-03-16 RX ORDER — FAMOTIDINE 20 MG/1
20 TABLET, FILM COATED ORAL DAILY
Status: DISCONTINUED | OUTPATIENT
Start: 2023-03-16 | End: 2023-03-16 | Stop reason: DRUGHIGH

## 2023-03-16 RX ORDER — POLYETHYLENE GLYCOL 3350 17 G/17G
17 POWDER, FOR SOLUTION ORAL DAILY
Status: DISCONTINUED | OUTPATIENT
Start: 2023-03-16 | End: 2023-03-21 | Stop reason: HOSPADM

## 2023-03-16 RX ORDER — HYDROXYZINE HYDROCHLORIDE 25 MG/1
25 TABLET, FILM COATED ORAL
Status: COMPLETED | OUTPATIENT
Start: 2023-03-16 | End: 2023-03-18

## 2023-03-16 RX ADMIN — HEPARIN SODIUM 1600 UNITS/HR: 10000 INJECTION, SOLUTION INTRAVENOUS at 02:22

## 2023-03-16 RX ADMIN — ISOSORBIDE MONONITRATE 30 MG: 30 TABLET, EXTENDED RELEASE ORAL at 10:37

## 2023-03-16 RX ADMIN — CLOTRIMAZOLE: 0.01 CREAM TOPICAL at 21:18

## 2023-03-16 RX ADMIN — ASPIRIN 81 MG CHEWABLE TABLET 81 MG: 81 TABLET CHEWABLE at 10:37

## 2023-03-16 RX ADMIN — Medication 250 MG: at 21:18

## 2023-03-16 RX ADMIN — NYSTATIN: 100000 POWDER TOPICAL at 21:18

## 2023-03-16 RX ADMIN — HEPARIN SODIUM 1500 UNITS/HR: 10000 INJECTION, SOLUTION INTRAVENOUS at 19:51

## 2023-03-16 RX ADMIN — Medication 250 MG: at 13:57

## 2023-03-16 RX ADMIN — SERTRALINE HYDROCHLORIDE 150 MG: 50 TABLET ORAL at 13:56

## 2023-03-16 RX ADMIN — METRONIDAZOLE 500 MG: 500 TABLET ORAL at 10:37

## 2023-03-16 RX ADMIN — ACETAMINOPHEN 1000 MG: 500 TABLET, FILM COATED ORAL at 13:56

## 2023-03-16 RX ADMIN — ACETAMINOPHEN 1000 MG: 500 TABLET, FILM COATED ORAL at 21:19

## 2023-03-16 RX ADMIN — METOPROLOL SUCCINATE 25 MG: 25 TABLET, EXTENDED RELEASE ORAL at 10:37

## 2023-03-16 RX ADMIN — GABAPENTIN 600 MG: 300 CAPSULE ORAL at 21:18

## 2023-03-16 RX ADMIN — CLOPIDOGREL BISULFATE 75 MG: 75 TABLET, FILM COATED ORAL at 10:37

## 2023-03-16 RX ADMIN — ACETAMINOPHEN 1000 MG: 500 TABLET, FILM COATED ORAL at 05:16

## 2023-03-16 RX ADMIN — METRONIDAZOLE 500 MG: 500 TABLET ORAL at 21:18

## 2023-03-16 RX ADMIN — CLOTRIMAZOLE: 0.01 CREAM TOPICAL at 13:56

## 2023-03-16 RX ADMIN — NYSTATIN: 100000 POWDER TOPICAL at 13:56

## 2023-03-16 RX ADMIN — MIRTAZAPINE 15 MG: 15 TABLET, FILM COATED ORAL at 21:19

## 2023-03-16 RX ADMIN — HYDROCODONE BITARTRATE AND ACETAMINOPHEN 1 TABLET: 5; 325 TABLET ORAL at 21:18

## 2023-03-16 RX ADMIN — FAMOTIDINE 20 MG: 20 TABLET, FILM COATED ORAL at 13:56

## 2023-03-16 RX ADMIN — LEVOTHYROXINE SODIUM 25 MCG: 0.03 TABLET ORAL at 21:19

## 2023-03-16 RX ADMIN — CEFEPIME 2 G: 2 INJECTION, POWDER, FOR SOLUTION INTRAVENOUS at 14:46

## 2023-03-16 ASSESSMENT — ACTIVITIES OF DAILY LIVING (ADL)
ADLS_ACUITY_SCORE: 47
ADLS_ACUITY_SCORE: 45
ADLS_ACUITY_SCORE: 51
ADLS_ACUITY_SCORE: 51
ADLS_ACUITY_SCORE: 45
ADLS_ACUITY_SCORE: 51

## 2023-03-16 NOTE — PROGRESS NOTES
Pt complaining of tenderness and increased swelling.  No redness or increased swelling noted on assessment, pt states he feels his arm is more stiff.  IV fluids changed to other arm.  Moon Vitale RN.............................3/16/2023 5:50 AM

## 2023-03-16 NOTE — PROGRESS NOTES
[facility]    Medicine Progress Note - Hospitalist Service    Date of Admission:  3/15/2023    Assessment & Plan      Elsa Horowitz is a 88 year old male admitted on 3/15/2023. He presents for evaluation of phlebitis at the right arm where PICC line is in place.    UE DVT.  With worsening swelling today.  PICC line.  Was continuing to work on admission but with more edema today they would recommend discontinuing use. he has peripheral access  -heparin drip for DVT given renal function  -Dose adjust aspirin as needed.  Will need outpatient anticoagulation therapy.  -Regular diet as tolerated    LUIS on CKD,   -renally adjusting antibiotics as noted  -Pharmacy consult for ongoing management  -Stop IV fluids today.  He sounds a little bit fluid overloaded on lung exam.  -Holding home diuretic and angiotensin receptor blocker. (Lasix and losartan)    Hypoglycemia  -hold all insulin and monitor  -D5 NS if not able to keep bld glc up    chronic osteo on antibiotics.    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  Status post revascularization of the right lower extremity.  He has had ongoing osteomyelitis treated with PICC line and IV antibiotic therapy.  Unfortunately, he is not able to give us a good history due to his mental status.  Spoke with infectious diseases and vascular surgery at [facility] where patient has had his care previously.  He has a complex medical history with several comorbidities including a recent non-STEMI.  Not a great surgical candidate at this time and does not appear to need source control for infection as foot has been stable and he recently had revascularization.  He had a non-STEMI about 2 months after his revascularization and remains on aspirin and Plavix.  Per that discussion would admit him here and transfer as needed.  Vascular team is available as needed for transfer if there does become an acute problem.  Infectious diseases is available for consultation  "and by phone as well for any further questions that should read.  Jey Breen [phone]  -Resume antibiotics Flagyl 500 mg twice daily, cefepime 2 g every 24 and daptomycin 500 mg which is 6 megs per kilogram every other day.  Can increase dosing or change dosing as needed for renal function.  -Dressing changes and wound care    Unfortunately he has many comorbidities and generally his prognosis is poor.  I do not think he would be a good candidate for any surgery at this time given his recent non-STEMI.  Okay for ongoing medical management.  Only family he has is a cousin.  We will work to be in touch with him about neck steps going forward as patient clearly cannot make his own decision about medical issues going forward based on his mental status.       Diet: Consistent Carbohydrate Diet High Consistent Carb (75 g CHO per Meal) Diet    DVT Prophylaxis: Heparin drip for acute DVT  Adkins Catheter: Not present  Lines:   PICC Single Lumen Right-Site Assessment: WDL      Cardiac Monitoring: ACTIVE order. Indication: Acute decompensated heart failure (48 hours)  Code Status: No CPR- Do NOT Intubate      Clinically Significant Risk Factors Present on Admission                # Drug Induced Platelet Defect: home medication list includes an antiplatelet medication  # Acute Kidney Injury, unspecified: based on a >150% or 0.3 mg/dL increase in last creatinine compared to past 90 day average, will monitor renal function  # Hypertension: home medication list includes antihypertensive(s)   # Acute Respiratory Failure: Documented O2 saturation < 91%.  Continue supplemental oxygen as needed    # DMII: A1C = 7.5 % (Ref range: 4.0 - 6.2 %) within past 6 months   # Overweight: Estimated body mass index is 28.91 kg/m  as calculated from the following:    Height as of 2/17/23: 1.854 m (6' 1\").    Weight as of this encounter: 99.4 kg (219 lb 1.6 oz).           Disposition Plan      Expected Discharge Date: 03/17/2023              "     Kristin Bill,   Hospitalist Service  [facility]  Securely message with [employer] (more info)  Text page via [facility] Paging/Directory   _____________________________________________________________________    Interval History   Worsening swelling of the right upper extremity.  Otherwise patient offers no complaints.  He has memory issues at his baseline which is unchanged.    Physical Exam   Vital Signs: Temp: 97.7  F (36.5  C) Temp src: Temporal BP: (!) 143/68 Pulse: 75   Resp: 12 SpO2: 97 % O2 Device: None (Room air) Oxygen Delivery: 2 LPM  Weight: 219 lbs 1.6 oz    General Appearance: Awake, alert.  Follows commands.  Not oriented.  Respiratory: Coarse on the left base.  Otherwise clear  Cardiovascular: Regular.  Positive bilateral lower extremity edema.  Skin: Positive upper extremity edema significant all the way down to the hand on the right side with difficult to auscultate radial pulse on the right.  Left radial pulse normal.      Medical Decision Making     50 MINUTES SPENT BY ME on the date of service doing chart review, history, exam, documentation & further activities per the note.      Data   ------------------------- PAST 24 HR DATA REVIEWED -----------------------------------------------    I have personally reviewed the following data over the past 24 hrs:    6.5  \   9.3 (L)   / 128 (L)     134 (L) 107 44.6 (H) /  72   5.2 14 (L) 3.11 (H) \       Procal: N/A CRP: 3.17 Lactic Acid: 0.9         Imaging results reviewed over the past 24 hrs:   Recent Results (from the past 24 hour(s))   US Upper Extremity Venous Duplex Right    Narrative    US UPPER EXTREMITY VENOUS DUPLEX RIGHT  3/15/2023 11:20 AM    History:Male, age 88 years; ; phlebitis noted at 8:30am. He was  running with Cefepime. Evaluate for possible blood clot.    Comparison: No relevant prior imaging.    Technique: Grayscale and color Doppler ultrasound of the right  upper  extremity venous structures.    Findings:    A PICC line is present within the basilic vein. Small volume of  nonocclusive thrombus is seen within the axillary and basilic vein.     The subclavian vein is difficult to assess.      Impression    Impression:  Positive for DVT.     Nonocclusive thrombus seen within the basilic and axillary vein.    UMBERTO BARNETT MD         SYSTEM ID:  [id number]

## 2023-03-16 NOTE — PROGRESS NOTES
:     Patient is currently at [facility] for short term rehab. Spoke with Christelle at [facility] and she stated that patient currently has a bed hold.     Patient does live at [facility] facility.      will continue to follow for discharge planning needs.     NOEL Atwood on 3/16/2023 at 1:23 PM

## 2023-03-16 NOTE — PROGRESS NOTES
RN over night noticed increased swelling in right arm and hand. It was reported that  active infusions were switched from the picc line to the peripheral line at that time. I was able to confirm increased swelling upon assessment. Dr. Bill and Leandra RN from IR notified. Orders received from IR not to pull the picc at this time, they plan on verifying placement via imaging with contrast to ensure whether there is in fact a problem with PICC, or if it the swelling is simply related to active DVT. If PICC is compromised they will likely insert a new PICC at that time per Leandra CHEEMA.

## 2023-03-16 NOTE — PLAN OF CARE
/50   Pulse 66   Temp 98.7  F (37.1  C) (Temporal)   Resp 10   Wt 99.4 kg (219 lb 1.6 oz)   SpO2 93%   BMI 28.91 kg/m      Afebrile.  RUE swollen and red, no change noted.  Denies pain or tenderness.  Petechiae on bilateral forearm unchanged from previous assessment.   Moon Vitale RN.............................3/16/2023 4:43 AM

## 2023-03-16 NOTE — PHARMACY-ADMISSION MEDICATION HISTORY
Pharmacy -- Admission Medication Reconciliation    Prior to admission (PTA) medications were reviewed and the patient's PTA medication list was updated.    Sources Consulted: [facility] MAR, [facility]' staff to confirm current dose of metoprolol    The reliability of this Medication Reconciliation is: Reliability: Reliable    The following significant changes were made:    REMOVED  - ciprofloxacin  - clindamycin    ADDED  - daptomycin   - cefepime  - metronidazole  - atorvastatin  - clotrimazole cream scheduled (clarified dosing as only scheduled and to groin)  - nystatin powder (clarified dosing as scheduled to groin)  - Florastor  - aspercreme  - hydrocodone-acetaminophen PRN  - hydrocortisone cream PRN  - loperamide PRN    UPDATED  - metoprolol dose to 25 mg (decreased from listed 50 mg)  - tramadol dose to Q8H as needed (decreased from Q6H as needed)  - famotidine dose to 10 mg twice daily  - diclofenac dosing / directions  - gabapentin dose decreased to once daily nightly    NOTES  - other listed as needed standing orders were not added to the medication list including - milk of magnesia, Preparation H, and senna. (these had not been documented as used in the month of March.    - patient was recently admitted to [facility] from 2/17/23 - 3/3/23. Per this discharge summary - Patient will be on IV cefepime, IV daptomycin, and oral metronidazole daily until 4/6/2023 for treatment of subacute osteomyelitis of fourth and fifth metatarsals of right foot. He will have follow up weekly labs results to be faxed to ID clinic. Follow-up with ID in 2 to 3 weeks. Per Ashley GONZALEZ, patient's dosing for cefepime and daptomycin have changed as follows since patient was discharged.    - cefepime IV    - 2 grams every 8 hours - 3/3/23 - 3/7/23     - 2 grams every 12 hours - 3/8/23 - 3/12/23     - 2 grams every 24 hours - 3/13/23 - current    - daptomycin IV    - 510 mg every 24 hours 3/4/23 - 3/13/23    - 500 mg every 48  hours starting 3/15/23   - metronidazole 500 mg PO twice daily 3/4/23 - current     In addition, the patient's allergies were reviewed with the patient and updated as follows:   Allergies: Penicillins, Tetanus antitoxin, and Tetanus toxoid    The pharmacist has reviewed with the patient that all personal medications should be removed from the building or locked in the belongings safe.  Patient shall only take medications ordered by the physician and administered by the nursing staff.     Medication barriers identified: Patient has all medications managed for him by nursing staff at the [facility].    Medication adherence concerns: none noted on MAR   Understanding of emergency medications: NA   Medication Affordability:  Not addressed - no noted issues    Chery Yeh [facility], 3/16/2023,  7:49 AM

## 2023-03-16 NOTE — PROGRESS NOTES
Spoke with Andrea SALAZAR. Plan is to keep him here and do not escalate care. Hospice if not improving

## 2023-03-17 ENCOUNTER — HOSPITAL ENCOUNTER (OUTPATIENT)
Dept: GENERAL RADIOLOGY | Facility: OTHER | Age: 88
Discharge: HOME | DRG: 315 | End: 2023-03-17
Attending: FAMILY MEDICINE
Payer: COMMERCIAL

## 2023-03-17 LAB
ALBUMIN SERPL BCG-MCNC: 3.2 G/DL (ref 3.5–5.2)
ALP SERPL-CCNC: 61 U/L (ref 40–129)
ALT SERPL W P-5'-P-CCNC: 10 U/L (ref 10–50)
ANION GAP SERPL CALCULATED.3IONS-SCNC: 9 MMOL/L (ref 7–15)
AST SERPL W P-5'-P-CCNC: 18 U/L (ref 10–50)
BILIRUB SERPL-MCNC: 0.2 MG/DL
BUN SERPL-MCNC: 43.9 MG/DL (ref 8–23)
CALCIUM SERPL-MCNC: 8.2 MG/DL (ref 8.8–10.2)
CHLORIDE SERPL-SCNC: 111 MMOL/L (ref 98–107)
CREAT SERPL-MCNC: 2.93 MG/DL (ref 0.67–1.17)
DEPRECATED HCO3 PLAS-SCNC: 19 MMOL/L (ref 22–29)
ERYTHROCYTE [DISTWIDTH] IN BLOOD BY AUTOMATED COUNT: 17 % (ref 10–15)
GFR SERPL CREATININE-BSD FRML MDRD: 20 ML/MIN/1.73M2
GLUCOSE BLDC GLUCOMTR-MCNC: 143 MG/DL (ref 70–99)
GLUCOSE BLDC GLUCOMTR-MCNC: 164 MG/DL (ref 70–99)
GLUCOSE BLDC GLUCOMTR-MCNC: 167 MG/DL (ref 70–99)
GLUCOSE SERPL-MCNC: 113 MG/DL (ref 70–99)
HCT VFR BLD AUTO: 26.3 % (ref 40–53)
HGB BLD-MCNC: 8.5 G/DL (ref 13.3–17.7)
HOLD SPECIMEN: NORMAL
INR PPP: 1.45 (ref 0.85–1.15)
MAGNESIUM SERPL-MCNC: 1.7 MG/DL (ref 1.7–2.3)
MCH RBC QN AUTO: 27.7 PG (ref 26.5–33)
MCHC RBC AUTO-ENTMCNC: 32.3 G/DL (ref 31.5–36.5)
MCV RBC AUTO: 86 FL (ref 78–100)
PLATELET # BLD AUTO: 116 10E3/UL (ref 150–450)
POTASSIUM SERPL-SCNC: 5.4 MMOL/L (ref 3.4–5.3)
PROT SERPL-MCNC: 6.4 G/DL (ref 6.4–8.3)
RBC # BLD AUTO: 3.07 10E6/UL (ref 4.4–5.9)
SODIUM SERPL-SCNC: 139 MMOL/L (ref 136–145)
UFH PPP CHRO-ACNC: <0.1 IU/ML
UFH PPP CHRO-ACNC: >1.1 IU/ML
WBC # BLD AUTO: 5.5 10E3/UL (ref 4–11)

## 2023-03-17 PROCEDURE — 36415 COLL VENOUS BLD VENIPUNCTURE: CPT | Performed by: FAMILY MEDICINE

## 2023-03-17 PROCEDURE — 85520 HEPARIN ASSAY: CPT | Performed by: FAMILY MEDICINE

## 2023-03-17 PROCEDURE — 250N000011 HC RX IP 250 OP 636: Performed by: FAMILY MEDICINE

## 2023-03-17 PROCEDURE — 83735 ASSAY OF MAGNESIUM: CPT | Performed by: FAMILY MEDICINE

## 2023-03-17 PROCEDURE — 85610 PROTHROMBIN TIME: CPT | Performed by: FAMILY MEDICINE

## 2023-03-17 PROCEDURE — 85027 COMPLETE CBC AUTOMATED: CPT | Performed by: FAMILY MEDICINE

## 2023-03-17 PROCEDURE — 250N000011 HC RX IP 250 OP 636: Performed by: NURSE PRACTITIONER

## 2023-03-17 PROCEDURE — 250N000013 HC RX MED GY IP 250 OP 250 PS 637: Performed by: FAMILY MEDICINE

## 2023-03-17 PROCEDURE — 99233 SBSQ HOSP IP/OBS HIGH 50: CPT | Performed by: FAMILY MEDICINE

## 2023-03-17 PROCEDURE — 02PYX3Z REMOVAL OF INFUSION DEVICE FROM GREAT VESSEL, EXTERNAL APPROACH: ICD-10-PCS | Performed by: FAMILY MEDICINE

## 2023-03-17 PROCEDURE — 02HV33Z INSERTION OF INFUSION DEVICE INTO SUPERIOR VENA CAVA, PERCUTANEOUS APPROACH: ICD-10-PCS | Performed by: STUDENT IN AN ORGANIZED HEALTH CARE EDUCATION/TRAINING PROGRAM

## 2023-03-17 PROCEDURE — 258N000003 HC RX IP 258 OP 636: Performed by: FAMILY MEDICINE

## 2023-03-17 PROCEDURE — 258N000003 HC RX IP 258 OP 636: Performed by: NURSE PRACTITIONER

## 2023-03-17 PROCEDURE — 80053 COMPREHEN METABOLIC PANEL: CPT | Performed by: FAMILY MEDICINE

## 2023-03-17 PROCEDURE — C1751 CATH, INF, PER/CENT/MIDLINE: HCPCS

## 2023-03-17 PROCEDURE — 120N000001 HC R&B MED SURG/OB

## 2023-03-17 PROCEDURE — 250N000013 HC RX MED GY IP 250 OP 250 PS 637: Performed by: NURSE PRACTITIONER

## 2023-03-17 RX ORDER — ENOXAPARIN SODIUM 100 MG/ML
1 INJECTION SUBCUTANEOUS EVERY 24 HOURS
Status: DISCONTINUED | OUTPATIENT
Start: 2023-03-17 | End: 2023-03-20 | Stop reason: ALTCHOICE

## 2023-03-17 RX ORDER — WARFARIN SODIUM 5 MG/1
5 TABLET ORAL
Status: COMPLETED | OUTPATIENT
Start: 2023-03-17 | End: 2023-03-17

## 2023-03-17 RX ORDER — ENOXAPARIN SODIUM 100 MG/ML
0.75 INJECTION SUBCUTANEOUS EVERY 24 HOURS
Status: DISCONTINUED | OUTPATIENT
Start: 2023-03-17 | End: 2023-03-17 | Stop reason: DRUGHIGH

## 2023-03-17 RX ADMIN — NYSTATIN: 100000 POWDER TOPICAL at 10:08

## 2023-03-17 RX ADMIN — SERTRALINE HYDROCHLORIDE 150 MG: 50 TABLET ORAL at 10:06

## 2023-03-17 RX ADMIN — CEFEPIME 2 G: 2 INJECTION, POWDER, FOR SOLUTION INTRAVENOUS at 13:59

## 2023-03-17 RX ADMIN — GABAPENTIN 600 MG: 300 CAPSULE ORAL at 22:48

## 2023-03-17 RX ADMIN — ACETAMINOPHEN 1000 MG: 500 TABLET, FILM COATED ORAL at 22:49

## 2023-03-17 RX ADMIN — WARFARIN SODIUM 5 MG: 5 TABLET ORAL at 18:39

## 2023-03-17 RX ADMIN — METRONIDAZOLE 500 MG: 500 TABLET ORAL at 10:07

## 2023-03-17 RX ADMIN — DAPTOMYCIN 500 MG: 500 INJECTION, POWDER, LYOPHILIZED, FOR SOLUTION INTRAVENOUS at 10:01

## 2023-03-17 RX ADMIN — NYSTATIN 1 G: 100000 POWDER TOPICAL at 22:56

## 2023-03-17 RX ADMIN — ASPIRIN 81 MG CHEWABLE TABLET 81 MG: 81 TABLET CHEWABLE at 10:06

## 2023-03-17 RX ADMIN — POLYETHYLENE GLYCOL (3350) 17 G: 17 POWDER, FOR SOLUTION ORAL at 10:01

## 2023-03-17 RX ADMIN — MIRTAZAPINE 15 MG: 15 TABLET, FILM COATED ORAL at 22:48

## 2023-03-17 RX ADMIN — METOPROLOL SUCCINATE 25 MG: 25 TABLET, EXTENDED RELEASE ORAL at 10:06

## 2023-03-17 RX ADMIN — CLOPIDOGREL BISULFATE 75 MG: 75 TABLET, FILM COATED ORAL at 10:07

## 2023-03-17 RX ADMIN — ACETAMINOPHEN 1000 MG: 500 TABLET, FILM COATED ORAL at 13:59

## 2023-03-17 RX ADMIN — ACETAMINOPHEN 1000 MG: 500 TABLET, FILM COATED ORAL at 05:31

## 2023-03-17 RX ADMIN — Medication 250 MG: at 22:48

## 2023-03-17 RX ADMIN — CLOTRIMAZOLE: 0.01 CREAM TOPICAL at 10:08

## 2023-03-17 RX ADMIN — METRONIDAZOLE 500 MG: 500 TABLET ORAL at 22:48

## 2023-03-17 RX ADMIN — ISOSORBIDE MONONITRATE 30 MG: 30 TABLET, EXTENDED RELEASE ORAL at 10:07

## 2023-03-17 RX ADMIN — Medication 250 MG: at 10:07

## 2023-03-17 RX ADMIN — ENOXAPARIN SODIUM 100 MG: 100 INJECTION SUBCUTANEOUS at 10:13

## 2023-03-17 ASSESSMENT — ACTIVITIES OF DAILY LIVING (ADL)
ADLS_ACUITY_SCORE: 51
ADLS_ACUITY_SCORE: 51
ADLS_ACUITY_SCORE: 50
ADLS_ACUITY_SCORE: 51
ADLS_ACUITY_SCORE: 51
ADLS_ACUITY_SCORE: 50
ADLS_ACUITY_SCORE: 50

## 2023-03-17 NOTE — PROGRESS NOTES
Pt complains of sob, O2 is stable on RA and lung sounds unchanged.  1L NC applied for comfort.  Tele-ICU notified of change in condition, negative x-ray.  Moon Vitale RN.............................3/16/2023 9:52 PM

## 2023-03-17 NOTE — PROGRESS NOTES
Central Line Insertion Note/Procedural Checklist      3/17/2023   1:08 PM  Type of Catheter:  Arrow(Brand) Single lumen pressure injectable PICC line in left basilic vein. Total catheter length: 55 cm. External length 6 cm. Internal length 49 cm. Upper arm extremity at 10 cm from antecubital: 29 cm.     Procedure education reviewed: procedure education discussed with: patient, who confirm(s) understanding of procedure.  Written Consent Received: Yes.     Medical/Surgical/ Allergies History reviewed: Yes..    Preprocedure Verification: Yes.  1) Patient identity verified; 2) side/site/procedure confirmed; 3) relevant information/documentation available, reviewed and properly matched to the patient; 4) consent accurate and complete; 5) equipment and supplies available   Site Marking:  Yes.  Time Out:  Yes.  Time out was conducted just prior to starting procedure to verify the four required elements: 1) patient name and date of birth 2) confirmation that the correct side/site are marked if applicable, including visualization of the site albertina 3) name of procedure including laterality if applicable, and 4) essential imaging and results are properly labeled and appropriately displayed, if applicable.    1 mLs 1% lidocaine was used by radiologist for local anesthetic during procedure.    Central line was placed using the following Central Line Insertion Checklist:    Hand Hygiene: Yes or NO: Yes.   Maximal Barrier Precautions including Sterile Gown, Hat and Mask:Yes.   Full Body Drape: Yes.    Site cleansed with:  Prep? Yes.   Sterile field maintained: Yes.    During procedure, did the provider:  Wear sterile gloves during catheter insertion? Yes.    Wear hat, mask, and sterile gown? Yes.  Maintain sterile field? Yes.  Did all staff and patient in the room wear a mask? Yes.    After the procedure:  Bio patch was applied to site, followed by skin prep, transparent dressing, and stockinet.   Was sterile technique maintained  when applying dressing? Yes.  Was dressing dated? Yes.    3/17/2023  Name of Procedure MD: Dr. Jasso  Name of RN (): ANETTE Mobley

## 2023-03-17 NOTE — PHARMACY-ANTICOAGULATION SERVICE
Pharmacy Consult- Initial Coumadin Assessment    Elsa Horowitz is a 88 year old male admitted on 3/15/2023 with Acute deep vein thrombosis (DVT) of axillary vein of right upper extremity (H)    Primary Indication(s) for Anticoagulation: DVT    Goal INR: 2-3    FYI, patient is followed by the Anticoagulation/Protime Clinic at: N/A (new start)    Patient Active Problem List   Diagnosis     Cellulitis     ACP (advance care planning)     Local infection of wound     Cellulitis of right foot     Chronic kidney disease, stage 4 (severe) (H)     Coronary artery disease     Diabetic neuropathy (H)     Essential hypertension     PAD (peripheral artery disease) (H)     Gastroesophageal reflux disease, unspecified whether esophagitis present     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)     Hyponatremia     Gangrene of right foot (H)     Acute respiratory failure with hypoxia (H)     Cognitive impairment     Gangrene (H)     History of intravenous drug use in remission     History of suicide attempt     Metal foreign body in chest     Altered mental status     Macular scar of both eyes     Chronic constipation     Dupuytren's disease of palm of both hands     Depressive disorder     Fatty liver     Foot ulcer (H)     History of amputation     Hypothyroidism, unspecified type     Intermittent claudication (H)     Living in assisted living     Long term (current) use of opiate analgesic     Low back pain     Lower limb amputation, great toe (H)     Lumbar disc disease     MDD (major depressive disorder), recurrent, in full remission (H)     Subacute osteomyelitis of right foot (H)     Stiffness of left hand joint     Stage 3b chronic kidney disease (H)     Spondylosis of thoracic region without myelopathy or radiculopathy     VRE (vancomycin resistant enterococcus) culture positive     Acute kidney injury (H)     Deep vein thrombosis (DVT) of upper extremity (H)     Acute deep vein thrombosis (DVT) of  axillary vein of right upper extremity (H)       Factors that may increase patient's sensitivity to warfarin (Coumadin) include: higher baseline INR (1.45 today), therapeutic lovenox, clopidogrel, asa, low platelets, low hemoglobin    Factors that may decrease patient's sensitivity to warfarin (Coumadin) include: N/A    Recent Labs   Lab Test 03/17/23  0521 03/16/23  0207 03/15/23  1110 03/13/23  1030 02/06/23  1339 01/31/23  1537 11/26/22  2024 02/05/15  1139   HGB 8.5* 9.3* 8.0* 7.5*   < > 8.5*   < >  --    INR 1.45*  --   --   --   --  1.16*  --  1.0   * 128* 98* 120*   < > 299   < >  --     < > = values in this interval not displayed.        Plan: Initiate therapy with Warfarin 5 mg x1. Repeat INR in AM. Will plan to bridge with therapeutic Lovenox (renally adjusted at 1 mg/kg) until INR > 2 for two consecutive readings.    Thank You for the Consult. Will continue to follow.    Curtis Ratliff [facility] ....................  3/17/2023   11:07 AM

## 2023-03-17 NOTE — PROGRESS NOTES
[facility]    Medicine Progress Note - Hospitalist Service    Date of Admission:  3/15/2023    Assessment & Plan   Elsa Horowitz is a 88 year old male admitted on 3/15/2023. He presents for evaluation of phlebitis at the right arm where PICC line is in place.    UE DVT.  With worsening swelling today.  PICC line.  Was continuing to work on admission but with more edema today they would recommend discontinuing use. he has peripheral access  -We will attempt to transition from heparin drip to Lovenox/warfarin and ultimately warfarin as an outpatient.  -Regular diet as tolerated  -Transfer to medical today.    LUIS on CKD,   -renally adjusting antibiotics as noted  -Pharmacy consult for ongoing management  -Kidney function has slowly improved over the last 48 hours  -Holding home diuretic and angiotensin receptor blocker. (Lasix and losartan)    Hypoglycemia  -hold all insulin and monitor  -Blood glucose readings have been stable despite insulin being held.    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  Status post revascularization of the right lower extremity.  He has had ongoing osteomyelitis treated with PICC line and IV antibiotic therapy.  Unfortunately, he is not able to give us a good history due to his mental status.  Spoke with infectious diseases and vascular surgery at [facility] where patient has had his care previously.  He has a complex medical history with several comorbidities including a recent non-STEMI.  Not a great surgical candidate at this time and does not appear to need source control for infection as foot has been stable and he recently had revascularization.  He had a non-STEMI about 2 months after his revascularization and remains on aspirin and Plavix.  Per that discussion would admit him here and transfer as needed.  Vascular team is available as needed for transfer if there does become an acute problem.  Infectious diseases is  "available for consultation and by phone as well for any further questions that should read.  Jey Breen [phone]  -Resume antibiotics Flagyl 500 mg twice daily, cefepime 2 g every 24 and daptomycin 500 mg which is 6 mgs per kilogram every other day.  Can increase dosing or change dosing as needed for renal function.  -Dressing changes and wound care     Diet: Consistent Carbohydrate Diet High Consistent Carb (75 g CHO per Meal) Diet    DVT Prophylaxis: heparin  Adkins Catheter: Not present  Lines: PRESENT      PICC Single Lumen Right-Site Assessment: WDL      Cardiac Monitoring: None  Code Status: No CPR- Do NOT Intubate      Clinically Significant Risk Factors        # Hyperkalemia: Highest K = 5.4 mmol/L in last 2 days, will monitor as appropriate   # Hypocalcemia: Lowest Ca = 8.2 mg/dL in last 2 days, will monitor and replace as appropriate     # Hypoalbuminemia: Lowest albumin = 3.2 g/dL at 3/17/2023  5:21 AM, will monitor as appropriate   # Thrombocytopenia: Lowest platelets = 98 in last 2 days, will monitor for bleeding        # DMII: A1C = 7.5 % (Ref range: 4.0 - 6.2 %) within past 6 months   # Overweight: Estimated body mass index is 28.5 kg/m  as calculated from the following:    Height as of 2/17/23: 1.854 m (6' 1\").    Weight as of this encounter: 98 kg (216 lb)., PRESENT ON ADMISSION         Disposition Plan      Expected Discharge Date: 03/20/2023      Destination: long-term care facility ([facility])            Imtiaz Dowd MD  Hospitalist Service  [facility]  Securely message with Karen (more info)  Text page via [facility] Paging/Directory   ______________________________________________________________________    Interval History   No significant events overnight.  He complains of some itchiness on his upper extremities.  No significant pain concerns.  He reports that he ambulates with a wheelchair mostly at home but is able to get around here with a walker fairly well.  " Appetite is poor.  No constipation.  Urinating well.    Physical Exam   Vital Signs: Temp: 97.6  F (36.4  C) Temp src: Temporal BP: (!) 114/94 Pulse: 75   Resp: (!) 8 SpO2: 95 % O2 Device: None (Room air) Oxygen Delivery: 1 LPM  Weight: 216 lbs 0 oz    General Appearance: He is sitting at the edge of the bed, answers questions appropriately, appears nontoxic.  Respiratory: Moving air well bilaterally, no wheezes or crackles  Cardiovascular: Regular without murmurs  GI: Soft, nontender  Skin: Mild amount of excoriation seen on both of his upper extremities.  Musculoskeletal: His right arm is more edematous compared to his left arm.  This is nonpitting.  Right lower extremity shows midfoot amputation without any significant pedal edema.    Medical Decision Making             Data

## 2023-03-17 NOTE — PROGRESS NOTES
:     Patient is currently at [facility] for short term rehab. Patient does have a bed hold and can return at the time of discharge. Spoke with Christelle at [facility] to update on patients discharge plan.      will continue to follow for discharge planning needs.    NOEL Atwood on 3/17/2023 at 1:40 PM

## 2023-03-17 NOTE — PROGRESS NOTES
Transferred from ICU to [id number] via wheelchair with all personal belongings, report received from Dayanara CHEEMA.

## 2023-03-17 NOTE — PLAN OF CARE
Goal Outcome Evaluation:       Patient off heparin drip. Plan is for new PICC line insertion this afternoon. VSS. Continue to monitor.

## 2023-03-17 NOTE — PHARMACY-CONSULT NOTE
Pharmacy - Transfer Medication Reconciliation     The patient's transfer medication orders have been compared to the medication administration record and to the Prior to Admissions Medications list - any noted discrepancies were resolved with the MD. (Patient transferred from ICU to Med/surg).    Thank you. Pharmacy will continue to monitor.     Curtis Ratliff formerly [facility] ....................  3/17/2023   3:23 PM

## 2023-03-17 NOTE — DISCHARGE INSTRUCTIONS
PICC PATIENT INSTRUCTIONS    What is a peripherally inserted central catheter? A peripherally inserted central catheter, often called a  PICC line  is a long, very thin, flexible tube that is usually placed into one of the large veins in the arm, often just above or just below the elbow. This tube is threaded into a large vein above the right side of the heart.     Why are PICC lines used? The PICC line will be used to give IV (intravenous) medicines or fluids. Because the tube is so small and flexible, the line can last several weeks to months, which means fewer needle pokes and less pain. The PICC line can be flushed and capped off when not in use. When it is time to give medicine, the medicine is connected to the PICC line and disconnected again when the medicine is finished.     PICC Line Care: These are the special things that you will need to learn:    Preventing infection    Flushing the PICC line    Giving the medicine    Solving problems    Knowing when and who to call for help     A nurse will teach you how to do all of these things and will be available for questions. Home care nurses may also come to your home to change the dressing over the line.  Preventing Infection It is very important to prevent infection. An infection might require removal of the line.     Cleanliness is a must! The nurse will show you how to keep your supplies sterile, so that no bacteria can enter the line and cause an infection. The dressing and cap must be changed weekly. Hands should be washed well before handling the line.     Flushing the PICC line: Giving the medicine and flushing the line The PICC line must be flushed so fluids will flow easily. If the line becomes clogged, it may have to be removed. A nurse will teach you how to flush the line and give medications.     General PICC Line Care:    No strenuous activity or heavy lifting for the first 48 hours after line is placed    Never use scissors to remove tape/dressing  from around the line    Always tape line to your arm to prevent it from snagging on objects    Cover with plastic when showering so the dressing does not get wet. Do not let ends of line hang in the bath water    Swimming and submersion are not allowed    Change dressing/cap every 7 days or sooner if dressing starts to lift up or becomes wet    No rough or contact sports as line may move or be damaged    To prevent damage to line, do not use pins, sharp clamps or scissors near your line    Avoid having dental work done while the line is in place as bacteria can be released into the blood stream and attach onto the end of the line. If dental work is necessary, tell your dentist about the line at least 5 days prior to the dental work.

## 2023-03-17 NOTE — PLAN OF CARE
BP (!) 144/93   Pulse 84   Temp 97.8  F (36.6  C) (Temporal)   Resp 12   Wt 98 kg (216 lb)   SpO2 99%   BMI 28.50 kg/m      VS.  SOB decreased throughout shift.  New frequent productive cough noted, pt does independently.  Adequate output.  Moon Vitale RN.............................3/17/2023 4:11 AM

## 2023-03-18 LAB
ALBUMIN SERPL BCG-MCNC: 3.4 G/DL (ref 3.5–5.2)
ALP SERPL-CCNC: 58 U/L (ref 40–129)
ALT SERPL W P-5'-P-CCNC: 9 U/L (ref 10–50)
ANION GAP SERPL CALCULATED.3IONS-SCNC: 10 MMOL/L (ref 7–15)
AST SERPL W P-5'-P-CCNC: 15 U/L (ref 10–50)
BASOPHILS # BLD AUTO: 0.1 10E3/UL (ref 0–0.2)
BASOPHILS NFR BLD AUTO: 2 %
BILIRUB SERPL-MCNC: 0.3 MG/DL
BUN SERPL-MCNC: 41.7 MG/DL (ref 8–23)
CALCIUM SERPL-MCNC: 8.8 MG/DL (ref 8.8–10.2)
CHLORIDE SERPL-SCNC: 108 MMOL/L (ref 98–107)
CREAT SERPL-MCNC: 2.66 MG/DL (ref 0.67–1.17)
DEPRECATED HCO3 PLAS-SCNC: 19 MMOL/L (ref 22–29)
EOSINOPHIL # BLD AUTO: 0.4 10E3/UL (ref 0–0.7)
EOSINOPHIL NFR BLD AUTO: 8 %
ERYTHROCYTE [DISTWIDTH] IN BLOOD BY AUTOMATED COUNT: 16.8 % (ref 10–15)
GFR SERPL CREATININE-BSD FRML MDRD: 23 ML/MIN/1.73M2
GLUCOSE BLDC GLUCOMTR-MCNC: 138 MG/DL (ref 70–99)
GLUCOSE BLDC GLUCOMTR-MCNC: 198 MG/DL (ref 70–99)
GLUCOSE BLDC GLUCOMTR-MCNC: 223 MG/DL (ref 70–99)
GLUCOSE BLDC GLUCOMTR-MCNC: 259 MG/DL (ref 70–99)
GLUCOSE SERPL-MCNC: 152 MG/DL (ref 70–99)
HCT VFR BLD AUTO: 26.4 % (ref 40–53)
HGB BLD-MCNC: 8.4 G/DL (ref 13.3–17.7)
HOLD SPECIMEN: NORMAL
IMM GRANULOCYTES # BLD: 0 10E3/UL
IMM GRANULOCYTES NFR BLD: 0 %
LACTATE SERPL-SCNC: 1.5 MMOL/L (ref 0.7–2)
LYMPHOCYTES # BLD AUTO: 1.1 10E3/UL (ref 0.8–5.3)
LYMPHOCYTES NFR BLD AUTO: 21 %
MAGNESIUM SERPL-MCNC: 1.6 MG/DL (ref 1.7–2.3)
MCH RBC QN AUTO: 27.5 PG (ref 26.5–33)
MCHC RBC AUTO-ENTMCNC: 31.8 G/DL (ref 31.5–36.5)
MCV RBC AUTO: 87 FL (ref 78–100)
MONOCYTES # BLD AUTO: 0.4 10E3/UL (ref 0–1.3)
MONOCYTES NFR BLD AUTO: 8 %
NEUTROPHILS # BLD AUTO: 3.3 10E3/UL (ref 1.6–8.3)
NEUTROPHILS NFR BLD AUTO: 61 %
NRBC # BLD AUTO: 0 10E3/UL
NRBC BLD AUTO-RTO: 0 /100
PLATELET # BLD AUTO: 119 10E3/UL (ref 150–450)
POTASSIUM SERPL-SCNC: 5.3 MMOL/L (ref 3.4–5.3)
PROT SERPL-MCNC: 6.9 G/DL (ref 6.4–8.3)
RBC # BLD AUTO: 3.05 10E6/UL (ref 4.4–5.9)
SODIUM SERPL-SCNC: 137 MMOL/L (ref 136–145)
WBC # BLD AUTO: 5.4 10E3/UL (ref 4–11)

## 2023-03-18 PROCEDURE — 250N000011 HC RX IP 250 OP 636: Performed by: FAMILY MEDICINE

## 2023-03-18 PROCEDURE — 250N000013 HC RX MED GY IP 250 OP 250 PS 637: Performed by: FAMILY MEDICINE

## 2023-03-18 PROCEDURE — 83735 ASSAY OF MAGNESIUM: CPT | Performed by: FAMILY MEDICINE

## 2023-03-18 PROCEDURE — 83605 ASSAY OF LACTIC ACID: CPT | Performed by: FAMILY MEDICINE

## 2023-03-18 PROCEDURE — 99232 SBSQ HOSP IP/OBS MODERATE 35: CPT | Performed by: FAMILY MEDICINE

## 2023-03-18 PROCEDURE — 80053 COMPREHEN METABOLIC PANEL: CPT | Performed by: FAMILY MEDICINE

## 2023-03-18 PROCEDURE — 36415 COLL VENOUS BLD VENIPUNCTURE: CPT | Performed by: FAMILY MEDICINE

## 2023-03-18 PROCEDURE — 250N000013 HC RX MED GY IP 250 OP 250 PS 637: Performed by: NURSE PRACTITIONER

## 2023-03-18 PROCEDURE — 85025 COMPLETE CBC W/AUTO DIFF WBC: CPT | Performed by: FAMILY MEDICINE

## 2023-03-18 PROCEDURE — 250N000013 HC RX MED GY IP 250 OP 250 PS 637: Performed by: INTERNAL MEDICINE

## 2023-03-18 PROCEDURE — 120N000001 HC R&B MED SURG/OB

## 2023-03-18 RX ORDER — TRIAMCINOLONE ACETONIDE 1 MG/G
CREAM TOPICAL 2 TIMES DAILY
Status: DISCONTINUED | OUTPATIENT
Start: 2023-03-18 | End: 2023-03-21 | Stop reason: HOSPADM

## 2023-03-18 RX ORDER — WARFARIN SODIUM 5 MG/1
5 TABLET ORAL ONCE
Status: COMPLETED | OUTPATIENT
Start: 2023-03-18 | End: 2023-03-18

## 2023-03-18 RX ADMIN — MIRTAZAPINE 15 MG: 15 TABLET, FILM COATED ORAL at 23:06

## 2023-03-18 RX ADMIN — CEFEPIME 2 G: 2 INJECTION, POWDER, FOR SOLUTION INTRAVENOUS at 14:06

## 2023-03-18 RX ADMIN — CLOTRIMAZOLE: 0.01 CREAM TOPICAL at 11:20

## 2023-03-18 RX ADMIN — ISOSORBIDE MONONITRATE 30 MG: 30 TABLET, EXTENDED RELEASE ORAL at 09:16

## 2023-03-18 RX ADMIN — Medication 250 MG: at 09:16

## 2023-03-18 RX ADMIN — FAMOTIDINE 20 MG: 20 TABLET, FILM COATED ORAL at 09:15

## 2023-03-18 RX ADMIN — Medication 250 MG: at 23:06

## 2023-03-18 RX ADMIN — ENOXAPARIN SODIUM 100 MG: 100 INJECTION SUBCUTANEOUS at 09:16

## 2023-03-18 RX ADMIN — HYDROXYZINE HYDROCHLORIDE 25 MG: 25 TABLET, FILM COATED ORAL at 09:23

## 2023-03-18 RX ADMIN — WARFARIN SODIUM 5 MG: 5 TABLET ORAL at 18:38

## 2023-03-18 RX ADMIN — METRONIDAZOLE 500 MG: 500 TABLET ORAL at 23:06

## 2023-03-18 RX ADMIN — ACETAMINOPHEN 500 MG: 500 TABLET, FILM COATED ORAL at 05:31

## 2023-03-18 RX ADMIN — NYSTATIN: 100000 POWDER TOPICAL at 09:16

## 2023-03-18 RX ADMIN — TRIAMCINOLONE ACETONIDE: 1 CREAM TOPICAL at 23:11

## 2023-03-18 RX ADMIN — ACETAMINOPHEN 1000 MG: 500 TABLET, FILM COATED ORAL at 23:06

## 2023-03-18 RX ADMIN — TRIAMCINOLONE ACETONIDE: 1 CREAM TOPICAL at 11:20

## 2023-03-18 RX ADMIN — SERTRALINE HYDROCHLORIDE 150 MG: 50 TABLET ORAL at 09:15

## 2023-03-18 RX ADMIN — CLOTRIMAZOLE: 0.01 CREAM TOPICAL at 23:11

## 2023-03-18 RX ADMIN — ACETAMINOPHEN 1000 MG: 500 TABLET, FILM COATED ORAL at 14:00

## 2023-03-18 RX ADMIN — CLOPIDOGREL BISULFATE 75 MG: 75 TABLET, FILM COATED ORAL at 09:16

## 2023-03-18 RX ADMIN — GABAPENTIN 600 MG: 300 CAPSULE ORAL at 23:06

## 2023-03-18 RX ADMIN — NYSTATIN: 100000 POWDER TOPICAL at 23:11

## 2023-03-18 RX ADMIN — METRONIDAZOLE 500 MG: 500 TABLET ORAL at 09:16

## 2023-03-18 RX ADMIN — LEVOTHYROXINE SODIUM 25 MCG: 0.03 TABLET ORAL at 23:06

## 2023-03-18 RX ADMIN — METOPROLOL SUCCINATE 25 MG: 25 TABLET, EXTENDED RELEASE ORAL at 09:16

## 2023-03-18 RX ADMIN — HYDROCODONE BITARTRATE AND ACETAMINOPHEN 1 TABLET: 5; 325 TABLET ORAL at 05:31

## 2023-03-18 ASSESSMENT — ACTIVITIES OF DAILY LIVING (ADL)
ADLS_ACUITY_SCORE: 50

## 2023-03-18 NOTE — PROGRESS NOTES
[facility]    Medicine Progress Note - Hospitalist Service    Date of Admission:  3/15/2023    Assessment & Plan   Elsa Horowitz is a 88 year old male admitted on 3/15/2023. He presents for evaluation of phlebitis at the right arm where PICC line is in place.    RUE DVT.    -swelling seems stable, potentially slightly improved  -having some itching, will trial triamcinolone cream  -transitioned to warfarin/lovenox yesterday,  -stop lovenox and discharge once warfarin therapeutic    PICC line.  Was continuing to work on admission but with more edema today they would recommend discontinuing use. he has peripheral access  -We will attempt to transition from heparin drip to Lovenox/warfarin and ultimately warfarin as an outpatient.  -Regular diet as tolerated  -PICC removed from RUE and placed in LUE due to need of 3 more weeks of IV antibiotics for osteomyelitis    LUIS on CKD,   -renally adjusting antibiotics as noted  -Pharmacy consult for ongoing management  -Kidney function continues to slowly improve  -Holding home diuretic and angiotensin receptor blocker. (Lasix and losartan)    Hypoglycemia  -hold all insulin and monitor  -Blood glucose readings have been stable despite insulin being held.    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  Status post revascularization of the right lower extremity.  He has had ongoing osteomyelitis treated with PICC line and IV antibiotic therapy.  nfectious diseases is available for consultation and by phone as well for any further questions that should read.  Jey Breen [phone]  -Resume antibiotics Flagyl 500 mg twice daily, cefepime 2 g every 24 and daptomycin 500 mg which is 6 mgs per kilogram every other day.  Can increase dosing or change dosing as needed for renal function.  -Dressing changes and wound care       Diet: Consistent Carbohydrate Diet High Consistent Carb (75 g CHO per Meal) Diet    DVT  "Prophylaxis: Warfarin  Adkins Catheter: Not present  Lines: PRESENT             Cardiac Monitoring: None  Code Status: No CPR- Do NOT Intubate      Clinically Significant Risk Factors        # Hyperkalemia: Highest K = 5.4 mmol/L in last 2 days, will monitor as appropriate   # Hypocalcemia: Lowest Ca = 8.2 mg/dL in last 2 days, will monitor and replace as appropriate   # Hypomagnesemia: Lowest Mg = 1.6 mg/dL in last 2 days, will replace as needed   # Hypoalbuminemia: Lowest albumin = 3.2 g/dL at 3/17/2023  5:21 AM, will monitor as appropriate   # Thrombocytopenia: Lowest platelets = 116 in last 2 days, will monitor for bleeding        # DMII: A1C = 7.5 % (Ref range: 4.0 - 6.2 %) within past 6 months   # Overweight: Estimated body mass index is 28.5 kg/m  as calculated from the following:    Height as of 2/17/23: 1.854 m (6' 1\").    Weight as of this encounter: 98 kg (216 lb)., PRESENT ON ADMISSION         Disposition Plan     Expected Discharge Date: 03/20/2023      Destination: long-term care facility            Imtiaz Dowd MD  Hospitalist Service  [facility]  Securely message with RIDERS (more info)  Text page via [employer] Paging/Directory   ______________________________________________________________________    Interval History   Tolerated transition to [facility] well.  New PICC line in LUE yesterday.  No new pain, SOB, nausea or vomiting.  Gets to bathroom via wheelchair    Physical Exam   Vital Signs: Temp: 97.3  F (36.3  C) Temp src: Tympanic BP: 131/60 Pulse: 74   Resp: 20 SpO2: 99 % O2 Device: None (Room air)    Weight: 216 lbs 0 oz    General Appearance: Sitting in chair, alert, answers questions appropriately  Respiratory: Clear without wheezing  Cardiovascular: Regular without murmurs  GI: Soft, nontender  Skin: Right extremity has some excoriations noted.  Slight swelling compared to the left upper extremity.  Musculoskeletal: Right partial foot amputation, left great and third toe " amputation which are chronic.    Medical Decision Making             Data     I have personally reviewed the following data over the past 24 hrs:    5.4  \   8.4 (L)   / 119 (L)     137 108 (H) 41.7 (H) /  138 (H)   5.3 19 (L) 2.66 (H) \       ALT: 9 (L) AST: 15 AP: 58 TBILI: 0.3   ALB: 3.4 (L) TOT PROTEIN: 6.9 LIPASE: N/A

## 2023-03-18 NOTE — PHARMACY-ANTICOAGULATION SERVICE
Pharmacy Consult- Coumadin Follow-Up    Elsa Horowitz is a 88 year old male admitted on 3/15/2023 with Acute deep vein thrombosis (DVT) of axillary vein of right upper extremity (H)    Primary Indication(s) for Anticoagulation: DVT of right upper extremity after PICC placement    Goal INR: 2.5 (2-3)    FYI, patient is followed by the Antico[facility] at: patient is a new start    Patient Active Problem List   Diagnosis     Cellulitis     ACP (advance care planning)     Local infection of wound     Cellulitis of right foot     Chronic kidney disease, stage 4 (severe) (H)     Coronary artery disease     Diabetic neuropathy (H)     Essential hypertension     PAD (peripheral artery disease) (H)     Gastroesophageal reflux disease, unspecified whether esophagitis present     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)     Hyponatremia     Gangrene of right foot (H)     Acute respiratory failure with hypoxia (H)     Cognitive impairment     Gangrene (H)     History of intravenous drug use in remission     History of suicide attempt     Metal foreign body in chest     Altered mental status     Macular scar of both eyes     Chronic constipation     Dupuytren's disease of palm of both hands     Depressive disorder     Fatty liver     Foot ulcer (H)     History of amputation     Hypothyroidism, unspecified type     Intermittent claudication (H)     Living in assisted living     Long term (current) use of opiate analgesic     Low back pain     Lower limb amputation, great toe (H)     Lumbar disc disease     MDD (major depressive disorder), recurrent, in full remission (H)     Subacute osteomyelitis of right foot (H)     Stiffness of left hand joint     Stage 3b chronic kidney disease (H)     Spondylosis of thoracic region without myelopathy or radiculopathy     VRE (vancomycin resistant enterococcus) culture positive     Acute kidney injury (H)     Deep vein thrombosis (DVT) of upper  extremity (H)     Acute deep vein thrombosis (DVT) of axillary vein of right upper extremity (H)     New factors that may increase patient's sensitivity to warfarin (Coumadin) include: aspirin, clopidogrel, anemia, concurrent Lovenox    New factors that may decrease patient's sensitivity to warfarin (Coumadin) include: none noted    Dietary Intake: consuming 100% of carb controlled diet    Recent Labs   Lab Test 03/18/23  0549 03/17/23  0521 03/16/23  0207 03/15/23  1110 02/06/23  1339 01/31/23  1537 11/26/22  2024 02/05/15  1139   HGB 8.4* 8.5* 9.3* 8.0*   < > 8.5*   < >  --    INR  --  1.45*  --   --   --  1.16*  --  1.0   * 116* 128* 98*   < > 299   < >  --     < > = values in this interval not displayed.      Anticoagulation Dose History     Recent Dosing and Labs Latest Ref Rng & Units 2/5/2015 1/31/2023 3/17/2023    Warfarin 5 mg - - - 5 mg    INR 0.85 - 1.15 1.0 1.16(H) 1.45(H)        Plan: repeat warfarin 5 mg today and check INR in am.  Creatinine stable/improving. Continue Lovenox at current dose.    Thank You for the Consult. Will continue to follow.    Ros Quintana [facility] ....................  3/18/2023   6:23 PM

## 2023-03-19 LAB
ALBUMIN SERPL BCG-MCNC: 3.4 G/DL (ref 3.5–5.2)
ALP SERPL-CCNC: 57 U/L (ref 40–129)
ALT SERPL W P-5'-P-CCNC: 9 U/L (ref 10–50)
ANION GAP SERPL CALCULATED.3IONS-SCNC: 9 MMOL/L (ref 7–15)
AST SERPL W P-5'-P-CCNC: 15 U/L (ref 10–50)
BASOPHILS # BLD AUTO: 0.1 10E3/UL (ref 0–0.2)
BASOPHILS NFR BLD AUTO: 2 %
BILIRUB SERPL-MCNC: 0.2 MG/DL
BUN SERPL-MCNC: 41.7 MG/DL (ref 8–23)
CALCIUM SERPL-MCNC: 8.7 MG/DL (ref 8.8–10.2)
CHLORIDE SERPL-SCNC: 108 MMOL/L (ref 98–107)
CREAT SERPL-MCNC: 2.67 MG/DL (ref 0.67–1.17)
DEPRECATED HCO3 PLAS-SCNC: 21 MMOL/L (ref 22–29)
EOSINOPHIL # BLD AUTO: 0.4 10E3/UL (ref 0–0.7)
EOSINOPHIL NFR BLD AUTO: 9 %
ERYTHROCYTE [DISTWIDTH] IN BLOOD BY AUTOMATED COUNT: 16.8 % (ref 10–15)
GFR SERPL CREATININE-BSD FRML MDRD: 22 ML/MIN/1.73M2
GLUCOSE BLDC GLUCOMTR-MCNC: 153 MG/DL (ref 70–99)
GLUCOSE BLDC GLUCOMTR-MCNC: 177 MG/DL (ref 70–99)
GLUCOSE BLDC GLUCOMTR-MCNC: 196 MG/DL (ref 70–99)
GLUCOSE BLDC GLUCOMTR-MCNC: 230 MG/DL (ref 70–99)
GLUCOSE SERPL-MCNC: 181 MG/DL (ref 70–99)
HCT VFR BLD AUTO: 26.4 % (ref 40–53)
HGB BLD-MCNC: 8.6 G/DL (ref 13.3–17.7)
HOLD SPECIMEN: NORMAL
IMM GRANULOCYTES # BLD: 0 10E3/UL
IMM GRANULOCYTES NFR BLD: 0 %
INR PPP: 1.82 (ref 0.85–1.15)
LACTATE SERPL-SCNC: 1.3 MMOL/L (ref 0.7–2)
LYMPHOCYTES # BLD AUTO: 1.3 10E3/UL (ref 0.8–5.3)
LYMPHOCYTES NFR BLD AUTO: 27 %
MAGNESIUM SERPL-MCNC: 1.5 MG/DL (ref 1.7–2.3)
MAGNESIUM SERPL-MCNC: 1.9 MG/DL (ref 1.7–2.3)
MCH RBC QN AUTO: 27.9 PG (ref 26.5–33)
MCHC RBC AUTO-ENTMCNC: 32.6 G/DL (ref 31.5–36.5)
MCV RBC AUTO: 86 FL (ref 78–100)
MONOCYTES # BLD AUTO: 0.4 10E3/UL (ref 0–1.3)
MONOCYTES NFR BLD AUTO: 9 %
NEUTROPHILS # BLD AUTO: 2.5 10E3/UL (ref 1.6–8.3)
NEUTROPHILS NFR BLD AUTO: 53 %
NRBC # BLD AUTO: 0 10E3/UL
NRBC BLD AUTO-RTO: 0 /100
PLATELET # BLD AUTO: 122 10E3/UL (ref 150–450)
POTASSIUM SERPL-SCNC: 5.3 MMOL/L (ref 3.4–5.3)
POTASSIUM SERPL-SCNC: 5.4 MMOL/L (ref 3.4–5.3)
PROT SERPL-MCNC: 6.9 G/DL (ref 6.4–8.3)
RBC # BLD AUTO: 3.08 10E6/UL (ref 4.4–5.9)
SODIUM SERPL-SCNC: 138 MMOL/L (ref 136–145)
WBC # BLD AUTO: 4.7 10E3/UL (ref 4–11)

## 2023-03-19 PROCEDURE — 120N000001 HC R&B MED SURG/OB

## 2023-03-19 PROCEDURE — 83735 ASSAY OF MAGNESIUM: CPT | Performed by: FAMILY MEDICINE

## 2023-03-19 PROCEDURE — 85610 PROTHROMBIN TIME: CPT | Performed by: FAMILY MEDICINE

## 2023-03-19 PROCEDURE — 250N000012 HC RX MED GY IP 250 OP 636 PS 637: Performed by: FAMILY MEDICINE

## 2023-03-19 PROCEDURE — 250N000011 HC RX IP 250 OP 636: Performed by: NURSE PRACTITIONER

## 2023-03-19 PROCEDURE — 99232 SBSQ HOSP IP/OBS MODERATE 35: CPT | Performed by: FAMILY MEDICINE

## 2023-03-19 PROCEDURE — 250N000013 HC RX MED GY IP 250 OP 250 PS 637: Performed by: FAMILY MEDICINE

## 2023-03-19 PROCEDURE — 80053 COMPREHEN METABOLIC PANEL: CPT | Performed by: FAMILY MEDICINE

## 2023-03-19 PROCEDURE — 258N000003 HC RX IP 258 OP 636: Performed by: NURSE PRACTITIONER

## 2023-03-19 PROCEDURE — 250N000011 HC RX IP 250 OP 636: Performed by: FAMILY MEDICINE

## 2023-03-19 PROCEDURE — 36415 COLL VENOUS BLD VENIPUNCTURE: CPT | Performed by: FAMILY MEDICINE

## 2023-03-19 PROCEDURE — 85025 COMPLETE CBC W/AUTO DIFF WBC: CPT | Performed by: FAMILY MEDICINE

## 2023-03-19 PROCEDURE — 250N000013 HC RX MED GY IP 250 OP 250 PS 637: Performed by: NURSE PRACTITIONER

## 2023-03-19 PROCEDURE — 83605 ASSAY OF LACTIC ACID: CPT | Performed by: FAMILY MEDICINE

## 2023-03-19 PROCEDURE — 84132 ASSAY OF SERUM POTASSIUM: CPT | Performed by: FAMILY MEDICINE

## 2023-03-19 RX ORDER — MAGNESIUM SULFATE HEPTAHYDRATE 40 MG/ML
2 INJECTION, SOLUTION INTRAVENOUS ONCE
Status: COMPLETED | OUTPATIENT
Start: 2023-03-19 | End: 2023-03-19

## 2023-03-19 RX ORDER — WARFARIN SODIUM 1 MG/1
1 TABLET ORAL
Status: COMPLETED | OUTPATIENT
Start: 2023-03-19 | End: 2023-03-19

## 2023-03-19 RX ADMIN — METRONIDAZOLE 500 MG: 500 TABLET ORAL at 21:57

## 2023-03-19 RX ADMIN — NYSTATIN: 100000 POWDER TOPICAL at 21:57

## 2023-03-19 RX ADMIN — INSULIN ASPART 2 UNITS: 100 INJECTION, SOLUTION INTRAVENOUS; SUBCUTANEOUS at 11:45

## 2023-03-19 RX ADMIN — TRIAMCINOLONE ACETONIDE: 1 CREAM TOPICAL at 11:52

## 2023-03-19 RX ADMIN — Medication 250 MG: at 10:02

## 2023-03-19 RX ADMIN — CLOPIDOGREL BISULFATE 75 MG: 75 TABLET, FILM COATED ORAL at 10:02

## 2023-03-19 RX ADMIN — CEFEPIME 2 G: 2 INJECTION, POWDER, FOR SOLUTION INTRAVENOUS at 14:31

## 2023-03-19 RX ADMIN — ISOSORBIDE MONONITRATE 30 MG: 30 TABLET, EXTENDED RELEASE ORAL at 10:01

## 2023-03-19 RX ADMIN — INSULIN GLARGINE 17 UNITS: 100 INJECTION, SOLUTION SUBCUTANEOUS at 08:36

## 2023-03-19 RX ADMIN — METRONIDAZOLE 500 MG: 500 TABLET ORAL at 09:59

## 2023-03-19 RX ADMIN — GABAPENTIN 600 MG: 300 CAPSULE ORAL at 21:57

## 2023-03-19 RX ADMIN — CLOTRIMAZOLE: 0.01 CREAM TOPICAL at 21:57

## 2023-03-19 RX ADMIN — MIRTAZAPINE 15 MG: 15 TABLET, FILM COATED ORAL at 21:57

## 2023-03-19 RX ADMIN — CLOTRIMAZOLE: 0.01 CREAM TOPICAL at 11:50

## 2023-03-19 RX ADMIN — LEVOTHYROXINE SODIUM 25 MCG: 0.03 TABLET ORAL at 21:57

## 2023-03-19 RX ADMIN — DAPTOMYCIN 500 MG: 500 INJECTION, POWDER, LYOPHILIZED, FOR SOLUTION INTRAVENOUS at 11:37

## 2023-03-19 RX ADMIN — WARFARIN SODIUM 1 MG: 1 TABLET ORAL at 18:23

## 2023-03-19 RX ADMIN — INSULIN ASPART 1 UNITS: 100 INJECTION, SOLUTION INTRAVENOUS; SUBCUTANEOUS at 16:30

## 2023-03-19 RX ADMIN — ACETAMINOPHEN 1000 MG: 500 TABLET, FILM COATED ORAL at 06:23

## 2023-03-19 RX ADMIN — Medication 250 MG: at 21:57

## 2023-03-19 RX ADMIN — MAGNESIUM SULFATE HEPTAHYDRATE 2 G: 40 INJECTION, SOLUTION INTRAVENOUS at 08:20

## 2023-03-19 RX ADMIN — METOPROLOL SUCCINATE 25 MG: 25 TABLET, EXTENDED RELEASE ORAL at 10:02

## 2023-03-19 RX ADMIN — TRIAMCINOLONE ACETONIDE: 1 CREAM TOPICAL at 21:57

## 2023-03-19 RX ADMIN — SERTRALINE HYDROCHLORIDE 150 MG: 50 TABLET ORAL at 10:00

## 2023-03-19 RX ADMIN — ACETAMINOPHEN 1000 MG: 500 TABLET, FILM COATED ORAL at 13:24

## 2023-03-19 RX ADMIN — ENOXAPARIN SODIUM 100 MG: 100 INJECTION SUBCUTANEOUS at 09:55

## 2023-03-19 RX ADMIN — ACETAMINOPHEN 1000 MG: 500 TABLET, FILM COATED ORAL at 21:57

## 2023-03-19 RX ADMIN — INSULIN ASPART 1 UNITS: 100 INJECTION, SOLUTION INTRAVENOUS; SUBCUTANEOUS at 08:21

## 2023-03-19 ASSESSMENT — ACTIVITIES OF DAILY LIVING (ADL)
ADLS_ACUITY_SCORE: 51
ADLS_ACUITY_SCORE: 51
ADLS_ACUITY_SCORE: 50
ADLS_ACUITY_SCORE: 51
ADLS_ACUITY_SCORE: 50
ADLS_ACUITY_SCORE: 51
ADLS_ACUITY_SCORE: 50
ADLS_ACUITY_SCORE: 50

## 2023-03-19 NOTE — PROGRESS NOTES
SAFETY CHECKLIST  ID Bands and Risk clasps correct and in place (DNR, Fall risk, Allergy, Latex, Limb):  Yes  All Lines Reconciled and labeled correctly: Yes  Whiteboard updated:Yes  Environmental interventions: Yes  Verify Tele #: NA

## 2023-03-19 NOTE — PLAN OF CARE
"Pt a/o, right arm has some swelling/edema, pt states he thinks its getting better.  Picc in Left arm.  Dressing to Right foot see flowsheet.  Pt SBA x 1 with walker.      Goal Outcome Evaluation:      Plan of Care Reviewed With: patient    Overall Patient Progress: improvingOverall Patient Progress: improving      Problem: Plan of Care - These are the overarching goals to be used throughout the patient stay.    Goal: Plan of Care Review  Description: The Plan of Care Review/Shift note should be completed every shift.  The Outcome Evaluation is a brief statement about your assessment that the patient is improving, declining, or no change.  This information will be displayed automatically on your shift note.  Outcome: Progressing  Flowsheets (Taken 3/19/2023 0317)  Plan of Care Reviewed With: patient  Overall Patient Progress: improving  Goal: Patient-Specific Goal (Individualized)  Description: You can add care plan individualizations to a care plan. Examples of Individualization might be:  \"Parent requests to be called daily at 9am for status\", \"I have a hard time hearing out of my right ear\", or \"Do not touch me to wake me up as it startles me\".  Outcome: Progressing  Goal: Absence of Hospital-Acquired Illness or Injury  Outcome: Progressing  Intervention: Identify and Manage Fall Risk  Recent Flowsheet Documentation  Taken 3/19/2023 0241 by Leandra Dunaway RN  Safety Promotion/Fall Prevention:    activity supervised    safety round/check completed    treat reversible contributory factors    treat underlying cause  Taken 3/18/2023 2300 by Leandra Dunaway RN  Safety Promotion/Fall Prevention:    activity supervised    safety round/check completed    treat reversible contributory factors    treat underlying cause  Intervention: Prevent and Manage VTE (Venous Thromboembolism) Risk  Recent Flowsheet Documentation  Taken 3/19/2023 0241 by Leandra Dunaway RN  VTE Prevention/Management: (lovenox) SCDs (sequential " compression devices) off  Taken 3/18/2023 2300 by Leandra Dunaway RN  VTE Prevention/Management: (lovenox) SCDs (sequential compression devices) off  Goal: Optimal Comfort and Wellbeing  Outcome: Progressing  Goal: Readiness for Transition of Care  Outcome: Progressing     Problem: Diabetes Comorbidity  Goal: Blood Glucose Level Within Targeted Range  Outcome: Progressing     Problem: Heart Failure Comorbidity  Goal: Maintenance of Heart Failure Symptom Control  Outcome: Progressing  Intervention: Maintain Heart Failure Management  Recent Flowsheet Documentation  Taken 3/19/2023 0241 by Leandra Dunaway RN  Medication Review/Management: medications reviewed  Taken 3/18/2023 2300 by Leandra Dunaway RN  Medication Review/Management: medications reviewed     Problem: Pain Chronic (Persistent) (Comorbidity Management)  Goal: Acceptable Pain Control and Functional Ability  Outcome: Progressing  Intervention: Manage Persistent Pain  Recent Flowsheet Documentation  Taken 3/19/2023 0241 by Leandra Dunaway RN  Medication Review/Management: medications reviewed  Taken 3/18/2023 2300 by Leandra Dunaway RN  Medication Review/Management: medications reviewed     Problem: Infection  Goal: Absence of Infection Signs and Symptoms  Outcome: Progressing

## 2023-03-19 NOTE — PROGRESS NOTES
[facility]    Medicine Progress Note - Hospitalist Service    Date of Admission:  3/15/2023    Assessment & Plan   Elsa Horowitz is a 88 year old male admitted on 3/15/2023. He presents for evaluation of phlebitis at the right arm where PICC line is in place.    RUE DVT.    -swelling seems stable, potentially slightly improved  -having some itching, will trial triamcinolone cream  -transitioned to warfarin/lovenox yesterday,  -stop lovenox and discharge once warfarin therapeutic  3-19 right wrist only swelling is improving.  Itching is improving with triamcinolone.  He is tolerating warfarin but not yet therapeutic    PICC line.  Was continuing to work on admission but with more edema today they would recommend discontinuing use. he has peripheral access  -We will attempt to transition from heparin drip to Lovenox/warfarin and ultimately warfarin as an outpatient.  -Regular diet as tolerated  -PICC removed from RUE and placed in LUE due to need of 3 more weeks of IV antibiotics for osteomyelitis    LUIS on CKD,   -renally adjusting antibiotics as noted  -Pharmacy consult for ongoing management  -Kidney function continues to slowly improve  -Holding home diuretic and angiotensin receptor blocker. (Lasix and losartan)  3-19 slow improvement continues.  Continue holding medication.  Continue monitoring.    Hypoglycemia  -hold all insulin and monitor  -Blood glucose readings have been stable despite insulin being held.  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  Status post revascularization of the right lower extremity.  He has had ongoing osteomyelitis treated with PICC line and IV antibiotic therapy.  nfectious diseases is available for consultation and by phone as well for any further questions that should read.  Jey Breen [phone]  -Resume antibiotics Flagyl 500 mg twice daily, cefepime 2 g  every 24 and daptomycin 500 mg which is 6 mgs per kilogram every other day.  Can increase dosing or change dosing as needed for renal function.  -Dressing changes and wound care  3-19 past records were reviewed and once again confirms above dosing of cefepime, daptomycin and Flagyl through April 6, 2023    CAD  3-19 records reviewed from [facility] shows that he had cardiac cath on December 5, 2022 showing RCA lesion that was treated with 2 drug-eluting stents.  Started on Plavix for 1 year.       Diet: Consistent Carbohydrate Diet High Consistent Carb (75 g CHO per Meal) Diet    DVT Prophylaxis: Warfarin and DOAC  Adkins Catheter: Not present  Lines: PRESENT      PICC 03/17/23 Single Lumen Left Basilic Long term antibiotics-Site Assessment: WDL      Cardiac Monitoring: None  Code Status: No CPR- Do NOT Intubate      Clinically Significant Risk Factors        # Hyperkalemia: Highest K = 5.4 mmol/L in last 2 days, will monitor as appropriate     # Hypomagnesemia: Lowest Mg = 1.5 mg/dL in last 2 days, will replace as needed   # Hypoalbuminemia: Lowest albumin = 3.2 g/dL at 3/17/2023  5:21 AM, will monitor as appropriate   # Thrombocytopenia: Lowest platelets = 119 in last 2 days, will monitor for bleeding        # DMII: A1C = 7.5 % (Ref range: 4.0 - 6.2 %) within past 6 months           Disposition Plan     Expected Discharge Date: 03/20/2023      Destination: long-term care facility            Imtiaz Dowd MD  Hospitalist Service  [facility]  Securely message with [employer] (more info)  Text page via [employer] Paging/Directory   ______________________________________________________________________    Interval History   No significant events overnight.  Tolerated breakfast well.  Urinating and bowel movements are normal.  Continues to have some pain in his knees.    Physical Exam   Vital Signs: Temp: (!) 96.1  F (35.6  C) Temp src: Oral BP: (!) 156/71 Pulse: 72   Resp: 16 SpO2: 99 % O2 Device: None  (Room air)    Weight: 216 lbs 0 oz    General Appearance: He is laying in bed comfortably, answers questions appropriately.  Respiratory: Moves air bilaterally without any difficulty, no wheezing or crackles  Cardiovascular: Regular without murmurs  GI: Soft, nontender, nondistended.  Positive bowel sounds.  Skin: Excoriations on upper extremities are stable.  Musculoskeletal: Right lower extremity shows midfoot amputation, left foot shows 2 toes amputated previously.  No significant edema.    Medical Decision Making         Data     I have personally reviewed the following data over the past 24 hrs:    4.7  \   8.6 (L)   / 122 (L)     138 108 (H) 41.7 (H) /  153 (H)   5.3; 5.4 (H) 21 (L) 2.67 (H) \       ALT: 9 (L) AST: 15 AP: 57 TBILI: 0.2   ALB: 3.4 (L) TOT PROTEIN: 6.9 LIPASE: N/A       Procal: N/A CRP: N/A Lactic Acid: 1.5       INR:  1.82 (H) PTT:  N/A   D-dimer:  N/A Fibrinogen:  N/A

## 2023-03-19 NOTE — PHARMACY-ANTICOAGULATION SERVICE
Pharmacy Consult- Coumadin Follow-Up    Elsa Horowitz is a 88 year old male admitted on 3/15/2023 with Acute deep vein thrombosis (DVT) of axillary vein of right upper extremity (H)    Primary Indication(s) for Anticoagulation: DVT of right upper extremity    Goal INR: 2.5 (2-3)    FYI, patient is followed by the Anticoagulation/Protime Clinic at: [facility]'s staff to manage at discharge    Patient Active Problem List   Diagnosis     Cellulitis     ACP (advance care planning)     Local infection of wound     Cellulitis of right foot     Chronic kidney disease, stage 4 (severe) (H)     Coronary artery disease     Diabetic neuropathy (H)     Essential hypertension     PAD (peripheral artery disease) (H)     Gastroesophageal reflux disease, unspecified whether esophagitis present     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)     Hyponatremia     Gangrene of right foot (H)     Acute respiratory failure with hypoxia (H)     Cognitive impairment     Gangrene (H)     History of intravenous drug use in remission     History of suicide attempt     Metal foreign body in chest     Altered mental status     Macular scar of both eyes     Chronic constipation     Dupuytren's disease of palm of both hands     Depressive disorder     Fatty liver     Foot ulcer (H)     History of amputation     Hypothyroidism, unspecified type     Intermittent claudication (H)     Living in assisted living     Long term (current) use of opiate analgesic     Low back pain     Lower limb amputation, great toe (H)     Lumbar disc disease     MDD (major depressive disorder), recurrent, in full remission (H)     Subacute osteomyelitis of right foot (H)     Stiffness of left hand joint     Stage 3b chronic kidney disease (H)     Spondylosis of thoracic region without myelopathy or radiculopathy     VRE (vancomycin resistant enterococcus) culture positive     Acute kidney injury (H)     Deep vein thrombosis (DVT) of  upper extremity (H)     Acute deep vein thrombosis (DVT) of axillary vein of right upper extremity (H)       New factors that may increase patient's sensitivity to warfarin (Coumadin) include: anemia, thrombocytopenia, concurrent clopidogrel and enoxaparin, FLAGYL    New factors that may decrease patient's sensitivity to warfarin (Coumadin) include: none noted    Dietary Intake: tolerating carb controlled diet    Recent Labs   Lab Test 03/19/23  0615 03/18/23  0549 03/17/23  0521 03/16/23  0207 02/06/23  1339 01/31/23  1537 11/26/22  2024 02/05/15  1139   HGB 8.6* 8.4* 8.5* 9.3*   < > 8.5*   < >  --    INR 1.82*  --  1.45*  --   --  1.16*  --  1.0   * 119* 116* 128*   < > 299   < >  --     < > = values in this interval not displayed.      Anticoagulation Dose History     Recent Dosing and Labs Latest Ref Rng & Units 2/5/2015 1/31/2023 3/17/2023 3/18/2023 3/19/2023    Warfarin 5 mg - - - 5 mg 5 mg -    INR 0.85 - 1.15 1.0 1.16(H) 1.45(H) - 1.82(H)        Plan: Give warfarin 1 mg today.  Check INR in am.    Thank You for the Consult. Will continue to follow.    Ros Quintana [facility] ....................  3/19/2023   11:55 AM

## 2023-03-19 NOTE — PROGRESS NOTES
SAFETY CHECKLIST  ID Bands and Risk clasps correct and in place (DNR, Fall risk, Allergy, Latex, Limb):  Yes  All Lines Reconciled and labeled correctly: Yes  Whiteboard updated:Yes  Environmental interventions: Yes  Verify Tele #: n/a

## 2023-03-19 NOTE — PLAN OF CARE
Goal Outcome Evaluation:      Plan of Care Reviewed With: patient        Patient sleepy this morning, oriented, SBA for mobility.  Magnesium replacement per protocol.  Denied pain.  PICC line left upper arm patent, dressing dried blood/dry/intact.  Dressing to right foot kerlix wrap CDI.  Chronic numbness/pale BLE, pedal pulses weak, no toes left foot.  Shabnam Ortega RN on 3/19/2023 at 9:06 AM

## 2023-03-20 PROBLEM — M86.60 CHRONIC OSTEOMYELITIS (H): Status: ACTIVE | Noted: 2023-03-20

## 2023-03-20 LAB
ALBUMIN SERPL BCG-MCNC: 3.4 G/DL (ref 3.5–5.2)
ALP SERPL-CCNC: 53 U/L (ref 40–129)
ALT SERPL W P-5'-P-CCNC: 8 U/L (ref 10–50)
ANION GAP SERPL CALCULATED.3IONS-SCNC: 9 MMOL/L (ref 7–15)
AST SERPL W P-5'-P-CCNC: 17 U/L (ref 10–50)
BASOPHILS # BLD AUTO: 0.1 10E3/UL (ref 0–0.2)
BASOPHILS NFR BLD AUTO: 2 %
BILIRUB SERPL-MCNC: <0.2 MG/DL
BUN SERPL-MCNC: 39.3 MG/DL (ref 8–23)
CALCIUM SERPL-MCNC: 8.8 MG/DL (ref 8.8–10.2)
CHLORIDE SERPL-SCNC: 107 MMOL/L (ref 98–107)
CREAT SERPL-MCNC: 2.69 MG/DL (ref 0.67–1.17)
DEPRECATED HCO3 PLAS-SCNC: 21 MMOL/L (ref 22–29)
EOSINOPHIL # BLD AUTO: 0.4 10E3/UL (ref 0–0.7)
EOSINOPHIL NFR BLD AUTO: 9 %
ERYTHROCYTE [DISTWIDTH] IN BLOOD BY AUTOMATED COUNT: 17 % (ref 10–15)
GFR SERPL CREATININE-BSD FRML MDRD: 22 ML/MIN/1.73M2
GLUCOSE BLDC GLUCOMTR-MCNC: 133 MG/DL (ref 70–99)
GLUCOSE BLDC GLUCOMTR-MCNC: 172 MG/DL (ref 70–99)
GLUCOSE BLDC GLUCOMTR-MCNC: 203 MG/DL (ref 70–99)
GLUCOSE BLDC GLUCOMTR-MCNC: 209 MG/DL (ref 70–99)
GLUCOSE SERPL-MCNC: 160 MG/DL (ref 70–99)
HCT VFR BLD AUTO: 26.4 % (ref 40–53)
HGB BLD-MCNC: 8.6 G/DL (ref 13.3–17.7)
HOLD SPECIMEN: NORMAL
HOLD SPECIMEN: NORMAL
IMM GRANULOCYTES # BLD: 0 10E3/UL
IMM GRANULOCYTES NFR BLD: 0 %
INR PPP: 2.82 (ref 0.85–1.15)
LYMPHOCYTES # BLD AUTO: 1.4 10E3/UL (ref 0.8–5.3)
LYMPHOCYTES NFR BLD AUTO: 32 %
MAGNESIUM SERPL-MCNC: 1.8 MG/DL (ref 1.7–2.3)
MCH RBC QN AUTO: 28.1 PG (ref 26.5–33)
MCHC RBC AUTO-ENTMCNC: 32.6 G/DL (ref 31.5–36.5)
MCV RBC AUTO: 86 FL (ref 78–100)
MONOCYTES # BLD AUTO: 0.4 10E3/UL (ref 0–1.3)
MONOCYTES NFR BLD AUTO: 8 %
NEUTROPHILS # BLD AUTO: 2.2 10E3/UL (ref 1.6–8.3)
NEUTROPHILS NFR BLD AUTO: 49 %
NRBC # BLD AUTO: 0 10E3/UL
NRBC BLD AUTO-RTO: 0 /100
PLATELET # BLD AUTO: 122 10E3/UL (ref 150–450)
POTASSIUM SERPL-SCNC: 5.1 MMOL/L (ref 3.4–5.3)
PROT SERPL-MCNC: 6.7 G/DL (ref 6.4–8.3)
RBC # BLD AUTO: 3.06 10E6/UL (ref 4.4–5.9)
SODIUM SERPL-SCNC: 137 MMOL/L (ref 136–145)
WBC # BLD AUTO: 4.5 10E3/UL (ref 4–11)

## 2023-03-20 PROCEDURE — 250N000013 HC RX MED GY IP 250 OP 250 PS 637: Performed by: NURSE PRACTITIONER

## 2023-03-20 PROCEDURE — 250N000011 HC RX IP 250 OP 636: Performed by: FAMILY MEDICINE

## 2023-03-20 PROCEDURE — 36415 COLL VENOUS BLD VENIPUNCTURE: CPT | Performed by: FAMILY MEDICINE

## 2023-03-20 PROCEDURE — 85025 COMPLETE CBC W/AUTO DIFF WBC: CPT | Performed by: FAMILY MEDICINE

## 2023-03-20 PROCEDURE — 250N000013 HC RX MED GY IP 250 OP 250 PS 637: Performed by: FAMILY MEDICINE

## 2023-03-20 PROCEDURE — 85610 PROTHROMBIN TIME: CPT | Performed by: FAMILY MEDICINE

## 2023-03-20 PROCEDURE — 99232 SBSQ HOSP IP/OBS MODERATE 35: CPT | Performed by: FAMILY MEDICINE

## 2023-03-20 PROCEDURE — 83735 ASSAY OF MAGNESIUM: CPT | Performed by: FAMILY MEDICINE

## 2023-03-20 PROCEDURE — 120N000001 HC R&B MED SURG/OB

## 2023-03-20 PROCEDURE — 80053 COMPREHEN METABOLIC PANEL: CPT | Performed by: FAMILY MEDICINE

## 2023-03-20 RX ADMIN — LEVOTHYROXINE SODIUM 25 MCG: 0.03 TABLET ORAL at 21:30

## 2023-03-20 RX ADMIN — MIRTAZAPINE 15 MG: 15 TABLET, FILM COATED ORAL at 21:29

## 2023-03-20 RX ADMIN — ACETAMINOPHEN 1000 MG: 500 TABLET, FILM COATED ORAL at 21:30

## 2023-03-20 RX ADMIN — NYSTATIN: 100000 POWDER TOPICAL at 11:04

## 2023-03-20 RX ADMIN — CLOTRIMAZOLE: 0.01 CREAM TOPICAL at 11:04

## 2023-03-20 RX ADMIN — FAMOTIDINE 20 MG: 20 TABLET, FILM COATED ORAL at 10:59

## 2023-03-20 RX ADMIN — CEFEPIME 2 G: 2 INJECTION, POWDER, FOR SOLUTION INTRAVENOUS at 15:51

## 2023-03-20 RX ADMIN — INSULIN ASPART 1 UNITS: 100 INJECTION, SOLUTION INTRAVENOUS; SUBCUTANEOUS at 17:51

## 2023-03-20 RX ADMIN — CLOTRIMAZOLE: 0.01 CREAM TOPICAL at 21:31

## 2023-03-20 RX ADMIN — TRIAMCINOLONE ACETONIDE: 1 CREAM TOPICAL at 11:05

## 2023-03-20 RX ADMIN — METRONIDAZOLE 500 MG: 500 TABLET ORAL at 21:30

## 2023-03-20 RX ADMIN — METOPROLOL SUCCINATE 25 MG: 25 TABLET, EXTENDED RELEASE ORAL at 10:59

## 2023-03-20 RX ADMIN — CLOPIDOGREL BISULFATE 75 MG: 75 TABLET, FILM COATED ORAL at 10:59

## 2023-03-20 RX ADMIN — ISOSORBIDE MONONITRATE 30 MG: 30 TABLET, EXTENDED RELEASE ORAL at 10:59

## 2023-03-20 RX ADMIN — TRIAMCINOLONE ACETONIDE: 1 CREAM TOPICAL at 21:33

## 2023-03-20 RX ADMIN — ACETAMINOPHEN 1000 MG: 500 TABLET, FILM COATED ORAL at 15:51

## 2023-03-20 RX ADMIN — INSULIN GLARGINE 17 UNITS: 100 INJECTION, SOLUTION SUBCUTANEOUS at 08:50

## 2023-03-20 RX ADMIN — ACETAMINOPHEN 1000 MG: 500 TABLET, FILM COATED ORAL at 05:14

## 2023-03-20 RX ADMIN — Medication 250 MG: at 10:59

## 2023-03-20 RX ADMIN — NYSTATIN: 100000 POWDER TOPICAL at 21:30

## 2023-03-20 RX ADMIN — Medication 250 MG: at 21:30

## 2023-03-20 RX ADMIN — SERTRALINE HYDROCHLORIDE 150 MG: 50 TABLET ORAL at 10:59

## 2023-03-20 RX ADMIN — GABAPENTIN 600 MG: 300 CAPSULE ORAL at 21:30

## 2023-03-20 RX ADMIN — INSULIN ASPART 2 UNITS: 100 INJECTION, SOLUTION INTRAVENOUS; SUBCUTANEOUS at 12:17

## 2023-03-20 RX ADMIN — METRONIDAZOLE 500 MG: 500 TABLET ORAL at 10:59

## 2023-03-20 ASSESSMENT — ACTIVITIES OF DAILY LIVING (ADL)
ADLS_ACUITY_SCORE: 51

## 2023-03-20 NOTE — ASSESSMENT & PLAN NOTE
He has had ongoing osteomyelitis treated with PICC line and IV antibiotic therapy.  nfectious diseases is available for consultation and by phone as well for any further questions that should read.  Jey Breen [phone]  -Resume antibiotics Flagyl 500 mg twice daily, cefepime 2 g every 24 and daptomycin 500 mg which is 6 mgs per kilogram every other day.  Can increase dosing or change dosing as needed for renal function.  -Dressing changes and wound care  3-19 past records were reviewed and once again confirms above dosing of cefepime, daptomycin and Flagyl through April 6, 2023

## 2023-03-20 NOTE — PROGRESS NOTES
[facility]    Medicine Progress Note - Hospitalist Service    Date of Admission:  3/15/2023    Assessment & Plan    Admitted with swelling and pain in right arm, arm with PICC line in place for treatment of osteomyelitis. Determined to have a DVT, treated with heparin with PICC removed and placed in left arm. Now being transitioned to oral warfarin. Likely will be discharged tomorrow to [facility] with ongoing antibiotic treatment, planned now through April 6, 2023    Chronic osteomyelitis (H)  He has had ongoing osteomyelitis treated with PICC line and IV antibiotic therapy.  nfectious diseases is available for consultation and by phone as well for any further questions that should read.  Jey Nuha [phone]  -Resume antibiotics Flagyl 500 mg twice daily, cefepime 2 g every 24 and daptomycin 500 mg which is 6 mgs per kilogram every other day.  Can increase dosing or change dosing as needed for renal function.  -Dressing changes and wound care  3-19 past records were reviewed and once again confirms above dosing of cefepime, daptomycin and Flagyl through April 6, 2023      Acute deep vein thrombosis (DVT) of axillary vein of right upper extremity (H)  -Presenting with swelling and pain of right arm, treated with IV heparin  -Swelling improved-transitioned to warfarin/lovenox   -INR therapeutic on 3/20/2023  -continue warfarin, will stop lovenox due to lower platelet count  -plan to discharge back to [facility] tomorrow with continued antibiotic treatment      Acute kidney injury (H)  On admit, up from normal to 3.11  Improving, down to 2.69, making good urine  Continue to follow    Coronary artery disease  3-19 records reviewed from [facility] shows that he had cardiac cath on December 5, 2022 showing RCA lesion that was treated with 2 drug-eluting stents.  Started on Plavix for 1 year.       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  3-19 Lantus and insulin sliding scale restarted             Diet: Consistent Carbohydrate Diet High Consistent Carb (75 g CHO per Meal) Diet    DVT Prophylaxis: Warfarin  Adkins Catheter: Not present  Lines: PRESENT      PICC 03/17/23 Single Lumen Left Basilic Long term antibiotics-Site Assessment: WDL      Cardiac Monitoring: None  Code Status: No CPR- Do NOT Intubate      Clinically Significant Risk Factors        # Hyperkalemia: Highest K = 5.4 mmol/L in last 2 days, will monitor as appropriate     # Hypomagnesemia: Lowest Mg = 1.5 mg/dL in last 2 days, will replace as needed   # Hypoalbuminemia: Lowest albumin = 3.2 g/dL at 3/17/2023  5:21 AM, will monitor as appropriate   # Thrombocytopenia: Lowest platelets = 122 in last 2 days, will monitor for bleeding        # DMII: A1C = 7.5 % (Ref range: 4.0 - 6.2 %) within past 6 months           Disposition Plan     Expected Discharge Date: 03/20/2023      Destination: long-term care facility            Jerrod Torres MD  Hospitalist Service  [facility]  Securely message with [employer] (more info)  Text page via [employer] Paging/Directory   ______________________________________________________________________    Interval History   Feeling better. Right arm less swollen, pain better. Has new PICC in left arm, doing well. Foot feeling betterf. No other complaints    Physical Exam   Vital Signs: Temp: 97.8  F (36.6  C) Temp src: Tympanic BP: (!) 166/62 Pulse: 65   Resp: 16 SpO2: 99 % O2 Device: None (Room air)    Weight: 200 lbs 8 oz    Constitutional: awake, alert, cooperative, no apparent distress, and appears stated age  Respiratory: No increased work of breathing, good air exchange, clear to auscultation bilaterally, no crackles or wheezing  Cardiovascular: regular rate and rhythm  GI: normal bowel sounds, soft and non-distended  Skin: right arm with minimal swelling. PICC  in left arm, looking good  Right foot dressed    Medical Decision Making       45 MINUTES SPENT BY ME on the date of service doing chart review, history, exam, documentation & further activities per the note.      Data     I have personally reviewed the following data over the past 24 hrs:    4.5  \   8.6 (L)   / 122 (L)     137 107 39.3 (H) /  133 (H)   5.1 21 (L) 2.69 (H) \       ALT: 8 (L) AST: 17 AP: 53 TBILI: <0.2   ALB: 3.4 (L) TOT PROTEIN: 6.7 LIPASE: N/A       Procal: N/A CRP: N/A Lactic Acid: 1.3       INR:  2.82 (H) PTT:  N/A   D-dimer:  N/A Fibrinogen:  N/A       Imaging results reviewed over the past 24 hrs:   No results found for this or any previous visit (from the past 24 hour(s)).

## 2023-03-20 NOTE — ASSESSMENT & PLAN NOTE
3-19 records reviewed from [facility] shows that he had cardiac cath on December 5, 2022 showing RCA lesion that was treated with 2 drug-eluting stents.  Started on Plavix for 1 year.

## 2023-03-20 NOTE — ASSESSMENT & PLAN NOTE
-hold all insulin and monitor on admit  -Blood glucose readings have been stable despite insulin being held.  3-19 Lantus and insulin sliding scale restarted

## 2023-03-20 NOTE — PROGRESS NOTES
:     Patient is currently residing at [facility] to receive IV antibiotics. Spoke with Mary Jo at [facility] to update on patients discharge plan.     Anticipated discharge is tomorrow.  will continue to follow for discharge planning needs.     NOEL Atwood on 3/20/2023 at 10:00 AM

## 2023-03-20 NOTE — ASSESSMENT & PLAN NOTE
-Presenting with swelling and pain of right arm, treated with IV heparin  -Swelling improved-transitioned to warfarin/lovenox   -INR therapeutic on 3/20/2023  -continue warfarin, will stop lovenox due to lower platelet count  3/21/2023-INR at 1.93. Will dose of lovenox, continue warfarin, pharmacy monitoring and adjusting dose. Back to Rehab today, Will need INR tomorrow with adjustment in coumadin and if still subtherapeutic would need additional lovenox dosing

## 2023-03-20 NOTE — PLAN OF CARE
Patient is alert and oriented x4. VSS except elevated B/P. Patient denies pain, nausea, and SOB. Area on R arm is improving. +1 edema noted to area. Scabs/rash noted to left arm. Dressing CDI to R foot. Incision is approximated. PICC line patent to LUE. He requires SBA with transfers and mobility. Will continue to monitor and update MD as appropriate.      Goal Outcome Evaluation:      Plan of Care Reviewed With: patient    Overall Patient Progress: improvingOverall Patient Progress: improving

## 2023-03-20 NOTE — PHARMACY-ANTICOAGULATION SERVICE
Pharmacy Consult- Coumadin Follow-Up    Elsa Horowitz is a 88 year old male admitted on 3/15/2023 with Acute deep vein thrombosis (DVT) of axillary vein of right upper extremity (H)    Primary Indication(s) for Anticoagulation: DVT    Goal INR: 2-3    FYI, patient is followed by the Anticoagulation/Protime Clinic at: N/A- patient is a new start on Warfarin- [facility] staff to manage at discharge    Patient Active Problem List   Diagnosis     Cellulitis     ACP (advance care planning)     Local infection of wound     Cellulitis of right foot     Chronic kidney disease, stage 4 (severe) (H)     Coronary artery disease     Diabetic neuropathy (H)     Essential hypertension     PAD (peripheral artery disease) (H)     Gastroesophageal reflux disease, unspecified whether esophagitis present     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)     Hyponatremia     Gangrene of right foot (H)     Acute respiratory failure with hypoxia (H)     Cognitive impairment     Gangrene (H)     History of intravenous drug use in remission     History of suicide attempt     Metal foreign body in chest     Altered mental status     Macular scar of both eyes     Chronic constipation     Dupuytren's disease of palm of both hands     Depressive disorder     Fatty liver     Foot ulcer (H)     History of amputation     Hypothyroidism, unspecified type     Intermittent claudication (H)     Living in assisted living     Long term (current) use of opiate analgesic     Low back pain     Lower limb amputation, great toe (H)     Lumbar disc disease     MDD (major depressive disorder), recurrent, in full remission (H)     Subacute osteomyelitis of right foot (H)     Stiffness of left hand joint     Stage 3b chronic kidney disease (H)     Spondylosis of thoracic region without myelopathy or radiculopathy     VRE (vancomycin resistant enterococcus) culture positive     Acute kidney injury (H)     Deep vein thrombosis (DVT) of upper  extremity (H)     Acute deep vein thrombosis (DVT) of axillary vein of right upper extremity (H)       New factors that may increase patient's sensitivity to warfarin (Coumadin) include: antibiotics, low hemoglobin, thrombocytopenia, concurrent administration with clopidogrel and enoxaparin    New factors that may decrease patient's sensitivity to warfarin (Coumadin) include: Non noted    Dietary Intake: % of consistent carb diet    Recent Labs   Lab Test 03/20/23  0508 03/19/23  0615 03/18/23  0549 03/17/23  0521 02/06/23  1339 01/31/23  1537   HGB 8.6* 8.6* 8.4* 8.5*   < > 8.5*   INR 2.82* 1.82*  --  1.45*  --  1.16*   * 122* 119* 116*   < > 299    < > = values in this interval not displayed.        Recent Dosing:    Date INR Dose Given   3/17 1.45 5 mg   3/18 N/A 5 mg   3/19 1.82 1 mg   3/20 2.82 See below       Plan: HOLD Warfarin x1. INR now therapeutic, but given large jump in INR yesterday and multiple risk factors for increased sensitivity to Warfarin, will proceed cautiously. Repeat INR in AM.    Thank You for the Consult. Will continue to follow.    Curtis Ratliff RPH ....................  3/20/2023   8:40 AM

## 2023-03-21 VITALS
TEMPERATURE: 97.8 F | DIASTOLIC BLOOD PRESSURE: 70 MMHG | WEIGHT: 198.6 LBS | RESPIRATION RATE: 18 BRPM | OXYGEN SATURATION: 95 % | BODY MASS INDEX: 26.2 KG/M2 | SYSTOLIC BLOOD PRESSURE: 148 MMHG | HEART RATE: 67 BPM

## 2023-03-21 LAB
GLUCOSE BLDC GLUCOMTR-MCNC: 152 MG/DL (ref 70–99)
GLUCOSE BLDC GLUCOMTR-MCNC: 213 MG/DL (ref 70–99)
HOLD SPECIMEN: NORMAL
HOLD SPECIMEN: NORMAL
INR PPP: 1.93 (ref 0.85–1.15)

## 2023-03-21 PROCEDURE — 85610 PROTHROMBIN TIME: CPT | Performed by: FAMILY MEDICINE

## 2023-03-21 PROCEDURE — 258N000003 HC RX IP 258 OP 636: Performed by: NURSE PRACTITIONER

## 2023-03-21 PROCEDURE — 36415 COLL VENOUS BLD VENIPUNCTURE: CPT | Performed by: FAMILY MEDICINE

## 2023-03-21 PROCEDURE — 250N000011 HC RX IP 250 OP 636: Performed by: FAMILY MEDICINE

## 2023-03-21 PROCEDURE — 250N000013 HC RX MED GY IP 250 OP 250 PS 637: Performed by: NURSE PRACTITIONER

## 2023-03-21 PROCEDURE — 250N000013 HC RX MED GY IP 250 OP 250 PS 637: Performed by: FAMILY MEDICINE

## 2023-03-21 PROCEDURE — 99239 HOSP IP/OBS DSCHRG MGMT >30: CPT | Performed by: FAMILY MEDICINE

## 2023-03-21 PROCEDURE — 250N000011 HC RX IP 250 OP 636: Performed by: NURSE PRACTITIONER

## 2023-03-21 RX ORDER — WARFARIN SODIUM 3 MG/1
3 TABLET ORAL
Status: DISCONTINUED | OUTPATIENT
Start: 2023-03-21 | End: 2023-03-21 | Stop reason: HOSPADM

## 2023-03-21 RX ORDER — WARFARIN SODIUM 3 MG/1
3 TABLET ORAL DAILY
Status: ON HOLD | DISCHARGE
Start: 2023-03-21 | End: 2023-04-25

## 2023-03-21 RX ORDER — ENOXAPARIN SODIUM 100 MG/ML
1 INJECTION SUBCUTANEOUS EVERY 24 HOURS
Status: ON HOLD | DISCHARGE
Start: 2023-03-22 | End: 2023-04-25

## 2023-03-21 RX ORDER — ENOXAPARIN SODIUM 100 MG/ML
1 INJECTION SUBCUTANEOUS EVERY 24 HOURS
Status: DISCONTINUED | OUTPATIENT
Start: 2023-03-21 | End: 2023-03-21 | Stop reason: HOSPADM

## 2023-03-21 RX ORDER — TRAMADOL HYDROCHLORIDE 50 MG/1
50 TABLET ORAL EVERY 8 HOURS PRN
Qty: 20 TABLET | Refills: 0 | Status: SHIPPED | OUTPATIENT
Start: 2023-03-21

## 2023-03-21 RX ORDER — HYDROCODONE BITARTRATE AND ACETAMINOPHEN 5; 325 MG/1; MG/1
1 TABLET ORAL EVERY 4 HOURS PRN
Qty: 20 TABLET | Refills: 0 | Status: SHIPPED | OUTPATIENT
Start: 2023-03-21

## 2023-03-21 RX ADMIN — CLOTRIMAZOLE: 0.01 CREAM TOPICAL at 09:43

## 2023-03-21 RX ADMIN — CLOPIDOGREL BISULFATE 75 MG: 75 TABLET, FILM COATED ORAL at 09:38

## 2023-03-21 RX ADMIN — TRIAMCINOLONE ACETONIDE: 1 CREAM TOPICAL at 09:43

## 2023-03-21 RX ADMIN — METRONIDAZOLE 500 MG: 500 TABLET ORAL at 09:38

## 2023-03-21 RX ADMIN — INSULIN ASPART 1 UNITS: 100 INJECTION, SOLUTION INTRAVENOUS; SUBCUTANEOUS at 08:30

## 2023-03-21 RX ADMIN — ACETAMINOPHEN 1000 MG: 500 TABLET, FILM COATED ORAL at 08:31

## 2023-03-21 RX ADMIN — INSULIN ASPART 2 UNITS: 100 INJECTION, SOLUTION INTRAVENOUS; SUBCUTANEOUS at 11:30

## 2023-03-21 RX ADMIN — NYSTATIN: 100000 POWDER TOPICAL at 09:43

## 2023-03-21 RX ADMIN — SERTRALINE HYDROCHLORIDE 150 MG: 50 TABLET ORAL at 09:38

## 2023-03-21 RX ADMIN — Medication 250 MG: at 09:38

## 2023-03-21 RX ADMIN — METOPROLOL SUCCINATE 25 MG: 25 TABLET, EXTENDED RELEASE ORAL at 09:38

## 2023-03-21 RX ADMIN — DAPTOMYCIN 500 MG: 500 INJECTION, POWDER, LYOPHILIZED, FOR SOLUTION INTRAVENOUS at 10:21

## 2023-03-21 RX ADMIN — ISOSORBIDE MONONITRATE 30 MG: 30 TABLET, EXTENDED RELEASE ORAL at 09:38

## 2023-03-21 RX ADMIN — INSULIN GLARGINE 17 UNITS: 100 INJECTION, SOLUTION SUBCUTANEOUS at 08:31

## 2023-03-21 RX ADMIN — ENOXAPARIN SODIUM 90 MG: 100 INJECTION SUBCUTANEOUS at 09:38

## 2023-03-21 ASSESSMENT — ACTIVITIES OF DAILY LIVING (ADL)
ADLS_ACUITY_SCORE: 52
ADLS_ACUITY_SCORE: 52
ADLS_ACUITY_SCORE: 51

## 2023-03-21 NOTE — PHARMACY-ANTICOAGULATION SERVICE
Pharmacy Consult- Coumadin Follow-Up    Elsa Hroowitz is a 88 year old male admitted on 3/15/2023 with Acute deep vein thrombosis (DVT) of axillary vein of right upper extremity (H)    Primary Indication(s) for Anticoagulation: DVT of upper extremity    Goal INR: 2-3    FYI, patient is followed by the Anticoagulation/Protime Clinic at: N/A- patient is a new start    Patient Active Problem List   Diagnosis     Cellulitis     ACP (advance care planning)     Local infection of wound     Cellulitis of right foot     Chronic kidney disease, stage 4 (severe) (H)     Coronary artery disease     Diabetic neuropathy (H)     Essential hypertension     PAD (peripheral artery disease) (H)     Gastroesophageal reflux disease, unspecified whether esophagitis present     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)     Hyponatremia     Gangrene of right foot (H)     Acute respiratory failure with hypoxia (H)     Cognitive impairment     Gangrene (H)     History of intravenous drug use in remission     History of suicide attempt     Metal foreign body in chest     Altered mental status     Macular scar of both eyes     Chronic constipation     Dupuytren's disease of palm of both hands     Depressive disorder     Fatty liver     Foot ulcer (H)     History of amputation     Hypothyroidism, unspecified type     Intermittent claudication (H)     Living in assisted living     Long term (current) use of opiate analgesic     Low back pain     Lower limb amputation, great toe (H)     Lumbar disc disease     MDD (major depressive disorder), recurrent, in full remission (H)     Subacute osteomyelitis of right foot (H)     Stiffness of left hand joint     Stage 3b chronic kidney disease (H)     Spondylosis of thoracic region without myelopathy or radiculopathy     VRE (vancomycin resistant enterococcus) culture positive     Acute kidney injury (H)     Deep vein thrombosis (DVT) of upper extremity (H)     Acute deep vein  Please send this patient a normal results letter:  Great news,  The A1C came down below 7.  Excellent news.    Thank you!    Connie    thrombosis (DVT) of axillary vein of right upper extremity (H)     Chronic osteomyelitis (H)       New factors that may increase patient's sensitivity to warfarin (Coumadin) include: low hemoglobin, low platelets, bridging with therapeutic Enoxaparin, IV antibiotics    New factors that may decrease patient's sensitivity to warfarin (Coumadin) include: dose HELD 3/20    Dietary Intake: % of consistent carb diet    Recent Labs   Lab Test 03/21/23  0753 03/20/23  0508 03/19/23  0615 03/18/23  0549 03/17/23  0521   HGB  --  8.6* 8.6* 8.4* 8.5*   INR 1.93* 2.82* 1.82*  --  1.45*   PLT  --  122* 122* 119* 116*        Recent Dosing:    Date INR Dose Given   3/17 1.45 5 mg   3/18 N/A 5 mg   3/19 1.82 1 mg   3/20 2.82 Dose HELD   3/21 1.93 See below         Plan: Give Warfarin 3 mg x1. INR now slightly subtherapeutic, down ~0.9 from 2.82 yesterday. Would expect INR to continue to drift down after held dose- discussed with Dr. Torres and will begin bridging with therapeutic Lovenox again until INR therapeutic. Plan is for patient to discharge back to the [facility] [place], operated by [facility] today- will need daily INR monitoring until therapeutic and will need close follow-up on INR thereafter, as therapeutic dosing will likely be a moving target.    Thank You for the Consult. Will continue to follow.    Curtis Ratliff [facility] ....................  3/21/2023   9:40 AM

## 2023-03-21 NOTE — PLAN OF CARE
Pt is A/O x 4, Denies pain. R arm still a little swollen. VS stable.     [facility] DISCHARGE NOTE    Patient discharged to nursing home at 12:26 PM via wheel chair. Accompanied by other:[facility] transport and staff. Discharge instructions reviewed with caregiver, opportunity offered to ask questions. Prescriptions sent to patients preferred pharmacy. All belongings sent with patient.    Nicolas Ayala, RN          Goal Outcome Evaluation:      Plan of Care Reviewed With: patient    Overall Patient Progress: improvingOverall Patient Progress: improving

## 2023-03-21 NOTE — DISCHARGE SUMMARY
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 Schulz on 3/28/2023 in [place].  The following   labs/tests are recommended: Needs INR testing on 3/22/2023 with monitoring   of warfarin dosing. Once therapeutic, the lovenox can be discontinued.             Unresulted Labs Ordered in the Past 30 Days of this Admission     No orders found from 2/13/2023 to 3/16/2023.      These results will be followed up by Dr. Schulz    Discharge Disposition   Discharged to short-term care facility  Condition at discharge: Satisfactory      Hospital Course   Chronic osteomyelitis (H)  He has had ongoing osteomyelitis treated with PICC line and IV antibiotic therapy.  nfectious diseases is available for consultation and by phone as well for any further questions that should read.  Jey Breen [phone]  -Resume antibiotics Flagyl 500 mg twice daily, cefepime 2 g every 24 and daptomycin 500 mg which is 6 mgs per kilogram every other day.  Can increase dosing or change dosing as needed for renal function.  -Dressing changes and wound care  3-19 past records were reviewed and once again confirms above dosing of cefepime, daptomycin and Flagyl through April 6, 2023      Acute deep vein thrombosis (DVT) of axillary vein of right upper extremity (H)  -Presenting with swelling and pain of right arm,  treated with IV heparin  -Swelling improved-transitioned to warfarin/lovenox   -INR therapeutic on 3/20/2023  -continue warfarin, will stop lovenox due to lower platelet count  3/21/2023-INR at 1.93. Will dose of lovenox, continue warfarin, pharmacy monitoring and adjusting dose. Back to Rehab today, Will need INR tomorrow with adjustment in coumadin and if still subtherapeutic would need additional lovenox dosing      Acute kidney injury (H)  On admit, up from normal to 3.11  Improving, down to 2.69, making good urine  Continue to follow    Coronary artery disease  3-19 records reviewed from [facility] shows that he had cardiac cath on December 5, 2022 showing RCA lesion that was treated with 2 drug-eluting stents.  Started on Plavix for 1 year.       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  3-19 Lantus and insulin sliding scale restarted          Consultations This Hospital Stay   PHARMACY IP CONSULT  PHARMACY IP CONSULT  SOCIAL WORK IP CONSULT  PHARMACY TO DOSE WARFARIN  OCCUPATIONAL THERAPY ADULT IP CONSULT  PHYSICAL THERAPY ADULT IP CONSULT    Code Status   No CPR- Do NOT Intubate    Time Spent on this Encounter   I, Jerrod Torres MD, personally saw the patient today and spent greater than 30 minutes discharging this patient.       Jerrod Torres MD  [facility] [place]  [address]  [place] [address]  Phone: [phone]  Fax: [phone]  ______________________________________________________________________    Physical Exam   Vital Signs: Temp: 97.8  F (36.6  C) Temp src: Tympanic BP: (!) 148/70 Pulse: 67   Resp: 18 SpO2: 95 % O2 Device: None (Room air)    Weight: 198 lbs 9.6 oz  Constitutional: awake, alert, cooperative, no apparent distress, and appears stated age  Respiratory: No increased work of breathing, good air exchange,  clear to auscultation bilaterally, no crackles or wheezing  Cardiovascular: regular rate and rhythm  GI: normal bowel sounds, soft and non-distended  Musculoskeletal: right arm with minimal swelling, moving well. Left arm with PICC in place, no tenderness. 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 Schulz on 3/28/2023 in [place].  The following labs/tests are recommended: Needs INR testing on 3/22/2023 with monitoring of warfarin dosing. Once therapeutic, the lovenox can be discontinued.     Reason for your hospital stay    Admitted with swelling and pain in right arm due to a deep vein thrombosis     Glucose monitor nursing POCT    Before meals and at bedtime     Wound care (specify)    Site:   right foot  Instructions:  change dressings daily     IV access    PICC.     Activity - Up ad davide     Occupational Therapy Adult Consult    Evaluate and treat as clinically indicated.    Reason:  partial amputation of right foot     Physical Therapy Adult Consult    Evaluate and treat as clinically indicated.    Reason:  partial amputation of right foot     Diet    Follow this diet upon discharge: Orders Placed This Encounter      Consistent Carbohydrate Diet High Consistent Carb (75 g CHO per Meal) Diet       Significant Results and Procedures   Most Recent 3 CBC's:Recent Labs   Lab Test 03/20/23  0508 03/19/23  0615 03/18/23  0549   WBC 4.5 4.7 5.4   HGB 8.6* 8.6* 8.4*   MCV 86  86 87   * 122* 119*     Most Recent 3 BMP's:Recent Labs   Lab Test 03/21/23  0800 03/20/23  2129 03/20/23  1608 03/20/23  0804 03/20/23  0508 03/19/23  0755 03/19/23  0615 03/18/23  0755 03/18/23  0549   NA  --   --   --   --  137  --  138  --  137   POTASSIUM  --   --   --   --  5.1  --  5.4*  5.3  --  5.3   CHLORIDE  --   --   --   --  107  --  108*  --  108*   CO2  --   --   --   --  21*  --  21*  --  19*   BUN  --   --   --   --  39.3*  --  41.7*  --  41.7*   CR  --   --   --   --  2.69*  --  2.67*  --  2.66*   ANIONGAP  --   --   --   --  9  --  9  --  10   JOSEE  --   --   --   --  8.8  --  8.7*  --  8.8   * 209* 172*   < > 160*   < > 181*   < > 152*    < > = values in this interval not displayed.   ,   Results for orders placed or performed during the hospital encounter of 03/15/23   US Upper Extremity Venous Duplex Right    Narrative    US UPPER EXTREMITY VENOUS DUPLEX RIGHT  3/15/2023 11:20 AM    History:Male, age 88 years; ; phlebitis noted at 8:30am. He was  running with Cefepime. Evaluate for possible blood clot.    Comparison: No relevant prior imaging.    Technique: Grayscale and color Doppler ultrasound of the right  upper  extremity venous structures.    Findings:   A PICC line is present within the basilic vein. Small volume of  nonocclusive thrombus is seen within the axillary and basilic vein.     The subclavian vein is difficult to assess.      Impression    Impression:  Positive for DVT.     Nonocclusive thrombus seen within the basilic and axillary vein.    UMBERTO BARNETT MD         SYSTEM ID:  [id number]   XR Chest Port 1 View    Narrative    PROCEDURE INFORMATION:   Exam: XR Chest   Exam date and time: 3/16/2023 10:13 PM   Age: 88 years old   Clinical indication: Shortness of breath; Additional info: Icrease SOB     TECHNIQUE:   Imaging protocol: Radiologic exam of the chest.   Views: 1 view.     COMPARISON:   CR XR CHEST PORT 1 VIEW 1/16/2023 10:39 AM     FINDINGS:   Lungs:  No  consolidation.   Pleural spaces: No pleural effusion. No pneumothorax.   Heart/Mediastinum: Unremarkable. No cardiomegaly.   Bones/joints: Unremarkable.       Impression    IMPRESSION:   No acute lung pathology.     THIS DOCUMENT HAS BEEN ELECTRONICALLY SIGNED BY LARA MARX MD   IR PICC Placement > 5 Yrs of Age    Narrative    PROCEDURE: IR PICC PLACEMENT > 5 YRS OF AGE    INDICATION:  osteo, antibiotics    COMPARISON: 3/16/2023.    FINDINGS: The risks and benefits of PICC insertion were discussed with  the patient who expressed understanding and a willingness to proceed.     Ultrasound of the left upper extremity was performed revealing a  patent basilic vein. The overlying skin was marked, prepped and draped  in a standard sterile fashion. Under ultrasound guidance, a  micropuncture set was used to access the vein and pass the  micropuncture wire. The needle was exchanged for a peel-away sheath. A  single lumen power injectable PICC was inserted through the peel-away  sheath and, under fluoroscopic guidance, guided into the SVC. The  inner wire was removed. The catheter withdrew freely and was flushed.  It was fixed in place utilizing standard technique. 0.0 minutes of  fluoro time were used. DAP 4.66 uGym^2, Skin dose 0.2 mGy.    The patient tolerated the procedure well with no immediate  complications.      Impression    IMPRESSION:    Successful ultrasound and fluoroscopic guided left PICC insertion.    SOHAIL BARNHART MD         SYSTEM ID:  [id number]       Discharge Medications   Current Discharge Medication List      START taking these medications    Details   enoxaparin ANTICOAGULANT (LOVENOX) 100 MG/ML syringe Inject 0.9 mLs (90 mg) Subcutaneous every 24 hours    Associated Diagnoses: DVT (deep vein thrombosis) in pregnancy      warfarin ANTICOAGULANT (COUMADIN) 3 MG tablet Take 1 tablet (3 mg) by mouth daily    Associated Diagnoses: DVT (deep vein thrombosis) in pregnancy         CONTINUE these  medications which have CHANGED    Details   HYDROcodone-acetaminophen (NORCO) 5-325 MG tablet Take 1 tablet by mouth every 4 hours as needed for severe pain (7-10)  Qty: 20 tablet, Refills: 0    Associated Diagnoses: DVT (deep vein thrombosis) in pregnancy; Chronic osteomyelitis (H)      traMADol (ULTRAM) 50 MG tablet Take 1 tablet (50 mg) by mouth every 8 hours as needed for severe pain (7-10)  Qty: 20 tablet, Refills: 0    Associated Diagnoses: DVT (deep vein thrombosis) in pregnancy; Chronic osteomyelitis (H)         CONTINUE these medications which have NOT CHANGED    Details   acetaminophen (TYLENOL) 500 MG tablet Take 1,000 mg by mouth 3 times daily      atorvastatin (LIPITOR) 40 MG tablet Take 40 mg by mouth every evening      ceFEPIme 2 g Inject 2 g into the vein every 24 hours      Cholecalciferol (VITAMIN D3) 2000 UNITS TABS Take 1 tablet by mouth daily.      clopidogrel (PLAVIX) 75 MG tablet Take 1 tablet by mouth daily.      clotrimazole (LOTRIMIN) 1 % external cream Apply topically 2 times daily - to groin for infection      DAPTOMYCIN IV Inject 500 mg into the vein every 48 hours      diclofenac (VOLTAREN) 1 % topical gel Apply topically 4 times daily - 2 gram to elbow, wrist, or hand  - 4 gram to knee, ankle, or foot      famotidine (PEPCID) 20 MG tablet Take 10 mg by mouth 2 times daily      furosemide (LASIX) 40 MG tablet Take 0.5 tablets (20 mg) by mouth daily      gabapentin (NEURONTIN) 300 MG capsule Take 600 mg by mouth At Bedtime      hydrocortisone 2.5 % cream Place rectally daily as needed (rectal pain/irritation)      insulin aspart (NOVOLOG FLEXPEN) 100 UNIT/ML pen Inject Subcutaneous 3 times daily (with meals) 125-149= 0 units, 150-199 = 2 units, 200-249= 4 units, 250-299= 6 units, 300-349= 8 units, 350-400 = 10 units, more than 400 call MD.      insulin glargine (LANTUS VIAL) 100 UNIT/ML vial Inject 17 Units Subcutaneous every morning      isosorbide mononitrate (IMDUR) 30 MG 24 hr tablet  Take 1 tablet by mouth every morning.      levothyroxine (SYNTHROID/LEVOTHROID) 25 MCG tablet Take 25 mcg by mouth every evening      loperamide (IMODIUM A-D) 2 MG tablet Take 2 capsules by mouth as needed after 1st loose stool, make take 1 capsule after each loose stool (up to 4 capsules / day)      losartan (COZAAR) 25 MG tablet Take 25 mg by mouth daily      magnesium oxide 400 MG CAPS Take 400 mg by mouth 2 times daily      melatonin 3 MG CAPS Take 9 mg by mouth At Bedtime      metoprolol succinate ER (TOPROL XL) 25 MG 24 hr tablet Take 25 mg by mouth daily      metroNIDAZOLE (FLAGYL) 500 MG tablet Take 500 mg by mouth 2 times daily      mirtazapine (REMERON) 15 MG tablet Take 15 mg by mouth At Bedtime      nystatin (MYCOSTATIN) 675299 UNIT/GM external powder Apply topically 2 times daily - to groin for infection      polyethylene glycol (MIRALAX) 17 g packet Take 1 packet by mouth daily      psyllium (METAMUCIL/KONSYL) capsule Take 1 capsule by mouth daily      saccharomyces boulardii (FLORASTOR) 250 MG capsule Take 250 mg by mouth 2 times daily      sertraline (ZOLOFT) 50 MG tablet Take 150 mg by mouth daily      tamsulosin (FLOMAX) 0.4 MG capsule Take 0.8 mg by mouth every evening      trolamine salicylate (ASPERCREME) 10 % external cream 2 times daily as needed bilateral hand and knees as needed for pain      nitroGLYcerin (NITROSTAT) 0.4 MG sublingual tablet Place 0.4 mg under the tongue every 5 minutes as needed for chest pain For chest pain place 1 tablet under the tongue every 5 minutes for 3 doses. If symptoms persist 5 minutes after 1st dose call 911.         STOP taking these medications       aspirin (ASA) 81 MG chewable tablet Comments:   Reason for Stopping:             Allergies   Allergies   Allergen Reactions     Penicillins Other (See Comments) and Hives     Pts. Arm swelled, but has tolerated rocephin     Tetanus Antitoxin Other (See Comments)     Tetanus Toxoid

## 2023-03-21 NOTE — PROGRESS NOTES
:     Patient is currently at [facility] for PICC line antibiotics. Plan for patient to discharge today back to [facility]. Spoke with Mary Jo from [facility] to update on patients discharge plan.     [facility] will provide transportation for patient at 1210. They will borrow a wheelchair.     No further needs from  at this time.     NOEL Atwood on 3/21/2023 at 9:25 AM

## 2023-03-21 NOTE — PHARMACY - DISCHARGE MEDICATION RECONCILIATION AND EDUCATION
Pharmacy:  Discharge Counseling and Medication Reconciliation    Elsa POLLARD Lor  [address]  [facility] [id number] [phone] (home)   88 year old male  PCP: No Ref-Primary, Physician    Allergies: Penicillins, Tetanus antitoxin, and Tetanus toxoid    Discharge Counseling:    Pharmacist did not meet with patient prior to discharge, as patient is being discharged back to the [facility], where facility staff manage and administer medications.    Summary of Education: N/A    Materials Provided:  MedCounselor sheets printed from Clinical Pharmacology on: N/A    Discharge Medication Reconciliation:  Patient is to continue therapy with warfarin- plan is to receive 3 mg x1 tonight, with INR recheck tomorrow. Patient is also to continue on therapeutic Lovenox 1 mg/kg Q24H (adjusted based on renal function) until INR is therapeutic, and then discontinue.    It has been determined that the patient has an adequate supply of medications available or which can be obtained from the [facility] pharmacy, which has been confirmed as: [facility] [place].    Thank you for the consult.    Curtis Ratliff [facility]........March 21, 2023 12:02 PM

## 2023-03-22 ENCOUNTER — PATIENT OUTREACH (OUTPATIENT)
Dept: FAMILY MEDICINE | Facility: OTHER | Age: 88
End: 2023-03-22
Payer: COMMERCIAL

## 2023-03-22 NOTE — TELEPHONE ENCOUNTER
Patient has PCP elsewhere, no follow-up here. No TCM call required per policy.  Dena Webster RN on 3/22/2023 at 8:00 AM

## 2023-03-23 ENCOUNTER — DOCUMENTATION ONLY (OUTPATIENT)
Dept: OTHER | Facility: CLINIC | Age: 88
End: 2023-03-23
Payer: COMMERCIAL

## 2023-03-23 ENCOUNTER — LAB REQUISITION (OUTPATIENT)
Dept: LAB | Facility: OTHER | Age: 88
End: 2023-03-23
Payer: COMMERCIAL

## 2023-03-23 DIAGNOSIS — M86.271 SUBACUTE OSTEOMYELITIS, RIGHT ANKLE AND FOOT (H): ICD-10-CM

## 2023-03-23 DIAGNOSIS — E11.42 TYPE 2 DIABETES MELLITUS WITH DIABETIC POLYNEUROPATHY (H): ICD-10-CM

## 2023-03-23 LAB
BASOPHILS # BLD AUTO: 0.1 10E3/UL (ref 0–0.2)
BASOPHILS NFR BLD AUTO: 1 %
CK SERPL-CCNC: 62 U/L (ref 39–308)
EOSINOPHIL # BLD AUTO: 0.4 10E3/UL (ref 0–0.7)
EOSINOPHIL NFR BLD AUTO: 7 %
ERYTHROCYTE [DISTWIDTH] IN BLOOD BY AUTOMATED COUNT: 17 % (ref 10–15)
FERRITIN SERPL-MCNC: 128 NG/ML (ref 31–409)
FOLATE SERPL-MCNC: 11.2 NG/ML (ref 4.6–34.8)
HCT VFR BLD AUTO: 26.9 % (ref 40–53)
HGB BLD-MCNC: 8.6 G/DL (ref 13.3–17.7)
HOLD SPECIMEN: NORMAL
HOLD SPECIMEN: NORMAL
IMM GRANULOCYTES # BLD: 0 10E3/UL
IMM GRANULOCYTES NFR BLD: 0 %
LYMPHOCYTES # BLD AUTO: 1.3 10E3/UL (ref 0.8–5.3)
LYMPHOCYTES NFR BLD AUTO: 20 %
MCH RBC QN AUTO: 27.8 PG (ref 26.5–33)
MCHC RBC AUTO-ENTMCNC: 32 G/DL (ref 31.5–36.5)
MCV RBC AUTO: 87 FL (ref 78–100)
MONOCYTES # BLD AUTO: 0.4 10E3/UL (ref 0–1.3)
MONOCYTES NFR BLD AUTO: 6 %
NEUTROPHILS # BLD AUTO: 4.2 10E3/UL (ref 1.6–8.3)
NEUTROPHILS NFR BLD AUTO: 66 %
NRBC # BLD AUTO: 0 10E3/UL
NRBC BLD AUTO-RTO: 0 /100
PLATELET # BLD AUTO: 129 10E3/UL (ref 150–450)
RBC # BLD AUTO: 3.09 10E6/UL (ref 4.4–5.9)
RETICS # AUTO: 0.04 10E6/UL (ref 0.03–0.1)
RETICS/RBC NFR AUTO: 1.4 % (ref 0.5–2)
VIT B12 SERPL-MCNC: 517 PG/ML (ref 232–1245)
WBC # BLD AUTO: 6.3 10E3/UL (ref 4–11)

## 2023-03-23 PROCEDURE — 82550 ASSAY OF CK (CPK): CPT | Performed by: INTERNAL MEDICINE

## 2023-03-23 PROCEDURE — 85045 AUTOMATED RETICULOCYTE COUNT: CPT | Performed by: INTERNAL MEDICINE

## 2023-03-23 PROCEDURE — 82607 VITAMIN B-12: CPT | Performed by: INTERNAL MEDICINE

## 2023-03-23 PROCEDURE — 82746 ASSAY OF FOLIC ACID SERUM: CPT | Performed by: INTERNAL MEDICINE

## 2023-03-23 PROCEDURE — 85025 COMPLETE CBC W/AUTO DIFF WBC: CPT | Performed by: INTERNAL MEDICINE

## 2023-03-23 PROCEDURE — 82728 ASSAY OF FERRITIN: CPT | Performed by: INTERNAL MEDICINE

## 2023-03-27 ENCOUNTER — LAB REQUISITION (OUTPATIENT)
Dept: LAB | Facility: OTHER | Age: 88
End: 2023-03-27
Payer: COMMERCIAL

## 2023-03-27 ENCOUNTER — DOCUMENTATION ONLY (OUTPATIENT)
Dept: OTHER | Facility: CLINIC | Age: 88
End: 2023-03-27

## 2023-03-27 DIAGNOSIS — Z22.39 CARRIER OF OTHER SPECIFIED BACTERIAL DISEASES: ICD-10-CM

## 2023-03-27 LAB
ALBUMIN SERPL BCG-MCNC: 2.9 G/DL (ref 3.5–5.2)
ALP SERPL-CCNC: 52 U/L (ref 40–129)
ALT SERPL W P-5'-P-CCNC: 10 U/L (ref 10–50)
ANION GAP SERPL CALCULATED.3IONS-SCNC: 9 MMOL/L (ref 7–15)
AST SERPL W P-5'-P-CCNC: 21 U/L (ref 10–50)
BASOPHILS # BLD AUTO: 0.1 10E3/UL (ref 0–0.2)
BASOPHILS NFR BLD AUTO: 2 %
BILIRUB SERPL-MCNC: 0.2 MG/DL
BUN SERPL-MCNC: 36.8 MG/DL (ref 8–23)
CALCIUM SERPL-MCNC: 7.4 MG/DL (ref 8.8–10.2)
CHLORIDE SERPL-SCNC: 111 MMOL/L (ref 98–107)
CK SERPL-CCNC: 52 U/L (ref 39–308)
CREAT SERPL-MCNC: 2.64 MG/DL (ref 0.67–1.17)
DEPRECATED HCO3 PLAS-SCNC: 18 MMOL/L (ref 22–29)
EOSINOPHIL # BLD AUTO: 0.6 10E3/UL (ref 0–0.7)
EOSINOPHIL NFR BLD AUTO: 11 %
ERYTHROCYTE [DISTWIDTH] IN BLOOD BY AUTOMATED COUNT: 16.9 % (ref 10–15)
GFR SERPL CREATININE-BSD FRML MDRD: 23 ML/MIN/1.73M2
GLUCOSE SERPL-MCNC: 104 MG/DL (ref 70–99)
HCT VFR BLD AUTO: 26.7 % (ref 40–53)
HGB BLD-MCNC: 8.5 G/DL (ref 13.3–17.7)
HOLD SPECIMEN: NORMAL
IMM GRANULOCYTES # BLD: 0 10E3/UL
IMM GRANULOCYTES NFR BLD: 0 %
LYMPHOCYTES # BLD AUTO: 1.2 10E3/UL (ref 0.8–5.3)
LYMPHOCYTES NFR BLD AUTO: 22 %
MCH RBC QN AUTO: 27.6 PG (ref 26.5–33)
MCHC RBC AUTO-ENTMCNC: 31.8 G/DL (ref 31.5–36.5)
MCV RBC AUTO: 87 FL (ref 78–100)
MONOCYTES # BLD AUTO: 0.4 10E3/UL (ref 0–1.3)
MONOCYTES NFR BLD AUTO: 7 %
NEUTROPHILS # BLD AUTO: 3.1 10E3/UL (ref 1.6–8.3)
NEUTROPHILS NFR BLD AUTO: 58 %
NRBC # BLD AUTO: 0 10E3/UL
NRBC BLD AUTO-RTO: 0 /100
PATH REPORT.FINAL DX SPEC: NORMAL
PLATELET # BLD AUTO: 155 10E3/UL (ref 150–450)
POTASSIUM SERPL-SCNC: 4.4 MMOL/L (ref 3.4–5.3)
PROT SERPL-MCNC: 5.8 G/DL (ref 6.4–8.3)
RBC # BLD AUTO: 3.08 10E6/UL (ref 4.4–5.9)
SODIUM SERPL-SCNC: 138 MMOL/L (ref 136–145)
WBC # BLD AUTO: 5.3 10E3/UL (ref 4–11)

## 2023-03-27 PROCEDURE — 82550 ASSAY OF CK (CPK): CPT | Performed by: INTERNAL MEDICINE

## 2023-03-27 PROCEDURE — 85025 COMPLETE CBC W/AUTO DIFF WBC: CPT | Performed by: INTERNAL MEDICINE

## 2023-03-27 PROCEDURE — 80053 COMPREHEN METABOLIC PANEL: CPT | Performed by: INTERNAL MEDICINE

## 2023-03-28 ENCOUNTER — LAB REQUISITION (OUTPATIENT)
Dept: LAB | Facility: OTHER | Age: 88
End: 2023-03-28
Payer: COMMERCIAL

## 2023-03-28 DIAGNOSIS — Z22.39 CARRIER OF OTHER SPECIFIED BACTERIAL DISEASES: ICD-10-CM

## 2023-03-28 LAB — HEMOCCULT STL QL: NEGATIVE

## 2023-03-28 PROCEDURE — 82272 OCCULT BLD FECES 1-3 TESTS: CPT | Performed by: NURSE PRACTITIONER

## 2023-03-29 ENCOUNTER — LAB REQUISITION (OUTPATIENT)
Dept: LAB | Facility: OTHER | Age: 88
End: 2023-03-29
Payer: COMMERCIAL

## 2023-03-29 DIAGNOSIS — Z22.39 CARRIER OF OTHER SPECIFIED BACTERIAL DISEASES: ICD-10-CM

## 2023-03-29 LAB — HEMOCCULT STL QL: NEGATIVE

## 2023-03-30 ENCOUNTER — LAB REQUISITION (OUTPATIENT)
Dept: LAB | Facility: OTHER | Age: 88
End: 2023-03-30
Payer: COMMERCIAL

## 2023-03-30 DIAGNOSIS — Z22.39 CARRIER OF OTHER SPECIFIED BACTERIAL DISEASES: ICD-10-CM

## 2023-03-30 LAB — HEMOCCULT STL QL: NEGATIVE

## 2023-03-30 PROCEDURE — 82272 OCCULT BLD FECES 1-3 TESTS: CPT | Performed by: NURSE PRACTITIONER

## 2023-04-02 ENCOUNTER — HOSPITAL ENCOUNTER (EMERGENCY)
Facility: OTHER | Age: 88
Discharge: NURSING, REHAB OR LONG-TERM CARE | End: 2023-04-02
Attending: STUDENT IN AN ORGANIZED HEALTH CARE EDUCATION/TRAINING PROGRAM | Admitting: STUDENT IN AN ORGANIZED HEALTH CARE EDUCATION/TRAINING PROGRAM
Payer: COMMERCIAL

## 2023-04-02 VITALS
WEIGHT: 198 LBS | RESPIRATION RATE: 16 BRPM | TEMPERATURE: 96.3 F | HEIGHT: 73 IN | HEART RATE: 74 BPM | SYSTOLIC BLOOD PRESSURE: 141 MMHG | OXYGEN SATURATION: 95 % | BODY MASS INDEX: 26.24 KG/M2 | DIASTOLIC BLOOD PRESSURE: 89 MMHG

## 2023-04-02 DIAGNOSIS — T81.30XA WOUND DEHISCENCE: ICD-10-CM

## 2023-04-02 PROCEDURE — 99283 EMERGENCY DEPT VISIT LOW MDM: CPT | Performed by: STUDENT IN AN ORGANIZED HEALTH CARE EDUCATION/TRAINING PROGRAM

## 2023-04-02 PROCEDURE — 250N000009 HC RX 250: Performed by: STUDENT IN AN ORGANIZED HEALTH CARE EDUCATION/TRAINING PROGRAM

## 2023-04-02 RX ORDER — GINSENG 100 MG
CAPSULE ORAL ONCE
Status: COMPLETED | OUTPATIENT
Start: 2023-04-02 | End: 2023-04-02

## 2023-04-02 RX ADMIN — BACITRACIN: 500 OINTMENT TOPICAL at 06:39

## 2023-04-02 ASSESSMENT — ACTIVITIES OF DAILY LIVING (ADL): ADLS_ACUITY_SCORE: 35

## 2023-04-02 NOTE — ED NOTES
[facility] was called and no transport is available today. Nurse calling [facility] for transport.    Kyung Anthony RN on 4/2/2023 at 8:08 AM

## 2023-04-02 NOTE — ED TRIAGE NOTES
"Patient arrives from [facility] with concerns of a wound dehiscence.  He had surgery to amputate the \"front quarter of his right foot\".  Report from EMS states that his sutures came out and patient woke up with his foot wet with blood.  Martha from [facility] called with report and stated that when patient notified them of the bleeding, he had bled through and ABD, curlex, an ACE wrap and his sock.  At this time the bleeding is minimal and well controlled.  He is on coumadin.  He has a PICC line in place for antibiotics.  Currently non weight bearing on foot and [facility] reports that patient has been compliant with this.  Patient denies any pain.       Triage Assessment     Row Name 04/02/23 0528       Triage Assessment (Adult)    Airway WDL WDL       Respiratory WDL    Respiratory WDL WDL       Skin Circulation/Temperature WDL    Skin Circulation/Temperature WDL X       Cardiac WDL    Cardiac WDL WDL       Peripheral/Neurovascular WDL    Peripheral Neurovascular WDL WDL       Cognitive/Neuro/Behavioral WDL    Cognitive/Neuro/Behavioral WDL WDL       Jenae Coma Scale    Assessment Qualifiers patient not sedated/intubated              "

## 2023-04-02 NOTE — DISCHARGE INSTRUCTIONS
It is very important that you call your podiatrist first thing Monday morning to discuss the fact that you are having bleeding and what appears to be wound breakdown/dehiscence.  Fortunately there is no evidence of superimposed superficial infection.  If you develop fevers or significant pain or significant bleeding from your wound, please return to the ER for evaluation.  Otherwise, try to schedule appointment with your podiatrist on Monday to be seen.  I would not take you off of your anticoagulation this point in time.

## 2023-04-02 NOTE — ED PROVIDER NOTES
Emergency Department Provider Note  : 1935 Age: 88 year old Sex: male MRN: [id number]    Chief Complaint   Patient presents with     Wound Dehiscence       Medical Decision Making / Assessment / Plan   88 year old male with a PMH of recent partial foot amputation per podiatry at [facility] in [place] who presents with small amount of bleeding from his wound noticed by nursing at his care facility today.  The patient has essentially no complaints otherwise.  His wound exam is notable for evidence of what appears to be early dehiscence without huma bleeding.  There is a small amount of oozing when stimulated but this quickly stops with any sort of pressure.  Looking back at the patient's INR would suggest that he is either therapeutic or subtherapeutic rather than supratherapeutic he has been taking his medications as prescribed. Do not feel this represents a concerning hemorrhagic complication or byproduct of warfarin really but rather complication of mild developing dehiscence.  Given lack of infectious findings and the fact he is on ongoing antibiotic infusions, we will follow-up with podiatry.  Did attempt to give his podiatrist to call they are not on-call until Monday.  Plan for evaluation in clinic per podiatry for discussion of future management.  Return precautions given for signs or symptoms of worsening bleeding or infectious complication.          The patient was informed of the plan and verbalized understanding and agreed with the plan. The patient was given strict return to Emergency Department precautions as well as appropriate follow up instructions. The patient was discharged in stable condition.    New Prescriptions    No medications on file       Final diagnoses:   Wound dehiscence       Umer Hennessy MD  2023   Emergency Department    Subjective   Elsa is a 88 year old male with PMH of CKD, DVT, DM2, recent partial foot amputation per Dr. Shawna Arteaga who presents at 5:22 AM for  "evaluation of small amount of bleeding from the area of recent partial right foot amputation on February 24.  Patient was discharged to the [facility] and returns to the ER today after nursing they are noticed that he has a small amount of blood on his dressing adjacent to his right foot wound.  No reported fevers.  Patient denies any fever or pain.  He is still on ongoing antibiotic infusions for cultures that grew Enterococcus Faecium and Pseudomonas.     I have reviewed the Medications, Allergies, Past Medical and Surgical History, and Social History in the Epic System and with family.    Review of Systems:  Please see HPI for pertinent positives and negatives. All other systems reviewed and found to be negative.      Objective   BP: (!) 141/89  Pulse: 74  Temp: (!) 96.3  F (35.7  C)  Resp: 16  Height: 185.4 cm (6' 1\")  Weight: 89.8 kg (198 lb)  SpO2: 95 %    Physical Exam:   Gen: Comfortable. NAD  HEENT: MMM. AT/NC.  Eye: EOMI.   CV: Well perfused.   Pulm: Nonlabored, equal chest rise  Abd: ND.   Ext:  Partial right foot amputation with sutures in place with what appears to be early dehiscence without huma opening of the wound over the lateral aspect.  There is no surrounding erythema or drainage or focal induration/edema.    Neuro: AOx3, no focal deficit noted  Psych: Appropriate affect, cognition intact    Procedures / Critical Care   Procedures    Critical Care Time: none         Medical/Surgical History:  Past Medical History:   Diagnosis Date     Chronic kidney disease, stage 4 (severe) (H) 7/4/2022     Coronary artery disease 1/14/2023     Diabetic neuropathy (H) 1/14/2023     Essential hypertension 1/14/2023     Gastroesophageal reflux disease, unspecified whether esophagitis present 4/17/2018     PAD (peripheral artery disease) (H) 11/29/2022     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) 1/8/2019     Past Surgical History:   Procedure Laterality Date     IR PICC PLACEMENT > 5 YRS " OF AGE  3/17/2023       Medications:  No current facility-administered medications for this encounter.     Current Outpatient Medications   Medication     acetaminophen (TYLENOL) 500 MG tablet     atorvastatin (LIPITOR) 40 MG tablet     ceFEPIme 2 g     Cholecalciferol (VITAMIN D3) 2000 UNITS TABS     clopidogrel (PLAVIX) 75 MG tablet     clotrimazole (LOTRIMIN) 1 % external cream     DAPTOMYCIN IV     diclofenac (VOLTAREN) 1 % topical gel     enoxaparin ANTICOAGULANT (LOVENOX) 100 MG/ML syringe     famotidine (PEPCID) 20 MG tablet     furosemide (LASIX) 40 MG tablet     gabapentin (NEURONTIN) 300 MG capsule     HYDROcodone-acetaminophen (NORCO) 5-325 MG tablet     hydrocortisone 2.5 % cream     insulin aspart (NOVOLOG FLEXPEN) 100 UNIT/ML pen     insulin glargine (LANTUS VIAL) 100 UNIT/ML vial     isosorbide mononitrate (IMDUR) 30 MG 24 hr tablet     levothyroxine (SYNTHROID/LEVOTHROID) 25 MCG tablet     loperamide (IMODIUM A-D) 2 MG tablet     losartan (COZAAR) 25 MG tablet     magnesium oxide 400 MG CAPS     melatonin 3 MG CAPS     metoprolol succinate ER (TOPROL XL) 25 MG 24 hr tablet     metroNIDAZOLE (FLAGYL) 500 MG tablet     mirtazapine (REMERON) 15 MG tablet     nitroGLYcerin (NITROSTAT) 0.4 MG sublingual tablet     nystatin (MYCOSTATIN) 886241 UNIT/GM external powder     polyethylene glycol (MIRALAX) 17 g packet     psyllium (METAMUCIL/KONSYL) capsule     saccharomyces boulardii (FLORASTOR) 250 MG capsule     sertraline (ZOLOFT) 50 MG tablet     tamsulosin (FLOMAX) 0.4 MG capsule     traMADol (ULTRAM) 50 MG tablet     trolamine salicylate (ASPERCREME) 10 % external cream     warfarin ANTICOAGULANT (COUMADIN) 3 MG tablet       Allergies:  Penicillins, Tetanus antitoxin, and Tetanus toxoid    Relevant labs, images, EKGs, Epic and outside hospital (if applicable) charts were reviewed. The findings, diagnosis, plan, and need for follow up were discussed with the patient/family. Nursing notes were reviewed.       Umer Hennessy MD  04/02/23 0716

## 2023-04-04 ENCOUNTER — LAB REQUISITION (OUTPATIENT)
Dept: LAB | Facility: OTHER | Age: 88
End: 2023-04-04
Payer: COMMERCIAL

## 2023-04-04 DIAGNOSIS — M86.271 SUBACUTE OSTEOMYELITIS, RIGHT ANKLE AND FOOT (H): ICD-10-CM

## 2023-04-04 LAB
ALBUMIN SERPL BCG-MCNC: 3.3 G/DL (ref 3.5–5.2)
ALP SERPL-CCNC: 65 U/L (ref 40–129)
ALT SERPL W P-5'-P-CCNC: 11 U/L (ref 10–50)
ANION GAP SERPL CALCULATED.3IONS-SCNC: 10 MMOL/L (ref 7–15)
AST SERPL W P-5'-P-CCNC: 19 U/L (ref 10–50)
BASOPHILS # BLD AUTO: 0.1 10E3/UL (ref 0–0.2)
BASOPHILS NFR BLD AUTO: 1 %
BILIRUB SERPL-MCNC: <0.2 MG/DL
BUN SERPL-MCNC: 59.3 MG/DL (ref 8–23)
CALCIUM SERPL-MCNC: 8.4 MG/DL (ref 8.8–10.2)
CHLORIDE SERPL-SCNC: 108 MMOL/L (ref 98–107)
CK SERPL-CCNC: 55 U/L (ref 39–308)
CREAT SERPL-MCNC: 3.6 MG/DL (ref 0.67–1.17)
DEPRECATED HCO3 PLAS-SCNC: 21 MMOL/L (ref 22–29)
EOSINOPHIL # BLD AUTO: 0.5 10E3/UL (ref 0–0.7)
EOSINOPHIL NFR BLD AUTO: 6 %
ERYTHROCYTE [DISTWIDTH] IN BLOOD BY AUTOMATED COUNT: 16.6 % (ref 10–15)
GFR SERPL CREATININE-BSD FRML MDRD: 16 ML/MIN/1.73M2
GLUCOSE SERPL-MCNC: 123 MG/DL (ref 70–99)
HCT VFR BLD AUTO: 25.9 % (ref 40–53)
HGB BLD-MCNC: 8.3 G/DL (ref 13.3–17.7)
HOLD SPECIMEN: NORMAL
IMM GRANULOCYTES # BLD: 0 10E3/UL
IMM GRANULOCYTES NFR BLD: 0 %
LYMPHOCYTES # BLD AUTO: 1.3 10E3/UL (ref 0.8–5.3)
LYMPHOCYTES NFR BLD AUTO: 14 %
MCH RBC QN AUTO: 28 PG (ref 26.5–33)
MCHC RBC AUTO-ENTMCNC: 32 G/DL (ref 31.5–36.5)
MCV RBC AUTO: 88 FL (ref 78–100)
MONOCYTES # BLD AUTO: 0.4 10E3/UL (ref 0–1.3)
MONOCYTES NFR BLD AUTO: 5 %
NEUTROPHILS # BLD AUTO: 6.6 10E3/UL (ref 1.6–8.3)
NEUTROPHILS NFR BLD AUTO: 74 %
NRBC # BLD AUTO: 0 10E3/UL
NRBC BLD AUTO-RTO: 0 /100
PLATELET # BLD AUTO: 143 10E3/UL (ref 150–450)
POTASSIUM SERPL-SCNC: 5.8 MMOL/L (ref 3.4–5.3)
PROT SERPL-MCNC: 6.5 G/DL (ref 6.4–8.3)
RBC # BLD AUTO: 2.96 10E6/UL (ref 4.4–5.9)
SODIUM SERPL-SCNC: 139 MMOL/L (ref 136–145)
WBC # BLD AUTO: 8.9 10E3/UL (ref 4–11)

## 2023-04-04 PROCEDURE — 82550 ASSAY OF CK (CPK): CPT

## 2023-04-04 PROCEDURE — 80053 COMPREHEN METABOLIC PANEL: CPT

## 2023-04-04 PROCEDURE — 85025 COMPLETE CBC W/AUTO DIFF WBC: CPT

## 2023-04-05 ENCOUNTER — HOSPITAL ENCOUNTER (EMERGENCY)
Facility: OTHER | Age: 88
Discharge: HOME | End: 2023-04-05
Attending: STUDENT IN AN ORGANIZED HEALTH CARE EDUCATION/TRAINING PROGRAM | Admitting: STUDENT IN AN ORGANIZED HEALTH CARE EDUCATION/TRAINING PROGRAM
Payer: COMMERCIAL

## 2023-04-05 VITALS
OXYGEN SATURATION: 95 % | HEART RATE: 81 BPM | SYSTOLIC BLOOD PRESSURE: 138 MMHG | HEIGHT: 73 IN | BODY MASS INDEX: 26.24 KG/M2 | TEMPERATURE: 98.2 F | DIASTOLIC BLOOD PRESSURE: 65 MMHG | WEIGHT: 198 LBS | RESPIRATION RATE: 16 BRPM

## 2023-04-05 DIAGNOSIS — N17.9 AKI (ACUTE KIDNEY INJURY) (H): ICD-10-CM

## 2023-04-05 DIAGNOSIS — E87.5 HYPERKALEMIA: ICD-10-CM

## 2023-04-05 LAB
ANION GAP SERPL CALCULATED.3IONS-SCNC: 12 MMOL/L (ref 7–15)
BUN SERPL-MCNC: 60.7 MG/DL (ref 8–23)
CALCIUM SERPL-MCNC: 8.9 MG/DL (ref 8.8–10.2)
CHLORIDE SERPL-SCNC: 103 MMOL/L (ref 98–107)
CK SERPL-CCNC: 72 U/L (ref 39–308)
CREAT SERPL-MCNC: 3.47 MG/DL (ref 0.67–1.17)
DEPRECATED HCO3 PLAS-SCNC: 20 MMOL/L (ref 22–29)
GFR SERPL CREATININE-BSD FRML MDRD: 16 ML/MIN/1.73M2
GLUCOSE SERPL-MCNC: 103 MG/DL (ref 70–99)
HOLD SPECIMEN: NORMAL
POTASSIUM SERPL-SCNC: 5.5 MMOL/L (ref 3.4–5.3)
SODIUM SERPL-SCNC: 135 MMOL/L (ref 136–145)

## 2023-04-05 PROCEDURE — 93005 ELECTROCARDIOGRAM TRACING: CPT | Performed by: STUDENT IN AN ORGANIZED HEALTH CARE EDUCATION/TRAINING PROGRAM

## 2023-04-05 PROCEDURE — 82550 ASSAY OF CK (CPK): CPT | Performed by: STUDENT IN AN ORGANIZED HEALTH CARE EDUCATION/TRAINING PROGRAM

## 2023-04-05 PROCEDURE — 99284 EMERGENCY DEPT VISIT MOD MDM: CPT | Performed by: STUDENT IN AN ORGANIZED HEALTH CARE EDUCATION/TRAINING PROGRAM

## 2023-04-05 PROCEDURE — 82310 ASSAY OF CALCIUM: CPT | Performed by: STUDENT IN AN ORGANIZED HEALTH CARE EDUCATION/TRAINING PROGRAM

## 2023-04-05 PROCEDURE — 93010 ELECTROCARDIOGRAM REPORT: CPT | Performed by: INTERNAL MEDICINE

## 2023-04-05 PROCEDURE — 36415 COLL VENOUS BLD VENIPUNCTURE: CPT | Performed by: STUDENT IN AN ORGANIZED HEALTH CARE EDUCATION/TRAINING PROGRAM

## 2023-04-05 PROCEDURE — 250N000013 HC RX MED GY IP 250 OP 250 PS 637: Performed by: STUDENT IN AN ORGANIZED HEALTH CARE EDUCATION/TRAINING PROGRAM

## 2023-04-05 RX ADMIN — SODIUM ZIRCONIUM CYCLOSILICATE 5 G: 5 POWDER, FOR SUSPENSION ORAL at 19:21

## 2023-04-05 ASSESSMENT — ACTIVITIES OF DAILY LIVING (ADL): ADLS_ACUITY_SCORE: 35

## 2023-04-05 NOTE — ED PROVIDER NOTES
Emergency Department Provider Note  : 1935 Age: 88 year old Sex: male MRN: [id number]    Chief Complaint   Patient presents with     Abnormal Labs       Medical Decision Making / Assessment / Plan   88 year old male presenting with with hyperkalemia.      Repeating labs today demonstrate that his potassium is indeed 5.5 and he has a mild acute kidney injury of 3.47.  It appears that his weight is down somewhere between 15 and 40 pounds. Suspect that he is over-diuresed and no peripheral edema.  No other culprit medications to cause LUIS. Suspect mild hyperkalemia related to AKIas well. He was given one dose of lokelma and furosemide was stopped on discharge.EKG without peaked t waves. Recommend following weight and monitoring for edema with stopping his furosemide.  Also recommend rechecking potassium in 1-2 days.           The patient was informed of the plan and verbalized understanding and agreed with the plan. The patient was given strict return to Emergency Department precautions as well as appropriate follow up instructions. The patient was discharged in stable condition.    Current Discharge Medication List          Final diagnoses:   Hyperkalemia   LUIS (acute kidney injury) (H)       Jovanni Khoury MD  2023   Emergency Department    Subjective   Elsa is a 88 year old male who presents at  5:39 PM with high potassium.    Patient was admitted to [facility] on 2023 for lower extremity infection and toe amputation.  He was also admitted from  through 2022 due to right foot infection with osteomyelitis and had revascularization at that time.  During his hospitalization in February he had a gangrenous appearing fourth toe chronically infected looking wound.  CT of the foot suggested osteomyelitis in the distal fourth metatarsal and probable osteomyelitis of the fourth proximal phalanx and suspected osteomyelitis involving the distal aspect of the fifth metatarsal.  He  "was transferred to [facility].  Wound cultures grew Pseudomonas and ID saw the patient changed to cefepime and Flagyl.  He underwent balloon angioplasty.  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  Patient was then admitted to [facility] on 3- through 3- for DVT of the right upper extremity he was started on warfarin and discharged back to the nursing facility.  Labs were drawn on 4-4-2023 at 8 AM and was found to have hyperkalemia to 5.8.  Also had a incidental LUIS.  He was instructed to present to the emergency department for further evaluation.    The patient has no complaints.  His foot is wrapped.  He is currently on IV antibiotics, I believe Flagyl 500 mg twice daily, cefepime 2 g every 24 hours and daptomycin 500 mg every other day.  Unclear duration but could be ending as early as 4/6/23.    Wt Readings from Last 5 Encounters:   04/05/23 89.8 kg (198 lb)   04/02/23 89.8 kg (198 lb)   03/21/23 90.1 kg (198 lb 9.6 oz)   02/17/23 108 kg (238 lb)   02/13/23 97.5 kg (215 lb)         I have reviewed the Medications, Allergies, Past Medical and Surgical History, and Social History in the [employer] System and with family.    Review of Systems:  Please see Subjective / HPI for pertinent positives and negatives. All other systems reviewed and found to be negative.      Objective     Patient Vitals for the past 24 hrs:   BP Temp Temp src Pulse Resp SpO2 Height Weight   04/05/23 1743 138/65 98.2  F (36.8  C) Oral 81 16 95 % 1.854 m (6' 1\") 89.8 kg (198 lb)       Physical Exam:     General: Pleasant 88 year old year old male lying in bed in no acute distress   CV: regular rate and rhythm, no murmur, rubs or peripheral edema appreciated  Pulm: CTAB      Procedures / Critical Care   Procedures    Aggregate Critical Care Time: None.     Orders Placed This Encounter   Procedures     Basic metabolic panel     CK total     Extra Tube ([place] Draw)     Extra Blue Top Tube     Extra Red Top Tube     Extra Green Top " (Lithium Heparin) Tube     Extra Purple Top Tube       RESULTS:  Results for orders placed or performed during the hospital encounter of 04/05/23   Basic metabolic panel     Status: Abnormal   Result Value Ref Range    Sodium 135 (L) 136 - 145 mmol/L    Potassium 5.5 (H) 3.4 - 5.3 mmol/L    Chloride 103 98 - 107 mmol/L    Carbon Dioxide (CO2) 20 (L) 22 - 29 mmol/L    Anion Gap 12 7 - 15 mmol/L    Urea Nitrogen 60.7 (H) 8.0 - 23.0 mg/dL    Creatinine 3.47 (H) 0.67 - 1.17 mg/dL    Calcium 8.9 8.8 - 10.2 mg/dL    Glucose 103 (H) 70 - 99 mg/dL    GFR Estimate 16 (L) >60 mL/min/1.73m2   CK total     Status: Normal   Result Value Ref Range    CK 72 39 - 308 U/L   Extra Tube ([place] Draw)     Status: None (In process)    Narrative    The following orders were created for panel order Extra Tube ([place] Draw).  Procedure                               Abnormality         Status                     ---------                               -----------         ------                     Extra Blue Top Tube[[id number]]                              In process                 Extra Red Top Tube[[id number]]                               In process                 Extra Green Top (Lithium...[[id number]]                      In process                 Extra Purple Top Tube[[id number]]                            In process                   Please view results for these tests on the individual orders.                 Medical/Surgical History:  Past Medical History:   Diagnosis Date     Chronic kidney disease, stage 4 (severe) (H) 7/4/2022     Coronary artery disease 1/14/2023     Diabetic neuropathy (H) 1/14/2023     Essential hypertension 1/14/2023     Gastroesophageal reflux disease, unspecified whether esophagitis present 4/17/2018     PAD (peripheral artery disease) (H) 11/29/2022     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) 1/8/2019     Past Surgical History:   Procedure Laterality Date     IR PICC PLACEMENT  > 5 YRS OF AGE  3/17/2023       Medications:  Current Facility-Administered Medications   Medication     sodium zirconium cyclosilicate (LOKELMA) packet 5 g     Current Outpatient Medications   Medication     acetaminophen (TYLENOL) 500 MG tablet     atorvastatin (LIPITOR) 40 MG tablet     ceFEPIme 2 g     Cholecalciferol (VITAMIN D3) 2000 UNITS TABS     clopidogrel (PLAVIX) 75 MG tablet     clotrimazole (LOTRIMIN) 1 % external cream     DAPTOMYCIN IV     diclofenac (VOLTAREN) 1 % topical gel     enoxaparin ANTICOAGULANT (LOVENOX) 100 MG/ML syringe     famotidine (PEPCID) 20 MG tablet     gabapentin (NEURONTIN) 300 MG capsule     HYDROcodone-acetaminophen (NORCO) 5-325 MG tablet     hydrocortisone 2.5 % cream     insulin aspart (NOVOLOG FLEXPEN) 100 UNIT/ML pen     insulin glargine (LANTUS VIAL) 100 UNIT/ML vial     isosorbide mononitrate (IMDUR) 30 MG 24 hr tablet     levothyroxine (SYNTHROID/LEVOTHROID) 25 MCG tablet     loperamide (IMODIUM A-D) 2 MG tablet     losartan (COZAAR) 25 MG tablet     magnesium oxide 400 MG CAPS     melatonin 3 MG CAPS     metoprolol succinate ER (TOPROL XL) 25 MG 24 hr tablet     metroNIDAZOLE (FLAGYL) 500 MG tablet     mirtazapine (REMERON) 15 MG tablet     nitroGLYcerin (NITROSTAT) 0.4 MG sublingual tablet     nystatin (MYCOSTATIN) 668554 UNIT/GM external powder     polyethylene glycol (MIRALAX) 17 g packet     psyllium (METAMUCIL/KONSYL) capsule     saccharomyces boulardii (FLORASTOR) 250 MG capsule     sertraline (ZOLOFT) 50 MG tablet     tamsulosin (FLOMAX) 0.4 MG capsule     traMADol (ULTRAM) 50 MG tablet     trolamine salicylate (ASPERCREME) 10 % external cream     warfarin ANTICOAGULANT (COUMADIN) 3 MG tablet       Allergies:  Penicillins, Tetanus antitoxin, and Tetanus toxoid    Relevant labs, images, EKGs, Epic and outside hospital (if applicable) charts were reviewed. The findings, diagnosis, plan, and need for follow up were discussed with the patient/family. Nursing notes  were reviewed.

## 2023-04-05 NOTE — DISCHARGE INSTRUCTIONS
You were in the emergency department for high blood potassium.  This is likely because you have been overdiuresed with a medication called furosemide.  We will stop this medication.  I would like the [facility] physician to to recheck your potassium in a few days.  You also given a medication called Kayexalate to help absorb some of the extra potassium in your body.

## 2023-04-05 NOTE — ED TRIAGE NOTES
Pt arrives via EMS fro [facility]. Pt has a potassium of 5.8.    Kyung Anthony RN on 4/5/2023 at 5:42 PM       Triage Assessment     Row Name 04/05/23 1741       Peripheral/Neurovascular WDL    Peripheral Neurovascular WDL WDL       Cognitive/Neuro/Behavioral WDL    Cognitive/Neuro/Behavioral WDL WDL

## 2023-04-06 LAB
ATRIAL RATE - MUSE: 78 BPM
DIASTOLIC BLOOD PRESSURE - MUSE: NORMAL MMHG
INTERPRETATION ECG - MUSE: NORMAL
P AXIS - MUSE: 67 DEGREES
PR INTERVAL - MUSE: 176 MS
QRS DURATION - MUSE: 68 MS
QT - MUSE: 378 MS
QTC - MUSE: 430 MS
R AXIS - MUSE: 16 DEGREES
SYSTOLIC BLOOD PRESSURE - MUSE: NORMAL MMHG
T AXIS - MUSE: 40 DEGREES
VENTRICULAR RATE- MUSE: 78 BPM

## 2023-04-06 NOTE — PROGRESS NOTES
Called the [facility] and spoke with Brenton.  He is in agreement that pt can come back via cab.   We are to call him at [phone] when the cab is leaving with Elsa and they will meet the cab outside.    Gloria Burger RN on 4/5/2023 at 7:13 PM      Called the [facility] back.  Brenton was not available.  Spoke with another staff member and notified them that pt is now on his way back.  They requested that we tell the cab company to bring pt to the front door of the [facility].  Gloria Burger RN on 4/5/2023 at 7:25 PM

## 2023-04-07 ENCOUNTER — HOSPITAL ENCOUNTER (EMERGENCY)
Facility: OTHER | Age: 88
Discharge: HOME | End: 2023-04-07
Attending: PHYSICIAN ASSISTANT | Admitting: PHYSICIAN ASSISTANT
Payer: COMMERCIAL

## 2023-04-07 ENCOUNTER — LAB REQUISITION (OUTPATIENT)
Dept: LAB | Facility: OTHER | Age: 88
End: 2023-04-07

## 2023-04-07 VITALS
HEIGHT: 73 IN | TEMPERATURE: 97.4 F | HEART RATE: 75 BPM | BODY MASS INDEX: 27.43 KG/M2 | SYSTOLIC BLOOD PRESSURE: 112 MMHG | WEIGHT: 207 LBS | OXYGEN SATURATION: 93 % | DIASTOLIC BLOOD PRESSURE: 78 MMHG | RESPIRATION RATE: 11 BRPM

## 2023-04-07 DIAGNOSIS — E83.39 HYPERPHOSPHATEMIA: ICD-10-CM

## 2023-04-07 DIAGNOSIS — N18.32 CHRONIC KIDNEY DISEASE, STAGE 3B (H): ICD-10-CM

## 2023-04-07 DIAGNOSIS — E87.5 HYPERKALEMIA: ICD-10-CM

## 2023-04-07 DIAGNOSIS — N18.4 ACUTE RENAL FAILURE SUPERIMPOSED ON STAGE 4 CHRONIC KIDNEY DISEASE, UNSPECIFIED ACUTE RENAL FAILURE TYPE (H): ICD-10-CM

## 2023-04-07 DIAGNOSIS — R79.89 ELEVATED TROPONIN: ICD-10-CM

## 2023-04-07 DIAGNOSIS — N18.4 ANEMIA OF CHRONIC RENAL FAILURE, STAGE 4 (SEVERE) (H): ICD-10-CM

## 2023-04-07 DIAGNOSIS — Z86.39 HISTORY OF HYPERKALEMIA: Primary | ICD-10-CM

## 2023-04-07 DIAGNOSIS — E83.51 HYPOCALCEMIA: ICD-10-CM

## 2023-04-07 DIAGNOSIS — R31.21 ASYMPTOMATIC MICROSCOPIC HEMATURIA: ICD-10-CM

## 2023-04-07 DIAGNOSIS — D63.1 ANEMIA OF CHRONIC RENAL FAILURE, STAGE 4 (SEVERE) (H): ICD-10-CM

## 2023-04-07 DIAGNOSIS — N17.9 ACUTE RENAL FAILURE SUPERIMPOSED ON STAGE 4 CHRONIC KIDNEY DISEASE, UNSPECIFIED ACUTE RENAL FAILURE TYPE (H): ICD-10-CM

## 2023-04-07 LAB
ALBUMIN UR-MCNC: 70 MG/DL
ANION GAP SERPL CALCULATED.3IONS-SCNC: 12 MMOL/L (ref 7–15)
ANION GAP SERPL CALCULATED.3IONS-SCNC: 13 MMOL/L (ref 7–15)
APPEARANCE UR: CLEAR
BACTERIA #/AREA URNS HPF: ABNORMAL /HPF
BASOPHILS # BLD AUTO: 0.1 10E3/UL (ref 0–0.2)
BASOPHILS NFR BLD AUTO: 1 %
BILIRUB UR QL STRIP: NEGATIVE
BUN SERPL-MCNC: 65.9 MG/DL (ref 8–23)
BUN SERPL-MCNC: 66.5 MG/DL (ref 8–23)
CALCIUM SERPL-MCNC: 8.6 MG/DL (ref 8.8–10.2)
CALCIUM SERPL-MCNC: 9.1 MG/DL (ref 8.8–10.2)
CHLORIDE SERPL-SCNC: 105 MMOL/L (ref 98–107)
CHLORIDE SERPL-SCNC: 105 MMOL/L (ref 98–107)
COLOR UR AUTO: ABNORMAL
CREAT SERPL-MCNC: 3.44 MG/DL (ref 0.67–1.17)
CREAT SERPL-MCNC: 3.46 MG/DL (ref 0.67–1.17)
CREAT SERPL-MCNC: 3.56 MG/DL (ref 0.67–1.17)
DEPRECATED HCO3 PLAS-SCNC: 20 MMOL/L (ref 22–29)
DEPRECATED HCO3 PLAS-SCNC: 21 MMOL/L (ref 22–29)
EOSINOPHIL # BLD AUTO: 0.4 10E3/UL (ref 0–0.7)
EOSINOPHIL NFR BLD AUTO: 7 %
ERYTHROCYTE [DISTWIDTH] IN BLOOD BY AUTOMATED COUNT: 16.7 % (ref 10–15)
GFR SERPL CREATININE-BSD FRML MDRD: 16 ML/MIN/1.73M2
GFR SERPL CREATININE-BSD FRML MDRD: 16 ML/MIN/1.73M2
GFR SERPL CREATININE-BSD FRML MDRD: 17 ML/MIN/1.73M2
GLUCOSE SERPL-MCNC: 166 MG/DL (ref 70–99)
GLUCOSE SERPL-MCNC: 191 MG/DL (ref 70–99)
GLUCOSE UR STRIP-MCNC: NEGATIVE MG/DL
HCT VFR BLD AUTO: 24.8 % (ref 40–53)
HGB BLD-MCNC: 8.2 G/DL (ref 13.3–17.7)
HGB UR QL STRIP: ABNORMAL
HOLD SPECIMEN: NORMAL
IMM GRANULOCYTES # BLD: 0 10E3/UL
IMM GRANULOCYTES NFR BLD: 1 %
INR PPP: 1.31 (ref 0.85–1.15)
KETONES UR STRIP-MCNC: NEGATIVE MG/DL
LEUKOCYTE ESTERASE UR QL STRIP: ABNORMAL
LYMPHOCYTES # BLD AUTO: 1.4 10E3/UL (ref 0.8–5.3)
LYMPHOCYTES NFR BLD AUTO: 22 %
MAGNESIUM SERPL-MCNC: 1.8 MG/DL (ref 1.7–2.3)
MCH RBC QN AUTO: 28.5 PG (ref 26.5–33)
MCHC RBC AUTO-ENTMCNC: 33.1 G/DL (ref 31.5–36.5)
MCV RBC AUTO: 86 FL (ref 78–100)
MONOCYTES # BLD AUTO: 0.4 10E3/UL (ref 0–1.3)
MONOCYTES NFR BLD AUTO: 6 %
MUCOUS THREADS #/AREA URNS LPF: PRESENT /LPF
NEUTROPHILS # BLD AUTO: 3.9 10E3/UL (ref 1.6–8.3)
NEUTROPHILS NFR BLD AUTO: 63 %
NITRATE UR QL: NEGATIVE
NRBC # BLD AUTO: 0 10E3/UL
NRBC BLD AUTO-RTO: 0 /100
PH UR STRIP: 5.5 [PH] (ref 5–9)
PHOSPHATE SERPL-MCNC: 5.1 MG/DL (ref 2.5–4.5)
PLATELET # BLD AUTO: 123 10E3/UL (ref 150–450)
POTASSIUM SERPL-SCNC: 5.3 MMOL/L (ref 3.4–5.3)
POTASSIUM SERPL-SCNC: 5.7 MMOL/L (ref 3.4–5.3)
RBC # BLD AUTO: 2.88 10E6/UL (ref 4.4–5.9)
RBC URINE: 2 /HPF
SODIUM SERPL-SCNC: 137 MMOL/L (ref 136–145)
SODIUM SERPL-SCNC: 139 MMOL/L (ref 136–145)
SP GR UR STRIP: 1.01 (ref 1–1.03)
TROPONIN T SERPL HS-MCNC: 82 NG/L
TROPONIN T SERPL HS-MCNC: 86 NG/L
UROBILINOGEN UR STRIP-MCNC: NORMAL MG/DL
WBC # BLD AUTO: 6.2 10E3/UL (ref 4–11)
WBC URINE: 5 /HPF

## 2023-04-07 PROCEDURE — 36415 COLL VENOUS BLD VENIPUNCTURE: CPT | Performed by: PHYSICIAN ASSISTANT

## 2023-04-07 PROCEDURE — 99284 EMERGENCY DEPT VISIT MOD MDM: CPT | Mod: 25 | Performed by: PHYSICIAN ASSISTANT

## 2023-04-07 PROCEDURE — 87086 URINE CULTURE/COLONY COUNT: CPT | Performed by: PHYSICIAN ASSISTANT

## 2023-04-07 PROCEDURE — 83735 ASSAY OF MAGNESIUM: CPT | Performed by: PHYSICIAN ASSISTANT

## 2023-04-07 PROCEDURE — 96360 HYDRATION IV INFUSION INIT: CPT | Performed by: PHYSICIAN ASSISTANT

## 2023-04-07 PROCEDURE — 81001 URINALYSIS AUTO W/SCOPE: CPT | Performed by: PHYSICIAN ASSISTANT

## 2023-04-07 PROCEDURE — 93010 ELECTROCARDIOGRAM REPORT: CPT | Performed by: INTERNAL MEDICINE

## 2023-04-07 PROCEDURE — 93005 ELECTROCARDIOGRAM TRACING: CPT | Performed by: PHYSICIAN ASSISTANT

## 2023-04-07 PROCEDURE — 258N000003 HC RX IP 258 OP 636: Performed by: PHYSICIAN ASSISTANT

## 2023-04-07 PROCEDURE — 99284 EMERGENCY DEPT VISIT MOD MDM: CPT | Performed by: PHYSICIAN ASSISTANT

## 2023-04-07 PROCEDURE — 85610 PROTHROMBIN TIME: CPT | Performed by: PHYSICIAN ASSISTANT

## 2023-04-07 PROCEDURE — 82565 ASSAY OF CREATININE: CPT | Performed by: PHYSICIAN ASSISTANT

## 2023-04-07 PROCEDURE — 85025 COMPLETE CBC W/AUTO DIFF WBC: CPT | Performed by: PHYSICIAN ASSISTANT

## 2023-04-07 PROCEDURE — 82310 ASSAY OF CALCIUM: CPT | Performed by: PHYSICIAN ASSISTANT

## 2023-04-07 PROCEDURE — 84100 ASSAY OF PHOSPHORUS: CPT | Performed by: PHYSICIAN ASSISTANT

## 2023-04-07 PROCEDURE — 80048 BASIC METABOLIC PNL TOTAL CA: CPT | Performed by: INTERNAL MEDICINE

## 2023-04-07 PROCEDURE — 84484 ASSAY OF TROPONIN QUANT: CPT | Performed by: PHYSICIAN ASSISTANT

## 2023-04-07 RX ORDER — SODIUM CHLORIDE 9 MG/ML
INJECTION, SOLUTION INTRAVENOUS CONTINUOUS
Status: DISCONTINUED | OUTPATIENT
Start: 2023-04-07 | End: 2023-04-07 | Stop reason: HOSPADM

## 2023-04-07 RX ADMIN — SODIUM CHLORIDE 1000 ML: 9 INJECTION, SOLUTION INTRAVENOUS at 14:40

## 2023-04-07 ASSESSMENT — ENCOUNTER SYMPTOMS
ABDOMINAL PAIN: 0
BRUISES/BLEEDS EASILY: 1
FEVER: 0
VOMITING: 0
BACK PAIN: 0
NAUSEA: 0
CHILLS: 0
HEADACHES: 0
COUGH: 0
AGITATION: 0
NECK PAIN: 0

## 2023-04-07 ASSESSMENT — ACTIVITIES OF DAILY LIVING (ADL)
ADLS_ACUITY_SCORE: 35
ADLS_ACUITY_SCORE: 35

## 2023-04-07 NOTE — ED TRIAGE NOTES
Pt arrived to ER via [facility] for hyperkalemia per nursing home staff.     Triage Assessment     Row Name 04/07/23 1340       Triage Assessment (Adult)    Airway WDL WDL       Respiratory WDL    Respiratory WDL WDL       Skin Circulation/Temperature WDL    Skin Circulation/Temperature WDL WDL       Cardiac WDL    Cardiac WDL WDL       Peripheral/Neurovascular WDL    Peripheral Neurovascular WDL WDL       Cognitive/Neuro/Behavioral WDL    Cognitive/Neuro/Behavioral WDL WDL

## 2023-04-07 NOTE — ED NOTES
[facility] staff called and our concerned of patients mental health.  They state he has had suicidal ideations in the past with no plan and no recent suicidal ideations.  They called with a concerns of finding tweezers, scissors and a screw  in his chair like he is hoarding them.  MD informed.  Patient appears to be in good spirits for the writer and has expressed no suicidal ideations to our staff.

## 2023-04-07 NOTE — DISCHARGE INSTRUCTIONS
- Lab draw on Monday at 1:20pm for BMP, magnesium, phosphorus, and CBC.  -Follow-up appointment with provider on Monday at 2pm with Lisa Sol PA-C.   -Do not take losartan until seen by provider on Monday.  This will help prevent his potassium from becoming elevated.  -Recommend a low phosphorus diet, phosphorus elevated at 5.1  -Potassium here in the ER 5.3.  -Return to the ER for any worsening symptoms.  Enclosed is a copy of patient's laboratory results

## 2023-04-07 NOTE — ED NOTES
Patient received 1000 cc bolus and tolerated this well.  MD states no need at this time for IV fluids at 125 cc an hour.

## 2023-04-07 NOTE — ED PROVIDER NOTES
"        EMERGENCY DEPARTMENT ENCOUNTER      NAME: Elsa Horowitz  AGE: 88 year old male  YOB: 1935  MRN: [id number]  EVALUATION DATE & TIME: 4/7/2023  1:37 PM    PCP: No Ref-Primary, Physician    ED PROVIDER: Jamaal Ortega PA-C       CHIEF COMPLAINT:  Chief Complaint   Patient presents with     Abnormal Labs       FINAL IMPRESSION:  1. History of hyperkalemia    2. Acute renal failure superimposed on stage 4 chronic kidney disease, unspecified acute renal failure type (H)    3. Hyperphosphatemia    4. Anemia of chronic renal failure, stage 4 (severe) (H)    5. Hypocalcemia    6. Asymptomatic microscopic hematuria    7. Elevated troponin        ED COURSE, MEDICAL DECISION MAKING, ASSESSMENT, AND PLAN:      The patient was interviewed and examined.  HPI and physical exam as below.  Differential diagnosis and MDM Key Documentation Elements as below.  Vitals and triage note were reviewed.  /78   Pulse 75   Temp 97.4  F (36.3  C) (Tympanic)   Resp 11   Ht 1.854 m (6' 1\")   Wt 93.9 kg (207 lb)   SpO2 93%   BMI 27.31 kg/m      Overall Impression/Treatment Plan/Discharge Info/Follow-up/Medical Necessity for Admission:  History of hyperkalemia  -Patient with known chronic kidney disease.  Earlier this morning had an elevated potassium at 5.7.  Repeat potassium here in the ER 5.3.  Patient currently does not require any interventions for hyperkalemia.  Continue to monitor.  We will hold losartan as this may elevate potassium levels.  Repeat laboratory studies of BMP, phosphorus, magnesium, and CBC in 3 days on Monday.  Patient to follow-up with DAVID Sol on Monday as well.  Return to the ER as needed    Acute on chronic renal failure  -Patient with recent bump up in his creatinine.  End of March patient's creatinine was 2.6.  First week of April, his creatinine jumped up and 3.6.  Creatinine today was 3.44, slightly improved from last visit on 4/5/2023.  Most likely secondary from either " dehydration versus recent IV antibiotic use.  Patient given 1 L IV fluids with repeat creatinine at 3.46.  Patient with otherwise stable creatinine levels.  May follow-up with outpatient.  May benefit from nephrology consult if patient so desires.    Elevated troponin  -Patient with slightly elevated troponins of 82 and 86 here in the ER.  Patient not having any chest pain, shortness of breath, or diaphoresis.  EKG shows a sinus rhythm with PACs with no ST segment deviation to suggest ACS/MI.  Patient has had prior elevation of troponins in the past.  Troponins most likely secondary to to underlying chronic kidney disease.  Patient was offered admission for observation but patient declines and wanted to go back to his living facility.  Feel that this reasonable at this point.  May follow-up with primary care doctor on Monday.  Return to the ER for any worsening of symptoms.    Hyperphosphatemia  -Phosphorus 5.1.  No EKG changes.  Currently no indications for interventions.  Would recommend a low phosphorus diet.  Follow-up with primary care.  Continue to monitor.    Anemia of chronic kidney disease  -Hemoglobin 8.2.  Stable to prior lab result.  Patient denying any bloody or tarry stools.  No abnormal bleeding or bruising.  INR 1.31.  Currently no indication for blood transfusions.  Follow-up primary care    Subtherapeutic INR  INR 1.31.  Follow-up with Coumadin nurse or primary care for medication management    Hypocalcemia  -Calcium 8.6.  Continue to monitor.  Follow-up primary care.    Hematuria  -hematuria with elevated leukoesterase.  No nitrites.  Patient denying any urinary symptoms.  Patient recently completed IV cefepime, less likely UTI.  Will await urine culture results before starting the biotic therapy.  Recommend following up with primary care    Reassessments, Medications, Interventions, & Response to Treatments:  Multiple reassessments.  Blood pressure and briefly became hypotensive although patient  was asymptomatic.  Patient given IV fluids with blood pressure remaining stable.  May have been machine related versus positional.  Patient with no white blood cell count, do not suspect sepsis.    Consultations:  None    Decision Rules, Medical Calculators, and Risk Stratification Tools:  None    MDM Key Documentation Elements for Patient's Evaluation:  1. Differential diagnosis to include high risk considerations: Differential includes but is not limited to hyperkalemia, multiple type abnormalities, chronic kidney disease  2. Escalation to admission/observation considered: Admission/observation considered, mission to observation was offered but patient declined.  Feel that patient is otherwise feeling extremely well and may be recently discharged to his primary care provider on Monday for reevaluation  3. Discussions and management with other clinicians:  No  3a. Independent interpretation of testing performed by another health professional:  No  3b. Discussion of management or test interpretation with another health professional: No  4. Independent interpretation of tests:  Ordering and review of 3+ test(s); review of 3+ test result(s) ordered prior to this encounter  5. Discussion of test interpretations with radiology:  No  6. History obtained from source other than patient/assessment requiring an independent historian:  No  7. Review of non-ED records:  review of 3+ records  8. Diagnostic tests considered but not ultimately performed/deferred:  -Patient with small hematuria, CT abdomen pelvis IV contrast was considered, but patient was asymptomatic.  We will await urine culture results.  No signs of infection or pain.  Do not suspect pyonephritis or nephrolithiasis.  Return to the ER for any worsening symptoms.  9. Prescription medications considered but not prescribed:  -Consider different blood pressure medications, but patient felt with primary care doctor for this  10. Chronic conditions affecting care:   -Chronic kidney disease, type 2 diabetes, osteomyelitis  11. Care affected by social determinants of health:  -None    The patient's management involved:   - Laboratory studies  - Imaging studies  - Parenteral controlled substance  - Prescription drug management    Pertinent Labs & Imaging studies reviewed. (See chart for details)  Results for orders placed or performed during the hospital encounter of 04/07/23   Basic metabolic panel   Result Value Ref Range    Sodium 137 136 - 145 mmol/L    Potassium 5.3 3.4 - 5.3 mmol/L    Chloride 105 98 - 107 mmol/L    Carbon Dioxide (CO2) 20 (L) 22 - 29 mmol/L    Anion Gap 12 7 - 15 mmol/L    Urea Nitrogen 65.9 (H) 8.0 - 23.0 mg/dL    Creatinine 3.44 (H) 0.67 - 1.17 mg/dL    Calcium 8.6 (L) 8.8 - 10.2 mg/dL    Glucose 191 (H) 70 - 99 mg/dL    GFR Estimate 17 (L) >60 mL/min/1.73m2   Result Value Ref Range    Magnesium 1.8 1.7 - 2.3 mg/dL   Result Value Ref Range    Phosphorus 5.1 (H) 2.5 - 4.5 mg/dL   Result Value Ref Range    Troponin T, High Sensitivity 82 (H) <=22 ng/L   Result Value Ref Range    INR 1.31 (H) 0.85 - 1.15   CBC with platelets and differential   Result Value Ref Range    WBC Count 6.2 4.0 - 11.0 10e3/uL    RBC Count 2.88 (L) 4.40 - 5.90 10e6/uL    Hemoglobin 8.2 (L) 13.3 - 17.7 g/dL    Hematocrit 24.8 (L) 40.0 - 53.0 %    MCV 86 78 - 100 fL    MCH 28.5 26.5 - 33.0 pg    MCHC 33.1 31.5 - 36.5 g/dL    RDW 16.7 (H) 10.0 - 15.0 %    Platelet Count 123 (L) 150 - 450 10e3/uL    % Neutrophils 63 %    % Lymphocytes 22 %    % Monocytes 6 %    % Eosinophils 7 %    % Basophils 1 %    % Immature Granulocytes 1 %    NRBCs per 100 WBC 0 <1 /100    Absolute Neutrophils 3.9 1.6 - 8.3 10e3/uL    Absolute Lymphocytes 1.4 0.8 - 5.3 10e3/uL    Absolute Monocytes 0.4 0.0 - 1.3 10e3/uL    Absolute Eosinophils 0.4 0.0 - 0.7 10e3/uL    Absolute Basophils 0.1 0.0 - 0.2 10e3/uL    Absolute Immature Granulocytes 0.0 <=0.4 10e3/uL    Absolute NRBCs 0.0 10e3/uL   UA reflex to Microscopic  and Culture    Specimen: Urine, Midstream   Result Value Ref Range    Color Urine Light Yellow Colorless, Straw, Light Yellow, Yellow    Appearance Urine Clear Clear    Glucose Urine Negative Negative mg/dL    Bilirubin Urine Negative Negative    Ketones Urine Negative Negative mg/dL    Specific Gravity Urine 1.014 1.000 - 1.030    Blood Urine Small (A) Negative    pH Urine 5.5 5.0 - 9.0    Protein Albumin Urine 70 (A) Negative mg/dL    Urobilinogen Urine Normal Normal, 2.0 mg/dL    Nitrite Urine Negative Negative    Leukocyte Esterase Urine Moderate (A) Negative    Bacteria Urine Few (A) None Seen /HPF    Mucus Urine Present (A) None Seen /LPF    RBC Urine 2 <=2 /HPF    WBC Urine 5 <=5 /HPF   Extra Red Top Tube   Result Value Ref Range    Hold Specimen JIC    Result Value Ref Range    Troponin T, High Sensitivity 86 (H) <=22 ng/L   Creatinine   Result Value Ref Range    Creatinine 3.46 (H) 0.67 - 1.17 mg/dL    GFR Estimate 16 (L) >60 mL/min/1.73m2     Lab Results   Component Value Date    ABORH B POS 02/17/2023         A shared decision making model was used. Plan and all results were discussed  Time was taken to answer all questions. Patient and/or associated parties understood and were agreeable to treatment plan.        PPE worn during patient evaluation:  Mask: Yes, surgical  Eye Protection: No  Gown: No  Hair cover: No  Face Shield: No  Patient wearing a mask: yes      MEDICATIONS GIVEN IN THE EMERGENCY:  Medications   0.9% sodium chloride BOLUS (0 mLs Intravenous Stopped 4/7/23 1530)     Followed by   sodium chloride 0.9% infusion ( Intravenous Not Given 4/7/23 1541)       NEW PRESCRIPTIONS STARTED AT TODAY'S ER VISIT:  Discharge Medication List as of 4/7/2023  5:00 PM             =================================================================    HPI  Elsa Horowitz is a pleasant 88 year old male with history of chronic kidney disease, type 2 diabetes, hyperkalemia, right foot infection with osteomyelitis  who presents to the ER today for repeat hyperkalemia.  Was seen here in the ER on 4/5/2023.  Was found to have elevated potassium but normal EKG.  He was given oral Damián and sent home.  Patient was instructed to hold his Lasix.  Patient was told to have a repeat BMP in 2 days with his potassium this morning being elevated at 5.7.  Patient states that he is otherwise been feeling fine.  Patient not complaining of any chest pain, shortness of breath, palpitations, headache, lightheadedness, or any other symptoms.  Patient states that his foot infection has been healing nicely.  He has an upcoming follow-up with appointment with podiatry next week.  He was told to come to the ER today because of his potassium being elevated.  Patient states his IV antibiotics were completed yesterday and that his IVs were removed.  Patient states that he is on losartan for blood pressure.      REVIEW OF SYSTEMS   Review of Systems   Constitutional: Negative for chills and fever.   Respiratory: Negative for cough.    Cardiovascular: Negative for chest pain.   Gastrointestinal: Negative for abdominal pain, nausea and vomiting.   Musculoskeletal: Negative for back pain and neck pain.   Allergic/Immunologic: Negative for immunocompromised state.   Neurological: Negative for headaches.   Hematological: Bruises/bleeds easily (On Coumadin).   Psychiatric/Behavioral: Negative for agitation.       Remainder of systems reviewed, unless noted in HPI all others negative.      PAST MEDICAL HISTORY:  Past Medical History:   Diagnosis Date     Chronic kidney disease, stage 4 (severe) (H) 7/4/2022     Coronary artery disease 1/14/2023     Diabetic neuropathy (H) 1/14/2023     Essential hypertension 1/14/2023     Gastroesophageal reflux disease, unspecified whether esophagitis present 4/17/2018     PAD (peripheral artery disease) (H) 11/29/2022     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) 1/8/2019       PAST SURGICAL  HISTORY:  Past Surgical History:   Procedure Laterality Date     IR PICC PLACEMENT > 5 YRS OF AGE  3/17/2023           CURRENT MEDICATIONS:    Current Outpatient Medications   Medication Instructions     acetaminophen (TYLENOL) 1,000 mg, Oral, 3 TIMES DAILY     atorvastatin (LIPITOR) 40 mg, Oral, EVERY EVENING     ceFEPIme 2 g 2 g, Intravenous, EVERY 24 HOURS     Cholecalciferol (VITAMIN D3) 2000 UNITS TABS 1 tablet, Oral, DAILY     clopidogrel (PLAVIX) 75 MG tablet 1 tablet, Oral, DAILY     clotrimazole (LOTRIMIN) 1 % external cream Topical, 2 TIMES DAILY, - to groin for infection     DAPTOMYCIN  mg, Intravenous, EVERY 48 HOURS     diclofenac (VOLTAREN) 1 % topical gel Topical, 4 TIMES DAILY, - 2 gram to elbow, wrist, or hand<BR>- 4 gram to knee, ankle, or foot     enoxaparin ANTICOAGULANT (LOVENOX) 1 mg/kg, Subcutaneous, EVERY 24 HOURS     famotidine (PEPCID) 10 mg, Oral, 2 TIMES DAILY     gabapentin (NEURONTIN) 600 mg, Oral, AT BEDTIME     HYDROcodone-acetaminophen (NORCO) 5-325 MG tablet 1 tablet, Oral, EVERY 4 HOURS PRN     hydrocortisone 2.5 % cream Rectal, DAILY PRN     insulin aspart (NOVOLOG FLEXPEN) 100 UNIT/ML pen Subcutaneous, 3 TIMES DAILY WITH MEALS, 125-149= 0 units, 150-199 = 2 units, 200-249= 4 units, 250-299= 6 units, 300-349= 8 units, 350-400 = 10 units, more than 400 call MD.     insulin glargine (LANTUS VIAL) 17 Units, Subcutaneous, EVERY MORNING     isosorbide mononitrate (IMDUR) 30 MG 24 hr tablet 1 tablet, Oral, EVERY MORNING     levothyroxine (SYNTHROID/LEVOTHROID) 25 mcg, Oral, EVERY EVENING     loperamide (IMODIUM A-D) 2 MG tablet Oral, Take 2 capsules by mouth as needed after 1st loose stool, make take 1 capsule after each loose stool (up to 4 capsules / day)     losartan (COZAAR) 25 mg, Oral, DAILY     magnesium oxide 400 mg, Oral, 2 TIMES DAILY     melatonin 9 mg, Oral, AT BEDTIME     metoprolol succinate ER (TOPROL XL) 25 mg, Oral, DAILY     metroNIDAZOLE (FLAGYL) 500 mg, Oral, 2  "TIMES DAILY     mirtazapine (REMERON) 15 mg, Oral, AT BEDTIME     nitroGLYcerin (NITROSTAT) 0.4 mg, Sublingual, EVERY 5 MIN PRN, For chest pain place 1 tablet under the tongue every 5 minutes for 3 doses. If symptoms persist 5 minutes after 1st dose call 911.     nystatin (MYCOSTATIN) 594389 UNIT/GM external powder Topical, 2 TIMES DAILY, - to groin for infection     polyethylene glycol (MIRALAX) 17 g packet 1 packet, Oral, DAILY     psyllium (METAMUCIL/KONSYL) capsule 1 capsule, Oral, DAILY     saccharomyces boulardii (FLORASTOR) 250 mg, Oral, 2 TIMES DAILY     sertraline (ZOLOFT) 150 mg, Oral, DAILY     tamsulosin (FLOMAX) 0.8 mg, Oral, EVERY EVENING     traMADol (ULTRAM) 50 mg, Oral, EVERY 8 HOURS PRN     trolamine salicylate (ASPERCREME) 10 % external cream 2 TIMES DAILY PRN, bilateral hand and knees as needed for pain     warfarin ANTICOAGULANT (COUMADIN) 3 mg, Oral, DAILY       ALLERGIES:  Allergies   Allergen Reactions     Penicillins Other (See Comments) and Hives     Pts. Arm swelled, but has tolerated rocephin     Tetanus Antitoxin Other (See Comments)     Tetanus Toxoid        FAMILY HISTORY:  No family history on file.    SOCIAL HISTORY:   Social History     Socioeconomic History     Marital status:    Tobacco Use     Smoking status: Former   Substance and Sexual Activity     Alcohol use: Not Currently     Drug use: Not Currently     Comment: Drug use: Not Asked   Social History Narrative    p 8/29/2013.       PHYSICAL EXAM    VITAL SIGNS: /78   Pulse 75   Temp 97.4  F (36.3  C) (Tympanic)   Resp 11   Ht 1.854 m (6' 1\")   Wt 93.9 kg (207 lb)   SpO2 93%   BMI 27.31 kg/m      Patient Vitals for the past 24 hrs:   BP Temp Temp src Pulse Resp SpO2 Height Weight   04/07/23 1630 112/78 -- -- 75 11 -- -- --   04/07/23 1601 -- -- -- 80 26 -- -- --   04/07/23 1600 (!) 126/109 -- -- 81 -- -- -- --   04/07/23 1530 120/66 -- -- 74 28 -- -- --   04/07/23 1500 133/73 -- -- 86 (!) 42 -- -- -- " "  04/07/23 1445 -- -- -- 73 19 -- -- --   04/07/23 1439 -- -- -- 75 19 -- -- --   04/07/23 1438 121/85 -- -- 114 -- -- -- --   04/07/23 1430 (!) 74/52 -- -- 69 13 -- -- --   04/07/23 1415 94/44 -- -- 70 (!) 0 93 % -- --   04/07/23 1411 94/44 -- -- 72 10 94 % -- --   04/07/23 1400 (!) 83/30 -- -- 71 16 95 % -- --   04/07/23 1340 119/47 -- -- 78 15 98 % -- --   04/07/23 1339 119/47 97.4  F (36.3  C) Tympanic 76 18 97 % 1.854 m (6' 1\") 93.9 kg (207 lb)       Physical Exam  Vitals and nursing note reviewed.   Constitutional:       General: He is not in acute distress.     Appearance: Normal appearance. He is not ill-appearing or diaphoretic.      Comments: Patient was resting comfortably on the exam bed.  He was well-appearing.  Dressing on his foot was clean, dry, intact.  Patient was alert and interactive.  No lethargy.   HENT:      Mouth/Throat:      Mouth: Mucous membranes are moist.      Pharynx: Oropharynx is clear.   Eyes:      Conjunctiva/sclera: Conjunctivae normal.   Cardiovascular:      Rate and Rhythm: Normal rate and regular rhythm.      Pulses: Normal pulses.      Heart sounds: Normal heart sounds.   Pulmonary:      Effort: Pulmonary effort is normal.      Breath sounds: Normal breath sounds.   Abdominal:      General: Abdomen is flat. Bowel sounds are normal.      Palpations: Abdomen is soft.      Tenderness: There is no abdominal tenderness.   Musculoskeletal:         General: Normal range of motion.   Skin:     General: Skin is warm and dry.      Capillary Refill: Capillary refill takes less than 2 seconds.      Comments: Dressing on patient's right foot, was clean, dry, intact.   Neurological:      General: No focal deficit present.      Mental Status: He is alert. Mental status is at baseline.   Psychiatric:         Mood and Affect: Mood normal.          LABS & RADIOLOGY:  All pertinent labs reviewed and interpreted. Reviewed all pertinent imaging. Please see official radiology report.  Results for " orders placed or performed during the hospital encounter of 04/07/23   Basic metabolic panel   Result Value Ref Range    Sodium 137 136 - 145 mmol/L    Potassium 5.3 3.4 - 5.3 mmol/L    Chloride 105 98 - 107 mmol/L    Carbon Dioxide (CO2) 20 (L) 22 - 29 mmol/L    Anion Gap 12 7 - 15 mmol/L    Urea Nitrogen 65.9 (H) 8.0 - 23.0 mg/dL    Creatinine 3.44 (H) 0.67 - 1.17 mg/dL    Calcium 8.6 (L) 8.8 - 10.2 mg/dL    Glucose 191 (H) 70 - 99 mg/dL    GFR Estimate 17 (L) >60 mL/min/1.73m2   Result Value Ref Range    Magnesium 1.8 1.7 - 2.3 mg/dL   Result Value Ref Range    Phosphorus 5.1 (H) 2.5 - 4.5 mg/dL   Result Value Ref Range    Troponin T, High Sensitivity 82 (H) <=22 ng/L   Result Value Ref Range    INR 1.31 (H) 0.85 - 1.15   CBC with platelets and differential   Result Value Ref Range    WBC Count 6.2 4.0 - 11.0 10e3/uL    RBC Count 2.88 (L) 4.40 - 5.90 10e6/uL    Hemoglobin 8.2 (L) 13.3 - 17.7 g/dL    Hematocrit 24.8 (L) 40.0 - 53.0 %    MCV 86 78 - 100 fL    MCH 28.5 26.5 - 33.0 pg    MCHC 33.1 31.5 - 36.5 g/dL    RDW 16.7 (H) 10.0 - 15.0 %    Platelet Count 123 (L) 150 - 450 10e3/uL    % Neutrophils 63 %    % Lymphocytes 22 %    % Monocytes 6 %    % Eosinophils 7 %    % Basophils 1 %    % Immature Granulocytes 1 %    NRBCs per 100 WBC 0 <1 /100    Absolute Neutrophils 3.9 1.6 - 8.3 10e3/uL    Absolute Lymphocytes 1.4 0.8 - 5.3 10e3/uL    Absolute Monocytes 0.4 0.0 - 1.3 10e3/uL    Absolute Eosinophils 0.4 0.0 - 0.7 10e3/uL    Absolute Basophils 0.1 0.0 - 0.2 10e3/uL    Absolute Immature Granulocytes 0.0 <=0.4 10e3/uL    Absolute NRBCs 0.0 10e3/uL   UA reflex to Microscopic and Culture    Specimen: Urine, Midstream   Result Value Ref Range    Color Urine Light Yellow Colorless, Straw, Light Yellow, Yellow    Appearance Urine Clear Clear    Glucose Urine Negative Negative mg/dL    Bilirubin Urine Negative Negative    Ketones Urine Negative Negative mg/dL    Specific Gravity Urine 1.014 1.000 - 1.030    Blood Urine  Small (A) Negative    pH Urine 5.5 5.0 - 9.0    Protein Albumin Urine 70 (A) Negative mg/dL    Urobilinogen Urine Normal Normal, 2.0 mg/dL    Nitrite Urine Negative Negative    Leukocyte Esterase Urine Moderate (A) Negative    Bacteria Urine Few (A) None Seen /HPF    Mucus Urine Present (A) None Seen /LPF    RBC Urine 2 <=2 /HPF    WBC Urine 5 <=5 /HPF   Extra Red Top Tube   Result Value Ref Range    Hold Specimen JIC    Result Value Ref Range    Troponin T, High Sensitivity 86 (H) <=22 ng/L   Creatinine   Result Value Ref Range    Creatinine 3.46 (H) 0.67 - 1.17 mg/dL    GFR Estimate 16 (L) >60 mL/min/1.73m2     No orders to display         [id number] EKG available for comparison. EKG reviewed at 1352.  1) Rhythm: Sinus rhythm with PACs  2) Rate: ventricular rate 77 bpm.  3) QRS Axis: No axis deviation  4) P waves/ TX interval: Sinus. Nml TERA. No atrial enlargement  5) QRS complex: Narrow. No BBB. No ventricular hypertrophy. No pathological Q waves.  6) ST Segment: No acute ST segment elevation or depression  7) T waves: No T wave inversions. No peaked or flattened T waves.     I have independently reviewed and interpreted today's EKG, pending cardiologist over read.       I, Paul Ortega PA-C, personally performed the services described in this documentation, and it is both accurate and complete.     Jamaal Ortega PA-C  [phone]

## 2023-04-10 LAB
ATRIAL RATE - MUSE: 77 BPM
BACTERIA UR CULT: NO GROWTH
DIASTOLIC BLOOD PRESSURE - MUSE: NORMAL MMHG
INTERPRETATION ECG - MUSE: NORMAL
P AXIS - MUSE: 74 DEGREES
PR INTERVAL - MUSE: 192 MS
QRS DURATION - MUSE: 64 MS
QT - MUSE: 370 MS
QTC - MUSE: 418 MS
R AXIS - MUSE: 18 DEGREES
SYSTOLIC BLOOD PRESSURE - MUSE: NORMAL MMHG
T AXIS - MUSE: 31 DEGREES
VENTRICULAR RATE- MUSE: 77 BPM

## 2023-04-11 ENCOUNTER — LAB REQUISITION (OUTPATIENT)
Dept: LAB | Facility: OTHER | Age: 88
End: 2023-04-11
Payer: COMMERCIAL

## 2023-04-11 DIAGNOSIS — Z22.39 CARRIER OF OTHER SPECIFIED BACTERIAL DISEASES: ICD-10-CM

## 2023-04-11 LAB
ALBUMIN SERPL BCG-MCNC: 3.4 G/DL (ref 3.5–5.2)
ALP SERPL-CCNC: 55 U/L (ref 40–129)
ALT SERPL W P-5'-P-CCNC: 20 U/L (ref 10–50)
ANION GAP SERPL CALCULATED.3IONS-SCNC: 12 MMOL/L (ref 7–15)
AST SERPL W P-5'-P-CCNC: 25 U/L (ref 10–50)
BASOPHILS # BLD AUTO: 0.1 10E3/UL (ref 0–0.2)
BASOPHILS NFR BLD AUTO: 1 %
BILIRUB SERPL-MCNC: 0.2 MG/DL
BUN SERPL-MCNC: 66.5 MG/DL (ref 8–23)
CALCIUM SERPL-MCNC: 8.7 MG/DL (ref 8.8–10.2)
CHLORIDE SERPL-SCNC: 105 MMOL/L (ref 98–107)
CK SERPL-CCNC: 67 U/L (ref 39–308)
CREAT SERPL-MCNC: 3.21 MG/DL (ref 0.67–1.17)
CRP SERPL-MCNC: <3 MG/L
DEPRECATED HCO3 PLAS-SCNC: 23 MMOL/L (ref 22–29)
EOSINOPHIL # BLD AUTO: 0.5 10E3/UL (ref 0–0.7)
EOSINOPHIL NFR BLD AUTO: 8 %
ERYTHROCYTE [DISTWIDTH] IN BLOOD BY AUTOMATED COUNT: 16.2 % (ref 10–15)
ERYTHROCYTE [SEDIMENTATION RATE] IN BLOOD BY WESTERGREN METHOD: 59 MM/HR (ref 0–20)
GFR SERPL CREATININE-BSD FRML MDRD: 18 ML/MIN/1.73M2
GLUCOSE SERPL-MCNC: 114 MG/DL (ref 70–99)
HCT VFR BLD AUTO: 24.3 % (ref 40–53)
HGB BLD-MCNC: 7.8 G/DL (ref 13.3–17.7)
HOLD SPECIMEN: NORMAL
IMM GRANULOCYTES # BLD: 0 10E3/UL
IMM GRANULOCYTES NFR BLD: 0 %
LYMPHOCYTES # BLD AUTO: 1.5 10E3/UL (ref 0.8–5.3)
LYMPHOCYTES NFR BLD AUTO: 25 %
MCH RBC QN AUTO: 28.2 PG (ref 26.5–33)
MCHC RBC AUTO-ENTMCNC: 32.1 G/DL (ref 31.5–36.5)
MCV RBC AUTO: 88 FL (ref 78–100)
MONOCYTES # BLD AUTO: 0.3 10E3/UL (ref 0–1.3)
MONOCYTES NFR BLD AUTO: 6 %
NEUTROPHILS # BLD AUTO: 3.5 10E3/UL (ref 1.6–8.3)
NEUTROPHILS NFR BLD AUTO: 60 %
NRBC # BLD AUTO: 0 10E3/UL
NRBC BLD AUTO-RTO: 0 /100
PLATELET # BLD AUTO: 139 10E3/UL (ref 150–450)
POTASSIUM SERPL-SCNC: 6 MMOL/L (ref 3.4–5.3)
PROT SERPL-MCNC: 6.1 G/DL (ref 6.4–8.3)
RBC # BLD AUTO: 2.77 10E6/UL (ref 4.4–5.9)
SODIUM SERPL-SCNC: 140 MMOL/L (ref 136–145)
WBC # BLD AUTO: 5.9 10E3/UL (ref 4–11)

## 2023-04-11 PROCEDURE — 80053 COMPREHEN METABOLIC PANEL: CPT | Performed by: INTERNAL MEDICINE

## 2023-04-11 PROCEDURE — 82550 ASSAY OF CK (CPK): CPT | Performed by: INTERNAL MEDICINE

## 2023-04-11 PROCEDURE — 85025 COMPLETE CBC W/AUTO DIFF WBC: CPT | Performed by: INTERNAL MEDICINE

## 2023-04-11 PROCEDURE — 86140 C-REACTIVE PROTEIN: CPT | Performed by: INTERNAL MEDICINE

## 2023-04-11 PROCEDURE — 85652 RBC SED RATE AUTOMATED: CPT | Performed by: INTERNAL MEDICINE

## 2023-04-14 ENCOUNTER — LAB REQUISITION (OUTPATIENT)
Dept: LAB | Facility: OTHER | Age: 88
End: 2023-04-14
Payer: COMMERCIAL

## 2023-04-14 DIAGNOSIS — N18.4 CHRONIC KIDNEY DISEASE, STAGE 4 (SEVERE) (H): ICD-10-CM

## 2023-04-14 LAB
ANION GAP SERPL CALCULATED.3IONS-SCNC: 12 MMOL/L (ref 7–15)
BUN SERPL-MCNC: 53.7 MG/DL (ref 8–23)
CALCIUM SERPL-MCNC: 8.8 MG/DL (ref 8.8–10.2)
CHLORIDE SERPL-SCNC: 100 MMOL/L (ref 98–107)
CREAT SERPL-MCNC: 2.72 MG/DL (ref 0.67–1.17)
DEPRECATED HCO3 PLAS-SCNC: 24 MMOL/L (ref 22–29)
GFR SERPL CREATININE-BSD FRML MDRD: 22 ML/MIN/1.73M2
GLUCOSE SERPL-MCNC: 252 MG/DL (ref 70–99)
POTASSIUM SERPL-SCNC: 5.4 MMOL/L (ref 3.4–5.3)
SODIUM SERPL-SCNC: 136 MMOL/L (ref 136–145)

## 2023-04-14 PROCEDURE — 80048 BASIC METABOLIC PNL TOTAL CA: CPT | Performed by: FAMILY MEDICINE

## 2023-04-17 ENCOUNTER — LAB REQUISITION (OUTPATIENT)
Dept: LAB | Facility: OTHER | Age: 88
End: 2023-04-17
Payer: COMMERCIAL

## 2023-04-17 DIAGNOSIS — N17.9 ACUTE KIDNEY FAILURE, UNSPECIFIED (H): ICD-10-CM

## 2023-04-17 DIAGNOSIS — I10 ESSENTIAL (PRIMARY) HYPERTENSION: ICD-10-CM

## 2023-04-17 DIAGNOSIS — E87.8 OTHER DISORDERS OF ELECTROLYTE AND FLUID BALANCE, NOT ELSEWHERE CLASSIFIED: ICD-10-CM

## 2023-04-17 LAB
ALBUMIN SERPL BCG-MCNC: 3.6 G/DL (ref 3.5–5.2)
ALP SERPL-CCNC: 59 U/L (ref 40–129)
ALT SERPL W P-5'-P-CCNC: 14 U/L (ref 10–50)
ANION GAP SERPL CALCULATED.3IONS-SCNC: 10 MMOL/L (ref 7–15)
AST SERPL W P-5'-P-CCNC: 16 U/L (ref 10–50)
BILIRUB SERPL-MCNC: 0.2 MG/DL
BUN SERPL-MCNC: 45.4 MG/DL (ref 8–23)
CALCIUM SERPL-MCNC: 8.8 MG/DL (ref 8.8–10.2)
CHLORIDE SERPL-SCNC: 99 MMOL/L (ref 98–107)
CREAT SERPL-MCNC: 2.16 MG/DL (ref 0.67–1.17)
DEPRECATED HCO3 PLAS-SCNC: 28 MMOL/L (ref 22–29)
ERYTHROCYTE [DISTWIDTH] IN BLOOD BY AUTOMATED COUNT: 15.5 % (ref 10–15)
GFR SERPL CREATININE-BSD FRML MDRD: 29 ML/MIN/1.73M2
GLUCOSE SERPL-MCNC: 117 MG/DL (ref 70–99)
HCT VFR BLD AUTO: 25.6 % (ref 40–53)
HGB BLD-MCNC: 8.1 G/DL (ref 13.3–17.7)
MAGNESIUM SERPL-MCNC: 1.5 MG/DL (ref 1.7–2.3)
MCH RBC QN AUTO: 27.4 PG (ref 26.5–33)
MCHC RBC AUTO-ENTMCNC: 31.6 G/DL (ref 31.5–36.5)
MCV RBC AUTO: 87 FL (ref 78–100)
PLATELET # BLD AUTO: 156 10E3/UL (ref 150–450)
POTASSIUM SERPL-SCNC: 4.7 MMOL/L (ref 3.4–5.3)
PROT SERPL-MCNC: 6.4 G/DL (ref 6.4–8.3)
RBC # BLD AUTO: 2.96 10E6/UL (ref 4.4–5.9)
SODIUM SERPL-SCNC: 137 MMOL/L (ref 136–145)
WBC # BLD AUTO: 6.7 10E3/UL (ref 4–11)

## 2023-04-17 PROCEDURE — 85027 COMPLETE CBC AUTOMATED: CPT | Performed by: NURSE PRACTITIONER

## 2023-04-17 PROCEDURE — 80053 COMPREHEN METABOLIC PANEL: CPT | Performed by: NURSE PRACTITIONER

## 2023-04-17 PROCEDURE — 83735 ASSAY OF MAGNESIUM: CPT | Performed by: NURSE PRACTITIONER

## 2023-04-24 ENCOUNTER — APPOINTMENT (OUTPATIENT)
Dept: CT IMAGING | Facility: OTHER | Age: 88
DRG: 872 | End: 2023-04-24
Attending: FAMILY MEDICINE
Payer: COMMERCIAL

## 2023-04-24 ENCOUNTER — HOSPITAL ENCOUNTER (INPATIENT)
Facility: OTHER | Age: 88
LOS: 1 days | Discharge: ANOTHER HOSPITAL | DRG: 872 | End: 2023-04-25
Attending: FAMILY MEDICINE | Admitting: FAMILY MEDICINE
Payer: COMMERCIAL

## 2023-04-24 ENCOUNTER — APPOINTMENT (OUTPATIENT)
Dept: GENERAL RADIOLOGY | Facility: OTHER | Age: 88
DRG: 872 | End: 2023-04-24
Attending: FAMILY MEDICINE
Payer: COMMERCIAL

## 2023-04-24 DIAGNOSIS — N30.01 ACUTE HEMORRHAGIC CYSTITIS: ICD-10-CM

## 2023-04-24 DIAGNOSIS — E11.9 TYPE 2 DIABETES MELLITUS WITHOUT COMPLICATION, UNSPECIFIED WHETHER LONG TERM INSULIN USE (H): ICD-10-CM

## 2023-04-24 DIAGNOSIS — N30.01 ACUTE CYSTITIS WITH HEMATURIA: ICD-10-CM

## 2023-04-24 LAB
ALBUMIN SERPL BCG-MCNC: 4 G/DL (ref 3.5–5.2)
ALBUMIN UR-MCNC: 20 MG/DL
ALP SERPL-CCNC: 92 U/L (ref 40–129)
ALT SERPL W P-5'-P-CCNC: 17 U/L (ref 10–50)
ANION GAP SERPL CALCULATED.3IONS-SCNC: 14 MMOL/L (ref 7–15)
APPEARANCE UR: CLEAR
AST SERPL W P-5'-P-CCNC: 22 U/L (ref 10–50)
BASOPHILS # BLD AUTO: 0.1 10E3/UL (ref 0–0.2)
BASOPHILS NFR BLD AUTO: 1 %
BILIRUB SERPL-MCNC: 0.2 MG/DL
BILIRUB UR QL STRIP: NEGATIVE
BUN SERPL-MCNC: 32.9 MG/DL (ref 8–23)
CALCIUM SERPL-MCNC: 9.1 MG/DL (ref 8.8–10.2)
CHLORIDE SERPL-SCNC: 96 MMOL/L (ref 98–107)
COLOR UR AUTO: ABNORMAL
CREAT SERPL-MCNC: 1.48 MG/DL (ref 0.67–1.17)
DEPRECATED HCO3 PLAS-SCNC: 25 MMOL/L (ref 22–29)
EOSINOPHIL # BLD AUTO: 0.4 10E3/UL (ref 0–0.7)
EOSINOPHIL NFR BLD AUTO: 4 %
ERYTHROCYTE [DISTWIDTH] IN BLOOD BY AUTOMATED COUNT: 15 % (ref 10–15)
GFR SERPL CREATININE-BSD FRML MDRD: 46 ML/MIN/1.73M2
GLUCOSE SERPL-MCNC: 128 MG/DL (ref 70–99)
GLUCOSE UR STRIP-MCNC: NEGATIVE MG/DL
HCT VFR BLD AUTO: 25.8 % (ref 40–53)
HGB BLD-MCNC: 8.7 G/DL (ref 13.3–17.7)
HGB UR QL STRIP: NEGATIVE
HOLD SPECIMEN: NORMAL
HYALINE CASTS: 1 /LPF
IMM GRANULOCYTES # BLD: 0.1 10E3/UL
IMM GRANULOCYTES NFR BLD: 1 %
INR PPP: 2.91 (ref 0.85–1.15)
KETONES UR STRIP-MCNC: NEGATIVE MG/DL
LACTATE SERPL-SCNC: 1.1 MMOL/L (ref 0.7–2)
LACTATE SERPL-SCNC: 2.2 MMOL/L (ref 0.7–2)
LEUKOCYTE ESTERASE UR QL STRIP: ABNORMAL
LYMPHOCYTES # BLD AUTO: 1.6 10E3/UL (ref 0.8–5.3)
LYMPHOCYTES NFR BLD AUTO: 15 %
MCH RBC QN AUTO: 28.2 PG (ref 26.5–33)
MCHC RBC AUTO-ENTMCNC: 33.7 G/DL (ref 31.5–36.5)
MCV RBC AUTO: 84 FL (ref 78–100)
MONOCYTES # BLD AUTO: 0.7 10E3/UL (ref 0–1.3)
MONOCYTES NFR BLD AUTO: 6 %
NEUTROPHILS # BLD AUTO: 7.8 10E3/UL (ref 1.6–8.3)
NEUTROPHILS NFR BLD AUTO: 73 %
NITRATE UR QL: NEGATIVE
NRBC # BLD AUTO: 0 10E3/UL
NRBC BLD AUTO-RTO: 0 /100
PH UR STRIP: 7.5 [PH] (ref 5–9)
PLATELET # BLD AUTO: 194 10E3/UL (ref 150–450)
POTASSIUM SERPL-SCNC: 4.6 MMOL/L (ref 3.4–5.3)
PROT SERPL-MCNC: 7.8 G/DL (ref 6.4–8.3)
RBC # BLD AUTO: 3.09 10E6/UL (ref 4.4–5.9)
RBC URINE: 2 /HPF
SODIUM SERPL-SCNC: 135 MMOL/L (ref 136–145)
SP GR UR STRIP: 1.01 (ref 1–1.03)
TROPONIN T SERPL HS-MCNC: 52 NG/L
UROBILINOGEN UR STRIP-MCNC: NORMAL MG/DL
WBC # BLD AUTO: 10.7 10E3/UL (ref 4–11)
WBC URINE: 11 /HPF

## 2023-04-24 PROCEDURE — 87040 BLOOD CULTURE FOR BACTERIA: CPT | Performed by: FAMILY MEDICINE

## 2023-04-24 PROCEDURE — 71045 X-RAY EXAM CHEST 1 VIEW: CPT | Mod: TC

## 2023-04-24 PROCEDURE — 85610 PROTHROMBIN TIME: CPT | Performed by: FAMILY MEDICINE

## 2023-04-24 PROCEDURE — 99285 EMERGENCY DEPT VISIT HI MDM: CPT | Mod: 25 | Performed by: FAMILY MEDICINE

## 2023-04-24 PROCEDURE — 250N000011 HC RX IP 250 OP 636: Performed by: FAMILY MEDICINE

## 2023-04-24 PROCEDURE — 80053 COMPREHEN METABOLIC PANEL: CPT | Performed by: FAMILY MEDICINE

## 2023-04-24 PROCEDURE — 96374 THER/PROPH/DIAG INJ IV PUSH: CPT | Performed by: FAMILY MEDICINE

## 2023-04-24 PROCEDURE — 120N000001 HC R&B MED SURG/OB

## 2023-04-24 PROCEDURE — 85025 COMPLETE CBC W/AUTO DIFF WBC: CPT | Performed by: FAMILY MEDICINE

## 2023-04-24 PROCEDURE — 84484 ASSAY OF TROPONIN QUANT: CPT | Performed by: FAMILY MEDICINE

## 2023-04-24 PROCEDURE — 99285 EMERGENCY DEPT VISIT HI MDM: CPT | Performed by: FAMILY MEDICINE

## 2023-04-24 PROCEDURE — 36415 COLL VENOUS BLD VENIPUNCTURE: CPT | Performed by: FAMILY MEDICINE

## 2023-04-24 PROCEDURE — 250N000013 HC RX MED GY IP 250 OP 250 PS 637: Performed by: FAMILY MEDICINE

## 2023-04-24 PROCEDURE — 258N000003 HC RX IP 258 OP 636: Performed by: FAMILY MEDICINE

## 2023-04-24 PROCEDURE — 96375 TX/PRO/DX INJ NEW DRUG ADDON: CPT | Performed by: FAMILY MEDICINE

## 2023-04-24 PROCEDURE — 70450 CT HEAD/BRAIN W/O DYE: CPT | Mod: TC

## 2023-04-24 PROCEDURE — 83605 ASSAY OF LACTIC ACID: CPT | Performed by: FAMILY MEDICINE

## 2023-04-24 PROCEDURE — 81001 URINALYSIS AUTO W/SCOPE: CPT | Performed by: FAMILY MEDICINE

## 2023-04-24 PROCEDURE — 87086 URINE CULTURE/COLONY COUNT: CPT | Performed by: FAMILY MEDICINE

## 2023-04-24 RX ORDER — ACETAMINOPHEN 500 MG
1000 TABLET ORAL ONCE
Status: COMPLETED | OUTPATIENT
Start: 2023-04-24 | End: 2023-04-24

## 2023-04-24 RX ORDER — ONDANSETRON 2 MG/ML
4 INJECTION INTRAMUSCULAR; INTRAVENOUS EVERY 6 HOURS PRN
Status: DISCONTINUED | OUTPATIENT
Start: 2023-04-24 | End: 2023-04-25 | Stop reason: HOSPADM

## 2023-04-24 RX ORDER — FENTANYL CITRATE 50 UG/ML
25 INJECTION, SOLUTION INTRAMUSCULAR; INTRAVENOUS ONCE
Status: COMPLETED | OUTPATIENT
Start: 2023-04-24 | End: 2023-04-24

## 2023-04-24 RX ORDER — CEFEPIME HYDROCHLORIDE 2 G/1
2 INJECTION, POWDER, FOR SOLUTION INTRAVENOUS ONCE
Status: COMPLETED | OUTPATIENT
Start: 2023-04-24 | End: 2023-04-24

## 2023-04-24 RX ORDER — ACETAMINOPHEN 325 MG/1
650 TABLET ORAL EVERY 4 HOURS PRN
Status: DISCONTINUED | OUTPATIENT
Start: 2023-04-24 | End: 2023-04-25 | Stop reason: HOSPADM

## 2023-04-24 RX ORDER — SODIUM CHLORIDE 9 MG/ML
INJECTION, SOLUTION INTRAVENOUS CONTINUOUS
Status: DISCONTINUED | OUTPATIENT
Start: 2023-04-24 | End: 2023-04-25 | Stop reason: DRUGHIGH

## 2023-04-24 RX ORDER — ONDANSETRON 4 MG/1
4 TABLET, ORALLY DISINTEGRATING ORAL EVERY 6 HOURS PRN
Status: DISCONTINUED | OUTPATIENT
Start: 2023-04-24 | End: 2023-04-25 | Stop reason: HOSPADM

## 2023-04-24 RX ORDER — SODIUM CHLORIDE 9 MG/ML
INJECTION, SOLUTION INTRAVENOUS CONTINUOUS
Status: DISCONTINUED | OUTPATIENT
Start: 2023-04-24 | End: 2023-04-25 | Stop reason: HOSPADM

## 2023-04-24 RX ADMIN — FENTANYL CITRATE 25 MCG: 50 INJECTION, SOLUTION INTRAMUSCULAR; INTRAVENOUS at 21:33

## 2023-04-24 RX ADMIN — SODIUM CHLORIDE 1000 ML: 9 INJECTION, SOLUTION INTRAVENOUS at 20:31

## 2023-04-24 RX ADMIN — ACETAMINOPHEN 1000 MG: 500 TABLET ORAL at 20:31

## 2023-04-24 RX ADMIN — CEFEPIME 2 G: 2 INJECTION, POWDER, FOR SOLUTION INTRAVENOUS at 21:35

## 2023-04-24 ASSESSMENT — ENCOUNTER SYMPTOMS
FEVER: 1
CHILLS: 1
DYSURIA: 0
CHOKING: 1
CHEST TIGHTNESS: 0
FLANK PAIN: 0
DIAPHORESIS: 0

## 2023-04-24 ASSESSMENT — ACTIVITIES OF DAILY LIVING (ADL)
ADLS_ACUITY_SCORE: 35
ADLS_ACUITY_SCORE: 35

## 2023-04-25 ENCOUNTER — APPOINTMENT (OUTPATIENT)
Dept: GENERAL RADIOLOGY | Facility: OTHER | Age: 88
DRG: 872 | End: 2023-04-25
Attending: FAMILY MEDICINE
Payer: COMMERCIAL

## 2023-04-25 VITALS
DIASTOLIC BLOOD PRESSURE: 66 MMHG | HEART RATE: 98 BPM | BODY MASS INDEX: 27.64 KG/M2 | TEMPERATURE: 98.7 F | OXYGEN SATURATION: 91 % | RESPIRATION RATE: 18 BRPM | SYSTOLIC BLOOD PRESSURE: 118 MMHG | WEIGHT: 209.5 LBS

## 2023-04-25 PROBLEM — J96.01 ACUTE RESPIRATORY FAILURE WITH HYPOXIA (H): Status: RESOLVED | Noted: 2022-11-28 | Resolved: 2023-04-25

## 2023-04-25 PROBLEM — M25.642 STIFFNESS OF LEFT HAND JOINT: Status: RESOLVED | Noted: 2020-11-25 | Resolved: 2023-04-25

## 2023-04-25 PROBLEM — M54.50 LOW BACK PAIN: Status: RESOLVED | Noted: 2023-03-15 | Resolved: 2023-04-25

## 2023-04-25 PROBLEM — R41.82 ALTERED MENTAL STATUS: Status: RESOLVED | Noted: 2023-03-15 | Resolved: 2023-04-25

## 2023-04-25 PROBLEM — L03.115 CELLULITIS OF RIGHT FOOT: Status: RESOLVED | Noted: 2023-01-14 | Resolved: 2023-04-25

## 2023-04-25 PROBLEM — N17.9 ACUTE KIDNEY INJURY (H): Status: RESOLVED | Noted: 2023-03-15 | Resolved: 2023-04-25

## 2023-04-25 LAB
GLUCOSE BLDC GLUCOMTR-MCNC: 139 MG/DL (ref 70–99)
SARS-COV-2 RNA RESP QL NAA+PROBE: NEGATIVE

## 2023-04-25 PROCEDURE — 250N000013 HC RX MED GY IP 250 OP 250 PS 637: Performed by: INTERNAL MEDICINE

## 2023-04-25 PROCEDURE — 250N000011 HC RX IP 250 OP 636: Performed by: FAMILY MEDICINE

## 2023-04-25 PROCEDURE — U0003 INFECTIOUS AGENT DETECTION BY NUCLEIC ACID (DNA OR RNA); SEVERE ACUTE RESPIRATORY SYNDROME CORONAVIRUS 2 (SARS-COV-2) (CORONAVIRUS DISEASE [COVID-19]), AMPLIFIED PROBE TECHNIQUE, MAKING USE OF HIGH THROUGHPUT TECHNOLOGIES AS DESCRIBED BY CMS-2020-01-R: HCPCS | Performed by: FAMILY MEDICINE

## 2023-04-25 PROCEDURE — 258N000003 HC RX IP 258 OP 636: Performed by: FAMILY MEDICINE

## 2023-04-25 PROCEDURE — 99236 HOSP IP/OBS SAME DATE HI 85: CPT | Performed by: FAMILY MEDICINE

## 2023-04-25 PROCEDURE — 250N000013 HC RX MED GY IP 250 OP 250 PS 637: Performed by: FAMILY MEDICINE

## 2023-04-25 PROCEDURE — 73630 X-RAY EXAM OF FOOT: CPT | Mod: RT

## 2023-04-25 RX ORDER — SODIUM HYPOCHLORITE 2.5 MG/ML
SOLUTION TOPICAL
COMMUNITY

## 2023-04-25 RX ORDER — CEFEPIME HYDROCHLORIDE 2 G/1
2 INJECTION, POWDER, FOR SOLUTION INTRAVENOUS ONCE
Status: COMPLETED | OUTPATIENT
Start: 2023-04-25 | End: 2023-04-25

## 2023-04-25 RX ORDER — LORAZEPAM 0.5 MG/1
0.5 TABLET ORAL 3 TIMES DAILY PRN
Status: DISCONTINUED | OUTPATIENT
Start: 2023-04-25 | End: 2023-04-25 | Stop reason: HOSPADM

## 2023-04-25 RX ORDER — SENNOSIDES 8.6 MG
1 TABLET ORAL 2 TIMES DAILY
COMMUNITY

## 2023-04-25 RX ORDER — NALOXONE HYDROCHLORIDE 0.4 MG/ML
0.4 INJECTION, SOLUTION INTRAMUSCULAR; INTRAVENOUS; SUBCUTANEOUS
Status: DISCONTINUED | OUTPATIENT
Start: 2023-04-25 | End: 2023-04-25 | Stop reason: HOSPADM

## 2023-04-25 RX ORDER — TRAMADOL HYDROCHLORIDE 50 MG/1
50 TABLET ORAL EVERY 6 HOURS PRN
Status: DISCONTINUED | OUTPATIENT
Start: 2023-04-25 | End: 2023-04-25 | Stop reason: HOSPADM

## 2023-04-25 RX ORDER — HYDROMORPHONE HYDROCHLORIDE 1 MG/ML
0.5 INJECTION, SOLUTION INTRAMUSCULAR; INTRAVENOUS; SUBCUTANEOUS ONCE
Status: COMPLETED | OUTPATIENT
Start: 2023-04-25 | End: 2023-04-25

## 2023-04-25 RX ORDER — LAMOTRIGINE 25 MG/1
25 TABLET ORAL DAILY
COMMUNITY

## 2023-04-25 RX ORDER — WARFARIN SODIUM 3 MG/1
TABLET ORAL DAILY
COMMUNITY

## 2023-04-25 RX ORDER — NALOXONE HYDROCHLORIDE 0.4 MG/ML
0.2 INJECTION, SOLUTION INTRAMUSCULAR; INTRAVENOUS; SUBCUTANEOUS
Status: DISCONTINUED | OUTPATIENT
Start: 2023-04-25 | End: 2023-04-25 | Stop reason: HOSPADM

## 2023-04-25 RX ORDER — INSULIN ASPART 100 [IU]/ML
INJECTION, SOLUTION INTRAVENOUS; SUBCUTANEOUS
COMMUNITY

## 2023-04-25 RX ADMIN — CEFEPIME 2 G: 2 INJECTION, POWDER, FOR SOLUTION INTRAVENOUS at 13:37

## 2023-04-25 RX ADMIN — TRAMADOL HYDROCHLORIDE 50 MG: 50 TABLET, COATED ORAL at 07:47

## 2023-04-25 RX ADMIN — SODIUM CHLORIDE: 9 INJECTION, SOLUTION INTRAVENOUS at 07:47

## 2023-04-25 RX ADMIN — LORAZEPAM 0.5 MG: 0.5 TABLET ORAL at 10:06

## 2023-04-25 RX ADMIN — SODIUM CHLORIDE: 9 INJECTION, SOLUTION INTRAVENOUS at 00:06

## 2023-04-25 RX ADMIN — HYDROMORPHONE HYDROCHLORIDE 0.5 MG: 1 INJECTION, SOLUTION INTRAMUSCULAR; INTRAVENOUS; SUBCUTANEOUS at 12:55

## 2023-04-25 RX ADMIN — ACETAMINOPHEN 650 MG: 325 TABLET ORAL at 00:52

## 2023-04-25 ASSESSMENT — ACTIVITIES OF DAILY LIVING (ADL)
ADLS_ACUITY_SCORE: 41
ADLS_ACUITY_SCORE: 44
ADLS_ACUITY_SCORE: 41
ADLS_ACUITY_SCORE: 44
ADLS_ACUITY_SCORE: 44

## 2023-04-25 NOTE — PROGRESS NOTES
SAFETY CHECKLIST  ID Bands and Risk clasps correct and in place (DNR, Fall risk, Allergy, Latex, Limb):  Yes  All Lines Reconciled and labeled correctly: Yes  Whiteboard updated:Yes  Environmental interventions: Yes

## 2023-04-25 NOTE — PROGRESS NOTES
[facility] DISCHARGE NOTE    Patient discharged to [facility] in [place] at 3:10 PM via cart. Accompanied by other:EMS meds 1 and staff. Discharge instructions reviewed with patient, opportunity offered to ask questions. Prescriptions - None ordered for discharge. All belongings sent with patient.    Aleksandar Ang RN

## 2023-04-25 NOTE — PHARMACY-ADMISSION MEDICATION HISTORY
Pharmacist Admission Medication History    Admission medication history is complete. The information provided in this note is only as accurate as the sources available at the time of the update.    Medication reconciliation/reorder completed by provider prior to medication history? Yes    Information Source(s): Betty BONNER Also called and talked to [facility] Staff, Chart Review    Pertinent Information: Per staff at [facility], PICC is no longer in place.     Changes made to PTA medication list:    Added: Senna, Preparation H, Milk of Mag, Lamictal, Vitron C, Dakin's, Prandial Insulin    Deleted: Alteplase for clogged PICC, Cefepime, Daptomycin, Lovenox (INR therapeutic), Losartan, Metronidazole,     Changed: Updated warfarin directions,     Medication Affordability: NA - at [facility]     Allergies reviewed with patient and updates made in EHR: no    Medication History Completed By: Jaswinder Rapp [facility] 4/25/2023 9:29 AM    Prior to Admission medications    Medication Sig Last Dose Taking? Auth Provider Long Term End Date   ferrous fumarate 65 mg, [place]. FE,-Vitamin C 125 mg (VITRON C)  MG TABS tablet Take 1 tablet by mouth daily  Yes Unknown, Entered By History     insulin aspart (NOVOLOG FLEXPEN) 100 UNIT/ML pen Inject Subcutaneous 3 times daily (with meals) MAR states 15 units with breakfast, 20 units with Lunch and 15 units with Supper.  Yes Unknown, Entered By History     lamoTRIgine (LAMICTAL) 25 MG tablet Take 25 mg by mouth daily  Yes Unknown, Entered By History Yes    magnesium hydroxide (MILK OF MAGNESIA) 400 MG/5ML suspension Take 5 mLs by mouth daily as needed for constipation or heartburn  Yes Unknown, Entered By History     pramox-pe-glycerin-petrolatum (PREPARATION H) 1-0.25-14.4-15 % CREA cream Place rectally 4 times daily  Yes Unknown, Entered By History     sennosides (SENOKOT) 8.6 MG tablet Take 1 tablet by mouth 2 times daily  Yes Unknown, Entered By History     sodium hypochlorite (DAKINS  HALF-STRENGTH) 0.25 % external solution 0.125 % Solution- Twice daily to wound on right foot.  Yes Unknown, Entered By History     warfarin ANTICOAGULANT (COUMADIN) 3 MG tablet Take by mouth daily 6 mg on Monday and Fridays, 5 mg rest of days per 4/25 anticoag note.  Yes Unknown, Entered By History     acetaminophen (TYLENOL) 500 MG tablet Take 1,000 mg by mouth 3 times daily   Unknown, Entered By History     atorvastatin (LIPITOR) 40 MG tablet Take 40 mg by mouth every evening   Unknown, Entered By History     Cholecalciferol (VITAMIN D3) 2000 UNITS TABS Take 1 tablet by mouth daily.   Reported, Patient     clopidogrel (PLAVIX) 75 MG tablet Take 1 tablet by mouth daily.   Reported, Patient     clotrimazole (LOTRIMIN) 1 % external cream Apply topically 2 times daily - to groin for infection   Unknown, Entered By History     diclofenac (VOLTAREN) 1 % topical gel Apply topically 4 times daily - 2 gram to elbow, wrist, or hand  - 4 gram to knee, ankle, or foot   Unknown, Entered By History     famotidine (PEPCID) 20 MG tablet Take 10 mg by mouth 2 times daily   Unknown, Entered By History     gabapentin (NEURONTIN) 300 MG capsule Take 600 mg by mouth At Bedtime   Unknown, Entered By History No    HYDROcodone-acetaminophen (NORCO) 5-325 MG tablet Take 1 tablet by mouth every 4 hours as needed for severe pain (7-10)   Jerrod Torres MD     hydrocortisone 2.5 % cream Place rectally daily as needed (rectal pain/irritation)   Unknown, Entered By History     insulin aspart (NOVOLOG FLEXPEN) 100 UNIT/ML pen Inject Subcutaneous 3 times daily (with meals) 125-149= 0 units, 150-199 = 2 units, 200-249= 4 units, 250-299= 6 units, 300-349= 8 units, 350-400 = 10 units, more than 400 call MD.   Unknown, Entered By History     insulin glargine (LANTUS VIAL) 100 UNIT/ML vial Inject 17 Units Subcutaneous every morning   Unknown, Entered By History     isosorbide mononitrate (IMDUR) 30 MG 24 hr tablet Take 1 tablet by mouth every  morning.   Reported, Patient     levothyroxine (SYNTHROID/LEVOTHROID) 25 MCG tablet Take 25 mcg by mouth every evening   Unknown, Entered By History     loperamide (IMODIUM A-D) 2 MG tablet Take 2 capsules by mouth as needed after 1st loose stool, make take 1 capsule after each loose stool (up to 4 capsules / day)   Unknown, Entered By History     magnesium oxide 400 MG CAPS Take 400 mg by mouth 2 times daily   Unknown, Entered By History     melatonin 3 MG CAPS Take 9 mg by mouth At Bedtime   Unknown, Entered By History     metoprolol succinate ER (TOPROL XL) 25 MG 24 hr tablet Take 25 mg by mouth daily   Vinny Berg MD Yes    mirtazapine (REMERON) 15 MG tablet Take 15 mg by mouth At Bedtime   Unknown, Entered By History     nitroGLYcerin (NITROSTAT) 0.4 MG sublingual tablet Place 0.4 mg under the tongue every 5 minutes as needed for chest pain For chest pain place 1 tablet under the tongue every 5 minutes for 3 doses. If symptoms persist 5 minutes after 1st dose call 911.   Unknown, Entered By History     nystatin (MYCOSTATIN) 917772 UNIT/GM external powder Apply topically 2 times daily - to groin for infection   Unknown, Entered By History     polyethylene glycol (MIRALAX) 17 g packet Take 1 packet by mouth daily   Unknown, Entered By History     psyllium (METAMUCIL/KONSYL) capsule Take 1 capsule by mouth daily   Unknown, Entered By History     saccharomyces boulardii (FLORASTOR) 250 MG capsule Take 250 mg by mouth 2 times daily   Unknown, Entered By History     sertraline (ZOLOFT) 50 MG tablet Take 150 mg by mouth daily   Unknown, Entered By History     tamsulosin (FLOMAX) 0.4 MG capsule Take 0.8 mg by mouth every evening   Unknown, Entered By History     traMADol (ULTRAM) 50 MG tablet Take 1 tablet (50 mg) by mouth every 8 hours as needed for severe pain (7-10)   Jerrod Torres MD     trolamine salicylate (ASPERCREME) 10 % external cream 2 times daily as needed bilateral hand and knees as needed for  pain   Unknown, Entered By History

## 2023-04-25 NOTE — PROGRESS NOTES
[facility] ADMISSION NOTE    Patient admitted to room 315 at approximately 2320 via bed from emergency room. Patient was accompanied by transport tech.     Verbal SBAR report received from ANETTE Pang prior to patient arrival.     Patient trasferred to bed, able to stand and pivot transfer Patient alert and oriented X 3. Pain is controlled with current analgesics.  Medication(s) being used: acetaminophen.  . Admission vital signs: Blood pressure 123/71, pulse 77, temperature 99.5  F (37.5  C), temperature source Tympanic, resp. rate 20, weight 95 kg (209 lb 8 oz), SpO2 96 %. Patient was oriented to plan of care, call light, bed controls, tv, telephone, bathroom and visiting hours.     Risk Assessment    The following safety risks were identified during admission: fall. Yellow risk band applied: YES.     Skin Initial Assessment    This writer admitted this patient and completed a full skin assessment and Stan score in the Adult PCS flowsheet. Appropriate interventions initiated as needed.     Education    Patient has a [place] to Observation order: No  Observation education completed and documented: No      Gloria Ren RN

## 2023-04-25 NOTE — PLAN OF CARE
Goal Outcome Evaluation:      Plan of Care Reviewed With: patient    Overall Patient Progress: no change    Patient has had frequent diarrhea since admission.  Patient has had unmeasured urine occurrences with these episodes.  Dressing to toe amputation areas changed, wound is dehisced with serosanguineous/yellow drainage, some pink/red tissue and also some slough present.  Patient also has some thick scabbing to back of ankle, mepilex placed.  Heal floated on pillow.  VSS,a febrile.

## 2023-04-25 NOTE — PROGRESS NOTES
:     Patient lives at [facility] but is currently at [facility] for short term rehab to receive IV antibiotics.    Spoke with Christelle at [facility] to update on patients discharge plan.      will continue to follow for discharge planning needs.     NOEL Atwood on 4/25/2023 at 1:02 PM     :     Patient was transferred to [facility] in [place]. Accessed patients chart to update [facility] regarding patients transfer.     No further needs from  at this time.     NOEL Atwood on 4/25/2023 at 3:42 PM

## 2023-04-25 NOTE — H&P
[facility]    History and Physical - Hospitalist Service       Date of Admission:  4/24/2023    Assessment & Plan      Elsa Horowitz is a 88 year old male admitted on 4/24/2023. He prevents from his care facility for fall and concern related to UTI but with complicated history of recent right lower extremity transmetatarsal amputation for chronic osteomyelitis.    Fever and fall.  Concern for possible ongoing infection of the bone of the right foot and or cystitis with urinary tract infection.  He has complained of dysuria.  Initially felt to not hit his head but complaining of a headache today.  CT of the head was done in the ER was negative for acute intracranial hemorrhage.  Chest x-ray negative for acute pathology.  Urinalysis concerning for possible infection which could contribute to his fall.  Patient febrile up to 102.4 overnight.  -Admit inpatient.  -Patient was treated with cefepime for UTI.  However, cannot exclude that his foot may be contributing to symptoms with ongoing infection.  -Regular diet as tolerated  -PT and OT  -Pain control and fever control  -Patient on IV fluids overnight.  -Follow-up blood culture and urine culture.    Chronic osteomyelitis of right lower extremity status post transmetatarsal amputation at [place].  Patient was on daptomycin, Flagyl and cefepime.  He completed course of therapy on April 5.  He had dehiscence of his wound.  He is supposed to be doing Dakin's solution for 1 month to see if this area is able to heal.  If not he may require further amputation including a BKA.  Unfortunately, patient is not able to offer any good history due to his dementia.  He does complain of pain of that right foot.  -Surgery consult.  May require transfer to higher level of care if concern for ongoing infection of the foot.  For now Dakin's solution wet-to-dry dressings as per recent wound care orders.  -Recently completed course of antibiotic therapy.  Will need  "to check in with infectious diseases to decide whether or not he would require further antibiotic treatment.  -X-ray of the right foot.  Consider CT or MRI if patient would tolerate.  Note ongoing renal function.    Upper extremity DVT on Coumadin.  INR 2.91 and in range.  -Pharmacy for dosing  -Monitor INR while on antibiotics    Cognitive impairment.  Noted.  Patient has emergency contact of his cousin.  Also currently living at the [facility].  -[facility].    Chronic kidney disease.  At his baseline.  Has previously had issues with hyperkalemia and acute on chronic kidney injury likely in part due to diuretic use.  Cautious antibiotic use.  Cautious imaging with contrast use.  No NSAIDs.       Diet: Regular Diet Adult  DVT Prophylaxis: Warfarin  Adkins Catheter: Not present  Lines: None     Cardiac Monitoring: None  Code Status: No CPR- Do NOT Intubate    Clinically Significant Risk Factors Present on Admission               # Drug Induced Coagulation Defect: home medication list includes an anticoagulant medication  # Drug Induced Platelet Defect: home medication list includes an antiplatelet medication       # DMII: A1C = N/A within past 6 months   # Overweight: Estimated body mass index is 27.64 kg/m  as calculated from the following:    Height as of 4/7/23: 1.854 m (6' 1\").    Weight as of this encounter: 95 kg (209 lb 8 oz).           Disposition Plan      Expected Discharge Date: 04/26/2023                  Kristin Bill DO  Hospitalist Service  [facility]  Securely message with [employer] (more info)  Text page via [facility] Paging/Directory     _____________________________________________________________________    Chief Complaint   fall    Unable to obtain a history from the patient due to confusion    History of Present Illness   Elsa Horowitz is a 88 year old male who presents from his care facility for concern related to fever and a fall.  He is unable to give history due to his " confusion.  Of significance he had a recent transmetatarsal amputation of the right lower extremity for chronic osteomyelitis.  He had been on IV antibiotic therapy which she also completed on April 5.  Is been followed by infectious diseases.  He does complain of dysuria today.  He also complains of a headache.  He does not recall hitting his head but his CT was done in the emergency room and was negative for acute intracranial hemorrhage.    He has several other chronic comorbidities including osteomyelitis, Dupuytren's contracture, depression, type 2 diabetes, CKD, diabetic neuropathy, hypertension, PAD, GERD, gangrenous right foot, status post partial amputation, DVT, vancomycin-resistant Enterococcus.     Past Medical History    Past Medical History:   Diagnosis Date     Chronic kidney disease, stage 4 (severe) (H) 7/4/2022     Coronary artery disease 1/14/2023     Diabetic neuropathy (H) 1/14/2023     Essential hypertension 1/14/2023     Gastroesophageal reflux disease, unspecified whether esophagitis present 4/17/2018     PAD (peripheral artery disease) (H) 11/29/2022     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) 1/8/2019       Past Surgical History   Past Surgical History:   Procedure Laterality Date     IR PICC PLACEMENT > 5 YRS OF AGE  3/17/2023       Prior to Admission Medications   Prior to Admission Medications   Prescriptions Last Dose Informant Patient Reported? Taking?   Cholecalciferol (VITAMIN D3) 2000 UNITS TABS  Nursing Home Yes No   Sig: Take 1 tablet by mouth daily.   HYDROcodone-acetaminophen (NORCO) 5-325 MG tablet   No No   Sig: Take 1 tablet by mouth every 4 hours as needed for severe pain (7-10)   acetaminophen (TYLENOL) 500 MG tablet  Nursing Home Yes No   Sig: Take 1,000 mg by mouth 3 times daily   atorvastatin (LIPITOR) 40 MG tablet  Nursing Home Yes No   Sig: Take 40 mg by mouth every evening   clopidogrel (PLAVIX) 75 MG tablet  Nursing Home Yes No   Sig: Take  1 tablet by mouth daily.   clotrimazole (LOTRIMIN) 1 % external cream  Nursing Home Yes No   Sig: Apply topically 2 times daily - to groin for infection   diclofenac (VOLTAREN) 1 % topical gel  Nursing Home Yes No   Sig: Apply topically 4 times daily - 2 gram to elbow, wrist, or hand  - 4 gram to knee, ankle, or foot   famotidine (PEPCID) 20 MG tablet  Nursing Home Yes No   Sig: Take 10 mg by mouth 2 times daily   ferrous fumarate 65 mg, [place]. FE,-Vitamin C 125 mg (VITRON C)  MG TABS tablet   Yes Yes   Sig: Take 1 tablet by mouth daily   gabapentin (NEURONTIN) 300 MG capsule  Nursing Home Yes No   Sig: Take 600 mg by mouth At Bedtime   hydrocortisone 2.5 % cream  Nursing Home Yes No   Sig: Place rectally daily as needed (rectal pain/irritation)   insulin aspart (NOVOLOG FLEXPEN) 100 UNIT/ML pen  Nursing Home Yes No   Sig: Inject Subcutaneous 3 times daily (with meals) 125-149= 0 units, 150-199 = 2 units, 200-249= 4 units, 250-299= 6 units, 300-349= 8 units, 350-400 = 10 units, more than 400 call MD.   insulin aspart (NOVOLOG FLEXPEN) 100 UNIT/ML pen   Yes Yes   Sig: Inject Subcutaneous 3 times daily (with meals) MAR states 15 units with breakfast, 20 units with Lunch and 15 units with Supper.   insulin glargine (LANTUS VIAL) 100 UNIT/ML vial  Nursing Home Yes No   Sig: Inject 17 Units Subcutaneous every morning   isosorbide mononitrate (IMDUR) 30 MG 24 hr tablet  Nursing Home Yes No   Sig: Take 1 tablet by mouth every morning.   lamoTRIgine (LAMICTAL) 25 MG tablet   Yes Yes   Sig: Take 25 mg by mouth daily   levothyroxine (SYNTHROID/LEVOTHROID) 25 MCG tablet  Nursing Home Yes No   Sig: Take 25 mcg by mouth every evening   loperamide (IMODIUM A-D) 2 MG tablet  Nursing Home Yes No   Sig: Take 2 capsules by mouth as needed after 1st loose stool, make take 1 capsule after each loose stool (up to 4 capsules / day)   magnesium hydroxide (MILK OF MAGNESIA) 400 MG/5ML suspension   Yes Yes   Sig: Take 5 mLs by mouth  daily as needed for constipation or heartburn   magnesium oxide 400 MG CAPS  Nursing Home Yes No   Sig: Take 400 mg by mouth 2 times daily   melatonin 3 MG CAPS  Nursing Home Yes No   Sig: Take 9 mg by mouth At Bedtime   metoprolol succinate ER (TOPROL XL) 25 MG 24 hr tablet  Nursing Home Yes No   Sig: Take 25 mg by mouth daily   mirtazapine (REMERON) 15 MG tablet  Nursing Home Yes No   Sig: Take 15 mg by mouth At Bedtime   nitroGLYcerin (NITROSTAT) 0.4 MG sublingual tablet  Nursing Home Yes No   Sig: Place 0.4 mg under the tongue every 5 minutes as needed for chest pain For chest pain place 1 tablet under the tongue every 5 minutes for 3 doses. If symptoms persist 5 minutes after 1st dose call 911.   nystatin (MYCOSTATIN) 334738 UNIT/GM external powder  Nursing Home Yes No   Sig: Apply topically 2 times daily - to groin for infection   polyethylene glycol (MIRALAX) 17 g packet  [facility] Yes No   Sig: Take 1 packet by mouth daily   pramox-pe-glycerin-petrolatum (PREPARATION H) 1-0.25-14.4-15 % CREA cream   Yes Yes   Sig: Place rectally 4 times daily   psyllium (METAMUCIL/KONSYL) capsule  Nursing Home Yes No   Sig: Take 1 capsule by mouth daily   saccharomyces boulardii (FLORASTOR) 250 MG capsule  Nursing Home Yes No   Sig: Take 250 mg by mouth 2 times daily   sennosides (SENOKOT) 8.6 MG tablet   Yes Yes   Sig: Take 1 tablet by mouth 2 times daily   sertraline (ZOLOFT) 50 MG tablet  Nursing Home Yes No   Sig: Take 150 mg by mouth daily   sodium hypochlorite (DAKINS HALF-STRENGTH) 0.25 % external solution   Yes Yes   Si.125 % Solution- Twice daily to wound on right foot.   tamsulosin (FLOMAX) 0.4 MG capsule  Nursing Home Yes No   Sig: Take 0.8 mg by mouth every evening   traMADol (ULTRAM) 50 MG tablet   No No   Sig: Take 1 tablet (50 mg) by mouth every 8 hours as needed for severe pain (7-10)   trolamine salicylate (ASPERCREME) 10 % external cream  Nursing Home Yes No   Si times daily as needed bilateral  hand and knees as needed for pain   warfarin ANTICOAGULANT (COUMADIN) 3 MG tablet   Yes Yes   Sig: Take by mouth daily 6 mg on Monday and Fridays, 5 mg rest of days per 4/25 anticoag note.      Facility-Administered Medications: None        Review of Systems    Review of systems not obtained due to patient factors - confusion     Physical Exam   Vital Signs: Temp: 97.6  F (36.4  C) Temp src: Tympanic BP: 131/51 Pulse: 80   Resp: 20 SpO2: 100 % O2 Device: None (Room air)    Weight: 209 lbs 8 oz    General Appearance: Confused but able to follow commands and answer some questions appropriately.  Eyes: Extraocular muscles intact.  Lids normal.  HEENT: Normocephalic, atraumatic  Respiratory: Clear to auscultation bilaterally.  No wheezing rhonchi or rales  Cardiovascular: Regular.  GI: Abdomen soft, nontender, nondistended  Skin: He is status post transmetatarsal amputation of the right lower extremity.  Sutures are in place of the first 3 toes.  However it has dehisced laterally there is epithelialization but also drainage from the area.  It is tender to palpation and there is some edema and erythema.  Right lower extremity patient is status post second toe amputation but there are no open wounds drainage or sores.  Musculoskeletal: He is able to move arms and legs equally and normally.  Knees are not red or swollen.  Elbows are not red or swollen.  Normal range of motion at the wrist.  Neurologic: No new focal deficits  Psychiatric: Confused with dementia    Medical Decision Making     65 MINUTES SPENT BY ME on the date of service doing chart review, history, exam, documentation & further activities per the note.      Data   ------------------------- PAST 24 HR DATA REVIEWED -----------------------------------------------    I have personally reviewed the following data over the past 24 hrs:    10.7  \   8.7 (L)   / 194     135 (L) 96 (L) 32.9 (H) /  128 (H)   4.6 25 1.48 (H) \       ALT: 17 AST: 22 AP: 92 TBILI: 0.2    ALB: 4.0 TOT PROTEIN: 7.8 LIPASE: N/A       Trop: 52 (H) BNP: N/A       Procal: N/A CRP: N/A Lactic Acid: 1.1       INR:  2.91 (H) PTT:  N/A   D-dimer:  N/A Fibrinogen:  N/A       Imaging results reviewed over the past 24 hrs:   Recent Results (from the past 24 hour(s))   CT Head w/o Contrast    Narrative    PROCEDURE INFORMATION:   Exam: CT Head Without Contrast   Exam date and time: 4/24/2023 9:04 PM   Age: 88 years old   Clinical indication: Injury or trauma; Fall; Blunt trauma (contusions or   hematomas); Consciousness not specified; Additional info: Fall, LEES on coumadin     TECHNIQUE:   Imaging protocol: Computed tomography of the head without contrast.   Radiation optimization: All CT scans at this facility use at least one of these   dose optimization techniques: automated exposure control; mA and/or kV   adjustment per patient size (includes targeted exams where dose is matched to   clinical indication); or iterative reconstruction.     REPORTING DATA:   Count of CT and Cardiac NM exams in prior 12 months: This patient has received   2 known CTs and 0 known cardiac nuclear medicine studies in the 12 months prior   to the current study.     COMPARISON:   No relevant prior studies available.     FINDINGS:   Brain:  No acute hemorrhage. No acute infarct.   Cerebral ventricles: No ventriculomegaly.   Paranasal sinuses: Mucosal thickening. No fluid levels.   Mastoid air cells: Visualized mastoid air cells are well aerated.      Bones/joints: No calvarial fracture.   Soft tissues: Unremarkable.       Impression    IMPRESSION:   No evidence of acute intracranial hemorrhage.     THIS DOCUMENT HAS BEEN ELECTRONICALLY SIGNED BY SANDIE POLLOCK MD   XR Chest Port 1 View    Narrative    PROCEDURE INFORMATION:   Exam: XR Chest   Exam date and time: 4/24/2023 9:14 PM   Age: 88 years old   Clinical indication: Fever and shortness of breath; Additional info: SOB     TECHNIQUE:   Imaging protocol: Radiologic exam of the  chest.   Views: 1 view.     COMPARISON:   No relevant prior studies available.     FINDINGS:   Lungs:  Hyperaeration. No consolidation.   Pleural spaces: Unremarkable. No pleural effusion. No pneumothorax.   Heart/Mediastinum:  Borderline cardiomegaly.   Bones/joints:  No acute pathology.       Impression    IMPRESSION:   No evidence of acute pathology.     THIS DOCUMENT HAS BEEN ELECTRONICALLY SIGNED BY SANDIE POLLOCK MD
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
## 2023-04-25 NOTE — PROVIDER NOTIFICATION
04/24/23 2300   Initial Information   Patient Belongings remains with patient   Patient Belongings Remaining with Patient clothing  (dog tags)   Did you bring any home meds/supplements to the hospital?  No     [facility] will make every effort per our policy to help keep your items safe while in the hospital.  If you choose to keep any items at the bedside, we cannot be held responsible for any items that are lost or broken.      List items sent to safe: none    I have reviewed my belongings list on admission and verify that it is correct.     Patient signature_______________________________  Date/Time_____________________    2nd Staff person if patient unable to sign __________________________  Date/Time ______________________      I have received all my belongings noted above at discharge.    Patient signature________________________________  Date/Time  __________________________

## 2023-04-25 NOTE — DISCHARGE SUMMARY
[facility]  Hospitalist Discharge Summary      Date of Admission:  4/24/2023  Date of Discharge:  4/25/2023  Discharging Provider: Kristin Bill DO  Discharge Service: Hospitalist Service    Discharge Diagnoses   Chronic recurrent osteomyelitis of the right lower extremity with acute infection, present on admission  Sepsis due to osteomyelitis. POA.  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  He will be available to be at the hospital at [facility] tomorrow.    Unresulted Labs Ordered in the Past 30 Days of this Admission     Date and Time Order Name Status Description    4/24/2023  9:10 PM Urine Culture In process     4/24/2023  8:25 PM Blood Culture Arm, Right Preliminary     4/24/2023  8:25 PM Blood Culture Peripheral Blood Preliminary       These results will be followed up by accepting hospital.     Discharge Disposition   Transferred to [facility]  Condition at discharge: Stable      Hospital Course      Elsa Horowitz is a 88 year old male admitted on 4/24/2023. He prevents from his care facility for fall and concern related to UTI but with complicated history of recent right lower extremity transmetatarsal amputation for chronic osteomyelitis.    Fever and fall.  Concern for possible ongoing infection of the bone of the right foot and or cystitis with urinary tract infection.  He has complained of dysuria.  Initially felt to not hit his head but complaining of a headache today.  CT of the head was done in the ER was negative for acute intracranial hemorrhage.  He is on chronic anticoagulation with coumadin. Chest x-ray negative for acute pathology.  Urinalysis concerning for possible infection which could contribute to his fall.  Patient febrile up  to 102.4 overnight.  He was treated with cefepime.  However, there is concern for ongoing infection of the right lower extremity.  As such  repeat x-ray imaging was done and there is in fact concern for ongoing osteomyelitis.  I contacted his power of  and cousin Andrea.  Plan is to transfer him to higher level of care at this time team's including infectious diseases, vascular surgery or Ortho for ongoing recommendations.  Did discuss with his cousin the potential that he would not be a good candidate for surgery and hospice may be also considered or recommended.  He is not ready to make that decision at this time prior to transfer to higher level of care and consultation with specialty team.    Chronic osteomyelitis of right lower extremity status post transmetatarsal amputation at [facility].  Patient was on daptomycin, Flagyl and cefepime.  He completed course of therapy on April 5.  He had dehiscence of his wound.  He is supposed to be doing Dakin's solution for 1 month to see if this area is able to heal.  If not he may require further amputation including a BKA.  Unfortunately, patient is not able to offer any good history due to his dementia.  He does complain of pain of that right foot.  Per discussion with surgery team and concern related for acute and ongoing osteomyelitis of the right foot he is transferred to higher level of care at this time.  I did not restart any other antibiotics other than the cefepime he got for concern related to UTI in the emergency room.    Recent Upper extremity DVT, on Coumadin.  INR 2.91 and in range.  -Pharmacy for dosing  -Monitor INR while on antibiotics    Cognitive impairment.  Noted.  Patient has emergency contact of his cousin.  Also currently living at the [facility].  -Minimize tethers.    Chronic kidney disease.  At his baseline.  Has previously had issues with hyperkalemia and acute on chronic kidney injury likely in part due to diuretic use.  Cautious  antibiotic use.  Cautious imaging with contrast use.  No NSAIDs.      Consultations This Hospital Stay   SOCIAL WORK IP CONSULT  PHYSICAL THERAPY ADULT IP CONSULT  OCCUPATIONAL THERAPY ADULT IP CONSULT    Code Status   No CPR- Do NOT Intubate    Time Spent on this Encounter   I, Kristin Bill DO, personally saw the patient today and spent greater than 30 minutes discharging this patient.       Kristin Bill DO  [facility]  [address]  [place] [address]  Phone: [phone]  Fax: [phone]  _____________________________________________________________________    Physical Exam   Vital Signs: Temp: 98.8  F (37.1  C) Temp src: Tympanic BP: 125/69 Pulse: 71   Resp: 16 SpO2: 98 % O2 Device: None (Room air)    Weight: 209 lbs 8 oz    General Appearance: Confused but able to follow commands and answer some questions appropriately.  Eyes: Extraocular muscles intact.  Lids normal.  HEENT: Normocephalic, atraumatic  Respiratory: Clear to auscultation bilaterally.  No wheezing rhonchi or rales  Cardiovascular: Regular.  GI: Abdomen soft, nontender, nondistended  Skin: He is status post transmetatarsal amputation of the right lower extremity.  Sutures are in place of the first 3 toes.  However it has dehisced laterally there is epithelialization but also drainage from the area.  It is tender to palpation and there is some edema and erythema.  Right lower extremity patient is status post second toe amputation but there are no open wounds drainage or sores.  Musculoskeletal: He is able to move arms and legs equally and normally.  Knees are not red or swollen.  Elbows are not red or swollen.  Normal range of motion at the wrist.  Neurologic: No new focal deficits  Psychiatric: Confused with dementia       Primary Care Physician   Physician No Ref-Primary    Discharge Orders   No discharge procedures on file.    Significant Results and Procedures   Most Recent 3 CBC's:  Recent Labs    Lab Test 04/24/23 2120 04/17/23  1142 04/11/23  0848   WBC 10.7 6.7 5.9   HGB 8.7* 8.1* 7.8*   MCV 84 87 88    156 139*     Most Recent 3 BMP's:  Recent Labs   Lab Test 04/25/23  1131 04/24/23 2034 04/17/23  1142 04/14/23  1518   NA  --  135* 137 136   POTASSIUM  --  4.6 4.7 5.4*   CHLORIDE  --  96* 99 100   CO2  --  25 28 24   BUN  --  32.9* 45.4* 53.7*   CR  --  1.48* 2.16* 2.72*   ANIONGAP  --  14 10 12   JOSEE  --  9.1 8.8 8.8   * 128* 117* 252*     Most Recent INR's and Anticoagulation Dosing History:  Anticoagulation Dose History         Latest Ref Rng & Units 3/17/2023 3/18/2023 3/19/2023 3/20/2023   Recent Dosing and Labs   warfarin ANTICOAGULANT (COUMADIN) tablet 1 mg    1 mg, $Given    warfarin ANTICOAGULANT (COUMADIN) tablet 5 mg  5 mg, $Given 5 mg, $Given     INR 0.85 - 1.15 1.45    1.82   2.82         3/21/2023 4/7/2023 4/24/2023   Recent Dosing and Labs   warfarin ANTICOAGULANT (COUMADIN) tablet 1 mg      warfarin ANTICOAGULANT (COUMADIN) tablet 5 mg      INR 1.93   1.31   2.91                Most Recent 3 Hemoglobins:  Recent Labs   Lab Test 04/24/23 2120 04/17/23  1142 04/11/23  0848   HGB 8.7* 8.1* 7.8*     Most Recent 3 Troponin's:No lab results found.  Most Recent 3 BNP's:  Recent Labs   Lab Test 01/31/23  1537   NTBNPI 5,312*     Most Recent D-dimer:No lab results found.  7-Day Micro Results     Collected Updated Procedure Result Status      04/24/2023 2057 04/25/2023 0902 Blood Culture Arm, Right [[id number]]   Blood from Arm, Right    Preliminary result Component Value   Culture No growth after 12 hours  [P]                04/24/2023 2049 04/24/2023 2110 Urine Culture [[id number]]   Urine, Clean Catch    In process Component Value   No component results               04/24/2023 1834 04/25/2023 0846 Blood Culture Peripheral Blood [[id number]]   Peripheral Blood    Preliminary result Component Value   Culture No growth after 12 hours  [P]                    Most Recent  Hemoglobin A1c:  Recent Labs   Lab Test 01/18/23  0612   A1C 7.5*     Most Recent 6 glucoses:  Recent Labs   Lab Test 04/25/23  1131 04/24/23  2034 04/17/23  1142 04/14/23  1518 04/11/23  0848 04/07/23  1402   * 128* 117* 252* 114* 191*     Most Recent Urinalysis:  Recent Labs   Lab Test 04/24/23 2049   COLOR Light Yellow   APPEARANCE Clear   URINEGLC Negative   URINEBILI Negative   URINEKETONE Negative   SG 1.014   UBLD Negative   URINEPH 7.5   PROTEIN 20*   NITRITE Negative   LEUKEST Moderate*   RBCU 2   WBCU 11*       Discharge Medications   Current Discharge Medication List      CONTINUE these medications which have NOT CHANGED    Details   acetaminophen (TYLENOL) 500 MG tablet Take 1,000 mg by mouth 3 times daily      Cholecalciferol (VITAMIN D3) 2000 UNITS TABS Take 1 tablet by mouth daily.      clopidogrel (PLAVIX) 75 MG tablet Take 1 tablet by mouth daily.      clotrimazole (LOTRIMIN) 1 % external cream Apply topically 2 times daily - to groin for infection      diclofenac (VOLTAREN) 1 % topical gel Apply topically 4 times daily - 2 gram to elbow, wrist, or hand  - 4 gram to knee, ankle, or foot      famotidine (PEPCID) 20 MG tablet Take 10 mg by mouth 2 times daily      ferrous fumarate 65 mg, [place]. FE,-Vitamin C 125 mg (VITRON C)  MG TABS tablet Take 1 tablet by mouth daily      HYDROcodone-acetaminophen (NORCO) 5-325 MG tablet Take 1 tablet by mouth every 4 hours as needed for severe pain (7-10)  Qty: 20 tablet, Refills: 0    Associated Diagnoses: DVT (deep vein thrombosis) in pregnancy; Chronic osteomyelitis (H)      hydrocortisone 2.5 % cream Place rectally daily as needed (rectal pain/irritation)      !! insulin aspart (NOVOLOG FLEXPEN) 100 UNIT/ML pen Inject Subcutaneous 3 times daily (with meals) MAR states 15 units with breakfast, 20 units with Lunch and 15 units with Supper.      !! insulin aspart (NOVOLOG FLEXPEN) 100 UNIT/ML pen Inject Subcutaneous 3 times daily (with meals)  125-149= 0 units, 150-199 = 2 units, 200-249= 4 units, 250-299= 6 units, 300-349= 8 units, 350-400 = 10 units, more than 400 call MD.      insulin glargine (LANTUS VIAL) 100 UNIT/ML vial Inject 17 Units Subcutaneous every morning      isosorbide mononitrate (IMDUR) 30 MG 24 hr tablet Take 1 tablet by mouth every morning.      lamoTRIgine (LAMICTAL) 25 MG tablet Take 25 mg by mouth daily      levothyroxine (SYNTHROID/LEVOTHROID) 25 MCG tablet Take 25 mcg by mouth every evening      loperamide (IMODIUM A-D) 2 MG tablet Take 2 capsules by mouth as needed after 1st loose stool, make take 1 capsule after each loose stool (up to 4 capsules / day)      magnesium hydroxide (MILK OF MAGNESIA) 400 MG/5ML suspension Take 5 mLs by mouth daily as needed for constipation or heartburn      magnesium oxide 400 MG CAPS Take 400 mg by mouth 2 times daily      metoprolol succinate ER (TOPROL XL) 25 MG 24 hr tablet Take 25 mg by mouth daily      mirtazapine (REMERON) 15 MG tablet Take 15 mg by mouth At Bedtime      nitroGLYcerin (NITROSTAT) 0.4 MG sublingual tablet Place 0.4 mg under the tongue every 5 minutes as needed for chest pain For chest pain place 1 tablet under the tongue every 5 minutes for 3 doses. If symptoms persist 5 minutes after 1st dose call 911.      nystatin (MYCOSTATIN) 348434 UNIT/GM external powder Apply topically 2 times daily - to groin for infection      polyethylene glycol (MIRALAX) 17 g packet Take 1 packet by mouth daily      pramox-pe-glycerin-petrolatum (PREPARATION H) 1-0.25-14.4-15 % CREA cream Place rectally 4 times daily      psyllium (METAMUCIL/KONSYL) capsule Take 1 capsule by mouth daily      saccharomyces boulardii (FLORASTOR) 250 MG capsule Take 250 mg by mouth 2 times daily      sennosides (SENOKOT) 8.6 MG tablet Take 1 tablet by mouth 2 times daily      sertraline (ZOLOFT) 50 MG tablet Take 150 mg by mouth daily      sodium hypochlorite (DAKINS HALF-STRENGTH) 0.25 % external solution 0.125 %  Solution- Twice daily to wound on right foot.      tamsulosin (FLOMAX) 0.4 MG capsule Take 0.8 mg by mouth every evening      trolamine salicylate (ASPERCREME) 10 % external cream 2 times daily as needed bilateral hand and knees as needed for pain      warfarin ANTICOAGULANT (COUMADIN) 3 MG tablet Take by mouth daily 6 mg on Monday and Fridays, 5 mg rest of days per 4/25 anticoag note.      atorvastatin (LIPITOR) 40 MG tablet Take 40 mg by mouth every evening      gabapentin (NEURONTIN) 300 MG capsule Take 600 mg by mouth At Bedtime      melatonin 3 MG CAPS Take 9 mg by mouth At Bedtime      traMADol (ULTRAM) 50 MG tablet Take 1 tablet (50 mg) by mouth every 8 hours as needed for severe pain (7-10)  Qty: 20 tablet, Refills: 0    Associated Diagnoses: DVT (deep vein thrombosis) in pregnancy; Chronic osteomyelitis (H)       !! - Potential duplicate medications found. Please discuss with provider.        Allergies   Allergies   Allergen Reactions     Penicillins Other (See Comments) and Hives     Pts. Arm swelled, but has tolerated rocephin     Tetanus Antitoxin Other (See Comments)     Tetanus Toxoid

## 2023-04-26 ENCOUNTER — PATIENT OUTREACH (OUTPATIENT)
Dept: FAMILY MEDICINE | Facility: OTHER | Age: 88
End: 2023-04-26

## 2023-04-26 NOTE — TELEPHONE ENCOUNTER
Patient has PCP elsewhere, no follow-up here. No TCM call required per policy. Julia Romero RN on 4/26/2023 at 8:06 AM

## 2023-04-27 LAB — BACTERIA UR CULT: NO GROWTH

## 2023-04-29 LAB
BACTERIA BLD CULT: NO GROWTH
BACTERIA BLD CULT: NO GROWTH

## 2023-05-25 ENCOUNTER — LAB REQUISITION (OUTPATIENT)
Dept: LAB | Facility: OTHER | Age: 88
End: 2023-05-25
Payer: COMMERCIAL

## 2023-05-25 ENCOUNTER — APPOINTMENT (OUTPATIENT)
Dept: LAB | Facility: OTHER | Age: 88
End: 2023-05-25
Payer: COMMERCIAL

## 2023-05-25 DIAGNOSIS — N18.9 CHRONIC KIDNEY DISEASE, UNSPECIFIED: ICD-10-CM

## 2023-05-25 DIAGNOSIS — D64.9 ANEMIA, UNSPECIFIED: ICD-10-CM

## 2023-05-25 DIAGNOSIS — E11.40 TYPE 2 DIABETES MELLITUS WITH DIABETIC NEUROPATHY, UNSPECIFIED (H): ICD-10-CM

## 2023-05-25 LAB
BASOPHILS # BLD AUTO: 0.1 10E3/UL (ref 0–0.2)
BASOPHILS NFR BLD AUTO: 1 %
EOSINOPHIL # BLD AUTO: 0.4 10E3/UL (ref 0–0.7)
EOSINOPHIL NFR BLD AUTO: 9 %
ERYTHROCYTE [DISTWIDTH] IN BLOOD BY AUTOMATED COUNT: 14.6 % (ref 10–15)
HCT VFR BLD AUTO: 29.4 % (ref 40–53)
HGB BLD-MCNC: 9.4 G/DL (ref 13.3–17.7)
IMM GRANULOCYTES # BLD: 0 10E3/UL
IMM GRANULOCYTES NFR BLD: 1 %
INR PPP: 3.65 (ref 0.85–1.15)
LYMPHOCYTES # BLD AUTO: 1.5 10E3/UL (ref 0.8–5.3)
LYMPHOCYTES NFR BLD AUTO: 32 %
MCH RBC QN AUTO: 28.3 PG (ref 26.5–33)
MCHC RBC AUTO-ENTMCNC: 32 G/DL (ref 31.5–36.5)
MCV RBC AUTO: 89 FL (ref 78–100)
MONOCYTES # BLD AUTO: 0.3 10E3/UL (ref 0–1.3)
MONOCYTES NFR BLD AUTO: 5 %
NEUTROPHILS # BLD AUTO: 2.4 10E3/UL (ref 1.6–8.3)
NEUTROPHILS NFR BLD AUTO: 52 %
NRBC # BLD AUTO: 0 10E3/UL
NRBC BLD AUTO-RTO: 0 /100
PLATELET # BLD AUTO: 129 10E3/UL (ref 150–450)
RBC # BLD AUTO: 3.32 10E6/UL (ref 4.4–5.9)
WBC # BLD AUTO: 4.7 10E3/UL (ref 4–11)

## 2023-05-25 PROCEDURE — 36415 COLL VENOUS BLD VENIPUNCTURE: CPT | Performed by: NURSE PRACTITIONER

## 2023-05-25 PROCEDURE — 85025 COMPLETE CBC W/AUTO DIFF WBC: CPT | Performed by: NURSE PRACTITIONER

## 2023-05-25 PROCEDURE — 85610 PROTHROMBIN TIME: CPT | Performed by: NURSE PRACTITIONER

## 2023-06-19 ENCOUNTER — LAB REQUISITION (OUTPATIENT)
Dept: LAB | Facility: OTHER | Age: 88
End: 2023-06-19
Payer: COMMERCIAL

## 2023-06-19 DIAGNOSIS — I82.0 BUDD-CHIARI SYNDROME (H): ICD-10-CM

## 2023-06-19 LAB — INR PPP: 3.85 (ref 0.85–1.15)

## 2023-06-19 PROCEDURE — 85610 PROTHROMBIN TIME: CPT | Performed by: FAMILY MEDICINE

## 2023-06-20 ENCOUNTER — LAB REQUISITION (OUTPATIENT)
Dept: LAB | Facility: OTHER | Age: 88
End: 2023-06-20
Payer: COMMERCIAL

## 2023-06-20 LAB
ALBUMIN SERPL BCG-MCNC: 3.8 G/DL (ref 3.5–5.2)
ALP SERPL-CCNC: 117 U/L (ref 40–129)
ALT SERPL W P-5'-P-CCNC: 14 U/L (ref 0–70)
ANION GAP SERPL CALCULATED.3IONS-SCNC: 8 MMOL/L (ref 7–15)
AST SERPL W P-5'-P-CCNC: 16 U/L (ref 0–45)
BILIRUB SERPL-MCNC: 0.2 MG/DL
BUN SERPL-MCNC: 16.7 MG/DL (ref 8–23)
CALCIUM SERPL-MCNC: 8.4 MG/DL (ref 8.8–10.2)
CHLORIDE SERPL-SCNC: 107 MMOL/L (ref 98–107)
CREAT SERPL-MCNC: 0.88 MG/DL (ref 0.67–1.17)
DEPRECATED HCO3 PLAS-SCNC: 22 MMOL/L (ref 22–29)
ERYTHROCYTE [DISTWIDTH] IN BLOOD BY AUTOMATED COUNT: 14.6 % (ref 10–15)
GFR SERPL CREATININE-BSD FRML MDRD: 83 ML/MIN/1.73M2
GLUCOSE SERPL-MCNC: 240 MG/DL (ref 70–99)
HBA1C MFR BLD: 7.4 % (ref 4–6.2)
HCT VFR BLD AUTO: 28.6 % (ref 40–53)
HGB BLD-MCNC: 9.4 G/DL (ref 13.3–17.7)
MCH RBC QN AUTO: 28.7 PG (ref 26.5–33)
MCHC RBC AUTO-ENTMCNC: 32.9 G/DL (ref 31.5–36.5)
MCV RBC AUTO: 87 FL (ref 78–100)
PLATELET # BLD AUTO: 167 10E3/UL (ref 150–450)
POTASSIUM SERPL-SCNC: 5.1 MMOL/L (ref 3.4–5.3)
PROT SERPL-MCNC: 6 G/DL (ref 6.4–8.3)
RBC # BLD AUTO: 3.28 10E6/UL (ref 4.4–5.9)
SODIUM SERPL-SCNC: 137 MMOL/L (ref 136–145)
WBC # BLD AUTO: 5.8 10E3/UL (ref 4–11)

## 2023-06-20 PROCEDURE — 85027 COMPLETE CBC AUTOMATED: CPT | Performed by: FAMILY MEDICINE

## 2023-06-20 PROCEDURE — 83036 HEMOGLOBIN GLYCOSYLATED A1C: CPT | Performed by: FAMILY MEDICINE

## 2023-06-20 PROCEDURE — 80053 COMPREHEN METABOLIC PANEL: CPT | Performed by: FAMILY MEDICINE

## 2025-02-18 ENCOUNTER — APPOINTMENT (OUTPATIENT)
Dept: CT IMAGING | Facility: OTHER | Age: 89
End: 2025-02-18
Attending: FAMILY MEDICINE
Payer: COMMERCIAL

## 2025-02-18 ENCOUNTER — HOSPITAL ENCOUNTER (EMERGENCY)
Facility: OTHER | Age: 89
Discharge: HOME | End: 2025-02-19
Attending: FAMILY MEDICINE
Payer: COMMERCIAL

## 2025-02-18 VITALS
SYSTOLIC BLOOD PRESSURE: 136 MMHG | HEIGHT: 73 IN | TEMPERATURE: 97.4 F | RESPIRATION RATE: 14 BRPM | OXYGEN SATURATION: 92 % | BODY MASS INDEX: 27.7 KG/M2 | WEIGHT: 209 LBS | HEART RATE: 70 BPM | DIASTOLIC BLOOD PRESSURE: 71 MMHG
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
## 2025-02-18 DIAGNOSIS — S09.90XA INJURY OF HEAD, INITIAL ENCOUNTER: ICD-10-CM

## 2025-02-18 LAB
ALBUMIN SERPL BCG-MCNC: 3.8 G/DL (ref 3.5–5.2)
ALBUMIN UR-MCNC: NEGATIVE MG/DL
ALP SERPL-CCNC: 102 U/L (ref 40–150)
ALT SERPL W P-5'-P-CCNC: 18 U/L (ref 0–70)
ANION GAP SERPL CALCULATED.3IONS-SCNC: 12 MMOL/L (ref 7–15)
APPEARANCE UR: CLEAR
AST SERPL W P-5'-P-CCNC: 24 U/L (ref 0–45)
BASOPHILS # BLD AUTO: 0.1 10E3/UL (ref 0–0.2)
BASOPHILS NFR BLD AUTO: 1 %
BILIRUB SERPL-MCNC: 0.2 MG/DL
BILIRUB UR QL STRIP: NEGATIVE
BUN SERPL-MCNC: 28.7 MG/DL (ref 8–23)
CALCIUM SERPL-MCNC: 8.8 MG/DL (ref 8.8–10.4)
CHLORIDE SERPL-SCNC: 100 MMOL/L (ref 98–107)
COLOR UR AUTO: ABNORMAL
CREAT SERPL-MCNC: 1.48 MG/DL (ref 0.67–1.17)
EGFRCR SERPLBLD CKD-EPI 2021: 45 ML/MIN/1.73M2
EOSINOPHIL # BLD AUTO: 0.7 10E3/UL (ref 0–0.7)
EOSINOPHIL NFR BLD AUTO: 10 %
ERYTHROCYTE [DISTWIDTH] IN BLOOD BY AUTOMATED COUNT: 13.5 % (ref 10–15)
GLUCOSE SERPL-MCNC: 178 MG/DL (ref 70–99)
GLUCOSE UR STRIP-MCNC: 50 MG/DL
HCO3 SERPL-SCNC: 24 MMOL/L (ref 22–29)
HCT VFR BLD AUTO: 31.1 % (ref 40–53)
HGB BLD-MCNC: 10.8 G/DL (ref 13.3–17.7)
HGB UR QL STRIP: NEGATIVE
HOLD SPECIMEN: NORMAL
IMM GRANULOCYTES # BLD: 0.1 10E3/UL
IMM GRANULOCYTES NFR BLD: 1 %
INR PPP: 1.95 (ref 0.85–1.15)
KETONES UR STRIP-MCNC: NEGATIVE MG/DL
LEUKOCYTE ESTERASE UR QL STRIP: NEGATIVE
LYMPHOCYTES # BLD AUTO: 2.2 10E3/UL (ref 0.8–5.3)
LYMPHOCYTES NFR BLD AUTO: 31 %
MCH RBC QN AUTO: 30.8 PG (ref 26.5–33)
MCHC RBC AUTO-ENTMCNC: 34.7 G/DL (ref 31.5–36.5)
MCV RBC AUTO: 89 FL (ref 78–100)
MONOCYTES # BLD AUTO: 0.4 10E3/UL (ref 0–1.3)
MONOCYTES NFR BLD AUTO: 6 %
NEUTROPHILS # BLD AUTO: 3.7 10E3/UL (ref 1.6–8.3)
NEUTROPHILS NFR BLD AUTO: 51 %
NITRATE UR QL: NEGATIVE
NRBC # BLD AUTO: 0 10E3/UL
NRBC BLD AUTO-RTO: 0 /100
PH UR STRIP: 5.5 [PH] (ref 5–9)
PLATELET # BLD AUTO: 214 10E3/UL (ref 150–450)
POTASSIUM SERPL-SCNC: 4.2 MMOL/L (ref 3.4–5.3)
PROT SERPL-MCNC: 6.9 G/DL (ref 6.4–8.3)
RBC # BLD AUTO: 3.51 10E6/UL (ref 4.4–5.9)
RBC URINE: 1 /HPF
SODIUM SERPL-SCNC: 136 MMOL/L (ref 135–145)
SP GR UR STRIP: 1.01 (ref 1–1.03)
UROBILINOGEN UR STRIP-MCNC: NORMAL MG/DL
WBC # BLD AUTO: 7.2 10E3/UL (ref 4–11)
WBC URINE: <1 /HPF

## 2025-02-18 PROCEDURE — 99284 EMERGENCY DEPT VISIT MOD MDM: CPT | Mod: 25 | Performed by: FAMILY MEDICINE

## 2025-02-18 PROCEDURE — 85025 COMPLETE CBC W/AUTO DIFF WBC: CPT | Performed by: FAMILY MEDICINE

## 2025-02-18 PROCEDURE — 36415 COLL VENOUS BLD VENIPUNCTURE: CPT | Performed by: FAMILY MEDICINE

## 2025-02-18 PROCEDURE — 99284 EMERGENCY DEPT VISIT MOD MDM: CPT | Performed by: FAMILY MEDICINE

## 2025-02-18 PROCEDURE — 70450 CT HEAD/BRAIN W/O DYE: CPT

## 2025-02-18 PROCEDURE — 80053 COMPREHEN METABOLIC PANEL: CPT | Performed by: FAMILY MEDICINE

## 2025-02-18 PROCEDURE — 85610 PROTHROMBIN TIME: CPT | Performed by: FAMILY MEDICINE

## 2025-02-18 PROCEDURE — 81001 URINALYSIS AUTO W/SCOPE: CPT | Performed by: FAMILY MEDICINE

## 2025-02-18 ASSESSMENT — ACTIVITIES OF DAILY LIVING (ADL)
ADLS_ACUITY_SCORE: 54
ADLS_ACUITY_SCORE: 54

## 2025-02-18 ASSESSMENT — COLUMBIA-SUICIDE SEVERITY RATING SCALE - C-SSRS
6. HAVE YOU EVER DONE ANYTHING, STARTED TO DO ANYTHING, OR PREPARED TO DO ANYTHING TO END YOUR LIFE?: NO
2. HAVE YOU ACTUALLY HAD ANY THOUGHTS OF KILLING YOURSELF IN THE PAST MONTH?: NO
1. IN THE PAST MONTH, HAVE YOU WISHED YOU WERE DEAD OR WISHED YOU COULD GO TO SLEEP AND NOT WAKE UP?: NO

## 2025-02-19 ENCOUNTER — APPOINTMENT (OUTPATIENT)
Dept: GENERAL RADIOLOGY | Facility: OTHER | Age: 89
End: 2025-02-19
Attending: FAMILY MEDICINE
Payer: COMMERCIAL

## 2025-02-19 LAB
FLUAV RNA SPEC QL NAA+PROBE: NEGATIVE
FLUBV RNA RESP QL NAA+PROBE: NEGATIVE
RSV RNA SPEC NAA+PROBE: NEGATIVE
SARS-COV-2 RNA RESP QL NAA+PROBE: NEGATIVE

## 2025-02-19 PROCEDURE — 87637 SARSCOV2&INF A&B&RSV AMP PRB: CPT | Performed by: FAMILY MEDICINE

## 2025-02-19 PROCEDURE — 250N000013 HC RX MED GY IP 250 OP 250 PS 637: Performed by: FAMILY MEDICINE

## 2025-02-19 PROCEDURE — 73030 X-RAY EXAM OF SHOULDER: CPT | Mod: RT

## 2025-02-19 RX ORDER — ACETAMINOPHEN 500 MG
500 TABLET ORAL ONCE
Status: COMPLETED | OUTPATIENT
Start: 2025-02-19 | End: 2025-02-19

## 2025-02-19 RX ADMIN — ACETAMINOPHEN 500 MG: 500 TABLET, FILM COATED ORAL at 01:18

## 2025-02-19 ASSESSMENT — ENCOUNTER SYMPTOMS
ACTIVITY CHANGE: 0
CHOKING: 0
SEIZURES: 0
APPETITE CHANGE: 0
SPEECH DIFFICULTY: 0
NAUSEA: 0
WEAKNESS: 1
ABDOMINAL PAIN: 0
SHORTNESS OF BREATH: 0
FEVER: 0
TREMORS: 0
DIZZINESS: 0
HEADACHES: 1
COUGH: 0
DIARRHEA: 0
VOMITING: 0

## 2025-02-19 ASSESSMENT — ACTIVITIES OF DAILY LIVING (ADL)
ADLS_ACUITY_SCORE: 54

## 2025-02-19 NOTE — ED NOTES
Nurse to nurse report given to Nicolas from [facility]. He reports that they have no means of transportation at this time, but would have adequate staff to offer transportation come day shift.
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
## 2025-02-19 NOTE — DISCHARGE INSTRUCTIONS
You may want to review case medications.  He is on several medications that can cause somnolence and falls including narcotics and gabapentin.  If any increasing pain, confusion, severe headache, or other neurologic deficits such as weakness numbness and tingling in the body that are new please return to the emergency room.  Otherwise please follow-up with his regular doctor in the next 1 to 2 weeks

## 2025-02-19 NOTE — ED TRIAGE NOTES
Pt here with [facility] into [facility] [address], pt was found on the floor at his assisted living this evening, pt was assisted into a w/c but is more lethargic than normal, pt is sleepy upon arrival with verbal stimuli pt awakens briefly and able to state his name, pt has a hematoma to side of head, VSS     Triage Assessment (Adult)       Row Name 02/18/25 6761          Triage Assessment    Airway WDL WDL        Respiratory WDL    Respiratory WDL WDL        Peripheral/Neurovascular WDL    Peripheral Neurovascular WDL WDL

## 2025-02-19 NOTE — PROGRESS NOTES
Spoke with Nicolas from [facility]. He states transportation will be here for pt at 0645.

## 2025-04-09 ENCOUNTER — HOSPITAL ENCOUNTER (INPATIENT)
Facility: OTHER | Age: 89
DRG: 617 | End: 2025-04-09
Attending: INTERNAL MEDICINE | Admitting: INTERNAL MEDICINE
Payer: COMMERCIAL

## 2025-04-09 DIAGNOSIS — I73.9 PAD (PERIPHERAL ARTERY DISEASE): ICD-10-CM

## 2025-04-09 DIAGNOSIS — M86.172 OTHER ACUTE OSTEOMYELITIS OF LEFT FOOT (H): ICD-10-CM

## 2025-04-09 DIAGNOSIS — E11.43 TYPE II DIABETES MELLITUS WITH PERIPHERAL AUTONOMIC NEUROPATHY (H): ICD-10-CM

## 2025-04-09 DIAGNOSIS — M86.672 OTHER CHRONIC OSTEOMYELITIS OF LEFT FOOT (H): Primary | ICD-10-CM

## 2025-04-09 DIAGNOSIS — L97.524 DIABETIC ULCER OF TOE OF LEFT FOOT ASSOCIATED WITH TYPE 2 DIABETES MELLITUS, WITH NECROSIS OF BONE (H): ICD-10-CM

## 2025-04-09 DIAGNOSIS — E11.621 DIABETIC ULCER OF TOE OF LEFT FOOT ASSOCIATED WITH TYPE 2 DIABETES MELLITUS, WITH NECROSIS OF BONE (H): ICD-10-CM

## 2025-04-09 DIAGNOSIS — L97.524 ULCER OF LEFT FOOT WITH NECROSIS OF BONE (H): ICD-10-CM

## 2025-04-09 LAB — GLUCOSE BLDC GLUCOMTR-MCNC: 125 MG/DL (ref 70–99)

## 2025-04-09 PROCEDURE — 82962 GLUCOSE BLOOD TEST: CPT

## 2025-04-09 ASSESSMENT — COLUMBIA-SUICIDE SEVERITY RATING SCALE - C-SSRS: IS THE PATIENT NOT ABLE TO COMPLETE C-SSRS: REFUSES TO ANSWER

## 2025-04-10 PROBLEM — M86.9 OSTEOMYELITIS (H): Status: ACTIVE | Noted: 2025-04-10

## 2025-04-10 PROBLEM — E11.621 DIABETIC ULCER OF TOE OF LEFT FOOT ASSOCIATED WITH TYPE 2 DIABETES MELLITUS, WITH NECROSIS OF BONE (H): Status: ACTIVE | Noted: 2025-04-10

## 2025-04-10 PROBLEM — L97.524 DIABETIC ULCER OF TOE OF LEFT FOOT ASSOCIATED WITH TYPE 2 DIABETES MELLITUS, WITH NECROSIS OF BONE (H): Status: ACTIVE | Noted: 2025-04-10

## 2025-04-10 LAB
ANION GAP SERPL CALCULATED.3IONS-SCNC: 11 MMOL/L (ref 7–15)
BUN SERPL-MCNC: 29.2 MG/DL (ref 8–23)
CALCIUM SERPL-MCNC: 8.7 MG/DL (ref 8.8–10.4)
CHLORIDE SERPL-SCNC: 100 MMOL/L (ref 98–107)
CREAT SERPL-MCNC: 1.36 MG/DL (ref 0.67–1.17)
CREAT SERPL-MCNC: 1.45 MG/DL (ref 0.67–1.17)
CRP SERPL-MCNC: 218.13 MG/L
EGFRCR SERPLBLD CKD-EPI 2021: 46 ML/MIN/1.73M2
EGFRCR SERPLBLD CKD-EPI 2021: 50 ML/MIN/1.73M2
ERYTHROCYTE [DISTWIDTH] IN BLOOD BY AUTOMATED COUNT: 13.1 % (ref 10–15)
EST. AVERAGE GLUCOSE BLD GHB EST-MCNC: 148 MG/DL
GLUCOSE BLDC GLUCOMTR-MCNC: 146 MG/DL (ref 70–99)
GLUCOSE BLDC GLUCOMTR-MCNC: 161 MG/DL (ref 70–99)
GLUCOSE BLDC GLUCOMTR-MCNC: 188 MG/DL (ref 70–99)
GLUCOSE BLDC GLUCOMTR-MCNC: 198 MG/DL (ref 70–99)
GLUCOSE SERPL-MCNC: 152 MG/DL (ref 70–99)
GRAM STAIN RESULT: ABNORMAL
HBA1C MFR BLD: 6.8 %
HCO3 SERPL-SCNC: 22 MMOL/L (ref 22–29)
HCT VFR BLD AUTO: 29.5 % (ref 40–53)
HGB BLD-MCNC: 10 G/DL (ref 13.3–17.7)
HOLD SPECIMEN: NORMAL
LACTATE SERPL-SCNC: 0.7 MMOL/L (ref 0.7–2)
MCH RBC QN AUTO: 29.8 PG (ref 26.5–33)
MCHC RBC AUTO-ENTMCNC: 33.9 G/DL (ref 31.5–36.5)
MCV RBC AUTO: 88 FL (ref 78–100)
PLATELET # BLD AUTO: 206 10E3/UL (ref 150–450)
POTASSIUM SERPL-SCNC: 4.3 MMOL/L (ref 3.4–5.3)
RBC # BLD AUTO: 3.36 10E6/UL (ref 4.4–5.9)
SODIUM SERPL-SCNC: 133 MMOL/L (ref 135–145)
WBC # BLD AUTO: 10.5 10E3/UL (ref 4–11)

## 2025-04-10 PROCEDURE — 36415 COLL VENOUS BLD VENIPUNCTURE: CPT | Performed by: INTERNAL MEDICINE

## 2025-04-10 PROCEDURE — 250N000011 HC RX IP 250 OP 636: Mod: JZ | Performed by: INTERNAL MEDICINE

## 2025-04-10 PROCEDURE — 87205 SMEAR GRAM STAIN: CPT | Performed by: STUDENT IN AN ORGANIZED HEALTH CARE EDUCATION/TRAINING PROGRAM

## 2025-04-10 PROCEDURE — 250N000013 HC RX MED GY IP 250 OP 250 PS 637: Performed by: STUDENT IN AN ORGANIZED HEALTH CARE EDUCATION/TRAINING PROGRAM

## 2025-04-10 PROCEDURE — 83036 HEMOGLOBIN GLYCOSYLATED A1C: CPT | Performed by: STUDENT IN AN ORGANIZED HEALTH CARE EDUCATION/TRAINING PROGRAM

## 2025-04-10 PROCEDURE — 250N000013 HC RX MED GY IP 250 OP 250 PS 637: Performed by: INTERNAL MEDICINE

## 2025-04-10 PROCEDURE — 36415 COLL VENOUS BLD VENIPUNCTURE: CPT | Performed by: STUDENT IN AN ORGANIZED HEALTH CARE EDUCATION/TRAINING PROGRAM

## 2025-04-10 PROCEDURE — 120N000001 HC R&B MED SURG/OB

## 2025-04-10 PROCEDURE — 83605 ASSAY OF LACTIC ACID: CPT | Performed by: INTERNAL MEDICINE

## 2025-04-10 PROCEDURE — 250N000012 HC RX MED GY IP 250 OP 636 PS 637: Performed by: STUDENT IN AN ORGANIZED HEALTH CARE EDUCATION/TRAINING PROGRAM

## 2025-04-10 PROCEDURE — 85018 HEMOGLOBIN: CPT | Performed by: INTERNAL MEDICINE

## 2025-04-10 PROCEDURE — 250N000011 HC RX IP 250 OP 636: Performed by: STUDENT IN AN ORGANIZED HEALTH CARE EDUCATION/TRAINING PROGRAM

## 2025-04-10 PROCEDURE — 99222 1ST HOSP IP/OBS MODERATE 55: CPT | Performed by: INTERNAL MEDICINE

## 2025-04-10 PROCEDURE — 87040 BLOOD CULTURE FOR BACTERIA: CPT | Performed by: STUDENT IN AN ORGANIZED HEALTH CARE EDUCATION/TRAINING PROGRAM

## 2025-04-10 PROCEDURE — 82565 ASSAY OF CREATININE: CPT | Performed by: INTERNAL MEDICINE

## 2025-04-10 PROCEDURE — 86140 C-REACTIVE PROTEIN: CPT | Performed by: STUDENT IN AN ORGANIZED HEALTH CARE EDUCATION/TRAINING PROGRAM

## 2025-04-10 PROCEDURE — 80048 BASIC METABOLIC PNL TOTAL CA: CPT | Performed by: INTERNAL MEDICINE

## 2025-04-10 RX ORDER — ATORVASTATIN CALCIUM 40 MG/1
40 TABLET, FILM COATED ORAL AT BEDTIME
Status: DISCONTINUED | OUTPATIENT
Start: 2025-04-10 | End: 2025-04-18 | Stop reason: HOSPADM

## 2025-04-10 RX ORDER — CEFEPIME HYDROCHLORIDE 2 G/1
2 INJECTION, POWDER, FOR SOLUTION INTRAVENOUS EVERY 12 HOURS
Status: DISCONTINUED | OUTPATIENT
Start: 2025-04-10 | End: 2025-04-12

## 2025-04-10 RX ORDER — DOXYCYCLINE 100 MG/1
100 CAPSULE ORAL 2 TIMES DAILY
Status: ON HOLD | COMMUNITY
Start: 2025-04-08 | End: 2025-04-18

## 2025-04-10 RX ORDER — ISOSORBIDE MONONITRATE 30 MG/1
30 TABLET, EXTENDED RELEASE ORAL EVERY MORNING
Status: DISCONTINUED | OUTPATIENT
Start: 2025-04-10 | End: 2025-04-18 | Stop reason: HOSPADM

## 2025-04-10 RX ORDER — OXYCODONE HYDROCHLORIDE 5 MG/1
5 TABLET ORAL EVERY 4 HOURS PRN
Status: DISCONTINUED | OUTPATIENT
Start: 2025-04-10 | End: 2025-04-14

## 2025-04-10 RX ORDER — NICOTINE POLACRILEX 4 MG
15-30 LOZENGE BUCCAL
Status: DISCONTINUED | OUTPATIENT
Start: 2025-04-10 | End: 2025-04-18 | Stop reason: HOSPADM

## 2025-04-10 RX ORDER — SACCHAROMYCES BOULARDII 250 MG
250 CAPSULE ORAL 2 TIMES DAILY
Status: DISCONTINUED | OUTPATIENT
Start: 2025-04-10 | End: 2025-04-18 | Stop reason: HOSPADM

## 2025-04-10 RX ORDER — METOPROLOL SUCCINATE 25 MG/1
25 TABLET, EXTENDED RELEASE ORAL DAILY
Status: DISCONTINUED | OUTPATIENT
Start: 2025-04-10 | End: 2025-04-18 | Stop reason: HOSPADM

## 2025-04-10 RX ORDER — INSULIN ASPART INJECTION 100 [IU]/ML
INJECTION, SOLUTION SUBCUTANEOUS
Status: ON HOLD | COMMUNITY
End: 2025-04-18

## 2025-04-10 RX ORDER — ASPIRIN 81 MG/1
81 TABLET ORAL DAILY
Status: DISCONTINUED | OUTPATIENT
Start: 2025-04-10 | End: 2025-04-18 | Stop reason: HOSPADM

## 2025-04-10 RX ORDER — NALOXONE HYDROCHLORIDE 0.4 MG/ML
0.4 INJECTION, SOLUTION INTRAMUSCULAR; INTRAVENOUS; SUBCUTANEOUS
Status: DISCONTINUED | OUTPATIENT
Start: 2025-04-10 | End: 2025-04-18 | Stop reason: HOSPADM

## 2025-04-10 RX ORDER — PROCHLORPERAZINE 25 MG
12.5 SUPPOSITORY, RECTAL RECTAL EVERY 12 HOURS PRN
Status: DISCONTINUED | OUTPATIENT
Start: 2025-04-10 | End: 2025-04-14

## 2025-04-10 RX ORDER — GABAPENTIN 300 MG/1
600 CAPSULE ORAL AT BEDTIME
Status: DISCONTINUED | OUTPATIENT
Start: 2025-04-10 | End: 2025-04-17

## 2025-04-10 RX ORDER — METRONIDAZOLE 500 MG/100ML
500 INJECTION, SOLUTION INTRAVENOUS EVERY 8 HOURS
Status: DISCONTINUED | OUTPATIENT
Start: 2025-04-10 | End: 2025-04-10

## 2025-04-10 RX ORDER — AMOXICILLIN 250 MG
1 CAPSULE ORAL 2 TIMES DAILY PRN
Status: DISCONTINUED | OUTPATIENT
Start: 2025-04-10 | End: 2025-04-14

## 2025-04-10 RX ORDER — MIRTAZAPINE 15 MG/1
15 TABLET, FILM COATED ORAL AT BEDTIME
Status: DISCONTINUED | OUTPATIENT
Start: 2025-04-10 | End: 2025-04-18 | Stop reason: HOSPADM

## 2025-04-10 RX ORDER — PROCHLORPERAZINE MALEATE 5 MG/1
5 TABLET ORAL EVERY 6 HOURS PRN
Status: DISCONTINUED | OUTPATIENT
Start: 2025-04-10 | End: 2025-04-14

## 2025-04-10 RX ORDER — BUPROPION HYDROCHLORIDE 150 MG/1
150 TABLET ORAL DAILY
COMMUNITY

## 2025-04-10 RX ORDER — NALOXONE HYDROCHLORIDE 0.4 MG/ML
0.2 INJECTION, SOLUTION INTRAMUSCULAR; INTRAVENOUS; SUBCUTANEOUS
Status: DISCONTINUED | OUTPATIENT
Start: 2025-04-10 | End: 2025-04-18 | Stop reason: HOSPADM

## 2025-04-10 RX ORDER — ONDANSETRON 2 MG/ML
4 INJECTION INTRAMUSCULAR; INTRAVENOUS EVERY 6 HOURS PRN
Status: DISCONTINUED | OUTPATIENT
Start: 2025-04-10 | End: 2025-04-14

## 2025-04-10 RX ORDER — ONDANSETRON 4 MG/1
4 TABLET, ORALLY DISINTEGRATING ORAL EVERY 6 HOURS PRN
Status: DISCONTINUED | OUTPATIENT
Start: 2025-04-10 | End: 2025-04-14

## 2025-04-10 RX ORDER — BUPROPION HYDROCHLORIDE 150 MG/1
150 TABLET ORAL DAILY
Status: DISCONTINUED | OUTPATIENT
Start: 2025-04-10 | End: 2025-04-18 | Stop reason: HOSPADM

## 2025-04-10 RX ORDER — GABAPENTIN 300 MG/1
300 CAPSULE ORAL EVERY MORNING
Status: DISCONTINUED | OUTPATIENT
Start: 2025-04-10 | End: 2025-04-17

## 2025-04-10 RX ORDER — ASPIRIN 81 MG/1
81 TABLET ORAL DAILY
COMMUNITY

## 2025-04-10 RX ORDER — AMOXICILLIN 250 MG
2 CAPSULE ORAL 2 TIMES DAILY PRN
Status: DISCONTINUED | OUTPATIENT
Start: 2025-04-10 | End: 2025-04-14

## 2025-04-10 RX ORDER — ACETAMINOPHEN 325 MG/1
650 TABLET ORAL EVERY 4 HOURS PRN
Status: DISCONTINUED | OUTPATIENT
Start: 2025-04-10 | End: 2025-04-18 | Stop reason: HOSPADM

## 2025-04-10 RX ORDER — FAMOTIDINE 20 MG/1
20 TABLET, FILM COATED ORAL 2 TIMES DAILY
Status: DISCONTINUED | OUTPATIENT
Start: 2025-04-10 | End: 2025-04-14

## 2025-04-10 RX ORDER — CALCIUM CARBONATE 500 MG/1
1000 TABLET, CHEWABLE ORAL 4 TIMES DAILY PRN
Status: DISCONTINUED | OUTPATIENT
Start: 2025-04-10 | End: 2025-04-14

## 2025-04-10 RX ORDER — ACETAMINOPHEN 650 MG/1
650 SUPPOSITORY RECTAL EVERY 4 HOURS PRN
Status: DISCONTINUED | OUTPATIENT
Start: 2025-04-10 | End: 2025-04-18 | Stop reason: HOSPADM

## 2025-04-10 RX ORDER — LIDOCAINE 40 MG/G
CREAM TOPICAL
Status: DISCONTINUED | OUTPATIENT
Start: 2025-04-10 | End: 2025-04-14

## 2025-04-10 RX ORDER — DEXTROSE MONOHYDRATE 25 G/50ML
25-50 INJECTION, SOLUTION INTRAVENOUS
Status: DISCONTINUED | OUTPATIENT
Start: 2025-04-10 | End: 2025-04-18 | Stop reason: HOSPADM

## 2025-04-10 RX ORDER — TAMSULOSIN HYDROCHLORIDE 0.4 MG/1
0.8 CAPSULE ORAL AT BEDTIME
Status: DISCONTINUED | OUTPATIENT
Start: 2025-04-10 | End: 2025-04-18 | Stop reason: HOSPADM

## 2025-04-10 RX ORDER — METRONIDAZOLE 500 MG/100ML
500 INJECTION, SOLUTION INTRAVENOUS EVERY 12 HOURS
Status: DISCONTINUED | OUTPATIENT
Start: 2025-04-10 | End: 2025-04-12

## 2025-04-10 RX ADMIN — BUPROPION HYDROCHLORIDE 150 MG: 150 TABLET, EXTENDED RELEASE ORAL at 12:20

## 2025-04-10 RX ADMIN — SERTRALINE HYDROCHLORIDE 150 MG: 50 TABLET ORAL at 12:20

## 2025-04-10 RX ADMIN — TAMSULOSIN HYDROCHLORIDE 0.8 MG: 0.4 CAPSULE ORAL at 21:20

## 2025-04-10 RX ADMIN — Medication 250 MG: at 12:52

## 2025-04-10 RX ADMIN — ONDANSETRON 4 MG: 2 INJECTION INTRAMUSCULAR; INTRAVENOUS at 15:33

## 2025-04-10 RX ADMIN — ACETAMINOPHEN 650 MG: 325 TABLET, FILM COATED ORAL at 15:36

## 2025-04-10 RX ADMIN — ACETAMINOPHEN 650 MG: 325 TABLET, FILM COATED ORAL at 03:15

## 2025-04-10 RX ADMIN — CEFEPIME 2 G: 2 INJECTION, POWDER, FOR SOLUTION INTRAVENOUS at 21:25

## 2025-04-10 RX ADMIN — ONDANSETRON 4 MG: 2 INJECTION INTRAMUSCULAR; INTRAVENOUS at 07:44

## 2025-04-10 RX ADMIN — METRONIDAZOLE 500 MG: 500 INJECTION, SOLUTION INTRAVENOUS at 22:17

## 2025-04-10 RX ADMIN — ACETAMINOPHEN 650 MG: 325 TABLET, FILM COATED ORAL at 07:43

## 2025-04-10 RX ADMIN — ASPIRIN 81 MG: 81 TABLET, COATED ORAL at 12:20

## 2025-04-10 RX ADMIN — GABAPENTIN 600 MG: 300 CAPSULE ORAL at 21:20

## 2025-04-10 RX ADMIN — Medication 1250 MG: at 23:32

## 2025-04-10 RX ADMIN — MIRTAZAPINE 15 MG: 15 TABLET, FILM COATED ORAL at 21:20

## 2025-04-10 RX ADMIN — GABAPENTIN 300 MG: 300 CAPSULE ORAL at 12:52

## 2025-04-10 RX ADMIN — PROCHLORPERAZINE EDISYLATE 5 MG: 5 INJECTION INTRAMUSCULAR; INTRAVENOUS at 09:16

## 2025-04-10 RX ADMIN — FAMOTIDINE 20 MG: 20 TABLET, FILM COATED ORAL at 21:20

## 2025-04-10 RX ADMIN — METRONIDAZOLE 500 MG: 500 INJECTION, SOLUTION INTRAVENOUS at 03:19

## 2025-04-10 RX ADMIN — ACETAMINOPHEN 650 MG: 325 TABLET, FILM COATED ORAL at 21:36

## 2025-04-10 RX ADMIN — FAMOTIDINE 20 MG: 20 TABLET, FILM COATED ORAL at 12:52

## 2025-04-10 RX ADMIN — PSYLLIUM HUSK 1 PACKET: 3.4 POWDER ORAL at 12:20

## 2025-04-10 RX ADMIN — Medication 250 MG: at 21:20

## 2025-04-10 RX ADMIN — INSULIN GLARGINE 15 UNITS: 100 INJECTION, SOLUTION SUBCUTANEOUS at 12:54

## 2025-04-10 RX ADMIN — ISOSORBIDE MONONITRATE 30 MG: 30 TABLET, EXTENDED RELEASE ORAL at 12:52

## 2025-04-10 RX ADMIN — METRONIDAZOLE 500 MG: 500 INJECTION, SOLUTION INTRAVENOUS at 10:00

## 2025-04-10 RX ADMIN — CEFEPIME 2 G: 2 INJECTION, POWDER, FOR SOLUTION INTRAVENOUS at 08:45

## 2025-04-10 RX ADMIN — OXYCODONE HYDROCHLORIDE 5 MG: 5 TABLET ORAL at 09:19

## 2025-04-10 RX ADMIN — ATORVASTATIN CALCIUM 40 MG: 40 TABLET, FILM COATED ORAL at 21:20

## 2025-04-10 RX ADMIN — METOPROLOL SUCCINATE 25 MG: 25 TABLET, EXTENDED RELEASE ORAL at 12:20

## 2025-04-10 ASSESSMENT — ACTIVITIES OF DAILY LIVING (ADL)
ADLS_ACUITY_SCORE: 72
DRESSING/BATHING_DIFFICULTY: YES
DOING_ERRANDS_INDEPENDENTLY_DIFFICULTY: YES
ADLS_ACUITY_SCORE: 59
WEAR_GLASSES_OR_BLIND: YES
ADLS_ACUITY_SCORE: 72
ADLS_ACUITY_SCORE: 69
ADLS_ACUITY_SCORE: 55
ADLS_ACUITY_SCORE: 72
VISION_MANAGEMENT: GLASSES
TOILETING_ASSISTANCE: TOILETING DIFFICULTY, ASSISTANCE 1 PERSON
ADLS_ACUITY_SCORE: 59
ADLS_ACUITY_SCORE: 72
EQUIPMENT_CURRENTLY_USED_AT_HOME: WHEELCHAIR, MANUAL
ADLS_ACUITY_SCORE: 72
WALKING_OR_CLIMBING_STAIRS_DIFFICULTY: YES
CHANGE_IN_FUNCTIONAL_STATUS_SINCE_ONSET_OF_CURRENT_ILLNESS/INJURY: NO
ADLS_ACUITY_SCORE: 59
ADLS_ACUITY_SCORE: 72
ADLS_ACUITY_SCORE: 59
ADLS_ACUITY_SCORE: 72
TOILETING: 1-->ASSISTANCE (EQUIPMENT/PERSON) NEEDED
ADLS_ACUITY_SCORE: 59
ADLS_ACUITY_SCORE: 59
ADLS_ACUITY_SCORE: 72
FALL_HISTORY_WITHIN_LAST_SIX_MONTHS: NO
ADLS_ACUITY_SCORE: 70
TOILETING_ISSUES: YES
ADLS_ACUITY_SCORE: 72
DIFFICULTY_EATING/SWALLOWING: NO
ADLS_ACUITY_SCORE: 72
WALKING_OR_CLIMBING_STAIRS: AMBULATION DIFFICULTY, DEPENDENT
DRESSING/BATHING: BATHING DIFFICULTY, ASSISTANCE 1 PERSON
ADLS_ACUITY_SCORE: 59
TOILETING: 1-->ASSISTANCE (EQUIPMENT/PERSON) NEEDED (NOT DEVELOPMENTALLY APPROPRIATE)
ADLS_ACUITY_SCORE: 70
CONCENTRATING,_REMEMBERING_OR_MAKING_DECISIONS_DIFFICULTY: YES
ADLS_ACUITY_SCORE: 72
ADLS_ACUITY_SCORE: 72

## 2025-04-10 NOTE — PROGRESS NOTES
Interdisciplinary Discharge Planning Note    Anticipated Discharge Date:2-4 days    Anticipated Discharge Location: [facility]     Clinical Needs Before Discharge:  stable vital signs and abx, surgery following    Treatment Needs After Discharge:  None identified at this time     Potential Barriers to Discharge: None Identified    NOEL Atwood  4/10/2025,  12:11 PM

## 2025-04-10 NOTE — PROGRESS NOTES
:     Patient lives at [facility].      will follow for discharge planning needs.     NOEL Atwood on 4/10/2025 at 12:20 PM

## 2025-04-10 NOTE — PLAN OF CARE
"    VSS. Afebrile, chills, tylenol given. Nausea and vomiting, Zofran and compazine given. Tolerating some food and fluids, states he is \"not hungry.\" Oxycodone given for \"shooting pain\" 8/10 in left leg, pt stated relief. Dry cough, lung sounds clear. Small amount of sangonious drainage to wound, allevyn dressing CDI. CCRQ2, external catheter in place.       Assist of two with piviot. Alarms on and audible.     /56 (BP Location: Right arm, Patient Position: Supine, Cuff Size: Adult Regular)   Pulse 81   Temp 98.6  F (37  C) (Oral)   Resp 16   Ht 1.854 m (6' 1\")   Wt 99.1 kg (218 lb 8 oz)   SpO2 94%   BMI 28.83 kg/m        Desiree Gould RN on 4/10/2025 at 6:19 PM  '  "

## 2025-04-10 NOTE — PROGRESS NOTES
"[facility] ADMISSION NOTE    Patient admitted to room 331 at approximately 2340 via bed from [facility]. Patient was accompanied by other:EMS.     Verbal SBAR report received from DR nurse prior to patient arrival.     Patient trasferred to bed via sliding Patient alert and oriented X 3. Pain is controlled without any medications.  . Admission vital signs: Blood pressure 132/58, pulse 82, temperature 99.8  F (37.7  C), temperature source Tympanic, resp. rate 20, height 1.854 m (6' 1\"), weight 99.1 kg (218 lb 8 oz), SpO2 94%. Patient was oriented to plan of care, call light, bed controls, tv, telephone, bathroom, and visiting hours.     Risk Assessment    The following safety risks were identified during admission: fall. Yellow risk band applied: YES.       Education    Patient has a [place] to Observation order: Yes  Observation education completed and documented: Yes      Johanna Contreras RN      "
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
## 2025-04-10 NOTE — PROVIDER NOTIFICATION
04/09/25 6695   Initial Information   Patient Belongings remains with patient   Patient Belongings Remaining with Patient clothing   Did you bring any home meds/supplements to the hospital?  No     A               Admission:  I am responsible for any personal items that are not sent to the safe or pharmacy.  [facility] is not responsible for loss, theft or damage of any property in my possession.    Signature:  _________________________________ Date: _______  Time: _____                                              Staff Signature:  ____________________________ Date: ________  Time: _____      2nd Staff person, if patient is unable/unwilling to sign:    Signature: ________________________________ Date: ________  Time: _____     Discharge:  [facility] has returned all of my personal belongings:    Signature: _________________________________ Date: ________  Time: _____                                          Staff Signature:  ____________________________ Date: ________  Time: _____

## 2025-04-10 NOTE — PROGRESS NOTES
Preventing Falls in the Hospital (02:42)  Your health professional recommends that you watch this short online health video.  Find out why you're at risk for falling in the hospital and how to prevent falls.   Purpose: Understand what increases a person's risk of falling in the hospital and how to prevent falls.  Goal: Understand what increases a person's risk of falling in the hospital and how to prevent falls.    Watch: Scan the QR code or visit the link to view video       https://hwi.se/[email or web address]  Current as of: July 17, 2023  Content Version: 14.2 2024 [facility] "[employer].   Care instructions adapted under license by your healthcare professional. If you have questions about a medical condition or this instruction, always ask your healthcare professional. [employer] disclaims any warranty or liability for your use of this information.  Preventing Falls in the Hospital

## 2025-04-10 NOTE — PHARMACY-VANCOMYCIN DOSING SERVICE
Pharmacy Vancomycin Initial Note  Date of Service April 10, 2025  Patient's  1935  89 year old, male    Indication: Osteomyelitis    Current estimated CrCl = Estimated Creatinine Clearance: 45.6 mL/min (A) (based on SCr of 1.36 mg/dL (H)).    Creatinine for last 3 days  4/10/2025:  1:05 AM Creatinine 1.45 mg/dL;  5:40 AM Creatinine 1.36 mg/dL    Recent Vancomycin Level(s) for last 3 days  No results found for requested labs within last 3 days.      Vancomycin IV Administrations (past 72 hours)        No vancomycin orders with administrations in past 72 hours.                    Nephrotoxins and other renal medications (From now, onward)      Start     Dose/Rate Route Frequency Ordered Stop    04/10/25 2200  vancomycin (VANCOCIN) 1,250 mg in 0.9% NaCl 250 mL intermittent infusion         1,250 mg  over 90 Minutes Intravenous EVERY 24 HOURS 04/10/25 0639              Contrast Orders - past 72 hours (72h ago, onward)      None            InsightRX Prediction of Planned Initial Vancomycin Regimen  Loading dose: N/A  Regimen: 1250 mg IV every 24 hours.  Start time: 06:39 on 04/10/2025  Exposure target: AUC24 (range)400-600 mg/L.hr   AUC24,ss: 528 mg/L.hr  Probability of AUC24 > 400: 79 %  Ctrough,ss: 17.1 mg/L  Probability of Ctrough,ss > 20: 35 %  Probability of nephrotoxicity (Lodise DEE ): 13 %          Plan:  Start vancomycin  1250 mg IV q24h. Patient received a one time 2g IV dose at [facility] prior to transferring.  Vancomycin monitoring method: AUC  Vancomycin therapeutic monitoring goal: 400-600 mg*h/L  Pharmacy will check vancomycin levels as appropriate in 1-3 Days.    Serum creatinine levels will be ordered daily for the first week of therapy and at least twice weekly for subsequent weeks.      Geovanni Bell RPH
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
## 2025-04-10 NOTE — PHARMACY-ADMISSION MEDICATION HISTORY
Pharmacist Admission Medication History    Admission medication history is complete. The information provided in this note is only as accurate as the sources available at the time of the update.    Information Source(s): Facility (U/NH/) medication list/MAR and CareEverywhere/SureScripts via phone    Pertinent Information: Patient is a resident of [facility]. Patient's medication is manages by the nursing staff there. Patient medication history was collected via MAR.    Changes made to PTA medication list:  Added:   Doxycycline  Patient started on 4/08/25 will end morning of 4/17/25  Deleted:   None  Changed:   Insulin Aspart  Added 11 unit bolas plus sliding scale for meal and sliding scale only for bed  Insulin Glargine  Patient is receiving 40 units QAM. Updated units.    Allergies reviewed with patient and updates made in EHR: yes    Medication History Completed By: Elton Garcia RPH 4/10/2025 12:03 PM    PTA Med List   Medication Sig Last Dose/Taking    aspirin 81 MG EC tablet Take 81 mg by mouth daily. Morning    buPROPion (WELLBUTRIN XL) 150 MG 24 hr tablet Take 150 mg by mouth daily. Morning    doxycycline hyclate (VIBRAMYCIN) 100 MG capsule Take 100 mg by mouth 2 times daily. Evening    insulin aspart (FIASP FLEXTOUCH) 100 UNIT/ML pen-injector Inject 11 unit three times daily with meal plus sliding scale  Use only sliding scale for bedtime  0-200 = 0 units  200-249 = 2 units  250-299 = 3 units  >300 = 4 units Evening    Vitamin D3 (CHOLECALCIFEROL) 25 mcg (1000 units) tablet Take 1 tablet by mouth daily. 4/9/2025 Morning

## 2025-04-10 NOTE — PROGRESS NOTES
"Pt experiencing vomiting and emesis. Chills, tympanic temp 100.3, oral 98.1. Tylenol and Zofran given with some relief.     BP (!) 179/71   Pulse 108   Temp 98.1  F (36.7  C) (Oral)   Resp 20   Ht 1.854 m (6' 1\")   Wt 99.1 kg (218 lb 8 oz)   SpO2 94%   BMI 28.83 kg/m      Desiree Gould RN on 4/10/2025 at 8:13 AM    "

## 2025-04-10 NOTE — PROGRESS NOTES
[facility]    Medicine Progress Note - Hospitalist Service    Date of Admission:  4/9/2025    Assessment & Plan     Elsa Horowitz is an 89-year-old man with past medical history of osteomyelitis, peripheral artery disease, coronary disease, type 2 diabetes, hypothyroidism, hypertension, CKD stage IIIb, and depression who was admitted to the hospital due to concerns for osteomyelitis on his left foot.    Principal Problem:    Osteomyelitis of left foot (H)    Gangrene (H)   Diabetic neuropathy (H)  PAD (peripheral artery disease)    Diabetic ulcer of toe of left foot associated with type 2 diabetes mellitus, with necrosis of bone (H)    Assessment: Acute on chronic osteomyelitis has grown MRSA, VRE and Pseudomonas in wound cultures in the past.  Able to express some purulence at the base of the necrotic gangrenous toe.  Anticipate that he may need transmetatarsal amputation but defer to surgery.    Plan:   -inpatient  -consult general surgery  -wound culture pending  -vancomycin, cefepime, metronidazole  -Continue home gabapentin  - As needed oxycodone for pain  - Continue home Florastor      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)    Assessment: Prior to admission on insulin Fiasp 3 units 3 times daily with meals, insulin glargine 17 units every a.m.    Plan:   - Add on hemoglobin A1c  - QID AC HS glucose checks  - Insulin glargine 15 units daily  - Medium sliding scale      Chronic kidney disease, stage 3a     Assessment: Renal function is variable with a baseline GFR of approximately 45, GFR 50 on admission.    Plan:   - Continue to trend      Coronary artery disease    Assessment: Continue home atorvastatin, Imdur and aspirin 81 mg daily        Essential hypertension    Assessment: Continue home isosorbide mononitrate 30 mg daily, metoprolol 25 mg daily, does not appear to be on an ACE or an ARB and should be with his CKD and diabetes.            Depressive  "disorder    Assessment: Continue home bupropion 150 mg daily, Remeron 15 mg at bedtime, sertraline 150 mg daily    BPH: Continue home tamsulosin 0.8 mg at bedtime          Diet: Regular Diet Adult    DVT Prophylaxis: Pneumatic Compression Devices  Adkins Catheter: Not present  Lines: None     Cardiac Monitoring: None  Code Status: No CPR- Do NOT Intubate      Clinically Significant Risk Factors Present on Admission         # Hyponatremia: Lowest Na = 133 mmol/L in last 2 days, will monitor as appropriate         # Drug Induced Platelet Defect: home medication list includes an antiplatelet medication   # Hypertension: Noted on problem list        # Anemia: based on hgb <11      # DMII: A1C = 6.8 % (Ref range: <5.7 %) within past 6 months   # Overweight: Estimated body mass index is 28.83 kg/m  as calculated from the following:    Height as of this encounter: 1.854 m (6' 1\").    Weight as of this encounter: 99.1 kg (218 lb 8 oz).              Social Drivers of Health    Tobacco Use: Medium Risk (4/9/2025)    Received from [facility]    Patient History     Smoking Tobacco Use: Former     Smokeless Tobacco Use: Never          Disposition Plan     Medically Ready for Discharge: Anticipated in 2-4 Days             Jovanni Khoury MD  Hospitalist Service  [facility]  Securely message with [employer] (more info)  Text page via [employer] Paging/Directory   ______________________________________________________________________    Interval History   Pain this morning in his foot.   Nausea as well.     Physical Exam   Vital Signs: Temp: 98.8  F (37.1  C) Temp src: Oral BP: 137/58 Pulse: 93   Resp: 18 SpO2: 95 % O2 Device: None (Room air)    Weight: 218 lbs 8 oz  General: Pleasant 89 year old year old male lying in bed in no acute distress   CV: regular rate and rhythm, no murmur, rubs or peripheral edema appreciated  Pulm: CTAB  GI: soft, nontender,   MSK: see photo below, outlined " cellulitis within margins, dry gangrene on stump of digit 2, small amount of purulence able to be expressed from between digits 1 and 2.         Medical Decision Making             Data     I have personally reviewed the following data over the past 24 hrs:    10.5  \   10.0 (L)   / 206     133 (L) 100 29.2 (H) /  188 (H)   4.3 22 1.36 (H) \     TSH: N/A T4: N/A A1C: 6.8 (H)     Procal: N/A CRP: 218.13 (H) Lactic Acid: 0.7         Imaging results reviewed over the past 24 hrs:   Recent Results (from the past 24 hours)   XR Foot Left G/E 3 Views    Narrative    This document is currently in Final Status    Exam Accession# [id number]    XR FOOT LEFT 3 OR MORE VIEWS    HISTORY: Diabetic foot infection, ?osteo.     COMPARISON: 03/05/2025.    IMPRESSION: Postoperative change. Partial amputation of the 1st through 3rd toes similar in appearance. No new destructive osseous changes. Alignment within normal limits. Advanced vascular calcifications noted. Question soft tissue laceration or wound along the plantar aspect of the forefoot distally.    Dictated By: Imtiaz Parkinson MD 4/9/2025 7:54 PM  Edited By: CORA 4/9/2025 7:56 PM      Electronically Signed: Imtiaz Parkinson MD 4/9/2025 8:05 PM

## 2025-04-10 NOTE — PHARMACY-VANCOMYCIN DOSING SERVICE
Pharmacy Vancomycin Initial Note  Date of Service April 10, 2025  Patient's  1935  89 year old, male    Indication: Osteomyelitis    Current estimated CrCl = Estimated Creatinine Clearance: 45.6 mL/min (A) (based on SCr of 1.36 mg/dL (H)).    Creatinine for last 3 days  4/10/2025:  1:05 AM Creatinine 1.45 mg/dL;  5:40 AM Creatinine 1.36 mg/dL    Recent Vancomycin Level(s) for last 3 days  No results found for requested labs within last 3 days.      Vancomycin IV Administrations (past 72 hours)        No vancomycin orders with administrations in past 72 hours.                    Nephrotoxins and other renal medications (From now, onward)      Start     Dose/Rate Route Frequency Ordered Stop    04/10/25 2200  vancomycin (VANCOCIN) 1,250 mg in 0.9% NaCl 250 mL intermittent infusion         1,250 mg  over 90 Minutes Intravenous EVERY 24 HOURS 04/10/25 0639              Contrast Orders - past 72 hours (72h ago, onward)      None            InsightRX Prediction of Planned Initial Vancomycin Regimen  Loading dose: 2000 mg IV once @ 2100 (given at [facility])  Regimen: 1250 mg IV every 24 hours.  Start time: 07:27 on 04/10/2025  Exposure target: AUC24 (range)400-600 mg/L.hr   AUC24,ss: 528 mg/L.hr  Probability of AUC24 > 400: 79 %  Ctrough,ss: 17.1 mg/L  Probability of Ctrough,ss > 20: 35 %  Probability of nephrotoxicity (Lodise DEE ): 13 %        Plan:  Start vancomycin  1250 mg IV q24h.   Vancomycin monitoring method: AUC  Vancomycin therapeutic monitoring goal: 400-600 mg*h/L  Pharmacy will check vancomycin levels as appropriate in 1-3 Days.    Serum creatinine levels will be ordered daily for the first week of therapy and at least twice weekly for subsequent weeks.      Radha Mclean RPH

## 2025-04-11 VITALS
TEMPERATURE: 99.8 F | HEIGHT: 73 IN | SYSTOLIC BLOOD PRESSURE: 102 MMHG | WEIGHT: 218.5 LBS | RESPIRATION RATE: 18 BRPM | DIASTOLIC BLOOD PRESSURE: 35 MMHG | HEART RATE: 87 BPM | BODY MASS INDEX: 28.96 KG/M2 | OXYGEN SATURATION: 95 %

## 2025-04-11 LAB
ANION GAP SERPL CALCULATED.3IONS-SCNC: 15 MMOL/L (ref 7–15)
BUN SERPL-MCNC: 30.4 MG/DL (ref 8–23)
CALCIUM SERPL-MCNC: 8.2 MG/DL (ref 8.8–10.4)
CHLORIDE SERPL-SCNC: 101 MMOL/L (ref 98–107)
CREAT SERPL-MCNC: 1.47 MG/DL (ref 0.67–1.17)
CRP SERPL-MCNC: 279.1 MG/L
EGFRCR SERPLBLD CKD-EPI 2021: 45 ML/MIN/1.73M2
GLUCOSE BLDC GLUCOMTR-MCNC: 149 MG/DL (ref 70–99)
GLUCOSE BLDC GLUCOMTR-MCNC: 164 MG/DL (ref 70–99)
GLUCOSE BLDC GLUCOMTR-MCNC: 164 MG/DL (ref 70–99)
GLUCOSE BLDC GLUCOMTR-MCNC: 197 MG/DL (ref 70–99)
GLUCOSE SERPL-MCNC: 165 MG/DL (ref 70–99)
HCO3 SERPL-SCNC: 19 MMOL/L (ref 22–29)
HOLD SPECIMEN: NORMAL
POTASSIUM SERPL-SCNC: 4.5 MMOL/L (ref 3.4–5.3)
SODIUM SERPL-SCNC: 135 MMOL/L (ref 135–145)
VANCOMYCIN SERPL-MCNC: 10.3 UG/ML

## 2025-04-11 PROCEDURE — 250N000011 HC RX IP 250 OP 636: Mod: JZ | Performed by: STUDENT IN AN ORGANIZED HEALTH CARE EDUCATION/TRAINING PROGRAM

## 2025-04-11 PROCEDURE — 99233 SBSQ HOSP IP/OBS HIGH 50: CPT | Performed by: STUDENT IN AN ORGANIZED HEALTH CARE EDUCATION/TRAINING PROGRAM

## 2025-04-11 PROCEDURE — 80048 BASIC METABOLIC PNL TOTAL CA: CPT | Performed by: STUDENT IN AN ORGANIZED HEALTH CARE EDUCATION/TRAINING PROGRAM

## 2025-04-11 PROCEDURE — 36415 COLL VENOUS BLD VENIPUNCTURE: CPT | Performed by: STUDENT IN AN ORGANIZED HEALTH CARE EDUCATION/TRAINING PROGRAM

## 2025-04-11 PROCEDURE — 250N000013 HC RX MED GY IP 250 OP 250 PS 637: Performed by: INTERNAL MEDICINE

## 2025-04-11 PROCEDURE — 250N000011 HC RX IP 250 OP 636: Mod: JZ | Performed by: INTERNAL MEDICINE

## 2025-04-11 PROCEDURE — 99223 1ST HOSP IP/OBS HIGH 75: CPT | Mod: 25 | Performed by: SURGERY

## 2025-04-11 PROCEDURE — 120N000001 HC R&B MED SURG/OB

## 2025-04-11 PROCEDURE — 80202 ASSAY OF VANCOMYCIN: CPT | Performed by: STUDENT IN AN ORGANIZED HEALTH CARE EDUCATION/TRAINING PROGRAM

## 2025-04-11 PROCEDURE — 86140 C-REACTIVE PROTEIN: CPT | Performed by: STUDENT IN AN ORGANIZED HEALTH CARE EDUCATION/TRAINING PROGRAM

## 2025-04-11 PROCEDURE — 250N000013 HC RX MED GY IP 250 OP 250 PS 637: Performed by: STUDENT IN AN ORGANIZED HEALTH CARE EDUCATION/TRAINING PROGRAM

## 2025-04-11 RX ADMIN — METRONIDAZOLE 500 MG: 500 INJECTION, SOLUTION INTRAVENOUS at 11:46

## 2025-04-11 RX ADMIN — METOPROLOL SUCCINATE 25 MG: 25 TABLET, EXTENDED RELEASE ORAL at 10:53

## 2025-04-11 RX ADMIN — FAMOTIDINE 20 MG: 20 TABLET, FILM COATED ORAL at 21:09

## 2025-04-11 RX ADMIN — ASPIRIN 81 MG: 81 TABLET, COATED ORAL at 10:53

## 2025-04-11 RX ADMIN — BUPROPION HYDROCHLORIDE 150 MG: 150 TABLET, EXTENDED RELEASE ORAL at 10:53

## 2025-04-11 RX ADMIN — ISOSORBIDE MONONITRATE 30 MG: 30 TABLET, EXTENDED RELEASE ORAL at 08:00

## 2025-04-11 RX ADMIN — GABAPENTIN 600 MG: 300 CAPSULE ORAL at 21:08

## 2025-04-11 RX ADMIN — TAMSULOSIN HYDROCHLORIDE 0.8 MG: 0.4 CAPSULE ORAL at 21:09

## 2025-04-11 RX ADMIN — ACETAMINOPHEN 650 MG: 325 TABLET, FILM COATED ORAL at 04:42

## 2025-04-11 RX ADMIN — INSULIN GLARGINE 15 UNITS: 100 INJECTION, SOLUTION SUBCUTANEOUS at 08:01

## 2025-04-11 RX ADMIN — Medication 250 MG: at 10:53

## 2025-04-11 RX ADMIN — METRONIDAZOLE 500 MG: 500 INJECTION, SOLUTION INTRAVENOUS at 21:49

## 2025-04-11 RX ADMIN — SERTRALINE HYDROCHLORIDE 150 MG: 50 TABLET ORAL at 10:53

## 2025-04-11 RX ADMIN — FAMOTIDINE 20 MG: 20 TABLET, FILM COATED ORAL at 10:55

## 2025-04-11 RX ADMIN — GABAPENTIN 300 MG: 300 CAPSULE ORAL at 08:00

## 2025-04-11 RX ADMIN — ATORVASTATIN CALCIUM 40 MG: 40 TABLET, FILM COATED ORAL at 21:08

## 2025-04-11 RX ADMIN — Medication 250 MG: at 21:09

## 2025-04-11 RX ADMIN — Medication 1250 MG: at 23:07

## 2025-04-11 RX ADMIN — CEFEPIME 2 G: 2 INJECTION, POWDER, FOR SOLUTION INTRAVENOUS at 21:03

## 2025-04-11 RX ADMIN — ACETAMINOPHEN 650 MG: 325 TABLET, FILM COATED ORAL at 17:44

## 2025-04-11 RX ADMIN — MIRTAZAPINE 15 MG: 15 TABLET, FILM COATED ORAL at 21:08

## 2025-04-11 RX ADMIN — CEFEPIME 2 G: 2 INJECTION, POWDER, FOR SOLUTION INTRAVENOUS at 10:53

## 2025-04-11 RX ADMIN — PSYLLIUM HUSK 1 PACKET: 3.4 POWDER ORAL at 10:57

## 2025-04-11 ASSESSMENT — ACTIVITIES OF DAILY LIVING (ADL)
ADLS_ACUITY_SCORE: 73
ADLS_ACUITY_SCORE: 78
ADLS_ACUITY_SCORE: 73
ADLS_ACUITY_SCORE: 78
ADLS_ACUITY_SCORE: 78
ADLS_ACUITY_SCORE: 70
ADLS_ACUITY_SCORE: 74
ADLS_ACUITY_SCORE: 73
ADLS_ACUITY_SCORE: 78
ADLS_ACUITY_SCORE: 74
ADLS_ACUITY_SCORE: 73
ADLS_ACUITY_SCORE: 78
ADLS_ACUITY_SCORE: 73
ADLS_ACUITY_SCORE: 73
ADLS_ACUITY_SCORE: 74

## 2025-04-11 NOTE — PHARMACY-VANCOMYCIN DOSING SERVICE
Pharmacy Vancomycin Note  Date of Service 2025  Patient's  1935   89 year old, male    Indication: Osteomyelitis  Day of Therapy: 3 (load given )  Current vancomycin regimen:  1250 mg IV q12h  Current vancomycin monitoring method: AUC  Current vancomycin therapeutic monitoring goal: 400-600 mg*h/L    InsightRX Prediction of Current Vancomycin Regimen  Loading dose: 2000 mg IV once @ 2100 (given at [facility])   Regimen: 1250 mg IV every 24 hours.  Start time: 23:32 on 2025  Exposure target: AUC24 (range)400-600 mg/L.hr   AUC24,ss: 481 mg/L.hr  Probability of AUC24 > 400: 77 %  Ctrough,ss: 17.2 mg/L  Probability of Ctrough,ss > 20: 31 %  Probability of nephrotoxicity (Lodise DEE ): 13 %    Current estimated CrCl = Estimated Creatinine Clearance: 45.6 mL/min (A) (based on SCr of 1.36 mg/dL (H)).    Creatinine for last 3 days  4/10/2025:  1:05 AM Creatinine 1.45 mg/dL;  5:40 AM Creatinine 1.36 mg/dL    Recent Vancomycin Levels (past 3 days)  2025:  5:40 AM Vancomycin 10.3 ug/mL    Vancomycin IV Administrations (past 72 hours)                     vancomycin (VANCOCIN) 1,250 mg in 0.9% NaCl 250 mL intermittent infusion (mg) 1,250 mg New Bag 04/10/25 2332                    Nephrotoxins and other renal medications (From now, onward)      Start     Dose/Rate Route Frequency Ordered Stop    04/10/25 2200  vancomycin (VANCOCIN) 1,250 mg in 0.9% NaCl 250 mL intermittent infusion         1,250 mg  over 90 Minutes Intravenous EVERY 24 HOURS 04/10/25 0639                 Contrast Orders - past 72 hours (72h ago, onward)      None            Interpretation of levels and current regimen:  Vancomycin level is reflective of -600    Has serum creatinine changed greater than 50% in last 72 hours: No    Urine output:  good urine output    Renal Function: Stable    Plan:  Continue Current Dose  Vancomycin monitoring method: AUC  Vancomycin therapeutic monitoring goal: 400-600  mg*h/L  Pharmacy will check vancomycin levels as appropriate in 1-3 Days.  Level 4/11 therapeutic  Serum creatinine levels will be ordered daily for the first week of therapy and at least twice weekly for subsequent weeks.    Radha Mclean RPH

## 2025-04-11 NOTE — PLAN OF CARE
"Goal Outcome Evaluation:      Plan of Care Reviewed With: patient    Overall Patient Progress: no changeOverall Patient Progress: no change    Outcome Evaluation: VSS, pain \"has improved a lot' rating 3/10.    Pt oriented to situation and place. Pt VSS but febrile. Gave PRN tylenol with improvement. Pt foot dressing CDI with small amount of drainage. Pt pulled out hand IV while Vanco was running, ran some in bed. New IV started on inside of R arm, covered with netting.     /66 (BP Location: Right arm, Patient Position: Sitting, Cuff Size: Adult Regular)   Pulse 97   Temp 101  F (38.3  C) (Tympanic)   Resp 19   Ht 1.854 m (6' 1\")   Wt 99.1 kg (218 lb 8 oz)   SpO2 97%   BMI 28.83 kg/m          "

## 2025-04-11 NOTE — PROGRESS NOTES
:    Spoke with Kamilla from [facility] and updated her that patient will be in the hospital over the weekend. I will plan on updating her again Monday about potential discharge plans.      will continue to follow.    NOEL Gavin on 4/11/2025 at 2:50 PM

## 2025-04-11 NOTE — PROGRESS NOTES
[facility]    Medicine Progress Note - Hospitalist Service    Date of Admission:  4/9/2025    Assessment & Plan     Elsa Horowitz is an 89-year-old man with past medical history of osteomyelitis, peripheral artery disease, coronary disease, type 2 diabetes, hypothyroidism, hypertension, CKD stage IIIb, and depression who was admitted to the hospital due to concerns for osteomyelitis on his left foot.    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  General surgery consulted and due to his peripheral vascular disease they would like to hold off on amputation due to high probability of progressive amputations from there.  Await culture speciation results from purulence, likely will discharge on 6-week total course of antibiotic with follow-up in ID clinic.    Principal Problem:    Osteomyelitis of left foot (H)    Gangrene (H)   Diabetic neuropathy (H)  PAD (peripheral artery disease)    Diabetic ulcer of toe of left foot associated with type 2 diabetes mellitus, with necrosis of bone (H)    Assessment: Acute on chronic osteomyelitis has grown MRSA, VRE and Pseudomonas in wound cultures in the past.  Able to express some purulence at the base of the necrotic gangrenous toe.  Per general surgery holding off on transmetatarsal amputation due to risk of progressive amputations from his significant peripheral vascular disease.    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  Admission hemoglobin A1c 6.8.    Plan:   - QID [facility] glucose checks  - Insulin glargine 17 units daily  -  "Medium sliding scale  - Insulin aspart 1 unit with meals      Chronic kidney disease, stage 3a     Assessment: Renal function is variable with a baseline GFR of approximately 45, GFR 50 on admission.    Plan:   - Continue to trend      Coronary artery disease    Assessment: Continue home atorvastatin, Imdur and aspirin 81 mg daily        Essential hypertension    Assessment: Continue home isosorbide mononitrate 30 mg daily, metoprolol 25 mg daily, does not appear to be on an ACE or an ARB and should be with his CKD and diabetes.            Depressive disorder    Assessment: Continue home bupropion 150 mg daily, Remeron 15 mg at bedtime, sertraline 150 mg daily    BPH: Continue home tamsulosin 0.8 mg at bedtime          Diet: NPO for Procedure/Surgery per Anesthesia Guidelines Except for: Meds; Clear liquids before procedure/surgery: ADULT (Age GREATER than or Equal to 18 years) - Clear liquids 2 hours before procedure/surgery    DVT Prophylaxis: Pneumatic Compression Devices  Adkins Catheter: Not present  Lines: None     Cardiac Monitoring: None  Code Status: No CPR- Do NOT Intubate      Clinically Significant Risk Factors         # Hyponatremia: Lowest Na = 133 mmol/L in last 2 days, will monitor as appropriate           # Hypertension: Noted on problem list             # DMII: A1C = 6.8 % (Ref range: <5.7 %) within past 6 months   # Overweight: Estimated body mass index is 28.83 kg/m  as calculated from the following:    Height as of this encounter: 1.854 m (6' 1\").    Weight as of this encounter: 99.1 kg (218 lb 8 oz)., PRESENT ON ADMISSION            Social Drivers of Health    Tobacco Use: Medium Risk (4/9/2025)    Received from [facility]    Patient History     Smoking Tobacco Use: Former     Smokeless Tobacco Use: Never          Disposition Plan     Medically Ready for Discharge: Anticipated in 2-4 Days             Jovanni Khoury MD  Hospitalist Service  [facility]  Securely message with Karen (more info)  Text page via [employer] Paging/Directory   ______________________________________________________________________    Interval History   Pain is much better improved today.  Discussed with general surgery, planning for nonoperative course.    Physical Exam   Vital Signs: Temp: 100.5  F (38.1  C) Temp src: Tympanic BP: 135/61 Pulse: 97   Resp: 28 SpO2: (!) 91 % O2 Device: None (Room air)    Weight: 218 lbs 8 oz  General: Pleasant 89 year old year old male lying in bed in no acute distress   CV: regular rate and rhythm, no murmur, rubs or peripheral edema appreciated  Pulm: CTAB  GI: soft, nontender,   MSK: see photo below, outlined cellulitis within margins, dry gangrene on stump of digit 2, small amount of purulence able to be expressed from between digits 1 and 2.         Medical Decision Making             Data     I have personally reviewed the following data over the past 24 hrs:    N/A  \   N/A   / N/A     135 101 30.4 (H) /  164 (H)   4.5 19 (L) 1.47 (H) \     TSH: N/A T4: N/A A1C: N/A     Procal: N/A CRP: 279.10 (H) Lactic Acid: N/A         Imaging results reviewed over the past 24 hrs:   No results found for this or any previous visit (from the past 24 hours).     9/27/2019

## 2025-04-11 NOTE — PLAN OF CARE
"Goal Outcome Evaluation:      Plan of Care Reviewed With: patient    Overall Patient Progress: no changeOverall Patient Progress: no change    Outcome Evaluation: Patient is febrile, continued antibiotics.    Patient has been sleeping off and on this shift. Patient is disoriented to time. Dressing changed x2 due to drainage. Erythema within marked area.  Update given to ANETTE Sánchez. Blood pressure 139/67, pulse 81, temperature (!) 101.6  F (38.7  C), resp. rate 28, height 1.854 m (6' 1\"), weight 99.1 kg (218 lb 8 oz), SpO2 96%.   "

## 2025-04-11 NOTE — PROGRESS NOTES
Interdisciplinary Discharge Planning Note    Anticipated Discharge Date: TBD    Anticipated Discharge Location: [facility]     Clinical Needs Before Discharge:   Waiting for culture results    Treatment Needs After Discharge:  None identified    Potential Barriers to Discharge: None identified at this time     NOEL Gavin  4/11/2025,  3:27 PM

## 2025-04-11 NOTE — PROGRESS NOTES
SAFETY CHECKLIST  ID Bands and Risk clasps correct and in place (DNR, Fall risk, Allergy, Latex, Limb):  Yes  All Lines Reconciled and labeled correctly: Yes  Whiteboard updated:Yes  Environmental interventions: Yes  Verify Tele #:  RIA Pritchard RN on 4/11/2025 at 7:37 AM

## 2025-04-11 NOTE — PROGRESS NOTES
Review per MST screen.  Patient reports weight loss.  Meal intakes have been poor, patient has refused meal orders.  Currently NPO.  Per chart review-  Weight on 2/10/25= 218 lb  Weight on 3/19/25= 222 lb  Current weight= 218 lb  RD will follow clinical course, implement intervention as appropriate.  Patient may need supplements, Glucerna,  if continued poor intakes.
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
## 2025-04-12 LAB
ANION GAP SERPL CALCULATED.3IONS-SCNC: 11 MMOL/L (ref 7–15)
BACTERIA SPEC CULT: ABNORMAL
BUN SERPL-MCNC: 27.7 MG/DL (ref 8–23)
CALCIUM SERPL-MCNC: 8.6 MG/DL (ref 8.8–10.4)
CHLORIDE SERPL-SCNC: 101 MMOL/L (ref 98–107)
CREAT SERPL-MCNC: 1.26 MG/DL (ref 0.67–1.17)
CRP SERPL-MCNC: 265.51 MG/L
EGFRCR SERPLBLD CKD-EPI 2021: 55 ML/MIN/1.73M2
ERYTHROCYTE [DISTWIDTH] IN BLOOD BY AUTOMATED COUNT: 13.3 % (ref 10–15)
GLUCOSE BLDC GLUCOMTR-MCNC: 150 MG/DL (ref 70–99)
GLUCOSE BLDC GLUCOMTR-MCNC: 198 MG/DL (ref 70–99)
GLUCOSE BLDC GLUCOMTR-MCNC: 245 MG/DL (ref 70–99)
GLUCOSE BLDC GLUCOMTR-MCNC: 278 MG/DL (ref 70–99)
GLUCOSE SERPL-MCNC: 160 MG/DL (ref 70–99)
GRAM STAIN RESULT: ABNORMAL
HCO3 SERPL-SCNC: 21 MMOL/L (ref 22–29)
HCT VFR BLD AUTO: 29.1 % (ref 40–53)
HGB BLD-MCNC: 9.8 G/DL (ref 13.3–17.7)
HOLD SPECIMEN: NORMAL
HOLD SPECIMEN: NORMAL
MCH RBC QN AUTO: 30.4 PG (ref 26.5–33)
MCHC RBC AUTO-ENTMCNC: 33.7 G/DL (ref 31.5–36.5)
MCV RBC AUTO: 90 FL (ref 78–100)
PLATELET # BLD AUTO: 247 10E3/UL (ref 150–450)
POTASSIUM SERPL-SCNC: 4.4 MMOL/L (ref 3.4–5.3)
RBC # BLD AUTO: 3.22 10E6/UL (ref 4.4–5.9)
SODIUM SERPL-SCNC: 133 MMOL/L (ref 135–145)
WBC # BLD AUTO: 10.4 10E3/UL (ref 4–11)

## 2025-04-12 PROCEDURE — 36415 COLL VENOUS BLD VENIPUNCTURE: CPT | Performed by: STUDENT IN AN ORGANIZED HEALTH CARE EDUCATION/TRAINING PROGRAM

## 2025-04-12 PROCEDURE — 82310 ASSAY OF CALCIUM: CPT | Performed by: STUDENT IN AN ORGANIZED HEALTH CARE EDUCATION/TRAINING PROGRAM

## 2025-04-12 PROCEDURE — 85014 HEMATOCRIT: CPT | Performed by: STUDENT IN AN ORGANIZED HEALTH CARE EDUCATION/TRAINING PROGRAM

## 2025-04-12 PROCEDURE — 99233 SBSQ HOSP IP/OBS HIGH 50: CPT | Performed by: STUDENT IN AN ORGANIZED HEALTH CARE EDUCATION/TRAINING PROGRAM

## 2025-04-12 PROCEDURE — 80048 BASIC METABOLIC PNL TOTAL CA: CPT | Performed by: STUDENT IN AN ORGANIZED HEALTH CARE EDUCATION/TRAINING PROGRAM

## 2025-04-12 PROCEDURE — 250N000011 HC RX IP 250 OP 636: Mod: JZ | Performed by: INTERNAL MEDICINE

## 2025-04-12 PROCEDURE — 250N000013 HC RX MED GY IP 250 OP 250 PS 637: Performed by: STUDENT IN AN ORGANIZED HEALTH CARE EDUCATION/TRAINING PROGRAM

## 2025-04-12 PROCEDURE — 86140 C-REACTIVE PROTEIN: CPT | Performed by: STUDENT IN AN ORGANIZED HEALTH CARE EDUCATION/TRAINING PROGRAM

## 2025-04-12 PROCEDURE — 120N000001 HC R&B MED SURG/OB

## 2025-04-12 PROCEDURE — 250N000011 HC RX IP 250 OP 636: Performed by: STUDENT IN AN ORGANIZED HEALTH CARE EDUCATION/TRAINING PROGRAM

## 2025-04-12 PROCEDURE — 85041 AUTOMATED RBC COUNT: CPT | Performed by: STUDENT IN AN ORGANIZED HEALTH CARE EDUCATION/TRAINING PROGRAM

## 2025-04-12 RX ORDER — AMPICILLIN AND SULBACTAM 2; 1 G/1; G/1
3 INJECTION, POWDER, FOR SOLUTION INTRAMUSCULAR; INTRAVENOUS EVERY 6 HOURS
Status: DISCONTINUED | OUTPATIENT
Start: 2025-04-12 | End: 2025-04-16

## 2025-04-12 RX ADMIN — Medication 250 MG: at 10:38

## 2025-04-12 RX ADMIN — METOPROLOL SUCCINATE 25 MG: 25 TABLET, EXTENDED RELEASE ORAL at 10:38

## 2025-04-12 RX ADMIN — TAMSULOSIN HYDROCHLORIDE 0.8 MG: 0.4 CAPSULE ORAL at 22:49

## 2025-04-12 RX ADMIN — GABAPENTIN 300 MG: 300 CAPSULE ORAL at 08:01

## 2025-04-12 RX ADMIN — ASPIRIN 81 MG: 81 TABLET, COATED ORAL at 10:38

## 2025-04-12 RX ADMIN — Medication 250 MG: at 22:49

## 2025-04-12 RX ADMIN — ATORVASTATIN CALCIUM 40 MG: 40 TABLET, FILM COATED ORAL at 22:49

## 2025-04-12 RX ADMIN — SERTRALINE HYDROCHLORIDE 150 MG: 50 TABLET ORAL at 10:38

## 2025-04-12 RX ADMIN — ISOSORBIDE MONONITRATE 30 MG: 30 TABLET, EXTENDED RELEASE ORAL at 08:01

## 2025-04-12 RX ADMIN — AMPICILLIN SODIUM AND SULBACTAM SODIUM 3 G: 2; 1 INJECTION, POWDER, FOR SOLUTION INTRAMUSCULAR; INTRAVENOUS at 19:46

## 2025-04-12 RX ADMIN — BUPROPION HYDROCHLORIDE 150 MG: 150 TABLET, EXTENDED RELEASE ORAL at 10:38

## 2025-04-12 RX ADMIN — FAMOTIDINE 20 MG: 20 TABLET, FILM COATED ORAL at 10:38

## 2025-04-12 RX ADMIN — MIRTAZAPINE 15 MG: 15 TABLET, FILM COATED ORAL at 22:49

## 2025-04-12 RX ADMIN — AMPICILLIN SODIUM AND SULBACTAM SODIUM 3 G: 2; 1 INJECTION, POWDER, FOR SOLUTION INTRAMUSCULAR; INTRAVENOUS at 13:37

## 2025-04-12 RX ADMIN — GABAPENTIN 600 MG: 300 CAPSULE ORAL at 22:49

## 2025-04-12 RX ADMIN — CEFEPIME 2 G: 2 INJECTION, POWDER, FOR SOLUTION INTRAVENOUS at 10:39

## 2025-04-12 RX ADMIN — PSYLLIUM HUSK 1 PACKET: 3.4 POWDER ORAL at 10:39

## 2025-04-12 RX ADMIN — FAMOTIDINE 20 MG: 20 TABLET, FILM COATED ORAL at 22:49

## 2025-04-12 ASSESSMENT — ACTIVITIES OF DAILY LIVING (ADL)
ADLS_ACUITY_SCORE: 73
ADLS_ACUITY_SCORE: 77
ADLS_ACUITY_SCORE: 78
ADLS_ACUITY_SCORE: 73
ADLS_ACUITY_SCORE: 78
ADLS_ACUITY_SCORE: 77
ADLS_ACUITY_SCORE: 77
ADLS_ACUITY_SCORE: 73
ADLS_ACUITY_SCORE: 78
ADLS_ACUITY_SCORE: 73
ADLS_ACUITY_SCORE: 77
ADLS_ACUITY_SCORE: 77
ADLS_ACUITY_SCORE: 73
ADLS_ACUITY_SCORE: 73
ADLS_ACUITY_SCORE: 77
ADLS_ACUITY_SCORE: 73
ADLS_ACUITY_SCORE: 73
ADLS_ACUITY_SCORE: 77
ADLS_ACUITY_SCORE: 73
ADLS_ACUITY_SCORE: 78
ADLS_ACUITY_SCORE: 73

## 2025-04-12 NOTE — PHARMACY-VANCOMYCIN DOSING SERVICE
Pharmacy Vancomycin Note  Date of Service 2025  Patient's  1935   89 year old, male    Indication: Osteomyelitis  Day of Therapy: 4  Current vancomycin regimen:  1250 mg IV q24h  Current vancomycin monitoring method: AUC  Current vancomycin therapeutic monitoring goal: 400-600 mg*h/L    InsightRX Prediction of Current Vancomycin Regimen  Regimen: 1250 mg IV every 24 hours.  Start time: 22:00 on 2025  Exposure target: AUC24 (range)400-600 mg/L.hr   AUC24,ss: 466 mg/L.hr  Probability of AUC24 > 400: 72 %  Ctrough,ss: 16.6 mg/L  Probability of Ctrough,ss > 20: 27 %  Probability of nephrotoxicity (Lodise DEE ): 12 %      Current estimated CrCl = Estimated Creatinine Clearance: 48.8 mL/min (A) (based on SCr of 1.26 mg/dL (H)).    Creatinine for last 3 days  4/10/2025:  1:05 AM Creatinine 1.45 mg/dL;  5:40 AM Creatinine 1.36 mg/dL  2025:  5:40 AM Creatinine 1.47 mg/dL  2025:  5:54 AM Creatinine 1.26 mg/dL    Recent Vancomycin Levels (past 3 days)  2025:  5:40 AM Vancomycin 10.3 ug/mL    Vancomycin IV Administrations (past 72 hours)                     vancomycin (VANCOCIN) 1,250 mg in 0.9% NaCl 250 mL intermittent infusion (mg) 1,250 mg New Bag 25 2307     1,250 mg New Bag 04/10/25 2332                    Nephrotoxins and other renal medications (From now, onward)      Start     Dose/Rate Route Frequency Ordered Stop    25 2200  vancomycin (VANCOCIN) 1,500 mg in 0.9% NaCl 250 mL intermittent infusion         1,500 mg  over 90 Minutes Intravenous EVERY 24 HOURS 25 0850                 Contrast Orders - past 72 hours (72h ago, onward)      None            Interpretation of levels and current regimen:  Vancomycin level is reflective of -600    Has serum creatinine changed greater than 50% in last 72 hours: No    Urine output:  good urine output    Renal Function: Improving    InsightRX Prediction of Planned New Vancomycin Regimen  Regimen: 1500 mg IV every 24  hours.  Start time: 23:07 on 04/12/2025  Exposure target: AUC24 (range)400-600 mg/L.hr   AUC24,ss: 556 mg/L.hr  Probability of AUC24 > 400: 90 %  Ctrough,ss: 19.8 mg/L  Probability of Ctrough,ss > 20: 49 %  Probability of nephrotoxicity (Lodise DEE 2009): 17 %    Plan:  Increase Dose to 1500mg IV Q24hr -> will reach AUC 48hr sooner, renal function improving as well.   Vancomycin monitoring method: AUC  Vancomycin therapeutic monitoring goal: 400-600 mg*h/L  Pharmacy will check vancomycin levels as appropriate in 1-3 Days. Will get level tomorrow AM   Serum creatinine levels will be ordered daily for the first week of therapy and at least twice weekly for subsequent weeks.    Terence Cortes [facility]

## 2025-04-12 NOTE — PLAN OF CARE
"Goal Outcome Evaluation:      Plan of Care Reviewed With: patient    Overall Patient Progress: no changeOverall Patient Progress: no change         Pt A/Ox3. VSS, BP elevated. Denies pain. Uses urinal in bed. IV detached and leaked Vanco on bed, pt then pulled out IV. New IV placed, Became unattached again and leaked Vanco again.    BP (!) 150/81 (BP Location: Right arm, Patient Position: Semi-Handley's, Cuff Size: Adult Regular)   Pulse 89   Temp 98.9  F (37.2  C) (Oral)   Resp 20   Ht 1.854 m (6' 1\")   Wt 99.1 kg (218 lb 8 oz)   SpO2 94%   BMI 28.83 kg/m     "

## 2025-04-12 NOTE — PROGRESS NOTES
Patient more awake this evening, VSS, Afebrile. Denies pain. Medipore intact on right foot, CDI. Blood glucose 245, insulin given per order. Friends at bedside.

## 2025-04-12 NOTE — PROGRESS NOTES
[facility]    Medicine Progress Note - Hospitalist Service    Date of Admission:  4/9/2025    Assessment & Plan     Elsa Horowitz is an 89-year-old man with past medical history of osteomyelitis, peripheral artery disease, coronary disease, type 2 diabetes, hypothyroidism, hypertension, CKD stage IIIb, and depression who was admitted to the hospital due to concerns for osteomyelitis on his left foot.    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  General surgery consulted and due to his peripheral vascular disease they would like to hold off on amputation due to high probability of progressive amputations from there.  Await culture speciation results from purulence, likely will discharge on 6-week total course of antibiotic with follow-up in ID clinic.    Principal Problem:    Osteomyelitis of left foot (H)    Gangrene (H)   Diabetic neuropathy (H)  PAD (peripheral artery disease)    Diabetic ulcer of toe of left foot associated with type 2 diabetes mellitus, with necrosis of bone (H)    Assessment: Acute on chronic osteomyelitis has grown MRSA, VRE and Pseudomonas in wound cultures in the past.  Able to express some purulence at the base of the necrotic gangrenous toe.  Per general surgery holding off on transmetatarsal amputation due to risk of progressive amputations from his significant peripheral vascular disease. Awaiting culture speciation before narrowing to oral antibiotics with planned 6 week course. Appears to only be growing group B strep, narrow to Unasyn, ideally monitor for improvement for the next 1-2 days prior to discharge home.    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  Admission hemoglobin A1c 6.8.    Plan:   - QID AC HS glucose checks  - Insulin glargine 17 units daily  - Medium sliding scale  - Insulin aspart 1 unit with meals      Chronic kidney disease, stage 3a     Assessment: Renal function is variable with a baseline GFR of approximately 45, GFR 50 on admission.    Plan:   - Continue to trend      Coronary artery disease    Assessment: Continue home atorvastatin, Imdur and aspirin 81 mg daily        Essential hypertension    Assessment: Continue home isosorbide mononitrate 30 mg daily, metoprolol 25 mg daily, does not appear to be on an ACE or an ARB and should be with his CKD and diabetes.            Depressive disorder    Assessment: Continue home bupropion 150 mg daily, Remeron 15 mg at bedtime, sertraline 150 mg daily    BPH: Continue home tamsulosin 0.8 mg at bedtime          Diet: Regular Diet Adult    DVT Prophylaxis: Pneumatic Compression Devices  Adkins Catheter: Not present  Lines: None     Cardiac Monitoring: None  Code Status: No CPR- Do NOT Intubate      Clinically Significant Risk Factors         # Hyponatremia: Lowest Na = 133 mmol/L in last 2 days, will monitor as appropriate           # Hypertension: Noted on problem list             # DMII: A1C = 6.8 % (Ref range: <5.7 %) within past 6 months   # Overweight: Estimated body mass index is 28.23 kg/m  as calculated from the following:    Height as of this encounter: 1.854 m (6' 1\").    Weight as of this encounter: 97.1 kg (214 lb)., PRESENT ON ADMISSION            Social Drivers of Health    Tobacco Use: Medium Risk (4/9/2025)    Received from [facility]    Patient History     Smoking Tobacco Use: Former     Smokeless Tobacco Use: Never          Disposition Plan     Medically Ready for Discharge: Anticipated in 2-4 Days             Jovanni " MD Iraida  Hospitalist Service  [facility]  Securely message with Karen (more info)  Text page via [employer] Paging/Directory   ______________________________________________________________________    Interval History   Pain is much better improved today.  Discussed with general surgery again today, planning for nonoperative course.      Physical Exam   Vital Signs: Temp: 98.1  F (36.7  C) Temp src: Oral BP: (!) 149/67 Pulse: 82   Resp: 16 SpO2: 95 % O2 Device: None (Room air)    Weight: 214 lbs 0 oz  General: Pleasant 89 year old year old male lying in bed in no acute distress   CV: regular rate and rhythm, no murmur, rubs or peripheral edema appreciated  Pulm: CTAB  GI: soft, nontender,   MSK: Erythema improving, minimal fluid drainage, admit photo below        Medical Decision Making             Data     I have personally reviewed the following data over the past 24 hrs:    10.4  \   9.8 (L)   / 247     133 (L) 101 27.7 (H) /  150 (H)   4.4 21 (L) 1.26 (H) \     Procal: N/A CRP: 266.00 (H) Lactic Acid: N/A         Imaging results reviewed over the past 24 hrs:   No results found for this or any previous visit (from the past 24 hours).

## 2025-04-12 NOTE — PLAN OF CARE
Alert and oriented x 4. Flat affect. VSS, afebrile. Denies pain. LS clear/diminished - on room air. Infrequent, productive cough. Left lower extremity red/dimas and hot. Left second toe ulcer - dressing changed on shift at 1416. Cleaned with normal saline and Medipore put in place. Small amount on sanguinous drainage. Adequate output. Receiving IV antibiotics on shift. Patient able to move weight independently. Continent of bowel and bladder on shift. [id number] for incontinence.     Valentine Daley RN .......  4/12/2025  2:47 PM      Goal Outcome Evaluation:      Plan of Care Reviewed With: patient    Overall Patient Progress: no change    Outcome Evaluation: VSS, afebrile, denies pain, IV antibiotics, dressing change

## 2025-04-13 LAB
ANION GAP SERPL CALCULATED.3IONS-SCNC: 14 MMOL/L (ref 7–15)
BUN SERPL-MCNC: 23.3 MG/DL (ref 8–23)
CALCIUM SERPL-MCNC: 8.8 MG/DL (ref 8.8–10.4)
CHLORIDE SERPL-SCNC: 98 MMOL/L (ref 98–107)
CREAT SERPL-MCNC: 1.19 MG/DL (ref 0.67–1.17)
EGFRCR SERPLBLD CKD-EPI 2021: 58 ML/MIN/1.73M2
ERYTHROCYTE [DISTWIDTH] IN BLOOD BY AUTOMATED COUNT: 13.1 % (ref 10–15)
GLUCOSE BLDC GLUCOMTR-MCNC: 196 MG/DL (ref 70–99)
GLUCOSE BLDC GLUCOMTR-MCNC: 216 MG/DL (ref 70–99)
GLUCOSE BLDC GLUCOMTR-MCNC: 263 MG/DL (ref 70–99)
GLUCOSE BLDC GLUCOMTR-MCNC: 301 MG/DL (ref 70–99)
GLUCOSE SERPL-MCNC: 222 MG/DL (ref 70–99)
HCO3 SERPL-SCNC: 20 MMOL/L (ref 22–29)
HCT VFR BLD AUTO: 28.9 % (ref 40–53)
HGB BLD-MCNC: 9.7 G/DL (ref 13.3–17.7)
HOLD SPECIMEN: NORMAL
HOLD SPECIMEN: NORMAL
MAGNESIUM SERPL-MCNC: 1.6 MG/DL (ref 1.7–2.3)
MCH RBC QN AUTO: 29.8 PG (ref 26.5–33)
MCHC RBC AUTO-ENTMCNC: 33.6 G/DL (ref 31.5–36.5)
MCV RBC AUTO: 89 FL (ref 78–100)
PLATELET # BLD AUTO: 268 10E3/UL (ref 150–450)
POTASSIUM SERPL-SCNC: 4.4 MMOL/L (ref 3.4–5.3)
RBC # BLD AUTO: 3.26 10E6/UL (ref 4.4–5.9)
SODIUM SERPL-SCNC: 132 MMOL/L (ref 135–145)
WBC # BLD AUTO: 9.5 10E3/UL (ref 4–11)

## 2025-04-13 PROCEDURE — 99233 SBSQ HOSP IP/OBS HIGH 50: CPT | Performed by: STUDENT IN AN ORGANIZED HEALTH CARE EDUCATION/TRAINING PROGRAM

## 2025-04-13 PROCEDURE — 120N000001 HC R&B MED SURG/OB

## 2025-04-13 PROCEDURE — 80048 BASIC METABOLIC PNL TOTAL CA: CPT | Performed by: STUDENT IN AN ORGANIZED HEALTH CARE EDUCATION/TRAINING PROGRAM

## 2025-04-13 PROCEDURE — 82310 ASSAY OF CALCIUM: CPT | Performed by: STUDENT IN AN ORGANIZED HEALTH CARE EDUCATION/TRAINING PROGRAM

## 2025-04-13 PROCEDURE — 36415 COLL VENOUS BLD VENIPUNCTURE: CPT | Performed by: STUDENT IN AN ORGANIZED HEALTH CARE EDUCATION/TRAINING PROGRAM

## 2025-04-13 PROCEDURE — 83735 ASSAY OF MAGNESIUM: CPT | Performed by: STUDENT IN AN ORGANIZED HEALTH CARE EDUCATION/TRAINING PROGRAM

## 2025-04-13 PROCEDURE — 250N000013 HC RX MED GY IP 250 OP 250 PS 637: Performed by: INTERNAL MEDICINE

## 2025-04-13 PROCEDURE — 250N000013 HC RX MED GY IP 250 OP 250 PS 637: Performed by: STUDENT IN AN ORGANIZED HEALTH CARE EDUCATION/TRAINING PROGRAM

## 2025-04-13 PROCEDURE — 85027 COMPLETE CBC AUTOMATED: CPT | Performed by: STUDENT IN AN ORGANIZED HEALTH CARE EDUCATION/TRAINING PROGRAM

## 2025-04-13 PROCEDURE — 250N000011 HC RX IP 250 OP 636: Performed by: STUDENT IN AN ORGANIZED HEALTH CARE EDUCATION/TRAINING PROGRAM

## 2025-04-13 RX ADMIN — TAMSULOSIN HYDROCHLORIDE 0.8 MG: 0.4 CAPSULE ORAL at 22:50

## 2025-04-13 RX ADMIN — ACETAMINOPHEN 650 MG: 325 TABLET, FILM COATED ORAL at 08:50

## 2025-04-13 RX ADMIN — METOPROLOL SUCCINATE 25 MG: 25 TABLET, EXTENDED RELEASE ORAL at 10:21

## 2025-04-13 RX ADMIN — Medication 250 MG: at 22:50

## 2025-04-13 RX ADMIN — AMPICILLIN SODIUM AND SULBACTAM SODIUM 3 G: 2; 1 INJECTION, POWDER, FOR SOLUTION INTRAMUSCULAR; INTRAVENOUS at 01:56

## 2025-04-13 RX ADMIN — ISOSORBIDE MONONITRATE 30 MG: 30 TABLET, EXTENDED RELEASE ORAL at 08:52

## 2025-04-13 RX ADMIN — FAMOTIDINE 20 MG: 20 TABLET, FILM COATED ORAL at 10:21

## 2025-04-13 RX ADMIN — BUPROPION HYDROCHLORIDE 150 MG: 150 TABLET, EXTENDED RELEASE ORAL at 10:22

## 2025-04-13 RX ADMIN — AMPICILLIN SODIUM AND SULBACTAM SODIUM 3 G: 2; 1 INJECTION, POWDER, FOR SOLUTION INTRAMUSCULAR; INTRAVENOUS at 08:52

## 2025-04-13 RX ADMIN — MIRTAZAPINE 15 MG: 15 TABLET, FILM COATED ORAL at 22:51

## 2025-04-13 RX ADMIN — FAMOTIDINE 20 MG: 20 TABLET, FILM COATED ORAL at 22:51

## 2025-04-13 RX ADMIN — AMPICILLIN SODIUM AND SULBACTAM SODIUM 3 G: 2; 1 INJECTION, POWDER, FOR SOLUTION INTRAMUSCULAR; INTRAVENOUS at 13:35

## 2025-04-13 RX ADMIN — Medication 250 MG: at 10:22

## 2025-04-13 RX ADMIN — SERTRALINE HYDROCHLORIDE 150 MG: 50 TABLET ORAL at 10:22

## 2025-04-13 RX ADMIN — PSYLLIUM HUSK 1 PACKET: 3.4 POWDER ORAL at 10:21

## 2025-04-13 RX ADMIN — ATORVASTATIN CALCIUM 40 MG: 40 TABLET, FILM COATED ORAL at 22:51

## 2025-04-13 RX ADMIN — ACETAMINOPHEN 650 MG: 325 TABLET, FILM COATED ORAL at 19:04

## 2025-04-13 RX ADMIN — GABAPENTIN 300 MG: 300 CAPSULE ORAL at 08:51

## 2025-04-13 RX ADMIN — GABAPENTIN 600 MG: 300 CAPSULE ORAL at 22:51

## 2025-04-13 RX ADMIN — AMPICILLIN SODIUM AND SULBACTAM SODIUM 3 G: 2; 1 INJECTION, POWDER, FOR SOLUTION INTRAMUSCULAR; INTRAVENOUS at 19:07

## 2025-04-13 RX ADMIN — ASPIRIN 81 MG: 81 TABLET, COATED ORAL at 10:22

## 2025-04-13 ASSESSMENT — ACTIVITIES OF DAILY LIVING (ADL)
ADLS_ACUITY_SCORE: 74
ADLS_ACUITY_SCORE: 78
ADLS_ACUITY_SCORE: 78
ADLS_ACUITY_SCORE: 74
ADLS_ACUITY_SCORE: 78
ADLS_ACUITY_SCORE: 77
ADLS_ACUITY_SCORE: 77
ADLS_ACUITY_SCORE: 74
ADLS_ACUITY_SCORE: 74
ADLS_ACUITY_SCORE: 72
ADLS_ACUITY_SCORE: 74
ADLS_ACUITY_SCORE: 74
ADLS_ACUITY_SCORE: 77
ADLS_ACUITY_SCORE: 74
ADLS_ACUITY_SCORE: 77
ADLS_ACUITY_SCORE: 74
ADLS_ACUITY_SCORE: 72
ADLS_ACUITY_SCORE: 78
ADLS_ACUITY_SCORE: 74
ADLS_ACUITY_SCORE: 74

## 2025-04-13 NOTE — PROGRESS NOTES
SAFETY CHECKLIST  ID Bands and Risk clasps correct and in place (DNR, Fall risk, Allergy, Latex, Limb):  Yes  All Lines Reconciled and labeled correctly: Yes  Whiteboard updated:Yes  Environmental interventions: Yes  Verify Tele #:  RIA Pritchard RN on 4/13/2025 at 7:10 AM

## 2025-04-13 NOTE — PLAN OF CARE
Goal Outcome Evaluation:    A/O x4, VSS, afebrile, on room air.  Denies pain. LS clear/dim throughout.  Left lower extremity warm, red/dimas.  Dressing to left second toe CDI.  Receiving IV antibiotics.  Pt able to reposition self in bed.  Incontinent of bladder at night.  CCRq2 for incontinence        Plan of Care Reviewed With: patient    Overall Patient Progress: no changeOverall Patient Progress: no change    Outcome Evaluation: VSS; Afebrile; Denies pain

## 2025-04-13 NOTE — PROGRESS NOTES
[facility]    Medicine Progress Note - Hospitalist Service    Date of Admission:  4/9/2025    Assessment & Plan     Elsa Horowitz is an 89-year-old man with past medical history of osteomyelitis, peripheral artery disease, coronary disease, type 2 diabetes, hypothyroidism, hypertension, CKD stage IIIb, and depression who was admitted to the hospital due to concerns for osteomyelitis on his left foot.    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  General surgery consulted and due to his peripheral vascular disease they would like to hold off on amputation due to high probability of progressive amputations from there.  Await culture speciation results from purulence, likely will discharge on 6-week total course of antibiotic with follow-up in ID clinic.    Principal Problem:    Osteomyelitis of left foot (H)    Gangrene (H)   Diabetic neuropathy (H)  PAD (peripheral artery disease)    Diabetic ulcer of toe of left foot associated with type 2 diabetes mellitus, with necrosis of bone (H)    Assessment: Acute on chronic osteomyelitis has grown MRSA, VRE and Pseudomonas in wound cultures in the past.  Able to express some purulence at the base of the necrotic gangrenous toe.  Per general surgery holding off on transmetatarsal amputation due to risk of progressive amputations from his significant peripheral vascular disease.  Urgently were going to try antibiotic suppression but due to large amount of purulence and drainage expressed from the foot we will proceed with BKA this week with general surgery.    Plan:   -general surgery following  -wound culture growing only group B strep though polymicrobial on Gram stain  -narrowed antibiotics to Unasyn  -Continue home gabapentin  - As needed oxycodone for pain  - Continue home Florastor      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)     "Assessment: Prior to admission on insulin Fiasp 3 units 3 times daily with meals, insulin glargine 17 units every a.m..  Admission hemoglobin A1c 6.8.    Plan:   - QID AC HS glucose checks  - Insulin glargine 20 units daily  - Medium sliding scale  - Insulin aspart 3 units with meals      Chronic kidney disease, stage 3a     Assessment: Renal function is variable with a baseline GFR of approximately 45, GFR 50 on admission.    Plan:   - Continue to trend      Coronary artery disease    Assessment: Continue home atorvastatin, Imdur and aspirin 81 mg daily        Essential hypertension    Assessment: Continue home isosorbide mononitrate 30 mg daily, metoprolol 25 mg daily, does not appear to be on an ACE or an ARB and should be with his CKD and diabetes.            Depressive disorder    Assessment: Continue home bupropion 150 mg daily, Remeron 15 mg at bedtime, sertraline 150 mg daily    BPH: Continue home tamsulosin 0.8 mg at bedtime          Diet: Regular Diet Adult    DVT Prophylaxis: Pneumatic Compression Devices  Adkins Catheter: Not present  Lines: None     Cardiac Monitoring: None  Code Status: No CPR- Do NOT Intubate      Clinically Significant Risk Factors         # Hyponatremia: Lowest Na = 132 mmol/L in last 2 days, will monitor as appropriate     # Hypomagnesemia: Lowest Mg = 1.6 mg/dL in last 2 days, will replace as needed       # Hypertension: Noted on problem list             # DMII: A1C = 6.8 % (Ref range: <5.7 %) within past 6 months   # Overweight: Estimated body mass index is 28.25 kg/m  as calculated from the following:    Height as of this encounter: 1.854 m (6' 1\").    Weight as of this encounter: 97.1 kg (214 lb 1.6 oz)., PRESENT ON ADMISSION            Social Drivers of Health    Tobacco Use: Medium Risk (4/9/2025)    Received from [facility]    Patient History     Smoking Tobacco Use: Former     Smokeless Tobacco Use: Never          Disposition Plan "     Medically Ready for Discharge: Anticipated in 2-4 Days             Jovanni Khoury MD  Hospitalist Service  [facility]  Securely message with [email or web address] (more info)  Text page via [employer] Paging/Directory   ______________________________________________________________________    Interval History   Seen with general surgery together today.  Has significant purulence able to be expressed from his foot.  He is in agreement for below-knee amputation due to poor vasculature below the ankle.  Highly unlikely to improve with suppressive antibiotics due to polymicrobial and highly resistant organism as well as clearly infected bone.      Physical Exam   Vital Signs: Temp: 98.1  F (36.7  C) Temp src: Oral BP: 117/55 Pulse: 87   Resp: 18 SpO2: 95 % O2 Device: None (Room air)    Weight: 214 lbs 1.6 oz  General: Pleasant 89 year old year old male lying in bed in no acute distress   CV: regular rate and rhythm, no murmur, rubs or peripheral edema appreciated  Pulm: CTAB  GI: soft, nontender,   MSK: Erythema improving, able to express approximately 10 mL of purulent fluid with eschar removed, appears to be necrotic fat underneath able to probe to bone.  Admit photo below        Medical Decision Making             Data     I have personally reviewed the following data over the past 24 hrs:    9.5  \   9.7 (L)   / 268     132 (L) 98 23.3 (H) /  216 (H)   4.4 20 (L) 1.19 (H) \     Procal: N/A CRP: N/A Lactic Acid: N/A         Imaging results reviewed over the past 24 hrs:   No results found for this or any previous visit (from the past 24 hours).

## 2025-04-13 NOTE — PROGRESS NOTES
GENERAL SURGERY PROGRESS NOTE  4/13/2025      Interval history:   No patient's overnight.  The patient denies pain.  Denies fevers chills.    Physical Exam:   Vital signs are reviewed and there are no significant  abnormalities.     UOP over past 24 hours is 1050 mL    General: Laying in bed, comfortable  MSK: Left foot with previous toe amputation and open wound.  There is obviously necrotic skin surrounding the open wound.  There is palpable bone within the wound.  I am able to express pus from the end of the toe and likely forefoot.  Erythema appears to be receding from the albertina but extends to the midfoot  Neuro: Alert and oriented    Labs are reviewed and are significant for sodium 132, potassium 4.4, creatinine 1.19, magnesium 1.6, glucose 222, WBC 9.5, hemoglobin 9.7, platelet 268    No new imaging       Assessment / Plan:   Elsa Horowitz is a 89 year old male with open infected left foot wound with exposed bone, certainly the bone is infected at this point.  Wound is growing out group B strep.  The patient's best opportunity to heal the wound would be to remove the foot up to the lower leg, below-knee amputation.  Patient has questionable vascular status for consideration of midfoot or complex ankle amputation.  He would certainly do best with healing of BKA.  He does seem agreeable to this when talking to him today.  Will try to set this up this week.          GONZALO Cheng MD   4/13/2025

## 2025-04-14 ENCOUNTER — ANESTHESIA EVENT (OUTPATIENT)
Dept: SURGERY | Facility: OTHER | Age: 89
End: 2025-04-14
Payer: COMMERCIAL

## 2025-04-14 ENCOUNTER — ANESTHESIA (OUTPATIENT)
Dept: SURGERY | Facility: OTHER | Age: 89
End: 2025-04-14
Payer: COMMERCIAL

## 2025-04-14 LAB
ANION GAP SERPL CALCULATED.3IONS-SCNC: 13 MMOL/L (ref 7–15)
ATRIAL RATE - MUSE: 81 BPM
BUN SERPL-MCNC: 18.1 MG/DL (ref 8–23)
CALCIUM SERPL-MCNC: 8.8 MG/DL (ref 8.8–10.4)
CHLORIDE SERPL-SCNC: 98 MMOL/L (ref 98–107)
CREAT SERPL-MCNC: 1.09 MG/DL (ref 0.67–1.17)
DIASTOLIC BLOOD PRESSURE - MUSE: NORMAL MMHG
EGFRCR SERPLBLD CKD-EPI 2021: 65 ML/MIN/1.73M2
ERYTHROCYTE [DISTWIDTH] IN BLOOD BY AUTOMATED COUNT: 13.1 % (ref 10–15)
GLUCOSE BLDC GLUCOMTR-MCNC: 222 MG/DL (ref 70–99)
GLUCOSE BLDC GLUCOMTR-MCNC: 268 MG/DL (ref 70–99)
GLUCOSE BLDC GLUCOMTR-MCNC: 366 MG/DL (ref 70–99)
GLUCOSE BLDC GLUCOMTR-MCNC: 464 MG/DL (ref 70–99)
GLUCOSE SERPL-MCNC: 196 MG/DL (ref 70–99)
HCO3 SERPL-SCNC: 22 MMOL/L (ref 22–29)
HCT VFR BLD AUTO: 29.2 % (ref 40–53)
HGB BLD-MCNC: 10 G/DL (ref 13.3–17.7)
HOLD SPECIMEN: NORMAL
INTERPRETATION ECG - MUSE: NORMAL
MAGNESIUM SERPL-MCNC: 1.5 MG/DL (ref 1.7–2.3)
MCH RBC QN AUTO: 30 PG (ref 26.5–33)
MCHC RBC AUTO-ENTMCNC: 34.2 G/DL (ref 31.5–36.5)
MCV RBC AUTO: 88 FL (ref 78–100)
P AXIS - MUSE: 47 DEGREES
PLATELET # BLD AUTO: 308 10E3/UL (ref 150–450)
POTASSIUM SERPL-SCNC: 4.2 MMOL/L (ref 3.4–5.3)
PR INTERVAL - MUSE: 180 MS
QRS DURATION - MUSE: 74 MS
QT - MUSE: 390 MS
QTC - MUSE: 453 MS
R AXIS - MUSE: 24 DEGREES
RBC # BLD AUTO: 3.33 10E6/UL (ref 4.4–5.9)
SODIUM SERPL-SCNC: 133 MMOL/L (ref 135–145)
SYSTOLIC BLOOD PRESSURE - MUSE: NORMAL MMHG
T AXIS - MUSE: 64 DEGREES
TROPONIN T SERPL HS-MCNC: 34 NG/L
VENTRICULAR RATE- MUSE: 81 BPM
WBC # BLD AUTO: 10.4 10E3/UL (ref 4–11)

## 2025-04-14 PROCEDURE — 250N000012 HC RX MED GY IP 250 OP 636 PS 637: Performed by: INTERNAL MEDICINE

## 2025-04-14 PROCEDURE — 370N000017 HC ANESTHESIA TECHNICAL FEE, PER MIN: Performed by: SURGERY

## 2025-04-14 PROCEDURE — 93005 ELECTROCARDIOGRAM TRACING: CPT

## 2025-04-14 PROCEDURE — 83735 ASSAY OF MAGNESIUM: CPT | Performed by: STUDENT IN AN ORGANIZED HEALTH CARE EDUCATION/TRAINING PROGRAM

## 2025-04-14 PROCEDURE — 250N000013 HC RX MED GY IP 250 OP 250 PS 637: Performed by: INTERNAL MEDICINE

## 2025-04-14 PROCEDURE — 82310 ASSAY OF CALCIUM: CPT | Performed by: STUDENT IN AN ORGANIZED HEALTH CARE EDUCATION/TRAINING PROGRAM

## 2025-04-14 PROCEDURE — 36415 COLL VENOUS BLD VENIPUNCTURE: CPT | Performed by: HOSPITALIST

## 2025-04-14 PROCEDURE — 0Y6J0Z1 DETACHMENT AT LEFT LOWER LEG, HIGH, OPEN APPROACH: ICD-10-PCS | Performed by: SURGERY

## 2025-04-14 PROCEDURE — 250N000011 HC RX IP 250 OP 636: Performed by: NURSE ANESTHETIST, CERTIFIED REGISTERED

## 2025-04-14 PROCEDURE — 85014 HEMATOCRIT: CPT | Performed by: STUDENT IN AN ORGANIZED HEALTH CARE EDUCATION/TRAINING PROGRAM

## 2025-04-14 PROCEDURE — 99222 1ST HOSP IP/OBS MODERATE 55: CPT | Mod: 25 | Performed by: NURSE PRACTITIONER

## 2025-04-14 PROCEDURE — 250N000011 HC RX IP 250 OP 636: Mod: JZ | Performed by: INTERNAL MEDICINE

## 2025-04-14 PROCEDURE — 272N000001 HC OR GENERAL SUPPLY STERILE: Performed by: SURGERY

## 2025-04-14 PROCEDURE — 360N000076 HC SURGERY LEVEL 3, PER MIN: Performed by: SURGERY

## 2025-04-14 PROCEDURE — 250N000009 HC RX 250: Performed by: NURSE ANESTHETIST, CERTIFIED REGISTERED

## 2025-04-14 PROCEDURE — 36415 COLL VENOUS BLD VENIPUNCTURE: CPT | Performed by: STUDENT IN AN ORGANIZED HEALTH CARE EDUCATION/TRAINING PROGRAM

## 2025-04-14 PROCEDURE — 250N000011 HC RX IP 250 OP 636: Mod: JZ | Performed by: SURGERY

## 2025-04-14 PROCEDURE — 250N000013 HC RX MED GY IP 250 OP 250 PS 637: Performed by: SURGERY

## 2025-04-14 PROCEDURE — 93010 ELECTROCARDIOGRAM REPORT: CPT | Mod: 76 | Performed by: INTERNAL MEDICINE

## 2025-04-14 PROCEDURE — 250N000011 HC RX IP 250 OP 636: Performed by: STUDENT IN AN ORGANIZED HEALTH CARE EDUCATION/TRAINING PROGRAM

## 2025-04-14 PROCEDURE — 250N000013 HC RX MED GY IP 250 OP 250 PS 637: Performed by: STUDENT IN AN ORGANIZED HEALTH CARE EDUCATION/TRAINING PROGRAM

## 2025-04-14 PROCEDURE — 27882 AMPUTATION OF LOWER LEG: CPT | Mod: LT | Performed by: SURGERY

## 2025-04-14 PROCEDURE — 250N000026 HC DESFLURANE, PER MIN: Performed by: SURGERY

## 2025-04-14 PROCEDURE — 250N000013 HC RX MED GY IP 250 OP 250 PS 637: Performed by: HOSPITALIST

## 2025-04-14 PROCEDURE — 80048 BASIC METABOLIC PNL TOTAL CA: CPT | Performed by: STUDENT IN AN ORGANIZED HEALTH CARE EDUCATION/TRAINING PROGRAM

## 2025-04-14 PROCEDURE — 120N000001 HC R&B MED SURG/OB

## 2025-04-14 PROCEDURE — 84484 ASSAY OF TROPONIN QUANT: CPT | Performed by: HOSPITALIST

## 2025-04-14 PROCEDURE — 258N000003 HC RX IP 258 OP 636: Performed by: NURSE ANESTHETIST, CERTIFIED REGISTERED

## 2025-04-14 PROCEDURE — 710N000009 HC RECOVERY PHASE 1, LEVEL 1, PER MIN: Performed by: SURGERY

## 2025-04-14 PROCEDURE — 99232 SBSQ HOSP IP/OBS MODERATE 35: CPT | Mod: 25 | Performed by: INTERNAL MEDICINE

## 2025-04-14 RX ORDER — DEXAMETHASONE SODIUM PHOSPHATE 4 MG/ML
INJECTION, SOLUTION INTRA-ARTICULAR; INTRALESIONAL; INTRAMUSCULAR; INTRAVENOUS; SOFT TISSUE PRN
Status: DISCONTINUED | OUTPATIENT
Start: 2025-04-14 | End: 2025-04-14

## 2025-04-14 RX ORDER — SODIUM CHLORIDE 9 MG/ML
INJECTION, SOLUTION INTRAVENOUS CONTINUOUS PRN
Status: DISCONTINUED | OUTPATIENT
Start: 2025-04-14 | End: 2025-04-14

## 2025-04-14 RX ORDER — ENOXAPARIN SODIUM 100 MG/ML
40 INJECTION SUBCUTANEOUS EVERY 24 HOURS
Status: DISCONTINUED | OUTPATIENT
Start: 2025-04-15 | End: 2025-04-18 | Stop reason: HOSPADM

## 2025-04-14 RX ORDER — ONDANSETRON 4 MG/1
4 TABLET, ORALLY DISINTEGRATING ORAL EVERY 6 HOURS PRN
Status: DISCONTINUED | OUTPATIENT
Start: 2025-04-14 | End: 2025-04-18 | Stop reason: HOSPADM

## 2025-04-14 RX ORDER — HYDROMORPHONE HYDROCHLORIDE 1 MG/ML
0.5 INJECTION, SOLUTION INTRAMUSCULAR; INTRAVENOUS; SUBCUTANEOUS
Status: DISCONTINUED | OUTPATIENT
Start: 2025-04-14 | End: 2025-04-16

## 2025-04-14 RX ORDER — OXYCODONE HYDROCHLORIDE 5 MG/1
10 TABLET ORAL EVERY 4 HOURS PRN
Status: DISCONTINUED | OUTPATIENT
Start: 2025-04-14 | End: 2025-04-17

## 2025-04-14 RX ORDER — AMOXICILLIN 250 MG
1 CAPSULE ORAL 2 TIMES DAILY
Status: DISCONTINUED | OUTPATIENT
Start: 2025-04-14 | End: 2025-04-18

## 2025-04-14 RX ORDER — PROCHLORPERAZINE MALEATE 5 MG/1
5 TABLET ORAL EVERY 6 HOURS PRN
Status: DISCONTINUED | OUTPATIENT
Start: 2025-04-14 | End: 2025-04-14

## 2025-04-14 RX ORDER — MAGNESIUM SULFATE HEPTAHYDRATE 40 MG/ML
4 INJECTION, SOLUTION INTRAVENOUS ONCE
Status: COMPLETED | OUTPATIENT
Start: 2025-04-14 | End: 2025-04-14

## 2025-04-14 RX ORDER — ACETAMINOPHEN 325 MG/1
975 TABLET ORAL EVERY 8 HOURS
Status: DISCONTINUED | OUTPATIENT
Start: 2025-04-14 | End: 2025-04-18 | Stop reason: HOSPADM

## 2025-04-14 RX ORDER — OXYCODONE HYDROCHLORIDE 5 MG/1
5 TABLET ORAL EVERY 4 HOURS PRN
Status: DISCONTINUED | OUTPATIENT
Start: 2025-04-14 | End: 2025-04-14

## 2025-04-14 RX ORDER — CELECOXIB 100 MG/1
100 CAPSULE ORAL 2 TIMES DAILY
Status: COMPLETED | OUTPATIENT
Start: 2025-04-14 | End: 2025-04-15

## 2025-04-14 RX ORDER — ONDANSETRON 2 MG/ML
INJECTION INTRAMUSCULAR; INTRAVENOUS PRN
Status: DISCONTINUED | OUTPATIENT
Start: 2025-04-14 | End: 2025-04-14

## 2025-04-14 RX ORDER — PROPOFOL 10 MG/ML
INJECTION, EMULSION INTRAVENOUS PRN
Status: DISCONTINUED | OUTPATIENT
Start: 2025-04-14 | End: 2025-04-14

## 2025-04-14 RX ORDER — FAMOTIDINE 20 MG/1
20 TABLET, FILM COATED ORAL 2 TIMES DAILY
Status: DISCONTINUED | OUTPATIENT
Start: 2025-04-14 | End: 2025-04-16

## 2025-04-14 RX ORDER — ACETAMINOPHEN 650 MG/1
650 SUPPOSITORY RECTAL EVERY 4 HOURS
Status: DISCONTINUED | OUTPATIENT
Start: 2025-04-14 | End: 2025-04-14

## 2025-04-14 RX ORDER — FENTANYL CITRATE 50 UG/ML
INJECTION, SOLUTION INTRAMUSCULAR; INTRAVENOUS PRN
Status: DISCONTINUED | OUTPATIENT
Start: 2025-04-14 | End: 2025-04-14

## 2025-04-14 RX ORDER — QUETIAPINE FUMARATE 25 MG/1
50 TABLET, FILM COATED ORAL AT BEDTIME
Status: DISCONTINUED | OUTPATIENT
Start: 2025-04-14 | End: 2025-04-17

## 2025-04-14 RX ORDER — LIDOCAINE 40 MG/G
CREAM TOPICAL
Status: DISCONTINUED | OUTPATIENT
Start: 2025-04-14 | End: 2025-04-18 | Stop reason: HOSPADM

## 2025-04-14 RX ORDER — CALCIUM CARBONATE 500 MG/1
500 TABLET, CHEWABLE ORAL 4 TIMES DAILY PRN
Status: DISCONTINUED | OUTPATIENT
Start: 2025-04-14 | End: 2025-04-18 | Stop reason: HOSPADM

## 2025-04-14 RX ORDER — BISACODYL 10 MG
10 SUPPOSITORY, RECTAL RECTAL DAILY PRN
Status: DISCONTINUED | OUTPATIENT
Start: 2025-04-17 | End: 2025-04-18 | Stop reason: HOSPADM

## 2025-04-14 RX ORDER — HYDROMORPHONE HCL IN WATER/PF 6 MG/30 ML
0.2 PATIENT CONTROLLED ANALGESIA SYRINGE INTRAVENOUS
Status: DISCONTINUED | OUTPATIENT
Start: 2025-04-14 | End: 2025-04-14

## 2025-04-14 RX ORDER — BUPIVACAINE HYDROCHLORIDE 2.5 MG/ML
INJECTION, SOLUTION INFILTRATION; PERINEURAL PRN
Status: DISCONTINUED | OUTPATIENT
Start: 2025-04-14 | End: 2025-04-14 | Stop reason: HOSPADM

## 2025-04-14 RX ORDER — HYDROXYZINE HYDROCHLORIDE 10 MG/1
10 TABLET, FILM COATED ORAL EVERY 6 HOURS PRN
Status: DISCONTINUED | OUTPATIENT
Start: 2025-04-14 | End: 2025-04-17

## 2025-04-14 RX ORDER — POLYETHYLENE GLYCOL 3350 17 G/17G
17 POWDER, FOR SOLUTION ORAL DAILY
Status: DISCONTINUED | OUTPATIENT
Start: 2025-04-15 | End: 2025-04-15

## 2025-04-14 RX ORDER — PROCHLORPERAZINE 25 MG
12.5 SUPPOSITORY, RECTAL RECTAL EVERY 12 HOURS PRN
Status: DISCONTINUED | OUTPATIENT
Start: 2025-04-14 | End: 2025-04-18 | Stop reason: HOSPADM

## 2025-04-14 RX ORDER — PROCHLORPERAZINE MALEATE 5 MG/1
5 TABLET ORAL EVERY 6 HOURS PRN
Status: DISCONTINUED | OUTPATIENT
Start: 2025-04-14 | End: 2025-04-18 | Stop reason: HOSPADM

## 2025-04-14 RX ADMIN — SODIUM CHLORIDE: 0.9 INJECTION, SOLUTION INTRAVENOUS at 10:14

## 2025-04-14 RX ADMIN — HYDROMORPHONE HYDROCHLORIDE 0.5 MG: 1 INJECTION, SOLUTION INTRAMUSCULAR; INTRAVENOUS; SUBCUTANEOUS at 09:32

## 2025-04-14 RX ADMIN — AMPICILLIN SODIUM AND SULBACTAM SODIUM 3 G: 2; 1 INJECTION, POWDER, FOR SOLUTION INTRAMUSCULAR; INTRAVENOUS at 07:50

## 2025-04-14 RX ADMIN — PROPOFOL 30 MG: 10 INJECTION, EMULSION INTRAVENOUS at 10:08

## 2025-04-14 RX ADMIN — DEXAMETHASONE SODIUM PHOSPHATE 4 MG: 4 INJECTION, SOLUTION INTRA-ARTICULAR; INTRALESIONAL; INTRAMUSCULAR; INTRAVENOUS; SOFT TISSUE at 08:46

## 2025-04-14 RX ADMIN — HYDROMORPHONE HYDROCHLORIDE 0.5 MG: 1 INJECTION, SOLUTION INTRAMUSCULAR; INTRAVENOUS; SUBCUTANEOUS at 13:24

## 2025-04-14 RX ADMIN — SODIUM CHLORIDE: 0.9 INJECTION, SOLUTION INTRAVENOUS at 08:41

## 2025-04-14 RX ADMIN — FENTANYL CITRATE 50 MCG: 50 INJECTION INTRAMUSCULAR; INTRAVENOUS at 09:02

## 2025-04-14 RX ADMIN — ACETAMINOPHEN 975 MG: 325 TABLET, FILM COATED ORAL at 14:09

## 2025-04-14 RX ADMIN — SENNOSIDES AND DOCUSATE SODIUM 1 TABLET: 50; 8.6 TABLET ORAL at 14:15

## 2025-04-14 RX ADMIN — PROPOFOL 30 MG: 10 INJECTION, EMULSION INTRAVENOUS at 09:16

## 2025-04-14 RX ADMIN — CELECOXIB 100 MG: 100 CAPSULE ORAL at 22:10

## 2025-04-14 RX ADMIN — PROPOFOL 140 MG: 10 INJECTION, EMULSION INTRAVENOUS at 08:46

## 2025-04-14 RX ADMIN — PROPOFOL 30 MG: 10 INJECTION, EMULSION INTRAVENOUS at 08:54

## 2025-04-14 RX ADMIN — PROPOFOL 30 MG: 10 INJECTION, EMULSION INTRAVENOUS at 09:30

## 2025-04-14 RX ADMIN — Medication 250 MG: at 22:09

## 2025-04-14 RX ADMIN — ROCURONIUM BROMIDE 50 MG: 50 INJECTION, SOLUTION INTRAVENOUS at 08:46

## 2025-04-14 RX ADMIN — ROCURONIUM BROMIDE 20 MG: 50 INJECTION, SOLUTION INTRAVENOUS at 09:31

## 2025-04-14 RX ADMIN — AMPICILLIN SODIUM AND SULBACTAM SODIUM 3 G: 2; 1 INJECTION, POWDER, FOR SOLUTION INTRAMUSCULAR; INTRAVENOUS at 01:42

## 2025-04-14 RX ADMIN — HYDROMORPHONE HYDROCHLORIDE 1 MG: 1 INJECTION, SOLUTION INTRAMUSCULAR; INTRAVENOUS; SUBCUTANEOUS at 14:51

## 2025-04-14 RX ADMIN — MIDAZOLAM HYDROCHLORIDE 1 MG: 1 INJECTION, SOLUTION INTRAMUSCULAR; INTRAVENOUS at 08:43

## 2025-04-14 RX ADMIN — FAMOTIDINE 20 MG: 20 TABLET, FILM COATED ORAL at 14:10

## 2025-04-14 RX ADMIN — ATORVASTATIN CALCIUM 40 MG: 40 TABLET, FILM COATED ORAL at 22:09

## 2025-04-14 RX ADMIN — GABAPENTIN 600 MG: 300 CAPSULE ORAL at 22:09

## 2025-04-14 RX ADMIN — INSULIN ASPART 5 UNITS: 100 INJECTION, SOLUTION INTRAVENOUS; SUBCUTANEOUS at 22:17

## 2025-04-14 RX ADMIN — SENNOSIDES AND DOCUSATE SODIUM 1 TABLET: 50; 8.6 TABLET ORAL at 22:10

## 2025-04-14 RX ADMIN — HYDROMORPHONE HYDROCHLORIDE 0.5 MG: 1 INJECTION, SOLUTION INTRAMUSCULAR; INTRAVENOUS; SUBCUTANEOUS at 09:16

## 2025-04-14 RX ADMIN — HYDROMORPHONE HYDROCHLORIDE 0.5 MG: 1 INJECTION, SOLUTION INTRAMUSCULAR; INTRAVENOUS; SUBCUTANEOUS at 19:10

## 2025-04-14 RX ADMIN — MAGNESIUM SULFATE HEPTAHYDRATE 4 G: 4 INJECTION, SOLUTION INTRAVENOUS at 15:24

## 2025-04-14 RX ADMIN — HYDROXYZINE HYDROCHLORIDE 10 MG: 10 TABLET, FILM COATED ORAL at 19:20

## 2025-04-14 RX ADMIN — TAMSULOSIN HYDROCHLORIDE 0.8 MG: 0.4 CAPSULE ORAL at 22:10

## 2025-04-14 RX ADMIN — OXYCODONE HYDROCHLORIDE 5 MG: 5 TABLET ORAL at 14:09

## 2025-04-14 RX ADMIN — HYDROMORPHONE HYDROCHLORIDE 0.5 MG: 1 INJECTION, SOLUTION INTRAMUSCULAR; INTRAVENOUS; SUBCUTANEOUS at 16:50

## 2025-04-14 RX ADMIN — ONDANSETRON HYDROCHLORIDE 4 MG: 2 SOLUTION INTRAMUSCULAR; INTRAVENOUS at 08:46

## 2025-04-14 RX ADMIN — CELECOXIB 100 MG: 100 CAPSULE ORAL at 14:15

## 2025-04-14 RX ADMIN — ACETAMINOPHEN 650 MG: 325 TABLET, FILM COATED ORAL at 20:36

## 2025-04-14 RX ADMIN — FENTANYL CITRATE 25 MCG: 50 INJECTION INTRAMUSCULAR; INTRAVENOUS at 08:43

## 2025-04-14 RX ADMIN — SUGAMMADEX 200 MG: 100 INJECTION, SOLUTION INTRAVENOUS at 12:17

## 2025-04-14 RX ADMIN — PROPOFOL 30 MG: 10 INJECTION, EMULSION INTRAVENOUS at 09:13

## 2025-04-14 RX ADMIN — ACETAMINOPHEN 975 MG: 325 TABLET, FILM COATED ORAL at 22:10

## 2025-04-14 RX ADMIN — AMPICILLIN SODIUM AND SULBACTAM SODIUM 3 G: 2; 1 INJECTION, POWDER, FOR SOLUTION INTRAMUSCULAR; INTRAVENOUS at 19:10

## 2025-04-14 RX ADMIN — FAMOTIDINE 20 MG: 20 TABLET, FILM COATED ORAL at 22:10

## 2025-04-14 RX ADMIN — PHENYLEPHRINE HYDROCHLORIDE 100 MCG: 10 INJECTION INTRAVENOUS at 10:19

## 2025-04-14 RX ADMIN — PROPOFOL 30 MG: 10 INJECTION, EMULSION INTRAVENOUS at 10:25

## 2025-04-14 RX ADMIN — QUETIAPINE FUMARATE 50 MG: 25 TABLET ORAL at 22:10

## 2025-04-14 RX ADMIN — MIRTAZAPINE 15 MG: 15 TABLET, FILM COATED ORAL at 22:10

## 2025-04-14 RX ADMIN — PROPOFOL 20 MG: 10 INJECTION, EMULSION INTRAVENOUS at 11:59

## 2025-04-14 RX ADMIN — FENTANYL CITRATE 25 MCG: 50 INJECTION INTRAMUSCULAR; INTRAVENOUS at 08:54

## 2025-04-14 RX ADMIN — OXYCODONE HYDROCHLORIDE 10 MG: 5 TABLET ORAL at 18:27

## 2025-04-14 ASSESSMENT — ACTIVITIES OF DAILY LIVING (ADL)
ADLS_ACUITY_SCORE: 72
ADLS_ACUITY_SCORE: 73
ADLS_ACUITY_SCORE: 72
ADLS_ACUITY_SCORE: 73
ADLS_ACUITY_SCORE: 73
ADLS_ACUITY_SCORE: 72
ADLS_ACUITY_SCORE: 73
ADLS_ACUITY_SCORE: 72
ADLS_ACUITY_SCORE: 73
ADLS_ACUITY_SCORE: 72
ADLS_ACUITY_SCORE: 73

## 2025-04-14 NOTE — PLAN OF CARE
Problem: Adult Inpatient Plan of Care  Goal: Absence of Hospital-Acquired Illness or Injury  Intervention: Identify and Manage Fall Risk  Recent Flowsheet Documentation  Taken 4/13/2025 1620 by Radha Tucker RN  Safety Promotion/Fall Prevention:   activity supervised   assistive device/personal items within reach   clutter free environment maintained   increased rounding and observation   increase visualization of patient   lighting adjusted   mobility aid in reach   nonskid shoes/slippers when out of bed   patient and family education   room door open   room near nurse's station   room organization consistent   safety round/check completed   supervised activity  Intervention: Prevent Skin Injury  Recent Flowsheet Documentation  Taken 4/13/2025 1620 by Radha Tucker RN  Body Position:   weight shifting   supine  Skin Protection: adhesive use limited  Intervention: Prevent and Manage VTE (Venous Thromboembolism) Risk  Recent Flowsheet Documentation  Taken 4/13/2025 1620 by Radha Tucker RN  VTE Prevention/Management: SCDs on (sequential compression devices)  Intervention: Prevent Infection  Recent Flowsheet Documentation  Taken 4/13/2025 1620 by Radha Tucker RN  Infection Prevention:   hand hygiene promoted   personal protective equipment utilized   rest/sleep promoted   single patient room provided  Goal: Optimal Comfort and Wellbeing  Intervention: Provide Person-Centered Care  Recent Flowsheet Documentation  Taken 4/13/2025 1620 by Radha Tucker RN  Trust Relationship/Rapport:   care explained   questions answered     Problem: Delirium  Goal: Improved Behavioral Control  Intervention: Minimize Safety Risk  Recent Flowsheet Documentation  Taken 4/13/2025 1620 by Radha Tucker RN  Enhanced Safety Measures: assistive devices when indicated  Trust Relationship/Rapport:   care explained   questions answered  Goal: Improved Attention and Thought Clarity  Intervention:  Maximize Cognitive Function  Recent Flowsheet Documentation  Taken 4/13/2025 1620 by Radha Tucker RN  Reorientation Measures: clock in view     Problem: Comorbidity Management  Goal: Blood Pressure in Desired Range  Intervention: Maintain Blood Pressure Management  Recent Flowsheet Documentation  Taken 4/13/2025 1620 by Radha Tucker, RN  Medication Review/Management:   medications reviewed   high-risk medications identified

## 2025-04-14 NOTE — CONSULTS
[facility]    Assessment:     ICD-10-CM    1. Other chronic osteomyelitis of left foot (H)  M86.672 Case Request: AMPUTATION, BELOW KNEE     Case Request: AMPUTATION, BELOW KNEE     CANCELED: Adult Infectious Disease  Referral      2. Diabetic ulcer of toe of left foot associated with type 2 diabetes mellitus, with necrosis of bone (H)  E11.621 Case Request: AMPUTATION, BELOW KNEE    L97.524 Case Request: AMPUTATION, BELOW KNEE      3. Ulcer of left foot with necrosis of bone (H)  L97.524 Case Request: AMPUTATION, BELOW KNEE     Case Request: AMPUTATION, BELOW KNEE      4. PAD (peripheral artery disease)  I73.9 Case Request: AMPUTATION, BELOW KNEE     Case Request: AMPUTATION, BELOW KNEE          Plan:   BKA planned and sounds as though it will take place this morning.  In the meantime leave Mepilex Ag in place.    Subjective:  This is a WOC consult as requested by  Of a 89 year old male patient with a diabetic ulcer left foot.  Patient has had a nonhealing ulcer of the left foot.  He was found to have necrosis to the bone.  He is hospitalized with osteomyelitis.  He did have discussion with surgeon over the weekend and they are planning for BKA later this week.  Patient denying pain.    Past Medical History:   Diagnosis Date    Chronic kidney disease, stage 4 (severe) (H) 7/4/2022    Coronary artery disease 1/14/2023    Diabetic neuropathy (H) 1/14/2023    Essential hypertension 1/14/2023    Gastroesophageal reflux disease, unspecified whether esophagitis present 4/17/2018    PAD (peripheral artery disease) 11/29/2022    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) 1/8/2019     Past Surgical History:   Procedure Laterality Date    IR PICC PLACEMENT > 5 YRS OF AGE  3/17/2023     Penicillins, Tetanus antitoxin, and Tetanus toxoid  Current Facility-Administered Medications   Medication Dose Route Frequency Provider Last Rate Last Admin    [Auto Hold] acetaminophen  (TYLENOL) tablet 650 mg  650 mg Oral Q4H PRN Sarah Lin MD   650 mg at 04/13/25 1904    Or    [Auto Hold] acetaminophen (TYLENOL) Suppository 650 mg  650 mg Rectal Q4H PRN Sarah Lin MD        [Auto Hold] ampicillin-sulbactam (UNASYN) 3 g vial to attach to  mL bag  3 g Intravenous Q6H Jovanni Khoury MD   3 g at 04/14/25 0750    [Held by provider] aspirin EC tablet 81 mg  81 mg Oral Daily Jovanni Khoury MD   81 mg at 04/13/25 1022    [Held by provider] atorvastatin (LIPITOR) tablet 40 mg  40 mg Oral At Bedtime Jovanni Khoury MD   40 mg at 04/13/25 2251    [Auto Hold] buPROPion (WELLBUTRIN XL) 24 hr tablet 150 mg  150 mg Oral Daily Jovanni Khoury MD   150 mg at 04/13/25 1022    [Auto Hold] calcium carbonate (TUMS) chewable tablet 1,000 mg  1,000 mg Oral 4x Daily PRN Sarah Lin MD        [Auto Hold] glucose gel 15-30 g  15-30 g Oral Q15 Min PRN Sarah Lin MD        Or    [Auto Hold] dextrose 50 % injection 25-50 mL  25-50 mL Intravenous Q15 Min PRN Sarah Lin MD        Or    [Auto Hold] glucagon injection 1 mg  1 mg Subcutaneous Q15 Min PRN Sarah Lin MD        [Auto Hold] famotidine (PEPCID) tablet 20 mg  20 mg Oral BID Jovanni Khoury MD   20 mg at 04/13/25 2251    [Auto Hold] gabapentin (NEURONTIN) capsule 300 mg  300 mg Oral QAM Jovanni Khoury MD   300 mg at 04/13/25 0851    [Auto Hold] gabapentin (NEURONTIN) capsule 600 mg  600 mg Oral At Bedtime Jovanni Khoury MD   600 mg at 04/13/25 2251    [Auto Hold] insulin aspart (NovoLOG) injection (RAPID ACTING)  3 Units Subcutaneous TID w/meals Jovanni Khoury MD        [Auto Hold] insulin aspart (NovoLOG) injection (RAPID ACTING)  1-7 Units Subcutaneous TID AC Sarah Lin MD   2 Units at 04/14/25 0755    [Auto Hold] insulin aspart (NovoLOG) injection (RAPID ACTING)  1-5 Units Subcutaneous At Bedtime Sarah Lin MD        [Held by provider] insulin glargine (LANTUS PEN) injection 20 Units  20 Units  Subcutaneous QAM AC Jovanni Khoury MD        [Held by provider] isosorbide mononitrate (IMDUR) 24 hr tablet 30 mg  30 mg Oral QAM Jovanni Khoury MD   30 mg at 04/13/25 0852    [Auto Hold] lidocaine (LMX4) cream   Topical Q1H PRN Sarah Lin MD        [Auto Hold] lidocaine 1 % 0.1-1 mL  0.1-1 mL Other Q1H PRN Sarah Lin MD        [Auto Hold] metoprolol succinate ER (TOPROL XL) 24 hr tablet 25 mg  25 mg Oral Daily Jovanni Khoury MD   25 mg at 04/13/25 1021    [Auto Hold] mirtazapine (REMERON) tablet 15 mg  15 mg Oral At Bedtime Jovanni Khoury MD   15 mg at 04/13/25 2251    [Auto Hold] naloxone (NARCAN) injection 0.2 mg  0.2 mg Intravenous Q2 Min PRN Jovanni Khoury MD        Or    [Auto Hold] naloxone (NARCAN) injection 0.4 mg  0.4 mg Intravenous Q2 Min PRN Jovanni Khoury MD        Or    [Auto Hold] naloxone (NARCAN) injection 0.2 mg  0.2 mg Intramuscular Q2 Min PRN Jovanni Khoury MD        Or    [Auto Hold] naloxone (NARCAN) injection 0.4 mg  0.4 mg Intramuscular Q2 Min PRN Jovanni Khoury MD        [Auto Hold] ondansetron (ZOFRAN ODT) ODT tab 4 mg  4 mg Oral Q6H PRN Sarah Lin MD        Or    [Auto Hold] ondansetron (ZOFRAN) injection 4 mg  4 mg Intravenous Q6H PRN Sarah Lin MD   4 mg at 04/10/25 1533    [Auto Hold] oxyCODONE (ROXICODONE) tablet 5 mg  5 mg Oral Q4H PRN Jovanni Khoury MD   5 mg at 04/10/25 0919    [Auto Hold] oxyCODONE IR (ROXICODONE) half-tab 2.5 mg  2.5 mg Oral Q4H PRN Jovanni Khoury MD        [Auto Hold] prochlorperazine (COMPAZINE) injection 5 mg  5 mg Intravenous Q6H PRN Jovanni Khoury MD   5 mg at 04/10/25 0916    Or    [Auto Hold] prochlorperazine (COMPAZINE) tablet 5 mg  5 mg Oral Q6H PRN Jovanni Khoury MD        Or    [Auto Hold] prochlorperazine (COMPAZINE) suppository 12.5 mg  12.5 mg Rectal Q12H PRN Jovanni Khoury MD        [Auto Hold] psyllium (METAMUCIL/KONSYL) Packet 1 packet  1 packet Oral Daily Jovanni Khoury MD   1 packet at 04/13/25 1021    [Auto  "Hold] saccharomyces boulardii (FLORASTOR) capsule 250 mg  250 mg Oral BID Jovanni Khoury MD   250 mg at 04/13/25 2250    [Auto Hold] senna-docusate (SENOKOT-S/PERICOLACE) 8.6-50 MG per tablet 1 tablet  1 tablet Oral BID PRN Sarah Lin MD        Or    [Auto Hold] senna-docusate (SENOKOT-S/PERICOLACE) 8.6-50 MG per tablet 2 tablet  2 tablet Oral BID PRN Sarah Lin MD        [Auto Hold] sertraline (ZOLOFT) tablet 150 mg  150 mg Oral Daily Jovanni Khoury MD   150 mg at 04/13/25 1022    [Auto Hold] sodium chloride (PF) 0.9% PF flush 3 mL  3 mL Intracatheter Q8H Sarah Cobb MD   3 mL at 04/14/25 0814    [Auto Hold] sodium chloride (PF) 0.9% PF flush 3 mL  3 mL Intracatheter q1 min prn Sarah Lin MD   3 mL at 04/11/25 1057    [Held by provider] tamsulosin (FLOMAX) capsule 0.8 mg  0.8 mg Oral At Bedtime Jovanni Khoury MD   0.8 mg at 04/13/25 2250       Review of Systems:  See HPI    Objective:   /54   Pulse 103   Temp 98.7  F (37.1  C) (Tympanic)   Resp 12   Ht 1.854 m (6' 1\")   Wt 96.6 kg (213 lb)   SpO2 98%   BMI 28.10 kg/m    Physical Exam     Elderly gentleman no acute distress.  Cognition impaired due to underlying dementia.    Wound Type:  Diabetic ulcer  Location:  Second digit left foot  Wound Measurements:  2.9 cm x 3.3 cm x 0.1 cm  Wound Base:  Gray soft eschar covering wound bed.  No granulation tissue.  Probes to the bone  Undermining:  None  Tunneling:  None  Drainage:  Moderate to large gray-brown pungent discharge  Periwound Tissue:  Surrounding erythema, warmth  Debridement:  Autolytic  Treatment:  Cleansed with Anasept, Mepilex Ag applied and secured with Medipore tape.    Comorbid Conditions Or Complications To Healing:  Dementia, type 2 diabetes, peripheral arterial disease, previous amputation, osteomyelitis, stage IIIa chronic kidney disease    Nutritional Status:  Mild protein malnutrition    Labs:  Recent Results (from the past 240 hours)   Glucose " by meter    Collection Time: 04/09/25 11:50 PM   Result Value Ref Range    GLUCOSE BY METER POCT 125 (H) 70 - 99 mg/dL   Creatinine    Collection Time: 04/10/25  1:05 AM   Result Value Ref Range    Creatinine 1.45 (H) 0.67 - 1.17 mg/dL    GFR Estimate 46 (L) >60 mL/min/1.73m2   Lactic Acid Whole Blood w/ 1x repeat in 2 hrs when >2    Collection Time: 04/10/25  1:05 AM   Result Value Ref Range    Lactic Acid, Initial 0.7 0.7 - 2.0 mmol/L   Basic metabolic panel    Collection Time: 04/10/25  5:40 AM   Result Value Ref Range    Sodium 133 (L) 135 - 145 mmol/L    Potassium 4.3 3.4 - 5.3 mmol/L    Chloride 100 98 - 107 mmol/L    Carbon Dioxide (CO2) 22 22 - 29 mmol/L    Anion Gap 11 7 - 15 mmol/L    Urea Nitrogen 29.2 (H) 8.0 - 23.0 mg/dL    Creatinine 1.36 (H) 0.67 - 1.17 mg/dL    GFR Estimate 50 (L) >60 mL/min/1.73m2    Calcium 8.7 (L) 8.8 - 10.4 mg/dL    Glucose 152 (H) 70 - 99 mg/dL   CBC with platelets    Collection Time: 04/10/25  5:40 AM   Result Value Ref Range    WBC Count 10.5 4.0 - 11.0 10e3/uL    RBC Count 3.36 (L) 4.40 - 5.90 10e6/uL    Hemoglobin 10.0 (L) 13.3 - 17.7 g/dL    Hematocrit 29.5 (L) 40.0 - 53.0 %    MCV 88 78 - 100 fL    MCH 29.8 26.5 - 33.0 pg    MCHC 33.9 31.5 - 36.5 g/dL    RDW 13.1 10.0 - 15.0 %    Platelet Count 206 150 - 450 10e3/uL   Extra Blue Top Tube    Collection Time: 04/10/25  5:40 AM   Result Value Ref Range    Hold Specimen [facility]    Extra Red Top Tube    Collection Time: 04/10/25  5:40 AM   Result Value Ref Range    Hold Specimen [facility]    CRP inflammation    Collection Time: 04/10/25  5:40 AM   Result Value Ref Range    CRP Inflammation 218.13 (H) <5.00 mg/L   Hemoglobin A1c    Collection Time: 04/10/25  5:40 AM   Result Value Ref Range    Estimated Average Glucose 148 (H) <117 mg/dL    Hemoglobin A1C 6.8 (H) <5.7 %   Glucose by meter    Collection Time: 04/10/25  7:25 AM   Result Value Ref Range    GLUCOSE BY METER POCT 161 (H) 70 - 99 mg/dL   Swab Aerobic Bacterial Culture Routine  With Gram Stain    Collection Time: 04/10/25 10:27 AM    Specimen: Foot, Left; Swab   Result Value Ref Range    Culture (A)      3+ Streptococcus agalactiae (Group B Streptococcus)    Gram Stain Result 2+ PMNs/low power field (A)     Gram Stain Result 2+ Squamous epithelial cells/low power field (A)     Gram Stain Result 2+ Gram positive cocci (A)     Gram Stain Result 2+ Gram negative bacilli (A)     Gram Stain Result 2+ Gram positive bacilli (A)        Susceptibility    Streptococcus agalactiae (Group B Streptococcus) - PONCE     Ampicillin 0.12 Susceptible      Ceftriaxone (meningitis) <=0.25 Susceptible      Ceftriaxone (non-meningitis) <=0.25 Susceptible      Erythromycin >0.5 Resistant      Levofloxacin <=0.25 Susceptible      Tetracycline >4 Resistant      Vancomycin 0.5 Susceptible      Azithromycin >2 Resistant      Cefepime <=0.25 Susceptible      Penicillin 0.06 Susceptible      Clindamycin >0.5 Resistant    Glucose by meter    Collection Time: 04/10/25 11:23 AM   Result Value Ref Range    GLUCOSE BY METER POCT 188 (H) 70 - 99 mg/dL   Blood Culture Peripheral Blood    Collection Time: 04/10/25  1:37 PM    Specimen: Peripheral Blood   Result Value Ref Range    Culture No growth after 3 days    Blood Culture Peripheral Blood    Collection Time: 04/10/25  1:37 PM    Specimen: Peripheral Blood   Result Value Ref Range    Culture No growth after 3 days    Extra Green Top (Lithium Heparin) Tube    Collection Time: 04/10/25  1:38 PM   Result Value Ref Range    Hold Specimen [facility]    Glucose by meter    Collection Time: 04/10/25  5:45 PM   Result Value Ref Range    GLUCOSE BY METER POCT 198 (H) 70 - 99 mg/dL   Glucose by meter    Collection Time: 04/10/25  9:46 PM   Result Value Ref Range    GLUCOSE BY METER POCT 146 (H) 70 - 99 mg/dL   Vancomycin level    Collection Time: 04/11/25  5:40 AM   Result Value Ref Range    Vancomycin 10.3   ug/mL   Extra Purple Top Tube    Collection Time: 04/11/25  5:40 AM   Result Value  Ref Range    Hold Specimen [facility]    Basic metabolic panel    Collection Time: 04/11/25  5:40 AM   Result Value Ref Range    Sodium 135 135 - 145 mmol/L    Potassium 4.5 3.4 - 5.3 mmol/L    Chloride 101 98 - 107 mmol/L    Carbon Dioxide (CO2) 19 (L) 22 - 29 mmol/L    Anion Gap 15 7 - 15 mmol/L    Urea Nitrogen 30.4 (H) 8.0 - 23.0 mg/dL    Creatinine 1.47 (H) 0.67 - 1.17 mg/dL    GFR Estimate 45 (L) >60 mL/min/1.73m2    Calcium 8.2 (L) 8.8 - 10.4 mg/dL    Glucose 165 (H) 70 - 99 mg/dL   CRP inflammation    Collection Time: 04/11/25  5:40 AM   Result Value Ref Range    CRP Inflammation 279.10 (H) <5.00 mg/L   Glucose by meter    Collection Time: 04/11/25  7:21 AM   Result Value Ref Range    GLUCOSE BY METER POCT 164 (H) 70 - 99 mg/dL   Glucose by meter    Collection Time: 04/11/25 11:42 AM   Result Value Ref Range    GLUCOSE BY METER POCT 164 (H) 70 - 99 mg/dL   Glucose by meter    Collection Time: 04/11/25  4:40 PM   Result Value Ref Range    GLUCOSE BY METER POCT 197 (H) 70 - 99 mg/dL   Glucose by meter    Collection Time: 04/11/25 10:14 PM   Result Value Ref Range    GLUCOSE BY METER POCT 149 (H) 70 - 99 mg/dL   CBC with platelets    Collection Time: 04/12/25  5:54 AM   Result Value Ref Range    WBC Count 10.4 4.0 - 11.0 10e3/uL    RBC Count 3.22 (L) 4.40 - 5.90 10e6/uL    Hemoglobin 9.8 (L) 13.3 - 17.7 g/dL    Hematocrit 29.1 (L) 40.0 - 53.0 %    MCV 90 78 - 100 fL    MCH 30.4 26.5 - 33.0 pg    MCHC 33.7 31.5 - 36.5 g/dL    RDW 13.3 10.0 - 15.0 %    Platelet Count 247 150 - 450 10e3/uL   Basic metabolic panel    Collection Time: 04/12/25  5:54 AM   Result Value Ref Range    Sodium 133 (L) 135 - 145 mmol/L    Potassium 4.4 3.4 - 5.3 mmol/L    Chloride 101 98 - 107 mmol/L    Carbon Dioxide (CO2) 21 (L) 22 - 29 mmol/L    Anion Gap 11 7 - 15 mmol/L    Urea Nitrogen 27.7 (H) 8.0 - 23.0 mg/dL    Creatinine 1.26 (H) 0.67 - 1.17 mg/dL    GFR Estimate 55 (L) >60 mL/min/1.73m2    Calcium 8.6 (L) 8.8 - 10.4 mg/dL    Glucose 160  (H) 70 - 99 mg/dL   CRP inflammation    Collection Time: 04/12/25  5:54 AM   Result Value Ref Range    CRP Inflammation 265.51 (H) <5.00 mg/L   Extra Blue Top Tube    Collection Time: 04/12/25  5:54 AM   Result Value Ref Range    Hold Specimen [facility]    Extra Red Top Tube    Collection Time: 04/12/25  5:54 AM   Result Value Ref Range    Hold Specimen [facility]    Glucose by meter    Collection Time: 04/12/25  7:32 AM   Result Value Ref Range    GLUCOSE BY METER POCT 150 (H) 70 - 99 mg/dL   Glucose by meter    Collection Time: 04/12/25 12:02 PM   Result Value Ref Range    GLUCOSE BY METER POCT 278 (H) 70 - 99 mg/dL   Glucose by meter    Collection Time: 04/12/25  4:44 PM   Result Value Ref Range    GLUCOSE BY METER POCT 245 (H) 70 - 99 mg/dL   Glucose by meter    Collection Time: 04/12/25  9:52 PM   Result Value Ref Range    GLUCOSE BY METER POCT 198 (H) 70 - 99 mg/dL   Basic metabolic panel    Collection Time: 04/13/25  5:38 AM   Result Value Ref Range    Sodium 132 (L) 135 - 145 mmol/L    Potassium 4.4 3.4 - 5.3 mmol/L    Chloride 98 98 - 107 mmol/L    Carbon Dioxide (CO2) 20 (L) 22 - 29 mmol/L    Anion Gap 14 7 - 15 mmol/L    Urea Nitrogen 23.3 (H) 8.0 - 23.0 mg/dL    Creatinine 1.19 (H) 0.67 - 1.17 mg/dL    GFR Estimate 58 (L) >60 mL/min/1.73m2    Calcium 8.8 8.8 - 10.4 mg/dL    Glucose 222 (H) 70 - 99 mg/dL   CBC with platelets    Collection Time: 04/13/25  5:38 AM   Result Value Ref Range    WBC Count 9.5 4.0 - 11.0 10e3/uL    RBC Count 3.26 (L) 4.40 - 5.90 10e6/uL    Hemoglobin 9.7 (L) 13.3 - 17.7 g/dL    Hematocrit 28.9 (L) 40.0 - 53.0 %    MCV 89 78 - 100 fL    MCH 29.8 26.5 - 33.0 pg    MCHC 33.6 31.5 - 36.5 g/dL    RDW 13.1 10.0 - 15.0 %    Platelet Count 268 150 - 450 10e3/uL   Magnesium    Collection Time: 04/13/25  5:38 AM   Result Value Ref Range    Magnesium 1.6 (L) 1.7 - 2.3 mg/dL   Extra Blue Top Tube    Collection Time: 04/13/25  5:38 AM   Result Value Ref Range    Hold Specimen JIC    Extra Red Top Tube     Collection Time: 04/13/25  5:38 AM   Result Value Ref Range    Hold Specimen [facility]    Glucose by meter    Collection Time: 04/13/25  7:48 AM   Result Value Ref Range    GLUCOSE BY METER POCT 216 (H) 70 - 99 mg/dL   Glucose by meter    Collection Time: 04/13/25 12:17 PM   Result Value Ref Range    GLUCOSE BY METER POCT 301 (H) 70 - 99 mg/dL   Glucose by meter    Collection Time: 04/13/25  4:45 PM   Result Value Ref Range    GLUCOSE BY METER POCT 263 (H) 70 - 99 mg/dL   Glucose by meter    Collection Time: 04/13/25 10:02 PM   Result Value Ref Range    GLUCOSE BY METER POCT 196 (H) 70 - 99 mg/dL   CBC with platelets    Collection Time: 04/14/25  5:31 AM   Result Value Ref Range    WBC Count 10.4 4.0 - 11.0 10e3/uL    RBC Count 3.33 (L) 4.40 - 5.90 10e6/uL    Hemoglobin 10.0 (L) 13.3 - 17.7 g/dL    Hematocrit 29.2 (L) 40.0 - 53.0 %    MCV 88 78 - 100 fL    MCH 30.0 26.5 - 33.0 pg    MCHC 34.2 31.5 - 36.5 g/dL    RDW 13.1 10.0 - 15.0 %    Platelet Count 308 150 - 450 10e3/uL   Basic metabolic panel    Collection Time: 04/14/25  5:31 AM   Result Value Ref Range    Sodium 133 (L) 135 - 145 mmol/L    Potassium 4.2 3.4 - 5.3 mmol/L    Chloride 98 98 - 107 mmol/L    Carbon Dioxide (CO2) 22 22 - 29 mmol/L    Anion Gap 13 7 - 15 mmol/L    Urea Nitrogen 18.1 8.0 - 23.0 mg/dL    Creatinine 1.09 0.67 - 1.17 mg/dL    GFR Estimate 65 >60 mL/min/1.73m2    Calcium 8.8 8.8 - 10.4 mg/dL    Glucose 196 (H) 70 - 99 mg/dL   Magnesium    Collection Time: 04/14/25  5:31 AM   Result Value Ref Range    Magnesium 1.5 (L) 1.7 - 2.3 mg/dL   Extra Red Top Tube    Collection Time: 04/14/25  5:31 AM   Result Value Ref Range    Hold Specimen [facility]    Glucose by meter    Collection Time: 04/14/25  7:27 AM   Result Value Ref Range    GLUCOSE BY METER POCT 222 (H) 70 - 99 mg/dL         Diagnostics:  April 9, 2025 left foot x-ray report reviewed        SORIN Waggoner   4/14/2025  12:38 PM

## 2025-04-14 NOTE — PHARMACY
Pharmacy - Transfer Medication Reconciliation     The patient's transfer medication orders have been compared to the medication administration record and to the Prior to Admissions Medications list - any noted discrepancies were resolved with the MD.     Thank you. Pharmacy will continue to monitor.     Terence Cortes [facility] ....................  4/14/2025   2:05 PM

## 2025-04-14 NOTE — ANESTHESIA POSTPROCEDURE EVALUATION
Patient: Elsa Horowitz    Procedure: Procedure(s):  AMPUTATION, BELOW KNEE       Anesthesia Type:  General    Note:  Disposition: Outpatient   Postop Pain Control: Uneventful            Sign Out: Well controlled pain   PONV: No   Neuro/Psych: Uneventful            Sign Out: Acceptable/Baseline neuro status   Airway/Respiratory: Uneventful            Sign Out: Acceptable/Baseline resp. status   CV/Hemodynamics: Uneventful            Sign Out: Acceptable CV status; No obvious hypovolemia; No obvious fluid overload   Other NRE: NONE   DID A NON-ROUTINE EVENT OCCUR?            Last vitals:  Vitals Value Taken Time   /68 04/14/25 1255   Temp 97.3  F (36.3  C) 04/14/25 1245   Pulse 96 04/14/25 1256   Resp 18 04/14/25 1256   SpO2 93 % 04/14/25 1302   Vitals shown include unfiled device data.    Electronically Signed By: EREN POWERS CRNA  April 14, 2025  2:55 PM

## 2025-04-14 NOTE — PROGRESS NOTES
Patient transferred to room 331 from PACU.  Patient is alert and oriented.  Patient has brace on.  VSS, afebrile.  Given call light and oriented to room.

## 2025-04-14 NOTE — ANESTHESIA PREPROCEDURE EVALUATION
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 Arm swelled, but has tolerated rocephin     Tetanus Antitoxin Other (See Comments)     Tetanus Toxoid       Social History     Tobacco Use     Smoking status: Former     Smokeless tobacco: Not on file   Substance Use Topics     Alcohol use: Not Currently      Wt Readings from Last 1 Encounters:   25 96.6 kg (213 lb)        Anesthesia Evaluation   Pt has had prior anesthetic. Type: General.    No history of anesthetic complications       ROS/MED HX  ENT/Pulmonary:       Neurologic:       Cardiovascular:     (+) Dyslipidemia hypertension- Peripheral Vascular Disease-  CAD -  - stent- 5                                     METS/Exercise Tolerance: 3 - Able to walk 1-2 blocks without stopping    Hematologic:     (+) History of blood clots,               Musculoskeletal: Comment: Right BKA      GI/Hepatic:     (+) GERD, Asymptomatic on medication,           liver disease,       Renal/Genitourinary:     (+) renal disease,             Endo:     (+)  type II DM,   Using insulin,    Diabetic complications: neuropathy. thyroid problem,            Psychiatric/Substance Use:     (+) psychiatric history depression       Infectious Disease:       Malignancy:   "     Other:          Physical Exam    Airway  airway exam normal      Mallampati: III   TM distance: > 3 FB   Neck ROM: full   Mouth opening: > 3 cm    Respiratory Devices and Support         Dental       (+) Multiple visibly decayed, broken teeth      Cardiovascular   cardiovascular exam normal       Rhythm and rate: regular and normal     Pulmonary   pulmonary exam normal        breath sounds clear to auscultation       OUTSIDE LABS:  CBC:   Lab Results   Component Value Date    WBC 10.4 04/14/2025    WBC 9.5 04/13/2025    HGB 10.0 (L) 04/14/2025    HGB 9.7 (L) 04/13/2025    HCT 29.2 (L) 04/14/2025    HCT 28.9 (L) 04/13/2025     04/14/2025     04/13/2025     BMP:   Lab Results   Component Value Date     (L) 04/14/2025     (L) 04/13/2025    POTASSIUM 4.2 04/14/2025    POTASSIUM 4.4 04/13/2025    CHLORIDE 98 04/14/2025    CHLORIDE 98 04/13/2025    CO2 22 04/14/2025    CO2 20 (L) 04/13/2025    BUN 18.1 04/14/2025    BUN 23.3 (H) 04/13/2025    CR 1.09 04/14/2025    CR 1.19 (H) 04/13/2025     (H) 04/14/2025     (H) 04/14/2025     COAGS:   Lab Results   Component Value Date    PTT 28 01/31/2023    INR 1.95 (H) 02/18/2025     POC: No results found for: \"BGM\", \"HCG\", \"HCGS\"  HEPATIC:   Lab Results   Component Value Date    ALBUMIN 3.8 02/18/2025    PROTTOTAL 6.9 02/18/2025    ALT 18 02/18/2025    AST 24 02/18/2025    ALKPHOS 102 02/18/2025    BILITOTAL 0.2 02/18/2025     OTHER:   Lab Results   Component Value Date    LACT 0.7 04/10/2025    A1C 6.8 (H) 04/10/2025    JOSEE 8.8 04/14/2025    PHOS 5.1 (H) 04/07/2023    MAG 1.5 (L) 04/14/2025    CRP 1.4 04/15/2013    SED 59 (H) 04/11/2023       Anesthesia Plan    ASA Status:  4    NPO Status:  NPO Appropriate    Anesthesia Type: General.     - Airway: ETT   Induction: Intravenous.   Maintenance: Balanced.        Consents    Anesthesia Plan(s) and associated risks, benefits, and realistic alternatives discussed. Questions answered and " "patient/representative(s) expressed understanding.     - Discussed: Risks, Benefits and Alternatives for BOTH SEDATION and the PROCEDURE were discussed     - Discussed with:  Patient      - Extended Intubation/Ventilatory Support Discussed: No.      - Patient is DNR/DNI Status: Yes             Suspend during perioperative period? Yes.             Agree to: chemical resuscitation, chest compression/defibrillation.   Use of blood products discussed: No .     Postoperative Care    Pain management: IV analgesics, Multi-modal analgesia.   PONV prophylaxis: Ondansetron (or other 5HT-3), Dexamethasone or Solumedrol     Comments:    Other Comments: Risks, benefits and alternatives discussed and would like to proceed.              EREN Nance CRNA    Clinically Significant Risk Factors         # Hyponatremia: Lowest Na = 132 mmol/L in last 2 days, will monitor as appropriate     # Hypomagnesemia: Lowest Mg = 1.5 mg/dL in last 2 days, will replace as needed       # Hypertension: Noted on problem list           # DMII: A1C = 6.8 % (Ref range: <5.7 %) within past 6 months   # Overweight: Estimated body mass index is 28.1 kg/m  as calculated from the following:    Height as of this encounter: 1.854 m (6' 1\").    Weight as of this encounter: 96.6 kg (213 lb).               "

## 2025-04-14 NOTE — PLAN OF CARE
Goal Outcome Evaluation:    A/O, VSS, temp of 101.4 down to 99, on room air. Denies pain. LS clear/dim throughout. Left lower extremity warm, red/dimas. Dressing to left second toe CDI. Receiving IV antibiotics. Pt able to reposition self in bed. Incontinent of bladder at night. CCRq2 for incontinence         Plan of Care Reviewed With: patient    Overall Patient Progress: no changeOverall Patient Progress: no change    Outcome Evaluation: VSS; Denies pain

## 2025-04-14 NOTE — ANESTHESIA CARE TRANSFER NOTE
Patient: Elsa Horowitz    Procedure: Procedure(s):  AMPUTATION, BELOW KNEE       Diagnosis: Other chronic osteomyelitis of left foot (H) [M86.672]  Diabetic ulcer of toe of left foot associated with type 2 diabetes mellitus, with necrosis of bone (H) [E11.621, L97.524]  Ulcer of left foot with necrosis of bone (H) [L97.524]  PAD (peripheral artery disease) [I73.9]  Diagnosis Additional Information: No value filed.    Anesthesia Type:   General     Note:    Oropharynx: oropharynx clear of all foreign objects  Level of Consciousness: awake  Oxygen Supplementation: face mask  Level of Supplemental Oxygen (L/min / FiO2): 8  Independent Airway: airway patency satisfactory and stable  Dentition: dentition unchanged  Vital Signs Stable: post-procedure vital signs reviewed and stable  Report to RN Given: handoff report given  Patient transferred to: PACU    Handoff Report: Identifed the Patient, Identified the Reponsible Provider, Reviewed the pertinent medical history, Discussed the surgical course, Reviewed Intra-OP anesthesia mangement and issues during anesthesia, Set expectations for post-procedure period and Allowed opportunity for questions and acknowledgement of understanding  Vitals:  Vitals Value Taken Time   /71 04/14/25 1228   Temp     Pulse 98 04/14/25 1230   Resp 23 04/14/25 1230   SpO2 98 % 04/14/25 1230   Vitals shown include unfiled device data.    Electronically Signed By: EREN Nance CRNA  April 14, 2025  12:31 PM

## 2025-04-14 NOTE — OR NURSING
PACU Transfer Note    Elsa Horowitz was transferred to [place] [id number] via cart.  Equipment used for transport:  none.  Accompanied by:  RN  Prescriptions were: n/a    PACU Respiratory Event Documentation     1) Episodes of Apnea greater than or equal to 10 seconds: no    2) Bradypnea - less than 8 breaths per minute: no    3) Pain score on 0 to 10 scale: tolerable per patient report    4) Pain-sedation mismatch (yes or no): no    5) Repeated 02 desaturation less than 90% (yes or no): no    Anesthesia notified? (yes or no): no    Any of the above events occuring repeatedly in separate 30 minute intervals may be considered recurrent PACU respiratory events.    Patient stable and meets phase 1 discharge criteria for transport from PACU.

## 2025-04-14 NOTE — PLAN OF CARE
Goal Outcome Evaluation:    Patient was having increased pain after BKA surgery.  Updated MD and received orders for increased PRN pain medications.  Patient has ACE and brace on LLE.  Patient is alert.  Denies any nausea and tolerating advancing diet.  Blood sugars have remained stable.  VSS and afebrile.

## 2025-04-14 NOTE — PROGRESS NOTES
:    [facility] comes from [facility].  will continue to follow for discharge planning needs.     NOEL Gavin on 4/14/2025 at 2:58 PM

## 2025-04-14 NOTE — ANESTHESIA PROCEDURE NOTES
Airway         Procedure Start/Stop Times: 4/14/2025 8:49 AM  Staff -        CRNA: Papito Gibbs APRN CRNA       Performed By: CRNA  Consent for Airway        Urgency: elective  Indications and Patient Condition       Indications for airway management: eris-procedural        Final Airway Details       Final airway type: endotracheal airway       Successful airway: ETT - single  Endotracheal Airway Details        ETT size (mm): 8.0       Cuffed: yes       Successful intubation technique: direct laryngoscopy       DL Blade Type: Mart 2       Grade View of Cords: 1       Adjucts: stylet       Position: Right       Measured from: lips       Secured at (cm): 23       Bite block used: None    Post intubation assessment        Placement verified by: capnometry, equal breath sounds and chest rise        Number of attempts at approach: 1       Number of other approaches attempted: 0       Secured with: tape       Ease of procedure: easy       Dentition: Intact and Unchanged    Medication(s) Administered   Medication Administration Time: 4/14/2025 8:49 AM

## 2025-04-14 NOTE — PROGRESS NOTES
Interdisciplinary Discharge Planning Note    Anticipated Discharge Date: TBD    Anticipated Discharge Location: [facility]     Clinical Needs Before Discharge:  stable functional status    Treatment Needs After Discharge:   PT/OT Eval to identify treatment needs after discharge    Potential Barriers to Discharge: None identified at this time     NOEL Gavin  4/14/2025,  2:58 PM

## 2025-04-14 NOTE — PROGRESS NOTES
[facility]    Medicine Progress Note - Hospitalist Service    Date of Admission:  4/9/2025    Assessment & Plan     Elsa Horowitz is an 89-year-old man with past medical history of osteomyelitis, peripheral artery disease, coronary disease, type 2 diabetes, hypothyroidism, hypertension, CKD stage IIIb, admitted t with osteomyelitis on his left foot.    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  General surgery consulted and due to his peripheral vascular disease they would like to hold off on amputation due to high probability of progressive amputations from there.        Osteomyelitis of left foot (H)    Wet Gangrene (H)   Diabetic neuropathy (H)  PAD (peripheral artery disease)    Diabetic ulcer of toe of left foot associated with type 2 diabetes mellitus, with necrosis of bone (H)    Assessment: Postop day 0 status post left below-knee amputation.  Tolerated procedure well without difficulty.  Acute on chronic osteomyelitis has grown MRSA, VRE and Pseudomonas in wound cultures in the past.     Plan:   -Appreciate general surgery consult   - Continue Unasyn for 48 hours after surgery and then discontinue  -Continue home gabapentin  - As needed Dilaudid/oxycodone for pain  - Continue home Florastor      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)    Assessment: Well-controlled with last hemoglobin A1c of 6.8.      Plan:   - QID AC HS glucose checks  - Resume insulin glargine 20 units daily in a.m.  - Continue medium sliding scale      Chronic kidney disease, stage 3a     Assessment: Renal function is variable with a baseline GFR of approximately 45, GFR 50 on admission.    Plan:   - Continue to trend daily      Coronary artery disease    Assessment: Continue home atorvastatin, Imdur and aspirin 81 mg daily      Essential hypertension    Assessment: Continue home isosorbide mononitrate  "30 mg daily, metoprolol 25 mg daily, does not appear to be on an ACE or an ARB and should be with his CKD and diabetes.  - Continue beta-blocker in the perioperative period        Depressive disorder    Assessment: Continue home bupropion 150 mg daily, Remeron 15 mg at bedtime, sertraline 150 mg daily    BPH: Continue home tamsulosin 0.8 mg at bedtime  Straight cath as needed          Diet: Consistent Carbohydrate Diet Moderate Consistent Carb (60 g CHO per Meal) Diet    DVT Prophylaxis: Lovenox  Adkins Catheter: Not present  Lines: None     Cardiac Monitoring: None  Code Status: No CPR- Do NOT Intubate      Clinically Significant Risk Factors         # Hyponatremia: Lowest Na = 132 mmol/L in last 2 days, will monitor as appropriate     # Hypomagnesemia: Lowest Mg = 1.5 mg/dL in last 2 days, will replace as needed       # Hypertension: Noted on problem list             # DMII: A1C = 6.8 % (Ref range: <5.7 %) within past 6 months   # Overweight: Estimated body mass index is 28.1 kg/m  as calculated from the following:    Height as of this encounter: 1.854 m (6' 1\").    Weight as of this encounter: 96.6 kg (213 lb).             Social Drivers of Health    Tobacco Use: Medium Risk (4/9/2025)    Received from [facility]    Patient History     Smoking Tobacco Use: Former     Smokeless Tobacco Use: Never          Disposition Plan     Medically Ready for Discharge: Anticipated in 2-4 Days             Vinny Berg MD  Hospitalist Service  [facility]  Securely message with Karen (more info)  Text page via [employer] Paging/Directory   ______________________________________________________________________    Interval History   Overnight no acute events and afebrile, this morning underwent left below-knee amputation, pain is currently well-controlled, no shortness of breath cough chest pain nausea vomiting diarrhea, appetite has been generally poor but he is eager to advance " his diet, no new complaints..      Physical Exam   Vital Signs: Temp: (!) 96.7  F (35.9  C) Temp src: Tympanic BP: (!) 147/68 Pulse: 94   Resp: 20 SpO2: 97 % O2 Device: None (Room air) Oxygen Delivery: 5 LPM  Weight: 213 lbs 0 oz    GENERAL: Talkative, laying in bed, pleasant and appropriate, in no apparent distress.  CARDIOVASCULAR: regular rate and rhythm, no murmurs, rubs, or gallops. Normal S1/S2. No lower extremity edema.   RESPIRATORY: clear to auscultation bilaterally, no wheezes, no crackles.   GI: soft, non-tender, non-distended, normoactive bowel sounds.  MUSCULOSKELETAL: warm and well perfused, left BKA wrapped with immobilizer dressing, clean dry and intact.  SKIN: no pallor,jaundice, or rashes.       Medical Decision Making             Data     I have personally reviewed the following data over the past 24 hrs:    10.4  \   10.0 (L)   / 308     133 (L) 98 18.1 /  268 (H)   4.2 22 1.09 \       Imaging results reviewed over the past 24 hrs:   No results found for this or any previous visit (from the past 24 hours).

## 2025-04-15 ENCOUNTER — APPOINTMENT (OUTPATIENT)
Dept: CARDIOLOGY | Facility: OTHER | Age: 89
DRG: 617 | End: 2025-04-15
Attending: INTERNAL MEDICINE
Payer: COMMERCIAL

## 2025-04-15 LAB
ALBUMIN SERPL BCG-MCNC: 3.2 G/DL (ref 3.5–5.2)
ALP SERPL-CCNC: 78 U/L (ref 40–150)
ALT SERPL W P-5'-P-CCNC: 41 U/L (ref 0–70)
ANION GAP SERPL CALCULATED.3IONS-SCNC: 13 MMOL/L (ref 7–15)
AST SERPL W P-5'-P-CCNC: 57 U/L (ref 0–45)
ATRIAL RATE - MUSE: 113 BPM
ATRIAL RATE - MUSE: 74 BPM
BACTERIA BLD CULT: NO GROWTH
BACTERIA BLD CULT: NO GROWTH
BILIRUB SERPL-MCNC: 0.2 MG/DL
BUN SERPL-MCNC: 29.6 MG/DL (ref 8–23)
CALCIUM SERPL-MCNC: 8.5 MG/DL (ref 8.8–10.4)
CHLORIDE SERPL-SCNC: 97 MMOL/L (ref 98–107)
CREAT SERPL-MCNC: 1.3 MG/DL (ref 0.67–1.17)
DIASTOLIC BLOOD PRESSURE - MUSE: NORMAL MMHG
DIASTOLIC BLOOD PRESSURE - MUSE: NORMAL MMHG
EGFRCR SERPLBLD CKD-EPI 2021: 53 ML/MIN/1.73M2
ERYTHROCYTE [DISTWIDTH] IN BLOOD BY AUTOMATED COUNT: 13 % (ref 10–15)
GLUCOSE BLDC GLUCOMTR-MCNC: 148 MG/DL (ref 70–99)
GLUCOSE BLDC GLUCOMTR-MCNC: 177 MG/DL (ref 70–99)
GLUCOSE BLDC GLUCOMTR-MCNC: 282 MG/DL (ref 70–99)
GLUCOSE BLDC GLUCOMTR-MCNC: 310 MG/DL (ref 70–99)
GLUCOSE BLDC GLUCOMTR-MCNC: 312 MG/DL (ref 70–99)
GLUCOSE BLDC GLUCOMTR-MCNC: 349 MG/DL (ref 70–99)
GLUCOSE BLDC GLUCOMTR-MCNC: 398 MG/DL (ref 70–99)
GLUCOSE SERPL-MCNC: 314 MG/DL (ref 70–99)
HCO3 SERPL-SCNC: 21 MMOL/L (ref 22–29)
HCT VFR BLD AUTO: 26.7 % (ref 40–53)
HGB BLD-MCNC: 9.2 G/DL (ref 13.3–17.7)
HOLD SPECIMEN: NORMAL
INTERPRETATION ECG - MUSE: NORMAL
INTERPRETATION ECG - MUSE: NORMAL
LVEF ECHO: NORMAL
MAGNESIUM SERPL-MCNC: 2.4 MG/DL (ref 1.7–2.3)
MCH RBC QN AUTO: 29.9 PG (ref 26.5–33)
MCHC RBC AUTO-ENTMCNC: 34.5 G/DL (ref 31.5–36.5)
MCV RBC AUTO: 87 FL (ref 78–100)
P AXIS - MUSE: 51 DEGREES
P AXIS - MUSE: 58 DEGREES
PLATELET # BLD AUTO: 348 10E3/UL (ref 150–450)
POTASSIUM SERPL-SCNC: 4.5 MMOL/L (ref 3.4–5.3)
PR INTERVAL - MUSE: 190 MS
PR INTERVAL - MUSE: 216 MS
PROT SERPL-MCNC: 6.8 G/DL (ref 6.4–8.3)
QRS DURATION - MUSE: 80 MS
QRS DURATION - MUSE: 84 MS
QT - MUSE: 320 MS
QT - MUSE: 414 MS
QTC - MUSE: 438 MS
QTC - MUSE: 459 MS
R AXIS - MUSE: 15 DEGREES
R AXIS - MUSE: 20 DEGREES
RBC # BLD AUTO: 3.08 10E6/UL (ref 4.4–5.9)
SODIUM SERPL-SCNC: 131 MMOL/L (ref 135–145)
SYSTOLIC BLOOD PRESSURE - MUSE: NORMAL MMHG
SYSTOLIC BLOOD PRESSURE - MUSE: NORMAL MMHG
T AXIS - MUSE: 70 DEGREES
T AXIS - MUSE: 93 DEGREES
TROPONIN T SERPL HS-MCNC: 305 NG/L
TROPONIN T SERPL HS-MCNC: 55 NG/L
TROPONIN T SERPL HS-MCNC: 608 NG/L
VENTRICULAR RATE- MUSE: 113 BPM
VENTRICULAR RATE- MUSE: 74 BPM
WBC # BLD AUTO: 11.5 10E3/UL (ref 4–11)

## 2025-04-15 PROCEDURE — 93306 TTE W/DOPPLER COMPLETE: CPT | Mod: 26 | Performed by: INTERNAL MEDICINE

## 2025-04-15 PROCEDURE — 250N000013 HC RX MED GY IP 250 OP 250 PS 637: Performed by: INTERNAL MEDICINE

## 2025-04-15 PROCEDURE — 36415 COLL VENOUS BLD VENIPUNCTURE: CPT | Performed by: INTERNAL MEDICINE

## 2025-04-15 PROCEDURE — 250N000013 HC RX MED GY IP 250 OP 250 PS 637: Performed by: SURGERY

## 2025-04-15 PROCEDURE — 250N000013 HC RX MED GY IP 250 OP 250 PS 637: Performed by: HOSPITALIST

## 2025-04-15 PROCEDURE — 250N000013 HC RX MED GY IP 250 OP 250 PS 637: Performed by: STUDENT IN AN ORGANIZED HEALTH CARE EDUCATION/TRAINING PROGRAM

## 2025-04-15 PROCEDURE — 82040 ASSAY OF SERUM ALBUMIN: CPT | Performed by: INTERNAL MEDICINE

## 2025-04-15 PROCEDURE — 36415 COLL VENOUS BLD VENIPUNCTURE: CPT | Performed by: HOSPITALIST

## 2025-04-15 PROCEDURE — 84484 ASSAY OF TROPONIN QUANT: CPT | Performed by: INTERNAL MEDICINE

## 2025-04-15 PROCEDURE — 84484 ASSAY OF TROPONIN QUANT: CPT | Performed by: HOSPITALIST

## 2025-04-15 PROCEDURE — 93010 ELECTROCARDIOGRAM REPORT: CPT | Performed by: INTERNAL MEDICINE

## 2025-04-15 PROCEDURE — 250N000011 HC RX IP 250 OP 636: Performed by: STUDENT IN AN ORGANIZED HEALTH CARE EDUCATION/TRAINING PROGRAM

## 2025-04-15 PROCEDURE — 93005 ELECTROCARDIOGRAM TRACING: CPT

## 2025-04-15 PROCEDURE — 999N000208 ECHOCARDIOGRAM COMPLETE

## 2025-04-15 PROCEDURE — 120N000001 HC R&B MED SURG/OB

## 2025-04-15 PROCEDURE — 85014 HEMATOCRIT: CPT | Performed by: INTERNAL MEDICINE

## 2025-04-15 PROCEDURE — 99233 SBSQ HOSP IP/OBS HIGH 50: CPT | Mod: 24 | Performed by: INTERNAL MEDICINE

## 2025-04-15 PROCEDURE — 255N000002 HC RX 255 OP 636: Performed by: INTERNAL MEDICINE

## 2025-04-15 PROCEDURE — 250N000011 HC RX IP 250 OP 636: Mod: JZ | Performed by: SURGERY

## 2025-04-15 PROCEDURE — 83735 ASSAY OF MAGNESIUM: CPT | Performed by: INTERNAL MEDICINE

## 2025-04-15 RX ORDER — SENNOSIDES 8.6 MG
8.6 TABLET ORAL 2 TIMES DAILY
Status: DISCONTINUED | OUTPATIENT
Start: 2025-04-15 | End: 2025-04-15

## 2025-04-15 RX ORDER — POLYETHYLENE GLYCOL 3350 17 G/17G
17 POWDER, FOR SOLUTION ORAL 2 TIMES DAILY
Status: DISCONTINUED | OUTPATIENT
Start: 2025-04-15 | End: 2025-04-18

## 2025-04-15 RX ADMIN — POLYETHYLENE GLYCOL 3350 17 G: 17 POWDER, FOR SOLUTION ORAL at 21:32

## 2025-04-15 RX ADMIN — AMPICILLIN SODIUM AND SULBACTAM SODIUM 3 G: 2; 1 INJECTION, POWDER, FOR SOLUTION INTRAMUSCULAR; INTRAVENOUS at 07:42

## 2025-04-15 RX ADMIN — ISOSORBIDE MONONITRATE 30 MG: 30 TABLET, EXTENDED RELEASE ORAL at 07:41

## 2025-04-15 RX ADMIN — OXYCODONE HYDROCHLORIDE 10 MG: 5 TABLET ORAL at 21:38

## 2025-04-15 RX ADMIN — POLYETHYLENE GLYCOL 3350 17 G: 17 POWDER, FOR SOLUTION ORAL at 09:44

## 2025-04-15 RX ADMIN — ATORVASTATIN CALCIUM 40 MG: 40 TABLET, FILM COATED ORAL at 21:31

## 2025-04-15 RX ADMIN — AMPICILLIN SODIUM AND SULBACTAM SODIUM 3 G: 2; 1 INJECTION, POWDER, FOR SOLUTION INTRAMUSCULAR; INTRAVENOUS at 20:08

## 2025-04-15 RX ADMIN — FAMOTIDINE 20 MG: 20 TABLET, FILM COATED ORAL at 21:32

## 2025-04-15 RX ADMIN — AMPICILLIN SODIUM AND SULBACTAM SODIUM 3 G: 2; 1 INJECTION, POWDER, FOR SOLUTION INTRAMUSCULAR; INTRAVENOUS at 13:29

## 2025-04-15 RX ADMIN — SENNOSIDES AND DOCUSATE SODIUM 1 TABLET: 50; 8.6 TABLET ORAL at 09:44

## 2025-04-15 RX ADMIN — CELECOXIB 100 MG: 100 CAPSULE ORAL at 09:44

## 2025-04-15 RX ADMIN — OXYCODONE HYDROCHLORIDE 10 MG: 5 TABLET ORAL at 06:03

## 2025-04-15 RX ADMIN — SERTRALINE HYDROCHLORIDE 150 MG: 50 TABLET ORAL at 09:44

## 2025-04-15 RX ADMIN — ACETAMINOPHEN 975 MG: 325 TABLET, FILM COATED ORAL at 21:31

## 2025-04-15 RX ADMIN — BUPROPION HYDROCHLORIDE 150 MG: 150 TABLET, EXTENDED RELEASE ORAL at 09:44

## 2025-04-15 RX ADMIN — ACETAMINOPHEN 975 MG: 325 TABLET, FILM COATED ORAL at 05:49

## 2025-04-15 RX ADMIN — HYDROMORPHONE HYDROCHLORIDE 0.5 MG: 1 INJECTION, SOLUTION INTRAMUSCULAR; INTRAVENOUS; SUBCUTANEOUS at 23:19

## 2025-04-15 RX ADMIN — ENOXAPARIN SODIUM 40 MG: 40 INJECTION SUBCUTANEOUS at 09:44

## 2025-04-15 RX ADMIN — ACETAMINOPHEN 975 MG: 325 TABLET, FILM COATED ORAL at 13:29

## 2025-04-15 RX ADMIN — GABAPENTIN 300 MG: 300 CAPSULE ORAL at 07:41

## 2025-04-15 RX ADMIN — TAMSULOSIN HYDROCHLORIDE 0.8 MG: 0.4 CAPSULE ORAL at 21:31

## 2025-04-15 RX ADMIN — PSYLLIUM HUSK 1 PACKET: 3.4 POWDER ORAL at 09:44

## 2025-04-15 RX ADMIN — Medication 250 MG: at 21:32

## 2025-04-15 RX ADMIN — PERFLUTREN 3 ML: 6.52 INJECTION, SUSPENSION INTRAVENOUS at 08:56

## 2025-04-15 RX ADMIN — GABAPENTIN 600 MG: 300 CAPSULE ORAL at 21:31

## 2025-04-15 RX ADMIN — ASPIRIN 81 MG: 81 TABLET, COATED ORAL at 09:44

## 2025-04-15 RX ADMIN — Medication 250 MG: at 09:44

## 2025-04-15 RX ADMIN — HYDROMORPHONE HYDROCHLORIDE 0.5 MG: 1 INJECTION, SOLUTION INTRAMUSCULAR; INTRAVENOUS; SUBCUTANEOUS at 18:30

## 2025-04-15 RX ADMIN — CELECOXIB 100 MG: 100 CAPSULE ORAL at 21:31

## 2025-04-15 RX ADMIN — ACETAMINOPHEN 650 MG: 325 TABLET, FILM COATED ORAL at 07:41

## 2025-04-15 RX ADMIN — SENNOSIDES AND DOCUSATE SODIUM 1 TABLET: 50; 8.6 TABLET ORAL at 21:31

## 2025-04-15 RX ADMIN — QUETIAPINE FUMARATE 50 MG: 25 TABLET ORAL at 21:31

## 2025-04-15 RX ADMIN — FAMOTIDINE 20 MG: 20 TABLET, FILM COATED ORAL at 09:44

## 2025-04-15 RX ADMIN — AMPICILLIN SODIUM AND SULBACTAM SODIUM 3 G: 2; 1 INJECTION, POWDER, FOR SOLUTION INTRAMUSCULAR; INTRAVENOUS at 00:17

## 2025-04-15 RX ADMIN — MIRTAZAPINE 15 MG: 15 TABLET, FILM COATED ORAL at 21:30

## 2025-04-15 RX ADMIN — HYDROMORPHONE HYDROCHLORIDE 0.5 MG: 1 INJECTION, SOLUTION INTRAMUSCULAR; INTRAVENOUS; SUBCUTANEOUS at 07:40

## 2025-04-15 RX ADMIN — METOPROLOL SUCCINATE 25 MG: 25 TABLET, EXTENDED RELEASE ORAL at 09:44

## 2025-04-15 ASSESSMENT — ACTIVITIES OF DAILY LIVING (ADL)
ADLS_ACUITY_SCORE: 74
ADLS_ACUITY_SCORE: 70
ADLS_ACUITY_SCORE: 73
ADLS_ACUITY_SCORE: 70
ADLS_ACUITY_SCORE: 73
ADLS_ACUITY_SCORE: 73
ADLS_ACUITY_SCORE: 70
ADLS_ACUITY_SCORE: 70
ADLS_ACUITY_SCORE: 73
ADLS_ACUITY_SCORE: 70
ADLS_ACUITY_SCORE: 73
ADLS_ACUITY_SCORE: 73
ADLS_ACUITY_SCORE: 70
ADLS_ACUITY_SCORE: 72
ADLS_ACUITY_SCORE: 74
ADLS_ACUITY_SCORE: 73
ADLS_ACUITY_SCORE: 73
ADLS_ACUITY_SCORE: 70
ADLS_ACUITY_SCORE: 72
ADLS_ACUITY_SCORE: 70
ADLS_ACUITY_SCORE: 70

## 2025-04-15 NOTE — PLAN OF CARE
"Goal Outcome Evaluation:    Pts initial VS this shift were elevated and CO chest pain. Tele Dr notified, EKG and trop level done. EKG changes and troponin slowly trending up. BGC was 464. Increased Novolog and Lantus given. Slowly trending down. Last BGC was 349. UTV surgical site. Ace wrap intact. Dressing is clean with supportive brace on. PRN seroquel given to assist with sleep and anxiety. Slept well between cares. All questions and concerns addressed.     /49 (BP Location: Left arm, Patient Position: Semi-Handley's, Cuff Size: Adult Regular)   Pulse 83   Temp (!) 96.5  F (35.8  C) (Tympanic)   Resp 16   Ht 1.854 m (6' 1\")   Wt 96.2 kg (212 lb)   SpO2 96%   BMI 27.97 kg/m          Plan of Care Reviewed With: patient    Overall Patient Progress: improving         Sherie Lopez RN on 4/15/2025 at 3:40 AM    "

## 2025-04-15 NOTE — PROGRESS NOTES
PROGRESS NOTE    cc: POD# 1 s/p BKA    HPI: Feels good now.     GENERAL: alert, NAD  CV: RRR, nomurmurs  RESPIRATORY: no dyspnea, clear bilat  ABDOMEN: non-distended, +BS, soft, appropriately tender, incision c/d/i, no signs of infection  EXTREMITY: no edema, neg Hortensia's  SKIN: warm and dry, no jaundice norashes  NEURO: cranial nerves II-XII grossly intact, motor exam intact    LABS  Recent Labs   Lab Test 04/15/25  0601 04/14/25  0531   WBC 11.5* 10.4   RBC 3.08* 3.33*   HGB 9.2* 10.0*   HCT 26.7* 29.2*   MCV 87 88   MCH 29.9 30.0   MCHC 34.5 34.2    308       Recent Labs   Lab Test 04/15/25  0601 04/14/25  0531   POTASSIUM 4.5 4.2   CHLORIDE 97* 98   BUN 29.6* 18.1       Recent Labs   Lab Test 04/15/25  0601 02/18/25  2337 02/18/25  2153 06/20/23  0909 04/24/23  2049   PROTEIN  --  Negative  --   --  20*   BILITOTAL 0.2  --  0.2   < >  --    AST 57*  --  24   < >  --    ALT 41  --  18   < >  --     < > = values in this interval not displayed.       IMAGING      ASSESSMENT  89 year old male s/p BKA for osteo of the foot in setting of PAD and BM       PLAN  - remove dressing tomorrow vs Thursday  - Discharge when pain controlled and heart eval compete.     Oswaldo Weiner MD on 4/15/2025 at 3:40 PM

## 2025-04-15 NOTE — PROGRESS NOTES
SAFETY CHECKLIST  ID Bands and Risk clasps correct and in place (DNR, Fall risk, Allergy, Latex, Limb):  Yes  All Lines Reconciled and labeled correctly: Yes  Whiteboard updated:Yes  Environmental interventions: Yes  Verify Tele #: RIA Lopez RN on 4/14/2025 at 7:28 PM

## 2025-04-15 NOTE — PROGRESS NOTES
Interdisciplinary Discharge Planning Note    Anticipated Discharge Date: 4/17    Anticipated Discharge Location: [facility]     Clinical Needs Before Discharge:  adequate comfort achieved, stable functional status, and stable vital signs    Treatment Needs After Discharge:  homecare    Potential Barriers to Discharge: None identified at this time     NOEL Gavin  4/15/2025,  12:33 PM

## 2025-04-15 NOTE — PROGRESS NOTES
:    Spoke with ANETTE Sánchez from [facility] who requested that I call her at [phone] and keep her updated about patients discharge plans.    NOEL Gavin on 4/15/2025 at 9:37 AM

## 2025-04-15 NOTE — PROGRESS NOTES
[facility]    Medicine Progress Note - Hospitalist Service    Date of Admission:  4/9/2025    Assessment & Plan     Elsa Horowitz is an 89-year-old man with past medical history of osteomyelitis, peripheral artery disease, coronary disease, type 2 diabetes, hypothyroidism, hypertension, CKD stage IIIb, admitted with osteomyelitis on his left foot.    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        Osteomyelitis of left foot (H)    Wet Gangrene (H)    Diabetic neuropathy (H)    PAD (peripheral artery disease)    Diabetic ulcer of toe of left foot associated with type 2 diabetes mellitus, with necrosis of bone (H)    Assessment: Postop day 1 status post left below-knee amputation.   Acute on chronic osteomyelitis has grown MRSA, VRE and Pseudomonas in wound cultures in the past.     Plan:   -Appreciate general surgery consult   - Continue Unasyn through today and then discontinue given definitive treatment of infectious source   - Continue home gabapentin  - As needed Dilaudid/oxycodone for pain  - Continue home Florastor      Coronary artery disease    Assessment: Overnight episode of chest pain with frequent coughing, no dyspnea no other consistent anginal equivalents, noted elevated troponin, EKG without obvious new ST changes, completely asymptomatic since that time, repeat echo without new wall motion abnormalities.  Troponin continues to climb and most clinically consistent with demand ischemia in the setting of surgery, CKD and anemia and not clear ACS at this point.  Plan:  - Continue optimize medical management with home atorvastatin, Imdur and aspirin 81 mg daily  - Telemetry  - Continue to trend troponin's  - If developing concerning cardiopulmonary symptoms, will need repeat EKG, possible heparin drip and consultation with cardiology.        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      Plan:   - QID AC HS glucose checks  - Resume insulin glargine 20 units daily in a.m.  - Continue medium sliding scale      Chronic kidney disease, stage 3a     Assessment: Renal function is variable with a baseline GFR of approximately 45, GFR 50 on admission.    Plan:   - Continue to trend daily      Essential hypertension    Assessment: Continue home isosorbide mononitrate 30 mg daily, metoprolol 25 mg daily, does not appear to be on an ACE or an ARB and should be with his CKD and diabetes.  - Continue beta-blocker in the perioperative period        Depressive disorder    Assessment: Continue home bupropion 150 mg daily, Remeron 15 mg at bedtime, sertraline 150 mg daily    BPH: Continue home tamsulosin 0.8 mg at bedtime  Straight cath as needed          Diet: Consistent Carbohydrate Diet Moderate Consistent Carb (60 g CHO per Meal) Diet    DVT Prophylaxis: Lovenox  Adkins Catheter: Not present  Lines: None     Cardiac Monitoring: ACTIVE order. Indication: AMI (NSTEMI/ STEMI) (48 hours)  Code Status: No CPR- Do NOT Intubate      Clinically Significant Risk Factors         # Hyponatremia: Lowest Na = 131 mmol/L in last 2 days, will monitor as appropriate  # Hypochloremia: Lowest Cl = 97 mmol/L in last 2 days, will monitor as appropriate  # Hypocalcemia: Lowest Ca = 8.5 mg/dL in last 2 days, will monitor and replace as appropriate   # Hypomagnesemia: Lowest Mg = 1.5 mg/dL in last 2 days, will replace as needed   # Hypoalbuminemia: Lowest albumin = 3.2 g/dL at 4/15/2025  6:01 AM, will monitor as appropriate     # Hypertension: Noted on problem list             # DMII: A1C = 6.8 % (Ref range: <5.7 %) within past 6 months   # Overweight: Estimated body mass index is 27.97 kg/m  as calculated from the following:    Height as of this encounter: 1.854 m (6' 1\").    Weight as of this encounter: 96.2 kg (212 lb).             Social Drivers of " Health    Tobacco Use: Medium Risk (4/15/2025)    Patient History     Smoking Tobacco Use: Former     Smokeless Tobacco Use: Unknown          Disposition Plan     Medically Ready for Discharge: Anticipated in 2-4 Days             Vinny Berg MD  Hospitalist Service  [facility]  Securely message with [employer] (more info)  Text page via [facility] Paging/Directory   ______________________________________________________________________    Interval History   Overnight no acute events, he developed some left-sided chest pain that lasted approximately 10 minutes associated with coughing up productive sputum, pain is since resolved, no shortness of breath nausea diaphoresis recurrent chest discomfort.  His amputation site pain is much better controlled today's tolerating a regular consistency diet and feeling better than he is felt since coming in.      Physical Exam   Vital Signs: Temp: 97.8  F (36.6  C) Temp src: Oral BP: 118/69 Pulse: 81   Resp: 16 SpO2: 94 % O2 Device: None (Room air)    Weight: 212 lbs 0 oz    GENERAL: Talkative, laying in bed, pleasant and appropriate, in no apparent distress.  CARDIOVASCULAR: regular rate and rhythm, no murmurs, rubs, or gallops. Normal S1/S2. No lower extremity edema.   RESPIRATORY: clear to auscultation bilaterally, no wheezes, no crackles.   GI: soft, non-tender to palpation in all quadrants, non-distended, normoactive bowel sounds.  MUSCULOSKELETAL: warm and well perfused, left BKA wrapped with Ace dressing, clean dry and intact.   SKIN: no pallor,jaundice, or rashes.       Medical Decision Making             Data     I have personally reviewed the following data over the past 24 hrs:    11.5 (H)  \   9.2 (L)   / 348     131 (L) 97 (L) 29.6 (H) /  310 (H)   4.5 21 (L) 1.30 (H) \     ALT: 41 AST: 57 (H) AP: 78 TBILI: 0.2   ALB: 3.2 (L) TOT PROTEIN: 6.8 LIPASE: N/A     Trop: 608 (HH) BNP: N/A       Imaging results reviewed over the past 24 hrs:   Recent Results  (from the past 24 hours)   Echocardiogram Complete   Result Value    LVEF  60-65%    [facility]    [id number]  [id number]  [id number]  [id number]^ISH^NATI^SRINATH     [facility]  [address] [place] [address]     Name: OSCAR WILSON  MRN: [id number]  : 1935  Study Date: 04/15/2025 08:07 AM  Age: 89 yrs  Gender: Male  Patient Location: [facility]  Reason For Study: CAD  Ordering Physician: NATI DENG  Performed By: Abby Holly, BS, RDCS, RVT     BSA: 2.2 m2  Height: 73 in  Weight: 212 lb  HR: 77  BP: 118/69 mmHg  ______________________________________________________________________________  Procedure  Echocardiogram with two-dimensional, color and spectral Doppler. Contrast  Definity. Definity (NDC #67413-169-10) given intravenously. Patient was given  3ml mixture of 1.5ml Definity and 8.5ml saline. 7 ml wasted. Definity Lot #  [id number] .  ______________________________________________________________________________  Interpretation Summary  Left ventricular size, wall motion and function are normal. The ejection  fraction is 60-65%. Mild hypokinesis of the mid inferior wall.     Right ventricular function, chamber size, wall motion, and thickness are  normal.     The inferior vena cava cannot be assessed.     No pericardial effusion is present.     Compared to previous study on 2023, no significant change  ______________________________________________________________________________  Left Ventricle  Left ventricular size, wall motion and function are normal. The ejection  fraction is 60-65%. Mild concentric wall thickening consistent with left  ventricular hypertrophy is present. Left ventricular diastolic function is  normal. Mild hypokinesis of the mid inferior wall.     Right Ventricle  Right ventricular function, chamber size, wall motion, and thickness are  normal.     Atria  Both atria appear normal. The atrial septum is intact as assessed by color  Doppler .     Mitral  Valve  Mild mitral annular calcification is present. Trace mitral insufficiency is  present.     Aortic Valve  The aortic valve is tricuspid. Mild aortic valve calcification is present.     Tricuspid Valve  The tricuspid valve is normal. Trace tricuspid insufficiency is present.     Pulmonic Valve  The pulmonic valve is normal. Trace pulmonic insufficiency is present.     Vessels  The thoracic aorta is normal. The aorta root is normal. The inferior vena cava  cannot be assessed. IVC diameter <2.1 cm collapsing >50% with sniff suggests a  normal RA pressure of 3 mmHg.     Pericardium  No pericardial effusion is present.     Compared to Previous Study  Compared to previous study on 2023, no significant change.     Attestation  I have personally viewed the imaging and agree with the interpretation and  report as documented by the fellow, Massimo Hanna, and/or edited by me.  ______________________________________________________________________________  MMode/2D Measurements & Calculations  IVSd: 1.1 cm  LVIDd: 4.7 cm  LVIDs: 2.9 cm  LVPWd: 0.84 cm  FS: 38.9 %  LV mass(C)d: 157.4 grams  LV mass(C)dI: 71.4 grams/m2  Ao root diam: 3.6 cm  asc Aorta Diam: 3.7 cm  LVOT diam: 2.3 cm  LVOT area: 4.2 cm2  Ao root diam index Ht(cm/m): 1.9  Ao root diam index BSA (cm/m2): 1.6  Asc Ao diam index BSA (cm/m2): 1.7  Asc Ao diam index Ht(cm/m): 2.0  LA Volume (BP): 67.5 ml     LA Volume Index (BP): 30.5 ml/m2  RWT: 0.36  TAPSE: 1.8 cm     Doppler Measurements & Calculations  MV E max jesus: 39.8 cm/sec  MV A max jesus: 91.3 cm/sec  MV E/A: 0.44  MV dec time: 0.24 sec  Ao V2 max: 122.0 cm/sec  Ao max P.0 mmHg  Ao V2 mean: 81.4 cm/sec  Ao mean PG: 3.0 mmHg  Ao V2 VTI: 23.9 cm  KAY(I,D): 3.2 cm2  KAY(V,D): 3.3 cm2  LV V1 max PG: 3.6 mmHg  LV V1 max: 94.7 cm/sec  LV V1 VTI: 18.3 cm  SV(LVOT): 77.4 ml  SI(LVOT): 35.1 ml/m2  PA acc time: 0.09 sec     AV Jesus Ratio (DI): 0.78  KAY Index (cm2/m2): 1.5  E/E' av.1  Lateral E/e': 4.1  Medial  E/e': 8.1  RV S Jesus: 19.1 cm/sec     ______________________________________________________________________________  Report approved by: Domenico Rolle MD on 04/15/2025 09:45 AM

## 2025-04-15 NOTE — PLAN OF CARE
"Goal Outcome Evaluation:  VSS. Patient has denied chest pain all day but had reported it last night. Troponin was 305 at 6am and then 608 at 12pm. MD aware, echo and EKG were done this morning, patient is now on tele monitor. Disoriented to time. ACE wrap to left lower extremity along with supportive brace on. PRN dilaudid and tylenol given once in the morning for pain. Patient denied pain this afternoon. Patient also has previous right below knee amputee. Repo q2h.      Plan of Care Reviewed With: patient    Overall Patient Progress: improvingOverall Patient Progress: improving    Outcome Evaluation: VSS, pain managed    Blood pressure 118/69, pulse 81, temperature 97.8  F (36.6  C), temperature source Oral, resp. rate 16, height 1.854 m (6' 1\"), weight 96.2 kg (212 lb), SpO2 94%.    Xuan Vargas RN on 4/15/2025 at 2:22 PM    "

## 2025-04-16 LAB
ALBUMIN SERPL BCG-MCNC: 2.9 G/DL (ref 3.5–5.2)
ALP SERPL-CCNC: 75 U/L (ref 40–150)
ALT SERPL W P-5'-P-CCNC: 63 U/L (ref 0–70)
ANION GAP SERPL CALCULATED.3IONS-SCNC: 15 MMOL/L (ref 7–15)
AST SERPL W P-5'-P-CCNC: 112 U/L (ref 0–45)
BILIRUB DIRECT SERPL-MCNC: <0.08 MG/DL (ref 0–0.3)
BILIRUB SERPL-MCNC: 0.2 MG/DL
BUN SERPL-MCNC: 35.2 MG/DL (ref 8–23)
CALCIUM SERPL-MCNC: 8.3 MG/DL (ref 8.8–10.4)
CHLORIDE SERPL-SCNC: 100 MMOL/L (ref 98–107)
CREAT SERPL-MCNC: 1.76 MG/DL (ref 0.67–1.17)
EGFRCR SERPLBLD CKD-EPI 2021: 37 ML/MIN/1.73M2
ERYTHROCYTE [DISTWIDTH] IN BLOOD BY AUTOMATED COUNT: 13.2 % (ref 10–15)
GLUCOSE BLDC GLUCOMTR-MCNC: 117 MG/DL (ref 70–99)
GLUCOSE BLDC GLUCOMTR-MCNC: 159 MG/DL (ref 70–99)
GLUCOSE BLDC GLUCOMTR-MCNC: 173 MG/DL (ref 70–99)
GLUCOSE BLDC GLUCOMTR-MCNC: 201 MG/DL (ref 70–99)
GLUCOSE SERPL-MCNC: 174 MG/DL (ref 70–99)
HCO3 SERPL-SCNC: 19 MMOL/L (ref 22–29)
HCT VFR BLD AUTO: 27 % (ref 40–53)
HGB BLD-MCNC: 9.3 G/DL (ref 13.3–17.7)
MAGNESIUM SERPL-MCNC: 2.3 MG/DL (ref 1.7–2.3)
MCH RBC QN AUTO: 29.9 PG (ref 26.5–33)
MCHC RBC AUTO-ENTMCNC: 34.4 G/DL (ref 31.5–36.5)
MCV RBC AUTO: 87 FL (ref 78–100)
PLATELET # BLD AUTO: 381 10E3/UL (ref 150–450)
POTASSIUM SERPL-SCNC: 4.1 MMOL/L (ref 3.4–5.3)
PROT SERPL-MCNC: 6.5 G/DL (ref 6.4–8.3)
RBC # BLD AUTO: 3.11 10E6/UL (ref 4.4–5.9)
SODIUM SERPL-SCNC: 134 MMOL/L (ref 135–145)
TROPONIN T SERPL HS-MCNC: 601 NG/L
TROPONIN T SERPL HS-MCNC: 736 NG/L
WBC # BLD AUTO: 10.2 10E3/UL (ref 4–11)

## 2025-04-16 PROCEDURE — 250N000013 HC RX MED GY IP 250 OP 250 PS 637: Performed by: FAMILY MEDICINE

## 2025-04-16 PROCEDURE — 250N000013 HC RX MED GY IP 250 OP 250 PS 637: Performed by: SURGERY

## 2025-04-16 PROCEDURE — 82248 BILIRUBIN DIRECT: CPT | Performed by: INTERNAL MEDICINE

## 2025-04-16 PROCEDURE — 250N000011 HC RX IP 250 OP 636: Performed by: INTERNAL MEDICINE

## 2025-04-16 PROCEDURE — 99233 SBSQ HOSP IP/OBS HIGH 50: CPT | Mod: 24 | Performed by: INTERNAL MEDICINE

## 2025-04-16 PROCEDURE — 250N000013 HC RX MED GY IP 250 OP 250 PS 637: Performed by: HOSPITALIST

## 2025-04-16 PROCEDURE — 250N000013 HC RX MED GY IP 250 OP 250 PS 637: Performed by: STUDENT IN AN ORGANIZED HEALTH CARE EDUCATION/TRAINING PROGRAM

## 2025-04-16 PROCEDURE — 120N000001 HC R&B MED SURG/OB

## 2025-04-16 PROCEDURE — 250N000011 HC RX IP 250 OP 636: Performed by: SURGERY

## 2025-04-16 PROCEDURE — 83735 ASSAY OF MAGNESIUM: CPT | Performed by: INTERNAL MEDICINE

## 2025-04-16 PROCEDURE — 36415 COLL VENOUS BLD VENIPUNCTURE: CPT | Performed by: INTERNAL MEDICINE

## 2025-04-16 PROCEDURE — 80053 COMPREHEN METABOLIC PANEL: CPT | Performed by: INTERNAL MEDICINE

## 2025-04-16 PROCEDURE — 250N000013 HC RX MED GY IP 250 OP 250 PS 637: Performed by: INTERNAL MEDICINE

## 2025-04-16 PROCEDURE — 258N000003 HC RX IP 258 OP 636: Performed by: INTERNAL MEDICINE

## 2025-04-16 PROCEDURE — 85027 COMPLETE CBC AUTOMATED: CPT | Performed by: INTERNAL MEDICINE

## 2025-04-16 PROCEDURE — 84484 ASSAY OF TROPONIN QUANT: CPT | Performed by: INTERNAL MEDICINE

## 2025-04-16 PROCEDURE — 250N000011 HC RX IP 250 OP 636: Performed by: STUDENT IN AN ORGANIZED HEALTH CARE EDUCATION/TRAINING PROGRAM

## 2025-04-16 RX ORDER — FAMOTIDINE 20 MG/1
20 TABLET, FILM COATED ORAL DAILY
Status: DISCONTINUED | OUTPATIENT
Start: 2025-04-16 | End: 2025-04-18 | Stop reason: HOSPADM

## 2025-04-16 RX ORDER — LORAZEPAM 1 MG/1
1 TABLET ORAL
Status: COMPLETED | OUTPATIENT
Start: 2025-04-16 | End: 2025-04-17

## 2025-04-16 RX ORDER — OLANZAPINE 10 MG/2ML
5 INJECTION, POWDER, FOR SOLUTION INTRAMUSCULAR ONCE
Status: COMPLETED | OUTPATIENT
Start: 2025-04-16 | End: 2025-04-16

## 2025-04-16 RX ORDER — LORAZEPAM 0.5 MG/1
0.5 TABLET ORAL ONCE
Status: COMPLETED | OUTPATIENT
Start: 2025-04-16 | End: 2025-04-16

## 2025-04-16 RX ORDER — CYCLOBENZAPRINE HCL 10 MG
10 TABLET ORAL EVERY 8 HOURS PRN
Status: DISCONTINUED | OUTPATIENT
Start: 2025-04-16 | End: 2025-04-17

## 2025-04-16 RX ADMIN — ACETAMINOPHEN 975 MG: 325 TABLET, FILM COATED ORAL at 05:40

## 2025-04-16 RX ADMIN — CYCLOBENZAPRINE HYDROCHLORIDE 10 MG: 10 TABLET, FILM COATED ORAL at 18:24

## 2025-04-16 RX ADMIN — ENOXAPARIN SODIUM 40 MG: 40 INJECTION SUBCUTANEOUS at 11:02

## 2025-04-16 RX ADMIN — GABAPENTIN 300 MG: 300 CAPSULE ORAL at 08:09

## 2025-04-16 RX ADMIN — HYDROMORPHONE HYDROCHLORIDE 0.5 MG: 1 INJECTION, SOLUTION INTRAMUSCULAR; INTRAVENOUS; SUBCUTANEOUS at 05:48

## 2025-04-16 RX ADMIN — OXYCODONE HYDROCHLORIDE 10 MG: 5 TABLET ORAL at 19:32

## 2025-04-16 RX ADMIN — OLANZAPINE 5 MG: 10 INJECTION, POWDER, FOR SOLUTION INTRAMUSCULAR at 21:51

## 2025-04-16 RX ADMIN — GABAPENTIN 600 MG: 300 CAPSULE ORAL at 21:00

## 2025-04-16 RX ADMIN — ACETAMINOPHEN 650 MG: 325 TABLET, FILM COATED ORAL at 17:45

## 2025-04-16 RX ADMIN — HYDROXYZINE HYDROCHLORIDE 10 MG: 10 TABLET, FILM COATED ORAL at 19:32

## 2025-04-16 RX ADMIN — ACETAMINOPHEN 975 MG: 325 TABLET, FILM COATED ORAL at 14:16

## 2025-04-16 RX ADMIN — HYDROXYZINE HYDROCHLORIDE 10 MG: 10 TABLET, FILM COATED ORAL at 06:22

## 2025-04-16 RX ADMIN — Medication 250 MG: at 11:02

## 2025-04-16 RX ADMIN — OXYCODONE HYDROCHLORIDE 10 MG: 5 TABLET ORAL at 08:43

## 2025-04-16 RX ADMIN — TAMSULOSIN HYDROCHLORIDE 0.8 MG: 0.4 CAPSULE ORAL at 21:00

## 2025-04-16 RX ADMIN — SERTRALINE HYDROCHLORIDE 150 MG: 50 TABLET ORAL at 11:02

## 2025-04-16 RX ADMIN — SODIUM CHLORIDE 1000 ML: 9 INJECTION, SOLUTION INTRAVENOUS at 08:05

## 2025-04-16 RX ADMIN — QUETIAPINE FUMARATE 50 MG: 25 TABLET ORAL at 21:01

## 2025-04-16 RX ADMIN — ATORVASTATIN CALCIUM 40 MG: 40 TABLET, FILM COATED ORAL at 21:00

## 2025-04-16 RX ADMIN — AMPICILLIN SODIUM AND SULBACTAM SODIUM 3 G: 2; 1 INJECTION, POWDER, FOR SOLUTION INTRAMUSCULAR; INTRAVENOUS at 01:49

## 2025-04-16 RX ADMIN — FAMOTIDINE 20 MG: 20 TABLET, FILM COATED ORAL at 11:02

## 2025-04-16 RX ADMIN — Medication 250 MG: at 21:00

## 2025-04-16 RX ADMIN — BUPROPION HYDROCHLORIDE 150 MG: 150 TABLET, EXTENDED RELEASE ORAL at 11:02

## 2025-04-16 RX ADMIN — ISOSORBIDE MONONITRATE 30 MG: 30 TABLET, EXTENDED RELEASE ORAL at 08:09

## 2025-04-16 RX ADMIN — OXYCODONE HYDROCHLORIDE 10 MG: 5 TABLET ORAL at 15:46

## 2025-04-16 RX ADMIN — ASPIRIN 81 MG: 81 TABLET, COATED ORAL at 11:02

## 2025-04-16 RX ADMIN — LORAZEPAM 0.5 MG: 0.5 TABLET ORAL at 21:00

## 2025-04-16 RX ADMIN — ACETAMINOPHEN 975 MG: 325 TABLET, FILM COATED ORAL at 21:00

## 2025-04-16 RX ADMIN — METOPROLOL SUCCINATE 25 MG: 25 TABLET, EXTENDED RELEASE ORAL at 11:02

## 2025-04-16 RX ADMIN — MIRTAZAPINE 15 MG: 15 TABLET, FILM COATED ORAL at 21:00

## 2025-04-16 ASSESSMENT — ACTIVITIES OF DAILY LIVING (ADL)
ADLS_ACUITY_SCORE: 73
ADLS_ACUITY_SCORE: 73
ADLS_ACUITY_SCORE: 69
ADLS_ACUITY_SCORE: 69
ADLS_ACUITY_SCORE: 74
ADLS_ACUITY_SCORE: 73
ADLS_ACUITY_SCORE: 69
ADLS_ACUITY_SCORE: 74
ADLS_ACUITY_SCORE: 74
ADLS_ACUITY_SCORE: 69
ADLS_ACUITY_SCORE: 73
ADLS_ACUITY_SCORE: 69
ADLS_ACUITY_SCORE: 74
ADLS_ACUITY_SCORE: 69
ADLS_ACUITY_SCORE: 74
ADLS_ACUITY_SCORE: 73
ADLS_ACUITY_SCORE: 74
ADLS_ACUITY_SCORE: 69

## 2025-04-16 NOTE — PROGRESS NOTES
SAFETY CHECKLIST  ID Bands and Risk clasps correct and in place (DNR, Fall risk, Allergy, Latex, Limb):  Yes  All Lines Reconciled and labeled correctly: Yes  Whiteboard updated:Yes  Environmental interventions: Yes  Verify Tele #: MS 2    Sherie Lopez RN on 4/15/2025 at 8:01 PM

## 2025-04-16 NOTE — PROGRESS NOTES
SAFETY CHECKLIST  ID Bands and Risk clasps correct and in place (DNR, Fall risk, Allergy, Latex, Limb):  Yes  All Lines Reconciled and labeled correctly: Yes  Whiteboard updated:Yes  Environmental interventions: Yes  Verify Tele #: MS 2    Sherie Lopez RN on 4/16/2025 at 6:43 PM

## 2025-04-16 NOTE — PROGRESS NOTES
Clinical Nutrition/Initial Assessment     Reason for Assessment:   Length of stay    Assessment:   Client History:  pt admitted with osteomyelitis of his foot. He is status post op day 2 following BKA. He is feeling better and his appetite is improving. Tolerating his diet. He lives at [facility].   Diet Order:  carb controlled  Oral Intake:  25-50%, encourage as tolerated. Consider adding supplements if intakes remain less than 50% at meals.     Intervention:  Encourage intakes and diet compliance     Monitoring and Evaluation:   Intakes, diet tolerance     Discharge Recommendation:   Nutrition Discharge Planning  Encourage protein at meals first for wound healing.   Follow up in 1-5 days.   Tonia Mckeon RD on 4/16/2025 at 8:03 AM

## 2025-04-16 NOTE — PROGRESS NOTES
:    Spoke with Princess RN at [facility] and updated her about potential discharge plan for 1-2 days. I will plan on updating her again tomorrow morning. [facility] reported that they may be able to transport patient back when he is discharged. If they are unable to patients insurance covers the cost of [facility].     NOLE Gavin on 4/16/2025 at 3:00 PM

## 2025-04-16 NOTE — PHARMACY
Pharmacy- Renal Dose Adjustment    Patient Active Problem List   Diagnosis    Local infection of wound    Chronic kidney disease, stage 4 (severe) (H)    Coronary artery disease    Diabetic neuropathy (H)    Essential hypertension    PAD (peripheral artery disease)    Gastroesophageal reflux disease, unspecified whether esophagitis present    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)    Hyponatremia    Gangrene of right foot (H)    Cognitive impairment    Gangrene (H)    History of intravenous drug use in remission    History of suicide attempt    Metal foreign body in chest    Macular scar of both eyes    Chronic constipation    Dupuytren's disease of palm of both hands    Depressive disorder    Fatty liver    Foot ulcer (H)    History of amputation    Hypothyroidism, unspecified type    Intermittent claudication    Living in assisted living    Long term (current) use of opiate analgesic    Lower limb amputation, great toe    Lumbar disc disease    MDD (major depressive disorder), recurrent, in full remission    Subacute osteomyelitis of right foot (H)    Stage 3b chronic kidney disease (H)    Spondylosis of thoracic region without myelopathy or radiculopathy    VRE (vancomycin resistant enterococcus) culture positive    Deep vein thrombosis (DVT) of upper extremity (H)    Acute deep vein thrombosis (DVT) of axillary vein of right upper extremity (H)    Chronic osteomyelitis (H)    Acute cystitis with hematuria    Osteomyelitis of left foot (H)    Diabetic ulcer of toe of left foot associated with type 2 diabetes mellitus, with necrosis of bone (H)        Relevant Labs:  Recent Labs   Lab Test 04/16/25  0525 04/15/25  0601   WBC 10.2 11.5*   HGB 9.3* 9.2*    348        CrCl: 35ml/min      Intake/Output Summary (Last 24 hours) at 4/16/2025 0847  Last data filed at 4/15/2025 0939  Gross per 24 hour   Intake 360 ml   Output --   Net 360 ml          Per Renal Dose Adjustment Protocol,  will adjust:  Famotidine 20mg BID -> daily       Will continue to follow and make adjustments accordingly. Thank You.    Terence oCrtes [facility] ....................  4/16/2025   8:47 AM

## 2025-04-16 NOTE — PLAN OF CARE
"Goal Outcome Evaluation:    BP runs soft. CO intermittent pain, claimed it was shooting/stabbing. Oral Oxy given with relief. Intermittent confusion but easily redirected. All questions and concerns addressed.     BP 96/51 (BP Location: Right arm, Patient Position: Semi-Handley's, Cuff Size: Adult Regular)   Pulse 80   Temp 98.6  F (37  C) (Tympanic)   Resp 16   Ht 1.854 m (6' 1\")   Wt 97.4 kg (214 lb 11.2 oz)   SpO2 95%   BMI 28.33 kg/m          Plan of Care Reviewed With: patient    Overall Patient Progress: improvingOverall Patient Progress: improving         Sherie Lopez RN on 4/16/2025 at 3:30 AM    "

## 2025-04-16 NOTE — PROGRESS NOTES
[facility]    Medicine Progress Note - Hospitalist Service    Date of Admission:  4/9/2025    Assessment & Plan     Elsa Horowitz is an 89-year-old man with past medical history of osteomyelitis, peripheral artery disease, coronary disease, type 2 diabetes, hypothyroidism, hypertension, CKD stage IIIb, admitted with osteomyelitis on his left foot.    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        Osteomyelitis of left foot (H)    Wet Gangrene (H)    Diabetic neuropathy (H)    PAD (peripheral artery disease)    Diabetic ulcer of toe of left foot associated with type 2 diabetes mellitus, with necrosis of bone (H)    Assessment: Postop day 2 s/p left below-knee amputation.   Acute on chronic osteomyelitis has grown MRSA, VRE and Pseudomonas in wound cultures in the past.  Pain currently well-controlled.   Plan:   - Appreciate general surgery consult   - Discontinue Unasyn 48 hours after surgery   - Continue home gabapentin  - As needed oxycodone for pain  - Continue home Florastor  - Monitor and activity restrictions per general surgery    Encephalopathy  Assessment: Developed overnight, likely due to sleep deprivation and pain  Plan:  - Encephalopathy order set placed  - Pain control as above      Coronary artery disease    Assessment: Improving.  Episode of atypical chest pain -4/14 with frequent coughing, no dyspnea no other consistent anginal equivalents, noted elevated troponin now downtrending, EKG without obvious new ST changes, completely asymptomatic since that time, repeat echo without new wall motion abnormalities.  Troponin elevation likely secondary to  demand ischemia in the setting of surgery, CKD and anemia without evidence for acute coronary syndrome.  Plan:  - Continue optimize medical management with home atorvastatin, Imdur and aspirin 81 mg daily  - Telemetry  - If developing concerning  cardiopulmonary symptoms, will need repeat EKG, possible heparin drip and consultation with cardiology.        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      Plan:   - QID AC HS glucose checks  - Resume insulin glargine 20 units daily in a.m.  - Continue medium sliding scale      Chronic kidney disease, stage 3a     Assessment: Worsening overnight and likely prerenal, no evidence of hypervolemia either on exam or echocardiogram, renal function is variable with a baseline GFR of approximately 45, GFR 50 on admission.    Plan:   - 1 L IV fluids over 10 hours  - Avoid NSAIDs and nephrotoxins  - Monitor daily      Essential hypertension    Assessment: Stable.  Continue home isosorbide mononitrate 30 mg daily, metoprolol 25 mg daily, does not appear to be on an ACE or an ARB and should be with his CKD and diabetes.  - Continue beta-blocker in the perioperative period        Depressive disorder    Assessment: Continue home bupropion 150 mg daily, Remeron 15 mg at bedtime, sertraline 150 mg daily    BPH: Continue home tamsulosin 0.8 mg at bedtime  -Bladder scan          Diet: Consistent Carbohydrate Diet Moderate Consistent Carb (60 g CHO per Meal) Diet    DVT Prophylaxis: Lovenox  Adkins Catheter: Not present  Lines: None     Cardiac Monitoring: ACTIVE order. Indication: AMI (NSTEMI/ STEMI) (48 hours)  Code Status: No CPR- Do NOT Intubate      Clinically Significant Risk Factors         # Hyponatremia: Lowest Na = 131 mmol/L in last 2 days, will monitor as appropriate  # Hypochloremia: Lowest Cl = 97 mmol/L in last 2 days, will monitor as appropriate      # Hypoalbuminemia: Lowest albumin = 2.9 g/dL at 4/16/2025  5:25 AM, will monitor as appropriate    # Acute Kidney Injury, unspecified: based on a >150% or 0.3 mg/dL increase in last creatinine compared to past 90 day average, will monitor renal function  # Hypertension: Noted on  "problem list             # DMII: A1C = 6.8 % (Ref range: <5.7 %) within past 6 months   # Overweight: Estimated body mass index is 28.33 kg/m  as calculated from the following:    Height as of this encounter: 1.854 m (6' 1\").    Weight as of this encounter: 97.4 kg (214 lb 11.2 oz).             Social Drivers of Health    Tobacco Use: Medium Risk (4/15/2025)    Patient History     Smoking Tobacco Use: Former     Smokeless Tobacco Use: Unknown          Disposition Plan     Medically Ready for Discharge: Possibly tomorrow     Vinny Berg MD  Hospitalist Service  [facility]  Securely message with [employer] (more info)  Text page via [facility] Paging/Directory   ______________________________________________________________________    Interval History   Overnight no acute events afebrile, no chest pain, palpitations, orthopnea, dizziness.  Feels slightly confused at anything since the afternoon although he knows he is in the hospital and he knows what year it is.  His pain continues to be significant in his left stump at his incision site, no nausea vomiting he is finally moving his bowels, appetite is stable, no other new complaints.      Physical Exam   Vital Signs: Temp: 97.4  F (36.3  C) Temp src: Tympanic BP: 118/58 Pulse: 75   Resp: 18 SpO2: 94 % O2 Device: None (Room air)    Weight: 214 lbs 11.2 oz    GENERAL: Talkative, laying in bed, pleasant and appropriate, in no apparent distress.  CARDIOVASCULAR: regular rate and rhythm, no murmurs, rubs, or gallops. Normal S1/S2. No lower extremity edema.   RESPIRATORY: clear to auscultation bilaterally, no wheezes, no crackles.   GI: soft, non-tender to palpation in all quadrants, non-distended, normoactive bowel sounds.  MUSCULOSKELETAL: warm and well perfused, left BKA wrapped with Ace dressing, clean dry and intact.   SKIN: no pallor,jaundice, or rashes.   Neuro: Waxing and waning mental status and intermittently talks fluently and clearly about " nonsensical topics, with prompting he is awake alert and oriented x 3, moving all cement extremities symmetrically, able to sit up on the side of the bed independently, grossly nonfocal.      Medical Decision Making             Data     I have personally reviewed the following data over the past 24 hrs:    10.2  \   9.3 (L)   / 381     134 (L) 100 35.2 (H) /  173 (H)   4.1 19 (L) 1.76 (H) \     ALT: 63 AST: 112 (H) AP: 75 TBILI: 0.2   ALB: 2.9 (L) TOT PROTEIN: 6.5 LIPASE: N/A     Trop: 601 (HH) BNP: N/A       Imaging results reviewed over the past 24 hrs:   No results found for this or any previous visit (from the past 24 hours).

## 2025-04-16 NOTE — PLAN OF CARE
"Goal Outcome Evaluation:  VSS, BP soft at times. Pain is intermittent, he will rate his pain a 0 at times but then soon after a 9. States it is a shooting/stabbing pain in his left leg. Oxy given for pain. Intermittent confusion. ACE wrap to left lower extremity. Patient had diarrhea this morning.       Plan of Care Reviewed With: patient    Overall Patient Progress: improvingOverall Patient Progress: improving    Outcome Evaluation: VSS, pain managed    Blood pressure 118/49, pulse 80, temperature 97.3  F (36.3  C), temperature source Tympanic, resp. rate 18, height 1.854 m (6' 1\"), weight 97.4 kg (214 lb 11.2 oz), SpO2 94%.    Xuan Vargas RN on 4/16/2025 at 2:51 PM    "

## 2025-04-16 NOTE — PROGRESS NOTES
Interdisciplinary Discharge Planning Note    Anticipated Discharge Date: 4/16-4/17    Anticipated Discharge Location: [facility]     Clinical Needs Before Discharge:  adequate comfort achieved, stable functional status, and stable vital signs    Treatment Needs After Discharge:  None identified    Potential Barriers to Discharge: None identified at this time     NOEL Gavin  4/16/2025,  2:55 PM

## 2025-04-17 LAB
ALBUMIN UR-MCNC: NEGATIVE MG/DL
ANION GAP SERPL CALCULATED.3IONS-SCNC: 13 MMOL/L (ref 7–15)
APPEARANCE UR: CLEAR
BACTERIA #/AREA URNS HPF: ABNORMAL /HPF
BILIRUB UR QL STRIP: NEGATIVE
BUN SERPL-MCNC: 33.4 MG/DL (ref 8–23)
CALCIUM SERPL-MCNC: 8.1 MG/DL (ref 8.8–10.4)
CHLORIDE SERPL-SCNC: 101 MMOL/L (ref 98–107)
COLOR UR AUTO: YELLOW
CREAT SERPL-MCNC: 1.44 MG/DL (ref 0.67–1.17)
EGFRCR SERPLBLD CKD-EPI 2021: 46 ML/MIN/1.73M2
GLUCOSE BLDC GLUCOMTR-MCNC: 170 MG/DL (ref 70–99)
GLUCOSE BLDC GLUCOMTR-MCNC: 181 MG/DL (ref 70–99)
GLUCOSE BLDC GLUCOMTR-MCNC: 191 MG/DL (ref 70–99)
GLUCOSE BLDC GLUCOMTR-MCNC: 204 MG/DL (ref 70–99)
GLUCOSE SERPL-MCNC: 180 MG/DL (ref 70–99)
GLUCOSE UR STRIP-MCNC: NEGATIVE MG/DL
HCO3 SERPL-SCNC: 19 MMOL/L (ref 22–29)
HGB UR QL STRIP: ABNORMAL
HOLD SPECIMEN: NORMAL
KETONES UR STRIP-MCNC: NEGATIVE MG/DL
LEUKOCYTE ESTERASE UR QL STRIP: NEGATIVE
MAGNESIUM SERPL-MCNC: 2 MG/DL (ref 1.7–2.3)
NITRATE UR QL: NEGATIVE
PATH REPORT.COMMENTS IMP SPEC: NORMAL
PATH REPORT.FINAL DX SPEC: NORMAL
PATH REPORT.RELEVANT HX SPEC: NORMAL
PH UR STRIP: 5 [PH] (ref 5–9)
PHOTO IMAGE: NORMAL
POTASSIUM SERPL-SCNC: 4.5 MMOL/L (ref 3.4–5.3)
RBC URINE: 2 /HPF
SODIUM SERPL-SCNC: 133 MMOL/L (ref 135–145)
SP GR UR STRIP: 1.01 (ref 1–1.03)
TROPONIN T SERPL HS-MCNC: 693 NG/L
UROBILINOGEN UR STRIP-MCNC: NORMAL MG/DL
WBC URINE: <1 /HPF

## 2025-04-17 PROCEDURE — 83735 ASSAY OF MAGNESIUM: CPT | Performed by: INTERNAL MEDICINE

## 2025-04-17 PROCEDURE — 250N000013 HC RX MED GY IP 250 OP 250 PS 637: Performed by: INTERNAL MEDICINE

## 2025-04-17 PROCEDURE — 250N000013 HC RX MED GY IP 250 OP 250 PS 637: Performed by: SURGERY

## 2025-04-17 PROCEDURE — 250N000011 HC RX IP 250 OP 636: Performed by: SURGERY

## 2025-04-17 PROCEDURE — 80048 BASIC METABOLIC PNL TOTAL CA: CPT | Performed by: INTERNAL MEDICINE

## 2025-04-17 PROCEDURE — 250N000013 HC RX MED GY IP 250 OP 250 PS 637: Performed by: FAMILY MEDICINE

## 2025-04-17 PROCEDURE — 82310 ASSAY OF CALCIUM: CPT | Performed by: INTERNAL MEDICINE

## 2025-04-17 PROCEDURE — 99233 SBSQ HOSP IP/OBS HIGH 50: CPT | Mod: 24 | Performed by: INTERNAL MEDICINE

## 2025-04-17 PROCEDURE — 36415 COLL VENOUS BLD VENIPUNCTURE: CPT | Performed by: INTERNAL MEDICINE

## 2025-04-17 PROCEDURE — 84484 ASSAY OF TROPONIN QUANT: CPT | Performed by: INTERNAL MEDICINE

## 2025-04-17 PROCEDURE — 81001 URINALYSIS AUTO W/SCOPE: CPT | Performed by: INTERNAL MEDICINE

## 2025-04-17 PROCEDURE — 120N000001 HC R&B MED SURG/OB

## 2025-04-17 PROCEDURE — 81003 URINALYSIS AUTO W/O SCOPE: CPT | Performed by: INTERNAL MEDICINE

## 2025-04-17 PROCEDURE — 250N000009 HC RX 250: Performed by: INTERNAL MEDICINE

## 2025-04-17 PROCEDURE — 250N000013 HC RX MED GY IP 250 OP 250 PS 637: Performed by: STUDENT IN AN ORGANIZED HEALTH CARE EDUCATION/TRAINING PROGRAM

## 2025-04-17 RX ORDER — LIDOCAINE HYDROCHLORIDE 20 MG/ML
JELLY TOPICAL ONCE
Status: COMPLETED | OUTPATIENT
Start: 2025-04-17 | End: 2025-04-17

## 2025-04-17 RX ORDER — QUETIAPINE FUMARATE 25 MG/1
50 TABLET, FILM COATED ORAL EVERY 6 HOURS PRN
Status: DISCONTINUED | OUTPATIENT
Start: 2025-04-17 | End: 2025-04-18 | Stop reason: HOSPADM

## 2025-04-17 RX ORDER — GABAPENTIN 300 MG/1
600 CAPSULE ORAL 3 TIMES DAILY
Status: DISCONTINUED | OUTPATIENT
Start: 2025-04-17 | End: 2025-04-18 | Stop reason: HOSPADM

## 2025-04-17 RX ORDER — OXYCODONE HYDROCHLORIDE 5 MG/1
5 TABLET ORAL EVERY 4 HOURS PRN
Status: DISCONTINUED | OUTPATIENT
Start: 2025-04-17 | End: 2025-04-18 | Stop reason: HOSPADM

## 2025-04-17 RX ADMIN — ATORVASTATIN CALCIUM 40 MG: 40 TABLET, FILM COATED ORAL at 21:30

## 2025-04-17 RX ADMIN — SENNOSIDES AND DOCUSATE SODIUM 1 TABLET: 50; 8.6 TABLET ORAL at 21:30

## 2025-04-17 RX ADMIN — GABAPENTIN 600 MG: 300 CAPSULE ORAL at 13:01

## 2025-04-17 RX ADMIN — ACETAMINOPHEN 975 MG: 325 TABLET, FILM COATED ORAL at 13:02

## 2025-04-17 RX ADMIN — Medication 250 MG: at 11:30

## 2025-04-17 RX ADMIN — ACETAMINOPHEN 650 MG: 325 TABLET, FILM COATED ORAL at 19:03

## 2025-04-17 RX ADMIN — BUPROPION HYDROCHLORIDE 150 MG: 150 TABLET, EXTENDED RELEASE ORAL at 11:30

## 2025-04-17 RX ADMIN — POLYETHYLENE GLYCOL 3350 17 G: 17 POWDER, FOR SOLUTION ORAL at 21:27

## 2025-04-17 RX ADMIN — GABAPENTIN 600 MG: 300 CAPSULE ORAL at 21:31

## 2025-04-17 RX ADMIN — ENOXAPARIN SODIUM 40 MG: 40 INJECTION SUBCUTANEOUS at 11:33

## 2025-04-17 RX ADMIN — METOPROLOL SUCCINATE 25 MG: 25 TABLET, EXTENDED RELEASE ORAL at 11:30

## 2025-04-17 RX ADMIN — GABAPENTIN 300 MG: 300 CAPSULE ORAL at 08:41

## 2025-04-17 RX ADMIN — MIRTAZAPINE 15 MG: 15 TABLET, FILM COATED ORAL at 21:30

## 2025-04-17 RX ADMIN — FAMOTIDINE 20 MG: 20 TABLET, FILM COATED ORAL at 11:30

## 2025-04-17 RX ADMIN — OXYCODONE HYDROCHLORIDE 5 MG: 5 TABLET ORAL at 12:12

## 2025-04-17 RX ADMIN — OXYCODONE HYDROCHLORIDE 10 MG: 5 TABLET ORAL at 06:20

## 2025-04-17 RX ADMIN — SERTRALINE HYDROCHLORIDE 150 MG: 50 TABLET ORAL at 11:31

## 2025-04-17 RX ADMIN — OXYCODONE HYDROCHLORIDE 5 MG: 5 TABLET ORAL at 19:03

## 2025-04-17 RX ADMIN — ASPIRIN 81 MG: 81 TABLET, COATED ORAL at 11:30

## 2025-04-17 RX ADMIN — POLYETHYLENE GLYCOL 3350 17 G: 17 POWDER, FOR SOLUTION ORAL at 11:34

## 2025-04-17 RX ADMIN — ISOSORBIDE MONONITRATE 30 MG: 30 TABLET, EXTENDED RELEASE ORAL at 08:41

## 2025-04-17 RX ADMIN — LIDOCAINE HYDROCHLORIDE: 20 JELLY TOPICAL at 09:22

## 2025-04-17 RX ADMIN — ACETAMINOPHEN 975 MG: 325 TABLET, FILM COATED ORAL at 06:20

## 2025-04-17 RX ADMIN — TAMSULOSIN HYDROCHLORIDE 0.8 MG: 0.4 CAPSULE ORAL at 21:30

## 2025-04-17 RX ADMIN — Medication 250 MG: at 21:30

## 2025-04-17 RX ADMIN — OXYCODONE HYDROCHLORIDE 10 MG: 5 TABLET ORAL at 01:53

## 2025-04-17 RX ADMIN — SENNOSIDES AND DOCUSATE SODIUM 1 TABLET: 50; 8.6 TABLET ORAL at 11:30

## 2025-04-17 RX ADMIN — ACETAMINOPHEN 975 MG: 325 TABLET, FILM COATED ORAL at 22:09

## 2025-04-17 RX ADMIN — PSYLLIUM HUSK 1 PACKET: 3.4 POWDER ORAL at 11:34

## 2025-04-17 RX ADMIN — LORAZEPAM 1 MG: 1 TABLET ORAL at 03:08

## 2025-04-17 ASSESSMENT — ACTIVITIES OF DAILY LIVING (ADL)
ADLS_ACUITY_SCORE: 80
ADLS_ACUITY_SCORE: 82
ADLS_ACUITY_SCORE: 80
ADLS_ACUITY_SCORE: 80
ADLS_ACUITY_SCORE: 78
ADLS_ACUITY_SCORE: 78
ADLS_ACUITY_SCORE: 81
ADLS_ACUITY_SCORE: 80
ADLS_ACUITY_SCORE: 82
ADLS_ACUITY_SCORE: 80
ADLS_ACUITY_SCORE: 78
ADLS_ACUITY_SCORE: 80
ADLS_ACUITY_SCORE: 82
ADLS_ACUITY_SCORE: 78
ADLS_ACUITY_SCORE: 80
ADLS_ACUITY_SCORE: 78
ADLS_ACUITY_SCORE: 80

## 2025-04-17 NOTE — PROGRESS NOTES
[facility]    Medicine Progress Note - Hospitalist Service    Date of Admission:  4/9/2025    Assessment & Plan     Elsa Horowitz is an 89-year-old man with past medical history of osteomyelitis, peripheral artery disease, coronary disease, type 2 diabetes, hypothyroidism, hypertension, CKD stage IIIb, admitted with osteomyelitis on his left foot.    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        Osteomyelitis of left foot (H)    Wet Gangrene (H)    Diabetic neuropathy (H)    PAD (peripheral artery disease)    Diabetic ulcer of toe of left foot associated with type 2 diabetes mellitus, with necrosis of bone (H)    Assessment: Postop day 3 s/p left below-knee amputation.   Acute on chronic osteomyelitis has grown MRSA, VRE and Pseudomonas in wound cultures in the past.  Significant phantom pain and will benefit from up titration of Neurontin.     Plan:   - Appreciate general surgery consult   - Completed a course of antibiotics   - Increase gabapentin from 300 a.m. 600 p.m.->600 mg po 3 times daily   - As needed oxycodone decreased to 5 mg as needed for pain  - Continue home Florastor  - Wound care and activity restrictions per general surgery    Encephalopathy  Assessment: Worsening overnight, likely due to urinary retention as well as sleep deprivation and pain.    Plan:  - Adkins catheter placed for trial of void as an outpatient  - Encephalopathy order set placed  - Pain control changes as above  - Seroquel as needed      Coronary artery disease    Assessment: Stable.  Episode of atypical chest pain 4/14 with frequent coughing, no dyspnea no other consistent anginal equivalents, noted elevated troponin now downtrending, EKG without obvious new ST changes, completely asymptomatic since that time, repeat echo without new wall motion abnormalities.  Troponin elevation likely secondary to  demand ischemia in  the setting of surgery, CKD and anemia without evidence for acute coronary syndrome.  Plan:  - Continue optimize medical management with home atorvastatin, Imdur and aspirin 81 mg daily  - If developing concerning cardiopulmonary symptoms, will need repeat EKG, possible heparin drip and consultation with cardiology.        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      Plan:   - QID AC HS glucose checks  - Resume insulin glargine 20 units (decreased from 40 units at home) daily in a.m.  - Continue medium sliding scale  - Discontinue scheduled short acting at this time      Chronic kidney disease, stage 3a     Assessment: Improved.  Multifactorial from prerenal insults and urinary retention.  No evidence of hypervolemia either on exam or echocardiogram, renal function is variable with a baseline GFR of approximately 45, GFR 50 on admission.    Plan:   - 1 L IV fluids on 4/16  - Avoid NSAIDs and nephrotoxins  - Monitor daily      Essential hypertension    Assessment: Stable.  Continue home isosorbide mononitrate 30 mg daily, metoprolol 25 mg daily, does not appear to be on an ACE or an ARB and should be with his CKD and diabetes.  - Continue beta-blocker in the perioperative period        Depressive disorder    Assessment: Continue home bupropion 150 mg daily, Remeron 15 mg at bedtime, sertraline 150 mg daily    BPH:   Assessment: No evidence of UTI based on UA  -Continue home tamsulosin 0.8 mg at bedtime  -Adkins as above        Diet: Consistent Carbohydrate Diet Moderate Consistent Carb (60 g CHO per Meal) Diet    DVT Prophylaxis: Lovenox  Adkins Catheter: Not present  Lines: None     Cardiac Monitoring: None  Code Status: No CPR- Do NOT Intubate      Clinically Significant Risk Factors         # Hyponatremia: Lowest Na = 133 mmol/L in last 2 days, will monitor as appropriate       # Hypoalbuminemia: Lowest albumin = 2.9 g/dL at  "4/16/2025  5:25 AM, will monitor as appropriate     # Hypertension: Noted on problem list             # DMII: A1C = 6.8 % (Ref range: <5.7 %) within past 6 months   # Overweight: Estimated body mass index is 28.33 kg/m  as calculated from the following:    Height as of this encounter: 1.854 m (6' 1\").    Weight as of this encounter: 97.4 kg (214 lb 11.2 oz).             Social Drivers of Health    Tobacco Use: Medium Risk (4/15/2025)    Patient History     Smoking Tobacco Use: Former     Smokeless Tobacco Use: Unknown          Disposition Plan     Medically Ready for Discharge: Tomorrow     Vinny Berg MD  Hospitalist Service  [facility]  Securely message with [employer] (more info)  Text page via [facility] Paging/Directory   ______________________________________________________________________    Interval History   Overnight no acute events and afebrile slightly combative yesterday and last night receiving Ativan as needed without significant effect., no current pain this morning but noted urinary retention.  Voided 400 and then straight cathed x 1 for an additional 500.  After straight cathing call orientated to person and place.  No nausea vomiting abdominal pain, tolerated regular consistency breakfast, no other new complaints.      Physical Exam   Vital Signs: Temp: 98.1  F (36.7  C) Temp src: Tympanic BP: 114/57 Pulse: 91   Resp: 16 SpO2: 94 % O2 Device: None (Room air)    Weight: 214 lbs 11.2 oz    GENERAL: Talkative, groggy but spontaneously awake, pleasant and appropriate, in no apparent distress.  CARDIOVASCULAR: regular rate and rhythm, no murmurs, rubs, or gallops. Normal S1/S2. No lower extremity edema.   RESPIRATORY: clear to auscultation bilaterally, no wheezes, no crackles.   GI: soft, non-tender to palpation in all quadrants, non-distended, normoactive bowel sounds.  MUSCULOSKELETAL: warm and well perfused, left BKA wrapped with Ace dressing, clean dry and intact.   SKIN: no " pallor,jaundice, or rashes.   Neuro: Ongoing and stable waxing and waning mental status and intermittently talks fluently and clearly about nonsensical topics, with frequent prompting he is awake alert and oriented x 3, moving all extremities symmetrically.      Medical Decision Making             Data     I have personally reviewed the following data over the past 24 hrs:    N/A  \   N/A   / N/A     133 (L) 101 33.4 (H) /  204 (H)   4.5 19 (L) 1.44 (H) \     Trop: 693 (HH) BNP: N/A       Imaging results reviewed over the past 24 hrs:   No results found for this or any previous visit (from the past 24 hours).

## 2025-04-17 NOTE — PLAN OF CARE
Goal Outcome Evaluation:    Alert, not oriented to place or situation.  VSS, afebrile, on room air.  Pt has been leaving dressing in place, dressing CDI.  Pt continues to thrash in bed, seizure pads put in place with side rails up to protect patient from injuring stump as patient will turn sideways in bed and thrash at times.  PRN Oxycodone given for pain.  One time dose of 1mg Ativan given for anxiety.  Pt has been continent of bladder and calling out when he needs the urinal.  Pt having smear BMs in brief. Pt is currently resting.      Plan of Care Reviewed With: patient    Overall Patient Progress: no changeOverall Patient Progress: no change    Outcome Evaluation: VSS; Confused; Pain management

## 2025-04-17 NOTE — PROGRESS NOTES
Interdisciplinary Discharge Planning Note     Anticipated Discharge Date: 4/18    Anticipated Discharge Location: [facility]    Clinical Needs Before Discharge:  stable functional status    Treatment Needs After Discharge:  homecare    Potential Barriers to Discharge: None identified at this time     NOEL Gavin  4/17/2025,  1:24 PM

## 2025-04-17 NOTE — PROGRESS NOTES
PROGRESS NOTE    cc: POD# 3 s/p BKA    HPI: Disorientated per nurses.    GENERAL: alert, NAD  CV: RRR, nomurmurs  RESPIRATORY: no dyspnea, clear bilat  ABDOMEN: non-distended, +BS, soft, appropriately tender  EXTREMITY: Small blisters to the posterior flap as well as the anterior leg.  No gross drainage.  SKIN: warm and dry, no jaundice norashes  NEURO: cranial nerves II-XII grossly intact, motor exam intact    LABS  Recent Labs   Lab Test 04/16/25  0525 04/15/25  0601   WBC 10.2 11.5*   RBC 3.11* 3.08*   HGB 9.3* 9.2*   HCT 27.0* 26.7*   MCV 87 87   MCH 29.9 29.9   MCHC 34.4 34.5    348       Recent Labs   Lab Test 04/17/25  0604 04/16/25  0525   POTASSIUM 4.5 4.1   CHLORIDE 101 100   BUN 33.4* 35.2*       Recent Labs   Lab Test 04/17/25  0933 04/16/25  0525 04/15/25  0601 02/18/25  2337   PROTEIN Negative  --   --  Negative   BILITOTAL  --  0.2 0.2  --    AST  --  112* 57*  --    ALT  --  63 41  --        IMAGING      ASSESSMENT  89 year old male s/p BKA for osteo of the foot in setting of PAD and BM       PLAN  - Vaseline gauze Kerlix and Ace wrap to the left lower extremity.

## 2025-04-17 NOTE — PLAN OF CARE
"Goal Outcome Evaluation:    VSS, pt became increasingly confused throughout the evening. He removed his surgical bandages, reapplied multiple times. Stump had some minimal bleeding from the incision site due to pt banging it around and hitting it on the bed. He continued to yell out, he was agitated and aggressive with staff. PRN 0.5mg ativan given with no relief. Pt received 5mg dose of Zyprexa. He is currently resting. Report given to Elmer CHEEMA.     /61 (BP Location: Right arm, Patient Position: Semi-Handley's, Cuff Size: Adult Regular)   Pulse 79   Temp 99.6  F (37.6  C) (Tympanic)   Resp 20   Ht 1.854 m (6' 1\")   Wt 97.4 kg (214 lb 11.2 oz)   SpO2 98%   BMI 28.33 kg/m        Plan of Care Reviewed With: patient    Overall Patient Progress: no change    Outcome Evaluation: FREDERICK, Confused, pain management    Sherie Lopez RN on 4/16/2025 at 11:06 PM    "

## 2025-04-17 NOTE — PLAN OF CARE
Goal Outcome Evaluation:      Plan of Care Reviewed With: patient    Overall Patient Progress: no changeOverall Patient Progress: no change    Outcome Evaluation: VSS: Confused: Pain management taken    Patient's vitals are stable. Previous care continued with new catheter in place. Pain management with scheduled and PRN medications. Patient is confused, but doing well.

## 2025-04-17 NOTE — PROGRESS NOTES
:    Met with patient to discuss discharge planning. Patient reports he is interested in having homecare for SN, PT, and OT.     Pt/family was given the Medicare Compare list for Home Care, with associated star ratings to assist with choice for referrals/discharge planning Yes    Education was given to pt/family that star ratings are updated/maintained by Medicare and can be reviewed by visiting www.medicare.gov Yes    Referral sent to [facility] as requested by patient.    NOEL Gavin on 4/17/2025 at 11:54 AM    Spoke with [facility] and they will plan on picking patient up from the hospital Friday morning at 815. They will borrow a wheelchair. Patients insurance covers the cost of transportation.    Spoke with ANETTE Sánchez at [facility] and updated her about time of transportation tomorrow morning and updated her that the referral for homecare was sent to [facility].    NOEL Gavin on 4/17/2025 at 12:44 PM

## 2025-04-18 VITALS
SYSTOLIC BLOOD PRESSURE: 112 MMHG | HEART RATE: 64 BPM | DIASTOLIC BLOOD PRESSURE: 57 MMHG | HEIGHT: 73 IN | WEIGHT: 207.6 LBS | RESPIRATION RATE: 18 BRPM | BODY MASS INDEX: 27.51 KG/M2 | OXYGEN SATURATION: 94 % | TEMPERATURE: 98.6 F

## 2025-04-18 LAB
ANION GAP SERPL CALCULATED.3IONS-SCNC: 12 MMOL/L (ref 7–15)
BUN SERPL-MCNC: 25.4 MG/DL (ref 8–23)
CALCIUM SERPL-MCNC: 8.7 MG/DL (ref 8.8–10.4)
CHLORIDE SERPL-SCNC: 101 MMOL/L (ref 98–107)
CREAT SERPL-MCNC: 1.2 MG/DL (ref 0.67–1.17)
EGFRCR SERPLBLD CKD-EPI 2021: 58 ML/MIN/1.73M2
GLUCOSE BLDC GLUCOMTR-MCNC: 208 MG/DL (ref 70–99)
GLUCOSE SERPL-MCNC: 210 MG/DL (ref 70–99)
HCO3 SERPL-SCNC: 21 MMOL/L (ref 22–29)
HOLD SPECIMEN: NORMAL
MAGNESIUM SERPL-MCNC: 1.9 MG/DL (ref 1.7–2.3)
POTASSIUM SERPL-SCNC: 4.6 MMOL/L (ref 3.4–5.3)
SODIUM SERPL-SCNC: 134 MMOL/L (ref 135–145)

## 2025-04-18 PROCEDURE — 83735 ASSAY OF MAGNESIUM: CPT | Performed by: INTERNAL MEDICINE

## 2025-04-18 PROCEDURE — 250N000013 HC RX MED GY IP 250 OP 250 PS 637: Performed by: SURGERY

## 2025-04-18 PROCEDURE — 99239 HOSP IP/OBS DSCHRG MGMT >30: CPT | Mod: 24 | Performed by: INTERNAL MEDICINE

## 2025-04-18 PROCEDURE — 36415 COLL VENOUS BLD VENIPUNCTURE: CPT | Performed by: INTERNAL MEDICINE

## 2025-04-18 PROCEDURE — 250N000013 HC RX MED GY IP 250 OP 250 PS 637: Performed by: INTERNAL MEDICINE

## 2025-04-18 PROCEDURE — 80048 BASIC METABOLIC PNL TOTAL CA: CPT | Performed by: INTERNAL MEDICINE

## 2025-04-18 RX ORDER — SACCHAROMYCES BOULARDII 250 MG
250 CAPSULE ORAL 2 TIMES DAILY
Qty: 180 CAPSULE | Refills: 0 | Status: SHIPPED | OUTPATIENT
Start: 2025-04-18 | End: 2025-07-17

## 2025-04-18 RX ORDER — GABAPENTIN 300 MG/1
600 CAPSULE ORAL 3 TIMES DAILY
Qty: 540 CAPSULE | Refills: 0 | Status: SHIPPED | OUTPATIENT
Start: 2025-04-18 | End: 2025-04-18

## 2025-04-18 RX ORDER — GABAPENTIN 300 MG/1
600 CAPSULE ORAL 3 TIMES DAILY
Qty: 540 CAPSULE | Refills: 0 | Status: SHIPPED | OUTPATIENT
Start: 2025-04-18

## 2025-04-18 RX ORDER — OXYCODONE HYDROCHLORIDE 5 MG/1
5 TABLET ORAL EVERY 4 HOURS PRN
Qty: 20 TABLET | Refills: 0 | Status: SHIPPED | OUTPATIENT
Start: 2025-04-18 | End: 2025-04-18

## 2025-04-18 RX ORDER — OXYCODONE HYDROCHLORIDE 5 MG/1
5 TABLET ORAL EVERY 4 HOURS PRN
Qty: 20 TABLET | Refills: 0 | Status: SHIPPED | OUTPATIENT
Start: 2025-04-18

## 2025-04-18 RX ADMIN — ISOSORBIDE MONONITRATE 30 MG: 30 TABLET, EXTENDED RELEASE ORAL at 07:49

## 2025-04-18 RX ADMIN — GABAPENTIN 600 MG: 300 CAPSULE ORAL at 05:35

## 2025-04-18 RX ADMIN — OXYCODONE HYDROCHLORIDE 5 MG: 5 TABLET ORAL at 00:45

## 2025-04-18 RX ADMIN — OXYCODONE HYDROCHLORIDE 5 MG: 5 TABLET ORAL at 05:40

## 2025-04-18 RX ADMIN — ACETAMINOPHEN 975 MG: 325 TABLET, FILM COATED ORAL at 05:53

## 2025-04-18 ASSESSMENT — ACTIVITIES OF DAILY LIVING (ADL)
ADLS_ACUITY_SCORE: 76
ADLS_ACUITY_SCORE: 80

## 2025-04-18 NOTE — PLAN OF CARE
"A/O x 3, post op day #4 Left below knee amputation.  Pt having significant phantom pain thru out this shift, he will yell and scream out in pain.     Pt pain improved for a while with gabapentin this shift, but does wear off, PRN oxycodone and scheduled tylenol have also helped with pt being able to get some rest thru out this shift, warm blanket and heat packs to L BKA has also been helpful with pts pain.   Adkins intact, and patent.  Pt has been incontinent of BM all thru out the night.  Recommend holding any laxatives this am as pt has had multiple very loose stools.     Pt needs to be dressed and ready in one of our wheelchairs by 0800.  He has a ride with [employer] at 0815.  Back to [facility] in [place].      /55 (BP Location: Right arm, Patient Position: Semi-Handley's, Cuff Size: Adult Regular)   Pulse 72   Temp 99  F (37.2  C) (Tympanic)   Resp 16   Ht 1.854 m (6' 1\")   Wt 94.2 kg (207 lb 9.6 oz)   SpO2 94%   BMI 27.39 kg/m     "

## 2025-04-18 NOTE — PROGRESS NOTES
:     [facility]     Patient will have [facility] in place. Spoke with Analisa to provide discharge update.    NOEL Atwood on 4/18/2025 at 8:56 AM

## 2025-04-18 NOTE — PROGRESS NOTES
[facility] [facility] DISCHARGE NOTE    Patient discharged to assisted living at 8:19 AM via wheel chair. Accompanied by transportation company and staff. Discharge instructions reviewed with patient and caregiver, opportunity offered to ask questions. Prescriptions sent to patients preferred pharmacy. All belongings sent with patient.    Lorenzo Harris RN

## 2025-04-18 NOTE — DISCHARGE SUMMARY
"[facility]    Discharge Summary  Hospitalist    Date of Admission:  4/9/2025  Date of Discharge:  4/18/2025  Discharging Provider: Vinny Berg MD  Date of Service (when I saw the patient): 04/18/25    Discharge Diagnoses   Principal Problem:    Osteomyelitis of left foot (H)    Date Noted: 4/10/2025  Active Problems:    Chronic kidney disease, stage 4 (severe) (H)    Date Noted: 7/4/2022    Coronary artery disease    Date Noted: 1/14/2023    Diabetic neuropathy (H)    Date Noted: 1/14/2023    Essential hypertension    Date Noted: 1/14/2023    PAD (peripheral artery disease)    Date Noted: 11/29/2022    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  Patient was admitted by Dr. Lin and per H&P, \"89yoM with chronic left 2nd toe wound/osteomyelitis s/p partial amputation, PAD s/p right BKA and several toe amputations on left foot, Type 2 DM, neuropathy, CKD, CAD, MDD, and GERD presented to [place] ED with fever and worsening redness of his left foot for the last few days.  He has been followed by Podiatry.\"     Hospital Course   Elsa Horowitz was admitted on 4/9/2025.  The following problems were addressed during his hospitalization:    Elsa Horowitz is an 89-year-old man with past medical history of osteomyelitis, peripheral artery disease, coronary disease, type 2 diabetes, hypothyroidism, hypertension, CKD stage IIIb, admitted with osteomyelitis on his left foot. 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         Osteomyelitis of left foot (H)    Wet Gangrene (H)    Diabetic neuropathy (H)    PAD (peripheral artery disease)    Diabetic ulcer of toe of left foot associated with type 2 diabetes mellitus, with necrosis of bone (H)  Patient was discharged on postop day 4 s/p left below-knee amputation.   Acute on chronic osteomyelitis has grown MRSA, VRE and Pseudomonas in wound cultures in the past.  He was seen evaluated by general surgery and underwent left below-knee amputation.  He tolerated the procedure well without difficulty.  Postoperatively pain was well-controlled on oral regiment but he had a significant component of phantom pain.  He had some encephalopathy from escalating doses of oxycodone 10 mg, this was back down to 5 mg and Neurontin was increased to 600 mg p.o. 3 times daily with significant improvement in mental status and pain control.     He will continue with routine wound care and activity restrictions as well as follow-up with general surgery for incision check.  He will start wearing his leg immobilizer based on follow-up in general surgery clinic.  He completed a postoperative course of Unasyn.     Encephalopathy  Now resolved and likely metabolic from urinary retention and pain medication.  Develop night of 4/14, noted to have urinary retention, Adkins catheter placed and oxycodone dose decreased as noted above with resolution of his encephalopathy.  Adkins catheter will remain in place with trial of void in 4 days.  If unable to successfully trial of void will need follow-up in urology clinic.      Coronary artery disease   Stable.  Episode of atypical pleuritic chest pain 4/14 with frequent coughing, no dyspnea no other consistent anginal equivalents, noted elevated troponin, peaked and then plateaued, EKG without obvious new ST changes, completely asymptomatic since that time, repeat echo without  new wall motion abnormalities.  Troponin elevation likely secondary to  demand ischemia in the setting of muscle injury from surgery, CKD and anemia without evidence for acute coronary syndrome.  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   home Lantus was decreased from 40 to 20 units and he tolerated this well, he will increase to 30 units and can reuptitrated back to his PTA 40 units pending oral intake.  He will continue with [facility] blood sugar checks, discontinue his scheduled short acting insulin and just use a sliding scale until follow-up with primary care provider.       Chronic kidney disease, stage 3a   Multifactorial from prerenal insults and urinary retention.  Improved with IV fluids and Adkins catheter placement.  No evidence of hypervolemia either on exam or echocardiogram, renal function is variable with a baseline GFR of approximately 45, GFR 50 on admission.  BMP at time of PCP follow-up for stability.         Essential hypertension  Stable.  Continue home isosorbide mononitrate 30 mg daily, metoprolol 25 mg daily, does not appear to be on an ACE or an ARB and should be with his CKD and diabetes.       Depressive disorder  Continue home bupropion 150 mg daily, Remeron 15 mg at bedtime, sertraline 150 mg daily     BPH:   No evidence of UTI based on UA, he will continue with Flomax Adkins with trial of void as noted.    Vinny Berg MD    Significant Results and Procedures   Procedure: Left foot below-knee amputation.     Pending Results   These results will be followed up by NA  Unresulted Labs Ordered in the Past 30 Days of this Admission       No orders found from 3/10/2025 to 4/10/2025.            Code Status   DNR / DNI       Primary Care Physician   Physician No Ref-Primary    Physical Exam   Temp: 98.5  F (36.9  C) Temp  src: Tympanic BP: 133/56 Pulse: 77   Resp: 16 SpO2: 96 % O2 Device: None (Room air)    Vitals:    04/15/25 0024 04/16/25 0129 04/18/25 0028   Weight: 96.2 kg (212 lb) 97.4 kg (214 lb 11.2 oz) 94.2 kg (207 lb 9.6 oz)     Vital Signs with Ranges  Temp:  [98.1  F (36.7  C)-100  F (37.8  C)] 98.5  F (36.9  C)  Pulse:  [72-91] 77  Resp:  [16] 16  BP: (114-133)/(55-59) 133/56  SpO2:  [94 %-96 %] 96 %  I/O last 3 completed shifts:  In: 0   Out: 2410 [Urine:2410]    GENERAL: Talkative, laying in bed, pleasant and appropriate, in no apparent distress.  CARDIOVASCULAR: regular rate and rhythm, no murmurs, rubs, or gallops. Normal S1/S2. No lower extremity edema.   RESPIRATORY: clear to auscultation bilaterally, no wheezes, no crackles.   GI: soft, non-tender, non-distended, normoactive bowel sounds.  : Adkins catheter in place with clear yellow urine  MUSCULOSKELETAL: warm and well perfused, left stump covered with Ace dressing, clean dry and intact.  SKIN: no pallor,jaundice, or rashes  Neuro: Awake alert and oriented x 3, helping reposition himself in bed for cares, joking, tracking, grossly nonfocal.    Discharge Disposition   Discharged to assisted living  Condition at discharge: Stable    Consultations This Hospital Stay   PHARMACY TO DOSE VANCO  PHARMACY TO DOSE VANCO  SURGERY GENERAL IP CONSULT  SOCIAL WORK IP CONSULT  WOUND OSTOMY CONTINENCE NURSE  IP CONSULT  SOCIAL WORK IP CONSULT    Time Spent on this Encounter   I, Vinny Berg MD, personally saw the patient today and spent greater than 30 minutes discharging this patient.    Discharge Orders      Home Care Referral      Reason for your hospital stay    Left foot osteomyelitis requiring left BKA     Follow-up and recommended labs and tests     Follow up with primary care provider, 4/21/2025  3:20 PM with Primary Care Provider within 7 days for hospital follow- up.  The following labs/tests are recommended: bmp, cbc.     Activity    Your activity upon discharge:  activity as tolerated.  Avoid applying any pressure to your new surgical incision until follow-up with general surgery     Monitor and record    Blood glucose: 4 times a day, before meals and at bedtime.     Wound care and dressings    Instructions to care for your wound at home: Apply Vaseline gauze covered by Kerlix and ace wrap daily and as needed if becomes soiled     Discharge Instructions    - There are multiple changes to medications at this time please refer to your after visit summary for complete details  -Increase your gabapentin to 3 times daily to help with the phantom limb pain  -Use the oxycodone as needed for more severe pain  -Decrease your Lantus until your oral intake normalizes.    - Discontinue your scheduled short acting insulin with each meal and just use a sliding scale before each meal     When to contact your care team    Call your primary doctor if you have any of the following: temperature greater than 101,  increased shortness of breath, increased drainage, increased swelling, or increased pain.     Tubes and Drains    Current Tubes and Drains:      Please place left leg immobilizer after follow-up with general surgery       Drain  Duration           Urinary Drain 04/17/25 Urethral Catheter Coude 16 fr <1 day         To straight gravity drainage.  Please do trial of void in 4 days.  If fails please replace catheter and change catheter every 2 weeks and PRN for leaking or decreased urine output with signs of bladder distention. DO NOT change catheter without a specific Provider order IF diagnosis of benign prostatic hypertrophy (BPH), neurogenic bladder, or other urological conditions     Diet    Follow this diet upon discharge: Current Diet:Orders Placed This Encounter      Consistent Carbohydrate Diet Moderate Consistent Carb (60 g CHO per Meal) Diet     Discharge Medications   Current Discharge Medication List        START taking these medications    Details   insulin aspart (NOVOLOG  PEN) 100 UNIT/ML pen Inject 1-7 Units subcutaneously 3 times daily (before meals). Correction Scale - MEDIUM INSULIN RESISTANCE DOSING   Do Not give Correction Insulin if Pre-Meal BG less than 140. For Pre-Meal  - 189 give 1 unit. For Pre-Meal  - 239 give 2 units. For Pre-Meal  - 289 give 3 units. For Pre-Meal  - 339 give 4 units. For Pre-Meal - 389 give 5 units. For Pre-Meal -439 give 6 units For Pre-Meal BG greater than or equal to 440 give 7 units. To be given with prandial insulin, and based on pre-meal blood glucose. Administering insulin within 5 minutes of the start of the meal is ideal. Administer insulin no more than 30 minutes after the start of the meal, unless directed otherwise by provider.   Notify provider if glucose greater than or equal to 350 mg/dL after administration of correction dose.  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  Qty: 20 tablet, Refills: 0    Associated Diagnoses: Other acute osteomyelitis of left foot (H)           CONTINUE these medications which have CHANGED    Details   gabapentin (NEURONTIN) 300 MG capsule Take 2 capsules (600 mg) by mouth 3 times daily.  Qty: 540 capsule, Refills: 0    Associated Diagnoses: Other chronic osteomyelitis of left foot (H); Other acute osteomyelitis of left foot (H)      insulin glargine (LANTUS PEN) 100 UNIT/ML pen Inject 30 Units subcutaneously every morning.    Comments: If Lantus is not covered by insurance, may substitute Basaglar or Semglee or other insulin glargine product per insurance preference at same dose and frequency.        saccharomyces boulardii (FLORASTOR) 250 MG capsule Take 1 capsule (250 mg) by mouth 2 times daily.  Qty: 180 capsule, Refills: 0    Associated Diagnoses: Other acute osteomyelitis of left foot  (H)           CONTINUE these medications which have NOT CHANGED    Details   aspirin 81 MG EC tablet Take 81 mg by mouth daily.      buPROPion (WELLBUTRIN XL) 150 MG 24 hr tablet Take 150 mg by mouth daily.      Vitamin D3 (CHOLECALCIFEROL) 25 mcg (1000 units) tablet Take 1 tablet by mouth daily.      acetaminophen (TYLENOL) 500 MG tablet Take 1,000 mg by mouth 3 times daily      atorvastatin (LIPITOR) 40 MG tablet Take 40 mg by mouth every evening      diclofenac (VOLTAREN) 1 % topical gel Apply topically 4 times daily - 2 gram to elbow, wrist, or hand  - 4 gram to knee, ankle, or foot      famotidine (PEPCID) 20 MG tablet Take 20 mg by mouth 2 times daily.      ferrous fumarate 65 mg, [place]. FE,-Vitamin C 125 mg (VITRON C)  MG TABS tablet Take 1 tablet by mouth daily      hydrocortisone 2.5 % cream Place rectally daily as needed (rectal pain/irritation)      isosorbide mononitrate (IMDUR) 30 MG 24 hr tablet Take 1 tablet by mouth every morning.      loperamide (IMODIUM A-D) 2 MG tablet Take 2 capsules by mouth as needed after 1st loose stool, make take 1 capsule after each loose stool (up to 4 capsules / day)      magnesium hydroxide (MILK OF MAGNESIA) 400 MG/5ML suspension Take 5 mLs by mouth daily as needed for constipation or heartburn      magnesium oxide (MAG-OX) 400 MG tablet Take 400 mg by mouth 2 times daily.      melatonin 3 MG CAPS Take 9 mg by mouth At Bedtime      metoprolol succinate ER (TOPROL XL) 25 MG 24 hr tablet Take 25 mg by mouth daily      mirtazapine (REMERON) 15 MG tablet Take 15 mg by mouth At Bedtime      nitroGLYcerin (NITROSTAT) 0.4 MG sublingual tablet Place 0.4 mg under the tongue every 5 minutes as needed for chest pain For chest pain place 1 tablet under the tongue every 5 minutes for 3 doses. If symptoms persist 5 minutes after 1st dose call 911.      nystatin (MYCOSTATIN) 438335 UNIT/GM external powder Apply topically 2 times daily - to groin for infection      polyethylene  glycol (MIRALAX) 17 g packet Take 1 packet by mouth daily      pramox-pe-glycerin-petrolatum (PREPARATION H) 1-0.25-14.4-15 % CREA cream Place rectally 4 times daily      psyllium (METAMUCIL/KONSYL) capsule Take 1 capsule by mouth daily      sennosides (SENOKOT) 8.6 MG tablet Take 1 tablet by mouth 2 times daily      sertraline (ZOLOFT) 100 MG tablet Take 150 mg by mouth daily.      tamsulosin (FLOMAX) 0.4 MG capsule Take 0.8 mg by mouth every evening      trolamine salicylate (ASPERCREME) 10 % external cream 2 times daily as needed bilateral hand and knees as needed for pain           STOP taking these medications       doxycycline hyclate (VIBRAMYCIN) 100 MG capsule Comments:   Reason for Stopping:         insulin aspart (FIASP FLEXTOUCH) 100 UNIT/ML pen-injector Comments:   Reason for Stopping:             Allergies   Allergies   Allergen Reactions    Penicillins Other (See Comments) and Hives     Pts. Arm swelled, but has tolerated rocephin. Also tolerates Unasyn    Tetanus Antitoxin Other (See Comments)    Tetanus Toxoid      Data   Most Recent 3 CBC's:  Recent Labs   Lab Test 04/16/25  0525 04/15/25  0601 04/14/25  0531   WBC 10.2 11.5* 10.4   HGB 9.3* 9.2* 10.0*   MCV 87 87 88    348 308      Most Recent 3 BMP's:  Recent Labs   Lab Test 04/18/25  0737 04/18/25  0603 04/17/25  2117 04/17/25  0942 04/17/25  0604 04/16/25  0740 04/16/25  0525   NA  --  134*  --   --  133*  --  134*   POTASSIUM  --  4.6  --   --  4.5  --  4.1   CHLORIDE  --  101  --   --  101  --  100   CO2  --  21*  --   --  19*  --  19*   BUN  --  25.4*  --   --  33.4*  --  35.2*   CR  --  1.20*  --   --  1.44*  --  1.76*   ANIONGAP  --  12  --   --  13  --  15   JOSEE  --  8.7*  --   --  8.1*  --  8.3*   * 210* 191*   < > 180*   < > 174*    < > = values in this interval not displayed.     Most Recent 2 LFT's:  Recent Labs   Lab Test 04/16/25  0525 04/15/25  0601   * 57*   ALT 63 41   ALKPHOS 75 78   BILITOTAL 0.2 0.2      Most Recent INR's and Anticoagulation Dosing History:  Anticoagulation Dose History  More data exists         Latest Ref Rng & Units 3/20/2023 3/21/2023 2023 2023 2023 2023 2025   Recent Dosing and Labs   INR 0.85 - 1.15 2.82  1.93  1.31  2.91  3.65  3.85  1.95      Most Recent 3 Troponin's:No lab results found.  Most Recent Cholesterol Panel:No lab results found.  Most Recent 6 Bacteria Isolates From Any Culture (See EPIC Reports for Culture Details):No lab results found.  Most Recent TSH, T4 and A1c Labs:  Recent Labs   Lab Test 04/10/25  0540   A1C 6.8*     Results for orders placed or performed during the hospital encounter of 25   Echocardiogram Complete     Value    LVEF  60-65%    Narrative    [id number]  [id number]  [id number]  [id number]^ISH^NATI^SRINATH     [facility]  [address] [place] [address]     Name: OSCAR WILSON  MRN: [id number]  : 1935  Study Date: 04/15/2025 08:07 AM  Age: 89 yrs  Gender: Male  Patient Location: [facility]  Reason For Study: CAD  Ordering Physician: NATI DENG  Performed By: DON Fitzgerald, RDCS, RVT     BSA: 2.2 m2  Height: 73 in  Weight: 212 lb  HR: 77  BP: 118/69 mmHg  ______________________________________________________________________________  Procedure  Echocardiogram with two-dimensional, color and spectral Doppler. Contrast  Definity. Definity (NDC #43331-841-41) given intravenously. Patient was given  3ml mixture of 1.5ml Definity and 8.5ml saline. 7 ml wasted. Definity Lot #  [id number] .  ______________________________________________________________________________  Interpretation Summary  Left ventricular size, wall motion and function are normal. The ejection  fraction is 60-65%. Mild hypokinesis of the mid inferior wall.     Right ventricular function, chamber size, wall motion, and thickness are  normal.     The inferior vena cava cannot be assessed.     No pericardial effusion is present.      Compared to previous study on 1/16/2023, no significant change  ______________________________________________________________________________  Left Ventricle  Left ventricular size, wall motion and function are normal. The ejection  fraction is 60-65%. Mild concentric wall thickening consistent with left  ventricular hypertrophy is present. Left ventricular diastolic function is  normal. Mild hypokinesis of the mid inferior wall.     Right Ventricle  Right ventricular function, chamber size, wall motion, and thickness are  normal.     Atria  Both atria appear normal. The atrial septum is intact as assessed by color  Doppler .     Mitral Valve  Mild mitral annular calcification is present. Trace mitral insufficiency is  present.     Aortic Valve  The aortic valve is tricuspid. Mild aortic valve calcification is present.     Tricuspid Valve  The tricuspid valve is normal. Trace tricuspid insufficiency is present.     Pulmonic Valve  The pulmonic valve is normal. Trace pulmonic insufficiency is present.     Vessels  The thoracic aorta is normal. The aorta root is normal. The inferior vena cava  cannot be assessed. IVC diameter <2.1 cm collapsing >50% with sniff suggests a  normal RA pressure of 3 mmHg.     Pericardium  No pericardial effusion is present.     Compared to Previous Study  Compared to previous study on 1/16/2023, no significant change.     Attestation  I have personally viewed the imaging and agree with the interpretation and  report as documented by the fellow, Massimo Hanna, and/or edited by me.  ______________________________________________________________________________  MMode/2D Measurements & Calculations  IVSd: 1.1 cm  LVIDd: 4.7 cm  LVIDs: 2.9 cm  LVPWd: 0.84 cm  FS: 38.9 %  LV mass(C)d: 157.4 grams  LV mass(C)dI: 71.4 grams/m2  Ao root diam: 3.6 cm  asc Aorta Diam: 3.7 cm  LVOT diam: 2.3 cm  LVOT area: 4.2 cm2  Ao root diam index Ht(cm/m): 1.9  Ao root diam index BSA (cm/m2): 1.6  Asc Ao diam  index BSA (cm/m2): 1.7  Asc Ao diam index Ht(cm/m): 2.0  LA Volume (BP): 67.5 ml     LA Volume Index (BP): 30.5 ml/m2  RWT: 0.36  TAPSE: 1.8 cm     Doppler Measurements & Calculations  MV E max jesus: 39.8 cm/sec  MV A max jesus: 91.3 cm/sec  MV E/A: 0.44  MV dec time: 0.24 sec  Ao V2 max: 122.0 cm/sec  Ao max P.0 mmHg  Ao V2 mean: 81.4 cm/sec  Ao mean PG: 3.0 mmHg  Ao V2 VTI: 23.9 cm  KAY(I,D): 3.2 cm2  KAY(V,D): 3.3 cm2  LV V1 max PG: 3.6 mmHg  LV V1 max: 94.7 cm/sec  LV V1 VTI: 18.3 cm  SV(LVOT): 77.4 ml  SI(LVOT): 35.1 ml/m2  PA acc time: 0.09 sec     AV Jesus Ratio (DI): 0.78  KAY Index (cm2/m2): 1.5  E/E' av.1  Lateral E/e': 4.1  Medial E/e': 8.1  RV S Jesus: 19.1 cm/sec     ______________________________________________________________________________  Report approved by: Domenico Rolle MD on 04/15/2025 09:45 AM

## 2025-04-18 NOTE — PHARMACY
[facility]  Part of [facility]  [address] [place] [address]    April 18, 2025    Dear Pharmacist,    Your customer, Elsa Horowitz, born on 1935, was recently discharged from [facility].  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 Correction Scale - MEDIUM INSULIN RESISTANCE DOSING   Do Not give Correction Insulin if Pre-Meal BG less than 140. For Pre-Meal  - 189 give 1 unit. For Pre-Meal  - 239 give 2 units. For Pre-Meal  - 289 give 3 units. For Pre-Meal  - 339 give 4 units. For Pre-Meal - 389 give 5 units. For Pre-Meal -439 give 6 units For Pre-Meal BG greater than or equal to 440 give 7 units. To be given with prandial insulin, and based on pre-meal blood glucose. Administering insulin within 5 minutes of the start of the meal is ideal. Administer insulin no more than 30 minutes after the start of the meal, unless directed otherwise by provider.   Notify provider if glucose greater than or equal to 350 mg/dL after administration of correction dose.  Quantity: 15 mL  Refills: 3     oxyCODONE 5 MG tablet  Commonly known as: ROXICODONE      Dose: 5 mg  Take 1 tablet (5 mg) by mouth every 4 hours as needed for moderate pain.  Quantity: 20 tablet  Refills: 0            CONTINUE these medicines which may have CHANGED, or have new prescriptions. If we are uncertain of the size of tablets/capsules you have at home, strength may be listed as something that might have changed.        Dose / Directions   gabapentin 300 MG capsule  Commonly known as: NEURONTIN  This may have changed:   how  much to take  how to take this  when to take this  additional instructions      Dose: 600 mg  Take 2 capsules (600 mg) by mouth 3 times daily.  Quantity: 540 capsule  Refills: 0     insulin glargine 100 UNIT/ML pen  Commonly known as: LANTUS PEN  This may have changed: how much to take      Dose: 30 Units  Inject 30 Units subcutaneously every morning.  Refills: 0            CONTINUE these medicines which have NOT CHANGED        Dose / Directions   acetaminophen 500 MG tablet  Commonly known as: TYLENOL      Dose: 1,000 mg  Take 1,000 mg by mouth 3 times daily  Refills: 0     aspirin 81 MG EC tablet      Dose: 81 mg  Take 81 mg by mouth daily.  Refills: 0     atorvastatin 40 MG tablet  Commonly known as: LIPITOR      Dose: 40 mg  Take 40 mg by mouth every evening  Refills: 0     buPROPion 150 MG 24 hr tablet  Commonly known as: WELLBUTRIN XL      Dose: 150 mg  Take 150 mg by mouth daily.  Refills: 0     diclofenac 1 % topical gel  Commonly known as: VOLTAREN      Apply topically 4 times daily - 2 gram to elbow, wrist, or hand  - 4 gram to knee, ankle, or foot  Refills: 0     Elemental iron 65 mg Vitamin C 125 mg  MG Tabs tablet  Commonly known as: VITRON C      Dose: 1 tablet  Take 1 tablet by mouth daily  Refills: 0     famotidine 20 MG tablet  Commonly known as: PEPCID      Dose: 20 mg  Take 20 mg by mouth 2 times daily.  Refills: 0     hydrocortisone 2.5 % cream      Place rectally daily as needed (rectal pain/irritation)  Refills: 0     isosorbide mononitrate 30 MG 24 hr tablet  Commonly known as: IMDUR      Dose: 1 tablet  Take 1 tablet by mouth every morning.  Refills: 0     loperamide 2 MG tablet  Commonly known as: IMODIUM A-D      Take 2 capsules by mouth as needed after 1st loose stool, make take 1 capsule after each loose stool (up to 4 capsules / day)  Refills: 0     magnesium hydroxide 400 MG/5ML suspension  Commonly known as: MILK OF MAGNESIA      Dose: 5 mL  Take 5 mLs by mouth daily as needed  for constipation or heartburn  Refills: 0     magnesium oxide 400 MG tablet  Commonly known as: MAG-OX      Dose: 400 mg  Take 400 mg by mouth 2 times daily.  Refills: 0     melatonin 3 MG Caps      Dose: 9 mg  Take 9 mg by mouth At Bedtime  Refills: 0     metoprolol succinate ER 25 MG 24 hr tablet  Commonly known as: TOPROL XL      Dose: 25 mg  Take 25 mg by mouth daily  Refills: 0     mirtazapine 15 MG tablet  Commonly known as: REMERON      Dose: 15 mg  Take 15 mg by mouth At Bedtime  Refills: 0     nitroGLYcerin 0.4 MG sublingual tablet  Commonly known as: NITROSTAT      Dose: 0.4 mg  Place 0.4 mg under the tongue every 5 minutes as needed for chest pain For chest pain place 1 tablet under the tongue every 5 minutes for 3 doses. If symptoms persist 5 minutes after 1st dose call 911.  Refills: 0     nystatin 456324 UNIT/GM external powder  Commonly known as: MYCOSTATIN      Apply topically 2 times daily - to groin for infection  Refills: 0     polyethylene glycol 17 g packet  Commonly known as: MIRALAX      Dose: 1 packet  Take 1 packet by mouth daily  Refills: 0     pramox-pe-glycerin-petrolatum 1-0.25-14.4-15 % Crea cream  Commonly known as: PREPARATION H      Place rectally 4 times daily  Refills: 0     psyllium capsule  Commonly known as: METAMUCIL/KONSYL      Dose: 1 capsule  Take 1 capsule by mouth daily  Refills: 0     saccharomyces boulardii 250 MG capsule  Commonly known as: FLORASTOR      Dose: 250 mg  Take 1 capsule (250 mg) by mouth 2 times daily.  Quantity: 180 capsule  Refills: 0     sennosides 8.6 MG tablet  Commonly known as: SENOKOT      Dose: 1 tablet  Take 1 tablet by mouth 2 times daily  Refills: 0     sertraline 100 MG tablet  Commonly known as: ZOLOFT      Dose: 150 mg  Take 150 mg by mouth daily.  Refills: 0     tamsulosin 0.4 MG capsule  Commonly known as: FLOMAX      Dose: 0.8 mg  Take 0.8 mg by mouth every evening  Refills: 0     trolamine salicylate 10 % external cream  Commonly known  as: ASPERCREME      2 times daily as needed bilateral hand and knees as needed for pain  Refills: 0     Vitamin D3 25 mcg (1000 units) tablet  Commonly known as: CHOLECALCIFEROL      Dose: 1 tablet  Take 1 tablet by mouth daily.  Refills: 0            STOP taking      doxycycline hyclate 100 MG capsule  Commonly known as: VIBRAMYCIN        insulin aspart 100 UNIT/ML pen-injector                  Where to get your medicines        These medications were sent to [address] AT [address] [address]      Phone: [phone]   gabapentin 300 MG capsule  insulin aspart 100 UNIT/ML pen  oxyCODONE 5 MG tablet  saccharomyces boulardii 250 MG capsule         We also reviewed Elsa Horowitz's allergy list and updated it as needed:  Allergies: Penicillins, Tetanus antitoxin, and Tetanus toxoid    Thank you for continuing to care for Elsa LATRICE Horowitz.  We look forward to working together with you in the future.    Sincerely,  Terence Cortes [facility]

## 2025-04-21 ENCOUNTER — PATIENT OUTREACH (OUTPATIENT)
Dept: FAMILY MEDICINE | Facility: OTHER | Age: 89
End: 2025-04-21
Payer: COMMERCIAL

## 2025-04-21 ENCOUNTER — DOCUMENTATION ONLY (OUTPATIENT)
Dept: OTHER | Facility: CLINIC | Age: 89
End: 2025-04-21
Payer: COMMERCIAL

## 2025-04-21 NOTE — TELEPHONE ENCOUNTER
Patient has PCP elsewhere, no follow-up here. No TCM call required per policy.    Dena Webster RN on 4/21/2025 at 9:01 AM

## 2025-04-29 ENCOUNTER — OFFICE VISIT (OUTPATIENT)
Dept: SURGERY | Facility: OTHER | Age: 89
End: 2025-04-29
Attending: SURGERY
Payer: COMMERCIAL

## 2025-04-29 VITALS
HEART RATE: 93 BPM | OXYGEN SATURATION: 93 % | RESPIRATION RATE: 18 BRPM | DIASTOLIC BLOOD PRESSURE: 75 MMHG | SYSTOLIC BLOOD PRESSURE: 115 MMHG | TEMPERATURE: 98 F

## 2025-04-29 DIAGNOSIS — Z89.512 LEFT BELOW-KNEE AMPUTEE (H): Primary | ICD-10-CM

## 2025-04-29 PROCEDURE — G0463 HOSPITAL OUTPT CLINIC VISIT: HCPCS

## 2025-04-29 ASSESSMENT — PAIN SCALES - GENERAL: PAINLEVEL_OUTOF10: MODERATE PAIN (5)

## 2025-04-29 NOTE — PROGRESS NOTES
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 Patient has done well.  Patient has been evaluated by primary care.  They had fitted him with a different sort of knee immobilizer with sheep wool padding.  Patient has developed blisters from a knee immobilizer.  Patient has not been wearing his  sock.    /75 (BP Location: Right arm, Patient Position: Sitting, Cuff Size: Adult Regular)   Pulse 93   Temp 98  F (36.7  C) (Tympanic)   Resp 18   SpO2 93%     General: NAD, pleasant and cooperative with exam and interview.  Extremity: Left below knee has good perfusion to flaps.  The medial aspect has a ischemic corner that measures 2 cm x 3 cm in uncertain depth.  There is no slough.  There are blisters on the flap and anterior shin that have dissipated.  There is additional abrasion/blister from some sort of prosthesis on the upper leg.  Psychiatry: awake, alert and oriented. Appropriate affect.    Assessment/Plan:  89-year-old male status post left most recent below-knee amputation.  Has not been wearing any compression.  Ace wrap placed today.   socks had been ordered and were present at the time of hospitalization.  Will reorder if needed.    Follow-up in 2 weeks for suture removal.  Possible debridement of ischemic skin/open wound care.    Oswaldo Weiner MD on 4/29/2025 at 10:34 AM

## 2025-04-29 NOTE — NURSING NOTE
"Chief Complaint   Patient presents with    Surgical Followup       Initial /75 (BP Location: Right arm, Patient Position: Sitting, Cuff Size: Adult Regular)   Pulse 93   Temp 98  F (36.7  C) (Tympanic)   Resp 18   SpO2 93%  Estimated body mass index is 27.39 kg/m  as calculated from the following:    Height as of 4/9/25: 1.854 m (6' 1\").    Weight as of 4/18/25: 94.2 kg (207 lb 9.6 oz).  Medication Reconciliation: complete    Joe Babin RN     "

## 2025-05-20 ENCOUNTER — OFFICE VISIT (OUTPATIENT)
Dept: SURGERY | Facility: OTHER | Age: 89
End: 2025-05-20
Attending: SURGERY
Payer: COMMERCIAL

## 2025-05-20 VITALS
OXYGEN SATURATION: 98 % | DIASTOLIC BLOOD PRESSURE: 60 MMHG | RESPIRATION RATE: 18 BRPM | TEMPERATURE: 98.4 F | SYSTOLIC BLOOD PRESSURE: 110 MMHG | HEART RATE: 88 BPM

## 2025-05-20 DIAGNOSIS — Z89.512 LEFT BELOW-KNEE AMPUTEE (H): Primary | ICD-10-CM

## 2025-05-20 PROCEDURE — 97597 DBRDMT OPN WND 1ST 20 CM/<: CPT | Performed by: SURGERY

## 2025-05-20 PROCEDURE — G0463 HOSPITAL OUTPT CLINIC VISIT: HCPCS | Mod: 25

## 2025-05-20 ASSESSMENT — PAIN SCALES - GENERAL: PAINLEVEL_OUTOF10: MILD PAIN (2)

## 2025-05-20 NOTE — PATIENT INSTRUCTIONS
We to dry dressing changes bid to the medial ulcer. Wrap with kerlix and ace wrap.     Apply bacitracin or vasaline followed by Allevyn / band aide to other dry scabs every week and PRN .

## 2025-05-20 NOTE — PROGRESS NOTES
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 Has some open wounds from the knee immobilizer.     /60 (BP Location: Right arm, Patient Position: Sitting, Cuff Size: Adult Regular)   Pulse 88   Temp 98.4  F (36.9  C) (Temporal)   Resp 18   SpO2 98%     General: NAD, pleasant and cooperative with exam and interview.  Extremity: Left knee BKA well healed. No dehiscence. 3 cm X 2 cm area debrided with scalpel to muscle and subcutaneous tissue.   Psychiatry: awake, alert and oriented. Appropriate affect.    Assessment/Plan:  89 year old male s/p BKA     - Follow-up in 4 6 weeks of wet to dry if not full healed. Bacitracin and topical covering to superficial wounds.      Oswaldo Weiner MD on 5/20/2025 at 3:25 PM 25 min spent.

## 2025-05-20 NOTE — NURSING NOTE
"Chief Complaint   Patient presents with    WOUND CARE       Initial /60 (BP Location: Right arm, Patient Position: Sitting, Cuff Size: Adult Regular)   Pulse 88   Temp 98.4  F (36.9  C) (Temporal)   Resp 18   SpO2 98%  Estimated body mass index is 27.39 kg/m  as calculated from the following:    Height as of 4/9/25: 1.854 m (6' 1\").    Weight as of 4/18/25: 94.2 kg (207 lb 9.6 oz).  Medication Reconciliation: complete    Joe Babin RN   "

## 2025-06-12 ENCOUNTER — OFFICE VISIT (OUTPATIENT)
Dept: SURGERY | Facility: OTHER | Age: 89
End: 2025-06-12
Attending: SURGERY
Payer: COMMERCIAL

## 2025-06-12 VITALS — TEMPERATURE: 98.4 F | DIASTOLIC BLOOD PRESSURE: 58 MMHG | RESPIRATION RATE: 16 BRPM | SYSTOLIC BLOOD PRESSURE: 124 MMHG

## 2025-06-12 DIAGNOSIS — Z98.890 POSTOPERATIVE STATE: Primary | ICD-10-CM

## 2025-06-12 DIAGNOSIS — S81.802A OPEN WOUND OF LEFT LOWER LEG, INITIAL ENCOUNTER: ICD-10-CM

## 2025-06-12 PROBLEM — M86.9 OSTEOMYELITIS OF LEFT FOOT (H): Status: RESOLVED | Noted: 2025-04-10 | Resolved: 2025-06-12

## 2025-06-12 PROBLEM — M86.60 CHRONIC OSTEOMYELITIS (H): Status: RESOLVED | Noted: 2023-03-20 | Resolved: 2025-06-12

## 2025-06-12 PROBLEM — L97.509 FOOT ULCER (H): Status: RESOLVED | Noted: 2023-03-15 | Resolved: 2025-06-12

## 2025-06-12 PROBLEM — M86.271 SUBACUTE OSTEOMYELITIS OF RIGHT FOOT (H): Status: RESOLVED | Noted: 2023-02-17 | Resolved: 2025-06-12

## 2025-06-12 PROBLEM — T14.8XXA LOCAL INFECTION OF WOUND: Status: RESOLVED | Noted: 2023-01-14 | Resolved: 2025-06-12

## 2025-06-12 PROBLEM — L08.9 LOCAL INFECTION OF WOUND: Status: RESOLVED | Noted: 2023-01-14 | Resolved: 2025-06-12

## 2025-06-12 PROBLEM — I96 GANGRENE OF RIGHT FOOT (H): Status: RESOLVED | Noted: 2023-02-13 | Resolved: 2025-06-12

## 2025-06-12 PROCEDURE — G0463 HOSPITAL OUTPT CLINIC VISIT: HCPCS

## 2025-06-12 RX ORDER — DOXYCYCLINE 100 MG/1
100 CAPSULE ORAL
COMMUNITY
Start: 2025-06-06 | End: 2025-06-13

## 2025-06-12 ASSESSMENT — PAIN SCALES - GENERAL: PAINLEVEL_OUTOF10: NO PAIN (0)

## 2025-06-12 NOTE — PROGRESS NOTES
Subjective:  This is a follow up after Left BKA on 4/14/25 by Dr Wenier. The patient has new complaints of wound on medial stump.     Objective:/58 (BP Location: Right arm, Patient Position: Sitting, Cuff Size: Adult Regular)   Temp 98.4  F (36.9  C) (Temporal)   Resp 16   The incision is healing  without erythema. In incision is open area under 1 cm but 1 cm deep. Multiple scabbed areas. Scabs removed. Medial has 3.4 x 3.8 x 1 cm deep wound covered in dry eschar which was debrided with scissors to bleeding tissue. Promogran/Allevyn applied    Assessment:   Open wound left stump to subcutaneous tissue.    Plan:  Maxorb/Allevyn to incisional wound every three days and as needed  Promogran Nevaeh AG/ Allevyn to medial wound every three days   Allevyn to other wounds every three days  Kerlix/Ace wrap for protection/compression  Follow-up Dr Weiner in two weeks

## 2025-06-12 NOTE — NURSING NOTE
"Chief Complaint   Patient presents with    RECHECK     Wound check    inside of left stump         Medication reconciliation completed.    FOOD SECURITY SCREENING QUESTIONS:    The next two questions are to help us understand your food security.  If you are feeling you need any assistance in this area, we have resources available to support you today.    Hunger Vital Signs:  Within the past 12 months we worried whether our food would run out before we got money to buy more. Never  Within the past 12 months the food we bought just didn't last and we didn't have money to get more. Never    Initial /58 (BP Location: Right arm, Patient Position: Sitting, Cuff Size: Adult Regular)   Temp 98.4  F (36.9  C) (Temporal)   Resp 16  Estimated body mass index is 27.39 kg/m  as calculated from the following:    Height as of 4/9/25: 1.854 m (6' 1\").    Weight as of 4/18/25: 94.2 kg (207 lb 9.6 oz).       Mary Jane Oliva LPN .......  6/12/2025  10:35 AM    "

## 2025-06-18 ENCOUNTER — TRANSFERRED RECORDS (OUTPATIENT)
Dept: HEALTH INFORMATION MANAGEMENT | Facility: OTHER | Age: 89
End: 2025-06-18
Payer: COMMERCIAL

## 2025-06-19 ENCOUNTER — TELEPHONE (OUTPATIENT)
Dept: SURGERY | Facility: OTHER | Age: 89
End: 2025-06-19
Payer: COMMERCIAL

## 2025-06-19 NOTE — TELEPHONE ENCOUNTER
Dr.Karissa Wesley from [facility] called regarding pt's wound care and needing to talk with either Dr. Stockton or Husam. Codi said she needs a call back today. Thank you  Evelyn Eastman on 6/19/2025 at 10:35 AM

## 2025-06-19 NOTE — TELEPHONE ENCOUNTER
Pt is already scheduled for Wednesday of next week. Sarah said that day would be fine to just keep that appt.  Evelyn Eastman on 6/19/2025 at 4:19 PM

## 2025-06-20 RX ORDER — LANOLIN ALCOHOL/MO/W.PET/CERES
400 CREAM (GRAM) TOPICAL DAILY
COMMUNITY
Start: 2025-05-16

## 2025-06-20 RX ORDER — QUETIAPINE FUMARATE 25 MG/1
25 TABLET, FILM COATED ORAL
COMMUNITY
Start: 2025-05-09

## 2025-06-20 RX ORDER — PRAZOSIN HYDROCHLORIDE 2 MG/1
2 CAPSULE ORAL
COMMUNITY
Start: 2025-06-18

## 2025-06-25 ENCOUNTER — OFFICE VISIT (OUTPATIENT)
Dept: SURGERY | Facility: OTHER | Age: 89
End: 2025-06-25
Attending: SURGERY
Payer: COMMERCIAL

## 2025-06-25 VITALS
RESPIRATION RATE: 18 BRPM | SYSTOLIC BLOOD PRESSURE: 128 MMHG | TEMPERATURE: 97.4 F | DIASTOLIC BLOOD PRESSURE: 62 MMHG | HEART RATE: 83 BPM | OXYGEN SATURATION: 94 %

## 2025-06-25 DIAGNOSIS — Z89.512 S/P BILATERAL BKA (BELOW KNEE AMPUTATION) (H): ICD-10-CM

## 2025-06-25 DIAGNOSIS — Z89.512 HX OF LEFT BKA (H): Primary | ICD-10-CM

## 2025-06-25 DIAGNOSIS — Z89.511 S/P BILATERAL BKA (BELOW KNEE AMPUTATION) (H): ICD-10-CM

## 2025-06-25 PROCEDURE — G0463 HOSPITAL OUTPT CLINIC VISIT: HCPCS | Mod: 25

## 2025-06-25 PROCEDURE — 97597 DBRDMT OPN WND 1ST 20 CM/<: CPT | Performed by: SURGERY

## 2025-06-25 ASSESSMENT — PAIN SCALES - GENERAL: PAINLEVEL_OUTOF10: MILD PAIN (3)

## 2025-06-25 NOTE — NURSING NOTE
"Chief Complaint   Patient presents with    WOUND CARE       Initial /62 (BP Location: Right arm, Patient Position: Sitting, Cuff Size: Adult Regular)   Pulse 83   Temp 97.4  F (36.3  C) (Temporal)   Resp 18   SpO2 94%  Estimated body mass index is 27.39 kg/m  as calculated from the following:    Height as of 4/9/25: 1.854 m (6' 1\").    Weight as of 4/18/25: 94.2 kg (207 lb 9.6 oz).  Medication Reconciliation: complete    Joe Babin RN   "

## 2025-06-25 NOTE — PROGRESS NOTES
Patient presents for follow-up.  Patient had been debrided with Dr. Stockton 6/19/2025 and seen in the ER in [place] 6/10.  He describes less pain after being started on antibiotics.  There is no cultures obtained.  There is drainage that is scant to the dressings.  The dressings that he have not are Allevyn and a small patch of tightly woven gauze to each site.      /62 (BP Location: Right arm, Patient Position: Sitting, Cuff Size: Adult Regular)   Pulse 83   Temp 97.4  F (36.3  C) (Temporal)   Resp 18   SpO2 94%     General: NAD, pleasant and cooperative with exam and interview.  Extremity: The left knee has a medial area of heavy slough and necrotic fat.  There is granulation tissue in one half of a 2 cm x 3 cm wound.  It probes 1.5 cm deep.  1.5 x 2 cm of necrotic fat is excised.  The midportion of the incision has slough only in a area of 1 cm x 3 cm.  There is no dehiscence to this area.  The other scattered abrasions have healed.  The tibial tuberosity wound is healed.  Psychiatry: awake, alert and oriented. Appropriate affect.    Assessment/Plan:  89 year old male s/p BKA     - Continue wet-to-dry dressing changes with saline moistened Kerlix.  The goal for these dressing changes is for serial debridement with coarse gauze to allow for full granulation and removal of devitalized tissue.  Do not soak dressings with saline at dressing change.  Continue twice daily until fully granulated and epithelialized.  Protect stump from fall.  New  sock ordered as the previous one has been discarded.  Needs to remain in compression.  Needs to be evaluated for diuresis with dependent edema in both legs.  Follow-up in 2 to 4 weeks for additional debridement.  No source of infection.  Likely irritation/pain from ischemic tissue.  Avoid further ischemic damage from prolonged pressure, trauma, other mechanical means.      No role for stump revision in noncompliant patient with no exposed bone and no active  life-threatening process.    Oswaldo Weiner MD on 6/25/2025 at 9:59 AM

## 2025-09-02 ENCOUNTER — OFFICE VISIT (OUTPATIENT)
Dept: SURGERY | Facility: OTHER | Age: 89
End: 2025-09-02
Attending: SURGERY
Payer: COMMERCIAL

## 2025-09-02 VITALS
DIASTOLIC BLOOD PRESSURE: 62 MMHG | RESPIRATION RATE: 18 BRPM | OXYGEN SATURATION: 98 % | TEMPERATURE: 98.4 F | SYSTOLIC BLOOD PRESSURE: 118 MMHG | HEART RATE: 79 BPM

## 2025-09-02 DIAGNOSIS — Z89.511 S/P BILATERAL BKA (BELOW KNEE AMPUTATION) (H): Primary | ICD-10-CM

## 2025-09-02 DIAGNOSIS — Z89.512 S/P BILATERAL BKA (BELOW KNEE AMPUTATION) (H): Primary | ICD-10-CM

## 2025-09-02 PROCEDURE — G0463 HOSPITAL OUTPT CLINIC VISIT: HCPCS | Mod: 25

## 2025-09-02 ASSESSMENT — PAIN SCALES - GENERAL: PAINLEVEL_OUTOF10: NO PAIN (0)

## (undated) DEVICE — SU VICRYL 2-0 CT-2 CR 8X18" J726D

## (undated) DEVICE — SUTURE 2-0 VICRYL TIES J911T

## (undated) DEVICE — PACK MAJOR EXTREMITY SOP15MEFCA

## (undated) DEVICE — SPONGE LAP 18X18" X8435

## (undated) DEVICE — SU VICRYL 1 CTX CR 8X18" J765D

## (undated) DEVICE — BLADE SAW SAGITTAL 18X60X0.6MM SHORT 2108-120-000

## (undated) DEVICE — SU VICRYL 1 CT 36" J959H

## (undated) DEVICE — Device

## (undated) DEVICE — DRAPE STOCKINETTE IMPERVIOUS 12" 1587

## (undated) DEVICE — DRSG KERLIX 4 1/2"X4YDS ROLL 6715

## (undated) DEVICE — PREP SKIN SCRUB TRAY 4461A

## (undated) DEVICE — GLOVE PROTEXIS POWDER FREE SMT 7.5  2D72PT75X

## (undated) DEVICE — BLADE SAW OSCIL/SAG STRK 25X90X1.27MM 4125-127-090

## (undated) DEVICE — SU VICRYL 3-0 SH 27" J784G

## (undated) DEVICE — SUCTION TIP YANKAUER W/O VENT K86

## (undated) DEVICE — BNDG COBAN 4"X5YDS STERILE 1584S

## (undated) DEVICE — PENCIL MEGADYNE TELESCOPING SMOKE EVACUATION 10 FT 251010J

## (undated) DEVICE — SU VICRYL 2-0 CT-2 27" J333H

## (undated) DEVICE — SOL WATER 1500ML

## (undated) DEVICE — TOWEL SURG 17INW X 26INL CTTN BLUE STRL 8324B

## (undated) DEVICE — GLOVE BIOGEL INDICATOR 7.5 LF 41675

## (undated) DEVICE — SU ETHILON 2-0 FS 18" 664G

## (undated) DEVICE — BNDG ELASTIC 4" DBL LENGTH UNSTERILE 6611-14

## (undated) DEVICE — DRSG XEROFORM 1X8"

## (undated) DEVICE — SPONGE LAP 18X18" 23250-400

## (undated) DEVICE — DRSG ABDOMINAL PAD UNSTERILE 5X9" 9190

## (undated) DEVICE — DRSG KERLIX SUPER SPONGE 7310

## (undated) RX ORDER — GABAPENTIN 300 MG/1
CAPSULE ORAL
Status: DISPENSED
Start: 2023-02-17

## (undated) RX ORDER — CEFEPIME HYDROCHLORIDE 2 G/1
INJECTION, POWDER, FOR SOLUTION INTRAVENOUS
Status: DISPENSED
Start: 2023-01-13

## (undated) RX ORDER — ONDANSETRON 2 MG/ML
INJECTION INTRAMUSCULAR; INTRAVENOUS
Status: DISPENSED
Start: 2025-04-14

## (undated) RX ORDER — CEFEPIME HYDROCHLORIDE 1 G/1
INJECTION, POWDER, FOR SOLUTION INTRAMUSCULAR; INTRAVENOUS
Status: DISPENSED
Start: 2022-11-26

## (undated) RX ORDER — PROPOFOL 10 MG/ML
INJECTION, EMULSION INTRAVENOUS
Status: DISPENSED
Start: 2025-04-14

## (undated) RX ORDER — TRIAMCINOLONE ACETONIDE 40 MG/ML
INJECTION, SUSPENSION INTRA-ARTICULAR; INTRAMUSCULAR
Status: DISPENSED
Start: 2018-08-21

## (undated) RX ORDER — BUPIVACAINE HYDROCHLORIDE 2.5 MG/ML
INJECTION, SOLUTION EPIDURAL; INFILTRATION; INTRACAUDAL; PERINEURAL
Status: DISPENSED
Start: 2025-04-14

## (undated) RX ORDER — CLOPIDOGREL BISULFATE 75 MG/1
TABLET ORAL
Status: DISPENSED
Start: 2023-02-17

## (undated) RX ORDER — FENTANYL CITRATE-0.9 % NACL/PF 10 MCG/ML
PLASTIC BAG, INJECTION (ML) INTRAVENOUS
Status: DISPENSED
Start: 2025-04-14

## (undated) RX ORDER — NEOMYCIN/BACITRACIN/POLYMYXINB 3.5-400-5K
OINTMENT (GRAM) TOPICAL
Status: DISPENSED
Start: 2023-01-26

## (undated) RX ORDER — ACETAMINOPHEN 500 MG
TABLET ORAL
Status: DISPENSED
Start: 2023-04-24

## (undated) RX ORDER — MORPHINE SULFATE 2 MG/ML
INJECTION, SOLUTION INTRAMUSCULAR; INTRAVENOUS
Status: DISPENSED
Start: 2022-11-26

## (undated) RX ORDER — GINSENG 100 MG
CAPSULE ORAL
Status: DISPENSED
Start: 2023-04-02

## (undated) RX ORDER — ACETAMINOPHEN 500 MG
TABLET ORAL
Status: DISPENSED
Start: 2025-02-19

## (undated) RX ORDER — HEPARIN SODIUM 10000 [USP'U]/100ML
INJECTION, SOLUTION INTRAVENOUS
Status: DISPENSED
Start: 2023-03-15

## (undated) RX ORDER — DEXAMETHASONE SODIUM PHOSPHATE 4 MG/ML
INJECTION, SOLUTION INTRA-ARTICULAR; INTRALESIONAL; INTRAMUSCULAR; INTRAVENOUS; SOFT TISSUE
Status: DISPENSED
Start: 2025-04-14

## (undated) RX ORDER — FENTANYL CITRATE 50 UG/ML
INJECTION, SOLUTION INTRAMUSCULAR; INTRAVENOUS
Status: DISPENSED
Start: 2025-04-14

## (undated) RX ORDER — ASPIRIN 81 MG/1
TABLET, CHEWABLE ORAL
Status: DISPENSED
Start: 2023-02-17

## (undated) RX ORDER — ACETAMINOPHEN 500 MG
TABLET ORAL
Status: DISPENSED
Start: 2022-11-26

## (undated) RX ORDER — HYDROMORPHONE HYDROCHLORIDE 2 MG/ML
INJECTION, SOLUTION INTRAMUSCULAR; INTRAVENOUS; SUBCUTANEOUS
Status: DISPENSED
Start: 2025-04-14

## (undated) RX ORDER — CEFEPIME HYDROCHLORIDE 2 G/1
INJECTION, POWDER, FOR SOLUTION INTRAVENOUS
Status: DISPENSED
Start: 2023-03-15

## (undated) RX ORDER — FAMOTIDINE 20 MG/1
TABLET, FILM COATED ORAL
Status: DISPENSED
Start: 2023-02-17

## (undated) RX ORDER — CIPROFLOXACIN 2 MG/ML
INJECTION, SOLUTION INTRAVENOUS
Status: DISPENSED
Start: 2023-02-17

## (undated) RX ORDER — MORPHINE SULFATE 2 MG/ML
INJECTION, SOLUTION INTRAMUSCULAR; INTRAVENOUS
Status: DISPENSED
Start: 2022-11-27

## (undated) RX ORDER — FENTANYL CITRATE 50 UG/ML
INJECTION, SOLUTION INTRAMUSCULAR; INTRAVENOUS
Status: DISPENSED
Start: 2023-04-24

## (undated) RX ORDER — LIDOCAINE HYDROCHLORIDE AND EPINEPHRINE 10; 10 MG/ML; UG/ML
INJECTION, SOLUTION INFILTRATION; PERINEURAL
Status: DISPENSED
Start: 2025-04-14

## (undated) RX ORDER — CEFEPIME HYDROCHLORIDE 2 G/1
INJECTION, POWDER, FOR SOLUTION INTRAVENOUS
Status: DISPENSED
Start: 2023-04-24